# Patient Record
Sex: MALE | Race: WHITE | NOT HISPANIC OR LATINO | Employment: OTHER | URBAN - METROPOLITAN AREA
[De-identification: names, ages, dates, MRNs, and addresses within clinical notes are randomized per-mention and may not be internally consistent; named-entity substitution may affect disease eponyms.]

---

## 2017-01-26 ENCOUNTER — GENERIC CONVERSION - ENCOUNTER (OUTPATIENT)
Dept: OTHER | Facility: OTHER | Age: 72
End: 2017-01-26

## 2017-01-26 LAB
LEFT EYE DIABETIC RETINOPATHY: NORMAL
RIGHT EYE DIABETIC RETINOPATHY: NORMAL

## 2017-02-13 ENCOUNTER — APPOINTMENT (OUTPATIENT)
Dept: LAB | Facility: CLINIC | Age: 72
End: 2017-02-13
Payer: COMMERCIAL

## 2017-02-13 ENCOUNTER — TRANSCRIBE ORDERS (OUTPATIENT)
Dept: LAB | Facility: CLINIC | Age: 72
End: 2017-02-13

## 2017-02-13 DIAGNOSIS — R31.0 GROSS HEMATURIA: ICD-10-CM

## 2017-02-13 DIAGNOSIS — R31.0 GROSS HEMATURIA: Primary | ICD-10-CM

## 2017-02-13 LAB — PSA SERPL-MCNC: <0.1 NG/ML (ref 0–4)

## 2017-02-13 PROCEDURE — 36415 COLL VENOUS BLD VENIPUNCTURE: CPT

## 2017-02-13 PROCEDURE — G0103 PSA SCREENING: HCPCS

## 2017-03-06 ENCOUNTER — TRANSCRIBE ORDERS (OUTPATIENT)
Dept: LAB | Facility: CLINIC | Age: 72
End: 2017-03-06

## 2017-03-06 ENCOUNTER — APPOINTMENT (OUTPATIENT)
Dept: LAB | Facility: CLINIC | Age: 72
End: 2017-03-06
Payer: COMMERCIAL

## 2017-03-06 DIAGNOSIS — R16.0 HEPATOMEGALY, NOT ELSEWHERE CLASSIFIED: ICD-10-CM

## 2017-03-06 DIAGNOSIS — I10 ESSENTIAL (PRIMARY) HYPERTENSION: ICD-10-CM

## 2017-03-06 DIAGNOSIS — K75.81 NONALCOHOLIC STEATOHEPATITIS (NASH): ICD-10-CM

## 2017-03-06 DIAGNOSIS — E78.5 HYPERLIPIDEMIA: ICD-10-CM

## 2017-03-06 DIAGNOSIS — E11.65 TYPE 2 DIABETES MELLITUS WITH HYPERGLYCEMIA (HCC): ICD-10-CM

## 2017-03-06 LAB
ALBUMIN SERPL BCP-MCNC: 3.6 G/DL (ref 3.5–5)
ALP SERPL-CCNC: 68 U/L (ref 46–116)
ALT SERPL W P-5'-P-CCNC: 126 U/L (ref 12–78)
ANION GAP SERPL CALCULATED.3IONS-SCNC: 6 MMOL/L (ref 4–13)
AST SERPL W P-5'-P-CCNC: 69 U/L (ref 5–45)
BILIRUB SERPL-MCNC: 0.5 MG/DL (ref 0.2–1)
BUN SERPL-MCNC: 19 MG/DL (ref 5–25)
CALCIUM SERPL-MCNC: 9.8 MG/DL (ref 8.3–10.1)
CHLORIDE SERPL-SCNC: 102 MMOL/L (ref 100–108)
CHOLEST SERPL-MCNC: 211 MG/DL (ref 50–200)
CO2 SERPL-SCNC: 31 MMOL/L (ref 21–32)
CREAT SERPL-MCNC: 1.51 MG/DL (ref 0.6–1.3)
CREAT UR-MCNC: 132 MG/DL
EST. AVERAGE GLUCOSE BLD GHB EST-MCNC: 157 MG/DL
GFR SERPL CREATININE-BSD FRML MDRD: 45.9 ML/MIN/1.73SQ M
GLUCOSE SERPL-MCNC: 113 MG/DL (ref 65–140)
HBA1C MFR BLD: 7.1 % (ref 4.2–6.3)
HDLC SERPL-MCNC: 49 MG/DL (ref 40–60)
LDLC SERPL CALC-MCNC: 139 MG/DL (ref 0–100)
MICROALBUMIN UR-MCNC: 5.7 MG/L (ref 0–20)
MICROALBUMIN/CREAT 24H UR: 4 MG/G CREATININE (ref 0–30)
POTASSIUM SERPL-SCNC: 4.5 MMOL/L (ref 3.5–5.3)
PROT SERPL-MCNC: 8.5 G/DL (ref 6.4–8.2)
SODIUM SERPL-SCNC: 139 MMOL/L (ref 136–145)
TRIGL SERPL-MCNC: 115 MG/DL

## 2017-03-06 PROCEDURE — 80061 LIPID PANEL: CPT

## 2017-03-06 PROCEDURE — 36415 COLL VENOUS BLD VENIPUNCTURE: CPT

## 2017-03-06 PROCEDURE — 82043 UR ALBUMIN QUANTITATIVE: CPT

## 2017-03-06 PROCEDURE — 83036 HEMOGLOBIN GLYCOSYLATED A1C: CPT

## 2017-03-06 PROCEDURE — 80053 COMPREHEN METABOLIC PANEL: CPT

## 2017-03-06 PROCEDURE — 82570 ASSAY OF URINE CREATININE: CPT

## 2017-03-16 ENCOUNTER — ALLSCRIPTS OFFICE VISIT (OUTPATIENT)
Dept: OTHER | Facility: OTHER | Age: 72
End: 2017-03-16

## 2017-03-22 ENCOUNTER — APPOINTMENT (OUTPATIENT)
Dept: LAB | Facility: CLINIC | Age: 72
End: 2017-03-22
Payer: COMMERCIAL

## 2017-03-22 ENCOUNTER — TRANSCRIBE ORDERS (OUTPATIENT)
Dept: LAB | Facility: CLINIC | Age: 72
End: 2017-03-22

## 2017-03-22 DIAGNOSIS — R31.0 GROSS HEMATURIA: ICD-10-CM

## 2017-03-22 DIAGNOSIS — R31.0 GROSS HEMATURIA: Primary | ICD-10-CM

## 2017-03-22 LAB — PSA SERPL-MCNC: <0.1 NG/ML (ref 0–4)

## 2017-03-22 PROCEDURE — 84153 ASSAY OF PSA TOTAL: CPT

## 2017-03-28 ENCOUNTER — GENERIC CONVERSION - ENCOUNTER (OUTPATIENT)
Dept: OTHER | Facility: OTHER | Age: 72
End: 2017-03-28

## 2017-05-04 ENCOUNTER — GENERIC CONVERSION - ENCOUNTER (OUTPATIENT)
Dept: OTHER | Facility: OTHER | Age: 72
End: 2017-05-04

## 2017-05-09 DIAGNOSIS — E11.65 TYPE 2 DIABETES MELLITUS WITH HYPERGLYCEMIA (HCC): ICD-10-CM

## 2017-05-09 DIAGNOSIS — K75.81 NONALCOHOLIC STEATOHEPATITIS (NASH): ICD-10-CM

## 2017-05-09 DIAGNOSIS — I10 ESSENTIAL (PRIMARY) HYPERTENSION: ICD-10-CM

## 2017-05-09 DIAGNOSIS — E78.5 HYPERLIPIDEMIA: ICD-10-CM

## 2017-05-09 DIAGNOSIS — C61 MALIGNANT NEOPLASM OF PROSTATE (HCC): ICD-10-CM

## 2017-06-03 DIAGNOSIS — I35.0 NONRHEUMATIC AORTIC VALVE STENOSIS: ICD-10-CM

## 2017-06-03 DIAGNOSIS — K75.81 NONALCOHOLIC STEATOHEPATITIS (NASH): ICD-10-CM

## 2017-06-03 DIAGNOSIS — R74.8 ABNORMAL LEVELS OF OTHER SERUM ENZYMES: ICD-10-CM

## 2017-06-03 DIAGNOSIS — E78.5 HYPERLIPIDEMIA: ICD-10-CM

## 2017-06-03 DIAGNOSIS — I10 ESSENTIAL (PRIMARY) HYPERTENSION: ICD-10-CM

## 2017-06-03 DIAGNOSIS — E11.65 TYPE 2 DIABETES MELLITUS WITH HYPERGLYCEMIA (HCC): ICD-10-CM

## 2017-06-08 ENCOUNTER — TRANSCRIBE ORDERS (OUTPATIENT)
Dept: LAB | Facility: CLINIC | Age: 72
End: 2017-06-08

## 2017-06-08 ENCOUNTER — APPOINTMENT (OUTPATIENT)
Dept: LAB | Facility: CLINIC | Age: 72
End: 2017-06-08
Payer: COMMERCIAL

## 2017-06-08 DIAGNOSIS — E11.65 TYPE 2 DIABETES MELLITUS WITH HYPERGLYCEMIA (HCC): ICD-10-CM

## 2017-06-08 DIAGNOSIS — R31.21 ASYMPTOMATIC MICROSCOPIC HEMATURIA: Primary | ICD-10-CM

## 2017-06-08 DIAGNOSIS — E78.5 HYPERLIPIDEMIA: ICD-10-CM

## 2017-06-08 DIAGNOSIS — C61 MALIGNANT NEOPLASM OF PROSTATE (HCC): ICD-10-CM

## 2017-06-08 DIAGNOSIS — R31.21 ASYMPTOMATIC MICROSCOPIC HEMATURIA: ICD-10-CM

## 2017-06-08 DIAGNOSIS — K75.81 NONALCOHOLIC STEATOHEPATITIS (NASH): ICD-10-CM

## 2017-06-08 DIAGNOSIS — I35.0 NONRHEUMATIC AORTIC VALVE STENOSIS: ICD-10-CM

## 2017-06-08 DIAGNOSIS — R74.8 ABNORMAL LEVELS OF OTHER SERUM ENZYMES: ICD-10-CM

## 2017-06-08 DIAGNOSIS — I10 ESSENTIAL (PRIMARY) HYPERTENSION: ICD-10-CM

## 2017-06-08 LAB
ALBUMIN SERPL BCP-MCNC: 3.6 G/DL (ref 3.5–5)
ALP SERPL-CCNC: 49 U/L (ref 46–116)
ALT SERPL W P-5'-P-CCNC: 136 U/L (ref 12–78)
ANION GAP SERPL CALCULATED.3IONS-SCNC: 10 MMOL/L (ref 4–13)
AST SERPL W P-5'-P-CCNC: 68 U/L (ref 5–45)
BILIRUB SERPL-MCNC: 0.5 MG/DL (ref 0.2–1)
BUN SERPL-MCNC: 28 MG/DL (ref 5–25)
CALCIUM SERPL-MCNC: 9.5 MG/DL (ref 8.3–10.1)
CHLORIDE SERPL-SCNC: 102 MMOL/L (ref 100–108)
CHOLEST SERPL-MCNC: 209 MG/DL (ref 50–200)
CO2 SERPL-SCNC: 28 MMOL/L (ref 21–32)
CREAT SERPL-MCNC: 1.25 MG/DL (ref 0.6–1.3)
EST. AVERAGE GLUCOSE BLD GHB EST-MCNC: 146 MG/DL
GFR SERPL CREATININE-BSD FRML MDRD: 57.1 ML/MIN/1.73SQ M
GLUCOSE P FAST SERPL-MCNC: 117 MG/DL (ref 65–99)
HBA1C MFR BLD: 6.7 % (ref 4.2–6.3)
HDLC SERPL-MCNC: 49 MG/DL (ref 40–60)
LDLC SERPL CALC-MCNC: 134 MG/DL (ref 0–100)
POTASSIUM SERPL-SCNC: 4.2 MMOL/L (ref 3.5–5.3)
PROT SERPL-MCNC: 8.2 G/DL (ref 6.4–8.2)
PSA SERPL-MCNC: <0.1 NG/ML (ref 0–4)
SODIUM SERPL-SCNC: 140 MMOL/L (ref 136–145)
TRIGL SERPL-MCNC: 130 MG/DL

## 2017-06-08 PROCEDURE — 80053 COMPREHEN METABOLIC PANEL: CPT

## 2017-06-08 PROCEDURE — 36415 COLL VENOUS BLD VENIPUNCTURE: CPT

## 2017-06-08 PROCEDURE — 80061 LIPID PANEL: CPT

## 2017-06-08 PROCEDURE — 83036 HEMOGLOBIN GLYCOSYLATED A1C: CPT

## 2017-06-08 PROCEDURE — 84403 ASSAY OF TOTAL TESTOSTERONE: CPT

## 2017-06-08 PROCEDURE — 84153 ASSAY OF PSA TOTAL: CPT

## 2017-06-08 PROCEDURE — 84402 ASSAY OF FREE TESTOSTERONE: CPT

## 2017-06-08 PROCEDURE — 84305 ASSAY OF SOMATOMEDIN: CPT

## 2017-06-09 LAB
IGF-I SERPL-MCNC: 110 NG/ML (ref 47–192)
TESTOST FREE SERPL-MCNC: 0.7 PG/ML (ref 6.6–18.1)
TESTOST SERPL-MCNC: <3 NG/DL (ref 348–1197)
TESTOSTERONE COMMENT: ABNORMAL

## 2017-06-20 ENCOUNTER — ALLSCRIPTS OFFICE VISIT (OUTPATIENT)
Dept: OTHER | Facility: OTHER | Age: 72
End: 2017-06-20

## 2017-06-21 ENCOUNTER — GENERIC CONVERSION - ENCOUNTER (OUTPATIENT)
Dept: OTHER | Facility: OTHER | Age: 72
End: 2017-06-21

## 2017-08-02 ENCOUNTER — GENERIC CONVERSION - ENCOUNTER (OUTPATIENT)
Dept: OTHER | Facility: OTHER | Age: 72
End: 2017-08-02

## 2017-08-04 ENCOUNTER — GENERIC CONVERSION - ENCOUNTER (OUTPATIENT)
Dept: OTHER | Facility: OTHER | Age: 72
End: 2017-08-04

## 2017-08-07 ENCOUNTER — GENERIC CONVERSION - ENCOUNTER (OUTPATIENT)
Dept: OTHER | Facility: OTHER | Age: 72
End: 2017-08-07

## 2017-08-14 ENCOUNTER — GENERIC CONVERSION - ENCOUNTER (OUTPATIENT)
Dept: OTHER | Facility: OTHER | Age: 72
End: 2017-08-14

## 2017-08-30 ENCOUNTER — ALLSCRIPTS OFFICE VISIT (OUTPATIENT)
Dept: OTHER | Facility: OTHER | Age: 72
End: 2017-08-30

## 2017-08-30 DIAGNOSIS — E11.9 TYPE 2 DIABETES MELLITUS WITHOUT COMPLICATIONS (HCC): ICD-10-CM

## 2017-09-12 ENCOUNTER — GENERIC CONVERSION - ENCOUNTER (OUTPATIENT)
Dept: OTHER | Facility: OTHER | Age: 72
End: 2017-09-12

## 2017-09-16 DIAGNOSIS — K75.81 NONALCOHOLIC STEATOHEPATITIS (NASH): ICD-10-CM

## 2017-09-16 DIAGNOSIS — I10 ESSENTIAL (PRIMARY) HYPERTENSION: ICD-10-CM

## 2017-09-16 DIAGNOSIS — I35.0 NONRHEUMATIC AORTIC VALVE STENOSIS: ICD-10-CM

## 2017-09-16 DIAGNOSIS — E11.9 TYPE 2 DIABETES MELLITUS WITHOUT COMPLICATIONS (HCC): ICD-10-CM

## 2017-09-16 DIAGNOSIS — E78.5 HYPERLIPIDEMIA: ICD-10-CM

## 2017-11-25 ENCOUNTER — APPOINTMENT (OUTPATIENT)
Dept: LAB | Facility: CLINIC | Age: 72
End: 2017-11-25
Payer: COMMERCIAL

## 2017-11-25 DIAGNOSIS — E11.9 TYPE 2 DIABETES MELLITUS WITHOUT COMPLICATIONS (HCC): ICD-10-CM

## 2017-11-25 LAB
ALBUMIN SERPL BCP-MCNC: 3.3 G/DL (ref 3.5–5)
ALP SERPL-CCNC: 68 U/L (ref 46–116)
ALT SERPL W P-5'-P-CCNC: 117 U/L (ref 12–78)
ANION GAP SERPL CALCULATED.3IONS-SCNC: 7 MMOL/L (ref 4–13)
AST SERPL W P-5'-P-CCNC: 84 U/L (ref 5–45)
BILIRUB SERPL-MCNC: 0.5 MG/DL (ref 0.2–1)
BUN SERPL-MCNC: 15 MG/DL (ref 5–25)
CALCIUM SERPL-MCNC: 9.4 MG/DL (ref 8.3–10.1)
CHLORIDE SERPL-SCNC: 102 MMOL/L (ref 100–108)
CO2 SERPL-SCNC: 28 MMOL/L (ref 21–32)
CREAT SERPL-MCNC: 1.16 MG/DL (ref 0.6–1.3)
EST. AVERAGE GLUCOSE BLD GHB EST-MCNC: 200 MG/DL
GFR SERPL CREATININE-BSD FRML MDRD: 63 ML/MIN/1.73SQ M
GLUCOSE P FAST SERPL-MCNC: 208 MG/DL (ref 65–99)
HBA1C MFR BLD: 8.6 % (ref 4.2–6.3)
POTASSIUM SERPL-SCNC: 4.4 MMOL/L (ref 3.5–5.3)
PROT SERPL-MCNC: 8 G/DL (ref 6.4–8.2)
SODIUM SERPL-SCNC: 137 MMOL/L (ref 136–145)

## 2017-11-25 PROCEDURE — 36415 COLL VENOUS BLD VENIPUNCTURE: CPT

## 2017-11-25 PROCEDURE — 83036 HEMOGLOBIN GLYCOSYLATED A1C: CPT

## 2017-11-25 PROCEDURE — 80053 COMPREHEN METABOLIC PANEL: CPT

## 2017-11-30 ENCOUNTER — ALLSCRIPTS OFFICE VISIT (OUTPATIENT)
Dept: OTHER | Facility: OTHER | Age: 72
End: 2017-11-30

## 2017-11-30 ENCOUNTER — GENERIC CONVERSION - ENCOUNTER (OUTPATIENT)
Dept: OTHER | Facility: OTHER | Age: 72
End: 2017-11-30

## 2018-01-11 NOTE — MISCELLANEOUS
Message   Recorded as Task   Date: 11/30/2017 11:54 AM, Created By: Dottie Garcia   Task Name: Miscellaneous   Assigned To: Essie Severs   Regarding Patient: Aneta Vivas, Status: Active   Comment:    Dottie Garcia - 30 Nov 2017 11:54 AM     TASK CREATED  Caller: Self; Other; [phone] (Home); [phone] (Work)  PATIENT IS TAKING ATORVASTATIN 40MG  LISINOPRIL 2 5MG  GLIPIZIDE 5MG  CARVEDILOL 3 135 MG  JANUVIA 100MG  UBIQUINONE Q10  MULTI VITAMIN  VIT D3  OMEGA CRILL 5X  OSTEOBIFLEX TRIPLE STRENGHT  GLIMEPIRIDE 1MG   Emma Mclean - 30 Nov 2017 3:02 PM     TASK EDITED  Patient called inquiring about meds  Claims you would review and get back to him  I did advise him you were still with patients  spoke with pt  he is taking glipizide 5 mg one per day abd glimipirde 1mg once per day  pt to stop glimipiride and take glipizide 5mg bid        Signatures   Electronically signed by : Obed Gregg; Nov 30 2017  3:22PM EST                       (Author)

## 2018-01-12 VITALS
DIASTOLIC BLOOD PRESSURE: 74 MMHG | WEIGHT: 239.8 LBS | RESPIRATION RATE: 16 BRPM | SYSTOLIC BLOOD PRESSURE: 120 MMHG | BODY MASS INDEX: 36.34 KG/M2 | OXYGEN SATURATION: 98 % | HEART RATE: 82 BPM | TEMPERATURE: 97.2 F | HEIGHT: 68 IN

## 2018-01-12 VITALS
SYSTOLIC BLOOD PRESSURE: 110 MMHG | TEMPERATURE: 98.1 F | RESPIRATION RATE: 16 BRPM | HEIGHT: 68 IN | WEIGHT: 241.38 LBS | OXYGEN SATURATION: 96 % | DIASTOLIC BLOOD PRESSURE: 72 MMHG | BODY MASS INDEX: 36.58 KG/M2 | HEART RATE: 86 BPM

## 2018-01-13 VITALS
SYSTOLIC BLOOD PRESSURE: 118 MMHG | DIASTOLIC BLOOD PRESSURE: 78 MMHG | RESPIRATION RATE: 16 BRPM | HEIGHT: 68 IN | HEART RATE: 90 BPM | WEIGHT: 250.2 LBS | BODY MASS INDEX: 37.92 KG/M2 | OXYGEN SATURATION: 97 % | TEMPERATURE: 97.3 F

## 2018-01-14 VITALS
DIASTOLIC BLOOD PRESSURE: 62 MMHG | WEIGHT: 247.4 LBS | HEIGHT: 68 IN | RESPIRATION RATE: 16 BRPM | TEMPERATURE: 97.2 F | SYSTOLIC BLOOD PRESSURE: 102 MMHG | HEART RATE: 77 BPM | BODY MASS INDEX: 37.5 KG/M2 | OXYGEN SATURATION: 97 %

## 2018-02-10 DIAGNOSIS — E78.5 HYPERLIPIDEMIA: ICD-10-CM

## 2018-02-10 DIAGNOSIS — I10 ESSENTIAL (PRIMARY) HYPERTENSION: ICD-10-CM

## 2018-02-10 DIAGNOSIS — K75.81 NONALCOHOLIC STEATOHEPATITIS (NASH): ICD-10-CM

## 2018-02-10 DIAGNOSIS — E11.9 TYPE 2 DIABETES MELLITUS WITHOUT COMPLICATIONS (HCC): ICD-10-CM

## 2018-02-10 DIAGNOSIS — I25.10 ATHEROSCLEROTIC HEART DISEASE OF NATIVE CORONARY ARTERY WITHOUT ANGINA PECTORIS: ICD-10-CM

## 2018-02-21 DIAGNOSIS — I10 HYPERTENSION, UNSPECIFIED TYPE: Primary | ICD-10-CM

## 2018-02-21 DIAGNOSIS — E11.9 TYPE 2 DIABETES MELLITUS WITHOUT COMPLICATION, WITHOUT LONG-TERM CURRENT USE OF INSULIN (HCC): ICD-10-CM

## 2018-02-21 RX ORDER — CARVEDILOL 3.12 MG/1
3.12 TABLET ORAL 2 TIMES DAILY WITH MEALS
Qty: 180 TABLET | Refills: 1 | Status: SHIPPED | OUTPATIENT
Start: 2018-02-21 | End: 2018-09-20 | Stop reason: SDUPTHER

## 2018-02-21 RX ORDER — GLIPIZIDE 5 MG/1
5 TABLET ORAL 2 TIMES DAILY
Qty: 180 TABLET | Refills: 1 | Status: SHIPPED | OUTPATIENT
Start: 2018-02-21 | End: 2018-02-28 | Stop reason: ALTCHOICE

## 2018-02-23 ENCOUNTER — APPOINTMENT (OUTPATIENT)
Dept: LAB | Facility: CLINIC | Age: 73
End: 2018-02-23
Payer: COMMERCIAL

## 2018-02-23 DIAGNOSIS — E11.9 TYPE 2 DIABETES MELLITUS WITHOUT COMPLICATIONS (HCC): ICD-10-CM

## 2018-02-23 DIAGNOSIS — K75.81 NONALCOHOLIC STEATOHEPATITIS (NASH): ICD-10-CM

## 2018-02-23 DIAGNOSIS — E78.5 HYPERLIPIDEMIA: ICD-10-CM

## 2018-02-23 DIAGNOSIS — I25.10 ATHEROSCLEROTIC HEART DISEASE OF NATIVE CORONARY ARTERY WITHOUT ANGINA PECTORIS: ICD-10-CM

## 2018-02-23 DIAGNOSIS — I10 ESSENTIAL (PRIMARY) HYPERTENSION: ICD-10-CM

## 2018-02-23 LAB
ALBUMIN SERPL BCP-MCNC: 3.4 G/DL (ref 3.5–5)
ALP SERPL-CCNC: 79 U/L (ref 46–116)
ALT SERPL W P-5'-P-CCNC: 160 U/L (ref 12–78)
ANION GAP SERPL CALCULATED.3IONS-SCNC: 8 MMOL/L (ref 4–13)
AST SERPL W P-5'-P-CCNC: 112 U/L (ref 5–45)
BILIRUB SERPL-MCNC: 0.4 MG/DL (ref 0.2–1)
BUN SERPL-MCNC: 23 MG/DL (ref 5–25)
CALCIUM SERPL-MCNC: 9.9 MG/DL (ref 8.3–10.1)
CHLORIDE SERPL-SCNC: 101 MMOL/L (ref 100–108)
CHOLEST SERPL-MCNC: 133 MG/DL (ref 50–200)
CO2 SERPL-SCNC: 28 MMOL/L (ref 21–32)
CREAT SERPL-MCNC: 1.29 MG/DL (ref 0.6–1.3)
CREAT UR-MCNC: 172 MG/DL
EST. AVERAGE GLUCOSE BLD GHB EST-MCNC: 240 MG/DL
GFR SERPL CREATININE-BSD FRML MDRD: 55 ML/MIN/1.73SQ M
GLUCOSE P FAST SERPL-MCNC: 246 MG/DL (ref 65–99)
HBA1C MFR BLD: 10 % (ref 4.2–6.3)
HDLC SERPL-MCNC: 43 MG/DL (ref 40–60)
LDLC SERPL CALC-MCNC: 74 MG/DL (ref 0–100)
MICROALBUMIN UR-MCNC: 19 MG/L (ref 0–20)
MICROALBUMIN/CREAT 24H UR: 11 MG/G CREATININE (ref 0–30)
POTASSIUM SERPL-SCNC: 4.6 MMOL/L (ref 3.5–5.3)
PROT SERPL-MCNC: 8.4 G/DL (ref 6.4–8.2)
SODIUM SERPL-SCNC: 137 MMOL/L (ref 136–145)
TRIGL SERPL-MCNC: 78 MG/DL

## 2018-02-23 PROCEDURE — 3061F NEG MICROALBUMINURIA REV: CPT | Performed by: PHYSICIAN ASSISTANT

## 2018-02-23 PROCEDURE — 82570 ASSAY OF URINE CREATININE: CPT

## 2018-02-23 PROCEDURE — 80053 COMPREHEN METABOLIC PANEL: CPT

## 2018-02-23 PROCEDURE — 80061 LIPID PANEL: CPT

## 2018-02-23 PROCEDURE — 36415 COLL VENOUS BLD VENIPUNCTURE: CPT

## 2018-02-23 PROCEDURE — 82043 UR ALBUMIN QUANTITATIVE: CPT

## 2018-02-23 PROCEDURE — 83036 HEMOGLOBIN GLYCOSYLATED A1C: CPT

## 2018-02-27 PROBLEM — Z95.2 HISTORY OF AORTIC VALVE REPLACEMENT: Status: ACTIVE | Noted: 2017-11-30

## 2018-02-27 PROBLEM — I25.10 ARTERIOSCLEROTIC CORONARY ARTERY DISEASE: Status: ACTIVE | Noted: 2017-11-30

## 2018-02-27 PROBLEM — E11.9: Status: ACTIVE | Noted: 2017-06-20

## 2018-02-27 PROBLEM — M81.0 OSTEOPOROSIS: Status: ACTIVE | Noted: 2017-11-30

## 2018-02-27 PROBLEM — E66.9 OBESITY (BMI 30-39.9): Status: ACTIVE | Noted: 2017-11-30

## 2018-02-27 RX ORDER — ACETAMINOPHEN 160 MG
2000 TABLET,DISINTEGRATING ORAL DAILY
Refills: 11 | COMMUNITY
Start: 2018-02-02

## 2018-02-27 RX ORDER — LANCETS 28 GAUGE
EACH MISCELLANEOUS
COMMUNITY
Start: 2011-09-28 | End: 2018-05-14 | Stop reason: SDUPTHER

## 2018-02-27 RX ORDER — ATORVASTATIN CALCIUM 40 MG/1
40 TABLET, FILM COATED ORAL DAILY
Refills: 1 | COMMUNITY
Start: 2017-12-18 | End: 2018-03-14 | Stop reason: SDUPTHER

## 2018-02-27 RX ORDER — LISINOPRIL 2.5 MG/1
1 TABLET ORAL DAILY
COMMUNITY
Start: 2017-12-05 | End: 2018-02-28

## 2018-02-28 ENCOUNTER — OFFICE VISIT (OUTPATIENT)
Dept: FAMILY MEDICINE CLINIC | Facility: CLINIC | Age: 73
End: 2018-02-28
Payer: COMMERCIAL

## 2018-02-28 VITALS
DIASTOLIC BLOOD PRESSURE: 76 MMHG | HEART RATE: 82 BPM | OXYGEN SATURATION: 96 % | WEIGHT: 257.6 LBS | SYSTOLIC BLOOD PRESSURE: 124 MMHG | TEMPERATURE: 97.5 F | BODY MASS INDEX: 39.04 KG/M2 | HEIGHT: 68 IN

## 2018-02-28 DIAGNOSIS — I25.10 ARTERIOSCLEROTIC CORONARY ARTERY DISEASE: ICD-10-CM

## 2018-02-28 DIAGNOSIS — E66.9 OBESITY (BMI 30-39.9): ICD-10-CM

## 2018-02-28 DIAGNOSIS — C61 PROSTATE CANCER (HCC): ICD-10-CM

## 2018-02-28 DIAGNOSIS — E11.65 CONTROLLED TYPE 2 DIABETES MELLITUS WITH HYPERGLYCEMIA (HCC): Primary | ICD-10-CM

## 2018-02-28 DIAGNOSIS — K75.81 NASH (NONALCOHOLIC STEATOHEPATITIS): ICD-10-CM

## 2018-02-28 DIAGNOSIS — M81.0 OSTEOPOROSIS: ICD-10-CM

## 2018-02-28 DIAGNOSIS — I10 BENIGN ESSENTIAL HTN: ICD-10-CM

## 2018-02-28 DIAGNOSIS — Z95.2 HISTORY OF AORTIC VALVE REPLACEMENT: ICD-10-CM

## 2018-02-28 DIAGNOSIS — E78.5 HYPERLIPIDEMIA: ICD-10-CM

## 2018-02-28 PROCEDURE — 99214 OFFICE O/P EST MOD 30 MIN: CPT | Performed by: PHYSICIAN ASSISTANT

## 2018-02-28 PROCEDURE — 1101F PT FALLS ASSESS-DOCD LE1/YR: CPT | Performed by: PHYSICIAN ASSISTANT

## 2018-02-28 RX ORDER — GLIPIZIDE 10 MG/1
10 TABLET ORAL
Qty: 180 TABLET | Refills: 0 | Status: SHIPPED | OUTPATIENT
Start: 2018-02-28 | End: 2018-05-27 | Stop reason: SDUPTHER

## 2018-02-28 NOTE — PROGRESS NOTES
Assessment/Plan:         Diagnoses and all orders for this visit:    Controlled type 2 diabetes mellitus with hyperglycemia ([facility] [address] )  Comments:   diabetes uncontrolled will increase glipizide to 10 mg twice a day  Continue Januvia 100 mg daily check blood sugar daily exercise low carb diet and weight lo  Orders:  -     HEMOGLOBIN A1C W/ EAG ESTIMATION; Future  -     glipiZIDE (GLUCOTROL) 10 mg tablet; Take 1 tablet (10 mg total) by mouth 2 (two) times a day before meals    Arteriosclerotic coronary artery disease  Comments:   coronary artery disease stable no angina  Benign essential HTN  Comments:    Hypertension at goal   Orders:  -     Comprehensive metabolic panel; Future    Osteoporosis  Comments:    Prolia  Calcium and vitamin-D and exercise    Prostate cancer (HCC)    YATES (nonalcoholic steatohepatitis)  Comments:   elevated liver enzymes  Patient to exercise diet and weight loss    History of aortic valve replacement    Hyperlipidemia  Comments:   lipids at goal on statin therapy  Obesity (BMI 30-39  9)  Comments:    Patient to resume the gym  Hopefully to resume bowling very soon  Other orders  -     aspirin 81 MG tablet; Take by mouth  -     atorvastatin (LIPITOR) 40 mg tablet; Take 40 mg by mouth daily  -     Cholecalciferol (VITAMIN D3) 2000 units capsule; Take 2,000 Units by mouth daily  -     Lancets (FREESTYLE) lancets; by Does not apply route  -     Glucose Blood (FREESTYLE LITE TEST VI); by In Vitro route  -     Discontinue: lisinopril (ZESTRIL) 2 5 mg tablet; Take 1 tablet by mouth daily  -     leuprolide (LUPRON DEPOT, 1-MONTH,) 3 75 mg injection; Inject into the shoulder, thigh, or buttocks  -     Multiple Vitamin-Folic Acid TABS; Take by mouth  -     denosumab (PROLIA) 60 mg/mL; Inject under the skin          Subjective:      Patient ID: Carlos Barker is a 71 y o  male  Patient has known coronary artery disease and history of aortic valve replacement    He is followed by Dr Giselle Mejias screen is Cardiology at [facility]   Patient has hypertension  He is on Coreg and Benicar 20 mg  Patient has hyperlipidemia on atorvastatin 20 mg  Patient has YATES or non alcoholic fatty liver disease  Patient has non-insulin diabetes mellitus type 2  He is on glipizide 5 mg twice a day and Januvia 100 mg daily  Patient is intolerant to metformin  Patient has prostate cancer on Lupron injection followed by Urology  Patient also sees Oncology Dr Timmons  He has osteoporosis which he is getting Prolia injections  Patient has been off his diet not exercising and has gained over 12 lb  His fasting blood sugars 246 current A1c is uncontrolled at 10 urine microalbumin is negative renal functions are negative  AST is 112     Lipid panel is normal        The following portions of the patient's history were reviewed and updated as appropriate:   He   Patient Active Problem List    Diagnosis Date Noted    History of aortic valve replacement 11/30/2017    Arteriosclerotic coronary artery disease 11/30/2017    Obesity (BMI 30-39 9) 11/30/2017    Osteoporosis 11/30/2017    Controlled type 2 diabetes mellitus with hyperglycemia ([facility] [address] ) 06/20/2017    YATES (nonalcoholic steatohepatitis) 09/14/2015    Abnormal liver enzymes 05/08/2014    Prostate cancer ([facility] [address] ) 06/18/2013    Hyperlipidemia 07/17/2012    Osteoarthritis 07/17/2012    Benign essential HTN 04/30/2012     Current Outpatient Prescriptions   Medication Sig Dispense Refill    denosumab (PROLIA) 60 mg/mL Inject under the skin      Glucose Blood (FREESTYLE LITE TEST VI) by In Vitro route      Lancets (FREESTYLE) lancets by Does not apply route      leuprolide (LUPRON DEPOT, 1-MONTH,) 3 75 mg injection Inject into the shoulder, thigh, or buttocks      aspirin 81 MG tablet Take by mouth      atorvastatin (LIPITOR) 40 mg tablet Take 40 mg by mouth daily  1    carvedilol (COREG) 3 125 mg tablet Take 1 tablet (3 125 mg total) by mouth 2 (two) times a day with meals 180 tablet 1    Cholecalciferol (VITAMIN D3) 2000 units capsule Take 2,000 Units by mouth daily  11    Coenzyme Q10 (COQ-10) 100 MG CAPS Take 100 mg by mouth   glipiZIDE (GLUCOTROL) 10 mg tablet Take 1 tablet (10 mg total) by mouth 2 (two) times a day before meals 180 tablet 0    Multiple Vitamin-Folic Acid TABS Take by mouth      olmesartan (BENICAR) 20 mg tablet Take 20 mg by mouth daily   Omega-3 Fatty Acids (FISH OIL) 1,000 mg Take 1,000 mg by mouth daily   sitaGLIPtin (JANUVIA) 100 mg tablet Take 100 mg by mouth daily  No current facility-administered medications for this visit  Current Outpatient Prescriptions on File Prior to Visit   Medication Sig    carvedilol (COREG) 3 125 mg tablet Take 1 tablet (3 125 mg total) by mouth 2 (two) times a day with meals    Coenzyme Q10 (COQ-10) 100 MG CAPS Take 100 mg by mouth   olmesartan (BENICAR) 20 mg tablet Take 20 mg by mouth daily   Omega-3 Fatty Acids (FISH OIL) 1,000 mg Take 1,000 mg by mouth daily   sitaGLIPtin (JANUVIA) 100 mg tablet Take 100 mg by mouth daily   [DISCONTINUED] glipiZIDE (GLUCOTROL) 5 mg tablet Take 1 tablet (5 mg total) by mouth 2 (two) times a day    [DISCONTINUED] multivitamin (THERAGRAN) TABS Take 1 tablet by mouth daily  No current facility-administered medications on file prior to visit  He has No Known Allergies       Review of Systems   Constitutional: Positive for appetite change  HENT: Negative for ear pain  Respiratory: Negative for cough and shortness of breath  Cardiovascular: Negative for chest pain and leg swelling  Gastrointestinal: Negative for abdominal pain, constipation, diarrhea, nausea and vomiting  Genitourinary: Negative for difficulty urinating and dysuria  Nocturia   X 2   Musculoskeletal: Negative for arthralgias and myalgias  Skin: Negative for rash  Neurological: Negative for dizziness, light-headedness and headaches  Psychiatric/Behavioral: Negative for sleep disturbance  Objective:      /76 (BP Location: Left arm, Patient Position: Sitting, Cuff Size: Standard)   Pulse 82   Temp 97 5 °F (36 4 °C)   Ht 5' 7 5" (1 715 m)   Wt 117 kg (257 lb 9 6 oz)   SpO2 96%   BMI 39 75 kg/m²          Physical Exam   Constitutional: He is oriented to person, place, and time  He appears well-developed and well-nourished  Morbidly  obese   HENT:   Right Ear: External ear normal    Left Ear: External ear normal    Mouth/Throat: Oropharynx is clear and moist    Eyes: Conjunctivae are normal  Pupils are equal, round, and reactive to light  Neck: Neck supple  No thyromegaly present  Cardiovascular: Normal rate and regular rhythm  Pulses are no weak pulses  Murmur heard  Systolic murmur is present with a grade of 1/6   Pulses:       Carotid pulses are 1+ on the right side, and 1+ on the left side  Dorsalis pedis pulses are 2+ on the right side, and 2+ on the left side  Pulmonary/Chest: Effort normal and breath sounds normal    Musculoskeletal: He exhibits no edema  Feet:   Right Foot:   Skin Integrity: Positive for dry skin  Left Foot:   Skin Integrity: Positive for dry skin  Lymphadenopathy:     He has no cervical adenopathy  Neurological: He is alert and oriented to person, place, and time  Skin: Skin is dry  Psychiatric: He has a normal mood and affect  His behavior is normal  Thought content normal      Patient's shoes and socks removed  Right Foot/Ankle   Right Foot Inspection  Skin Exam: dry skin                          Toe Exam: ROM and strength within normal limits  Sensory   Vibration: intact    Monofilament testing: intact  Vascular    The right DP pulse is 2+  Left Foot/Ankle  Left Foot Inspection  Skin Exam: dry skin                                         Sensory   Vibration: intact    Monofilament: intact  Vascular    The left DP pulse is 2+     Assign Risk Category:  No deformity present; No loss of protective sensation;  No weak pulses       Risk: 0

## 2018-03-01 DIAGNOSIS — I10 HYPERTENSION, UNSPECIFIED TYPE: Primary | ICD-10-CM

## 2018-03-01 RX ORDER — LISINOPRIL 2.5 MG/1
2.5 TABLET ORAL DAILY
Qty: 90 TABLET | Refills: 0 | Status: SHIPPED | OUTPATIENT
Start: 2018-03-01 | End: 2018-05-27 | Stop reason: SDUPTHER

## 2018-03-14 DIAGNOSIS — E78.5 DYSLIPIDEMIA: Primary | ICD-10-CM

## 2018-03-14 RX ORDER — ATORVASTATIN CALCIUM 40 MG/1
40 TABLET, FILM COATED ORAL DAILY
Qty: 90 TABLET | Refills: 1 | Status: SHIPPED | OUTPATIENT
Start: 2018-03-14 | End: 2018-09-04 | Stop reason: SDUPTHER

## 2018-04-16 DIAGNOSIS — E11.9 TYPE 2 DIABETES MELLITUS WITHOUT COMPLICATION, WITHOUT LONG-TERM CURRENT USE OF INSULIN (HCC): Primary | ICD-10-CM

## 2018-04-16 RX ORDER — SITAGLIPTIN 100 MG/1
TABLET, FILM COATED ORAL
Qty: 90 TABLET | Refills: 0 | Status: SHIPPED | OUTPATIENT
Start: 2018-04-16 | End: 2018-07-14 | Stop reason: SDUPTHER

## 2018-05-14 DIAGNOSIS — E11.9 TYPE 2 DIABETES MELLITUS WITHOUT COMPLICATION, WITHOUT LONG-TERM CURRENT USE OF INSULIN (HCC): Primary | ICD-10-CM

## 2018-05-14 RX ORDER — LANCETS 28 GAUGE
EACH MISCELLANEOUS
Qty: 100 EACH | Refills: 0 | Status: SHIPPED | OUTPATIENT
Start: 2018-05-14 | End: 2019-04-10

## 2018-05-19 LAB
LEFT EYE DIABETIC RETINOPATHY: NORMAL
RIGHT EYE DIABETIC RETINOPATHY: NORMAL

## 2018-05-21 ENCOUNTER — OFFICE VISIT (OUTPATIENT)
Dept: FAMILY MEDICINE CLINIC | Facility: CLINIC | Age: 73
End: 2018-05-21
Payer: COMMERCIAL

## 2018-05-21 VITALS
DIASTOLIC BLOOD PRESSURE: 72 MMHG | BODY MASS INDEX: 39.13 KG/M2 | OXYGEN SATURATION: 96 % | TEMPERATURE: 97.4 F | HEIGHT: 68 IN | SYSTOLIC BLOOD PRESSURE: 116 MMHG | HEART RATE: 83 BPM | WEIGHT: 258.2 LBS

## 2018-05-21 DIAGNOSIS — S39.012A STRAIN OF LUMBAR REGION, INITIAL ENCOUNTER: Primary | ICD-10-CM

## 2018-05-21 PROCEDURE — 99213 OFFICE O/P EST LOW 20 MIN: CPT | Performed by: PHYSICIAN ASSISTANT

## 2018-05-21 NOTE — PROGRESS NOTES
Assessment/Plan:         Diagnoses and all orders for this visit:    Strain of lumbar region, initial encounter  Comments:    Mild lumbar strain  Patient to apply warm moist heat  Patient may take over-the-counter acetaminophen  Follow up if persists or worsens  Subjective:      Patient ID: Maurice Bloom is a 71 y o  male  Patient presents with right lower back pain which started Tuesday night  Patient denies any fall or any injury  Patient states he was out in the rain and got wet  Patient has no radiation of pain no rash at site of area  Patient states he has pain after sitting for periods of time stiffness when he gets up  Patient applied heat  No over-the-counter meds taken  The following portions of the patient's history were reviewed and updated as appropriate:   He  has a past medical history of Arteriosclerotic coronary artery disease (11/30/2017); Benign prostate hyperplasia; COPD (chronic obstructive pulmonary disease) ([facility] [address] ); Deep venous embolism and thrombosis ([facility] ); Diabetes mellitus ([facility] [address] ); Hypertension; Impacted cerumen; Osteoarthritis (7/17/2012); Prostate cancer ([facility] ); and Pulmonary embolism ([facility] )    He   Patient Active Problem List    Diagnosis Date Noted    History of aortic valve replacement 11/30/2017    Arteriosclerotic coronary artery disease 11/30/2017    Obesity (BMI 30-39 9) 11/30/2017    Osteoporosis 11/30/2017    Controlled type 2 diabetes mellitus with hyperglycemia ([facility] [address] ) 06/20/2017    YATES (nonalcoholic steatohepatitis) 09/14/2015    Abnormal liver enzymes 05/08/2014    [facility] ([facility] ) 06/18/2013    Hyperlipidemia 07/17/2012    Osteoarthritis 07/17/2012    Benign essential HTN 04/30/2012     Current Outpatient Prescriptions   Medication Sig Dispense Refill    aspirin 81 MG tablet Take by mouth      atorvastatin (LIPITOR) 40 mg tablet Take 1 tablet (40 mg total) by mouth daily 90 tablet 1    carvedilol (COREG) 3 125 mg tablet Take 1 tablet (3 125 mg total) by mouth 2 (two) times a day with meals 180 tablet 1    Cholecalciferol (VITAMIN D3) 2000 units capsule Take 2,000 Units by mouth daily  11    Coenzyme Q10 (COQ-10) 100 MG CAPS Take 100 mg by mouth   denosumab (PROLIA) 60 mg/mL Inject under the skin      glipiZIDE (GLUCOTROL) 10 mg tablet Take 1 tablet (10 mg total) by mouth 2 (two) times a day before meals 180 tablet 0    Glucose Blood (FREESTYLE LITE TEST VI) by In Vitro route      JANUVIA 100 MG tablet TAKE 1 TABLET ONCE DAILY  90 tablet 0    Lancets (FREESTYLE) lancets USE 1 LANCET ONCE A DAY AS DIRECTED 100 each 0    leuprolide (LUPRON DEPOT, 1-MONTH,) 3 75 mg injection Inject into the shoulder, thigh, or buttocks      lisinopril (ZESTRIL) 2 5 mg tablet Take 1 tablet (2 5 mg total) by mouth daily 90 tablet 0    Multiple Vitamin-Folic Acid TABS Take by mouth      Omega-3 Fatty Acids (FISH OIL) 1,000 mg Take 1,000 mg by mouth daily  No current facility-administered medications for this visit  Current Outpatient Prescriptions on File Prior to Visit   Medication Sig    aspirin 81 MG tablet Take by mouth    atorvastatin (LIPITOR) 40 mg tablet Take 1 tablet (40 mg total) by mouth daily    carvedilol (COREG) 3 125 mg tablet Take 1 tablet (3 125 mg total) by mouth 2 (two) times a day with meals    Cholecalciferol (VITAMIN D3) 2000 units capsule Take 2,000 Units by mouth daily    Coenzyme Q10 (COQ-10) 100 MG CAPS Take 100 mg by mouth   denosumab (PROLIA) 60 mg/mL Inject under the skin    glipiZIDE (GLUCOTROL) 10 mg tablet Take 1 tablet (10 mg total) by mouth 2 (two) times a day before meals    Glucose Blood (FREESTYLE LITE TEST VI) by In Vitro route    JANUVIA 100 MG tablet TAKE 1 TABLET ONCE DAILY      Lancets (FREESTYLE) lancets USE 1 LANCET ONCE A DAY AS DIRECTED    leuprolide (LUPRON DEPOT, 1-MONTH,) 3 75 mg injection Inject into the shoulder, thigh, or buttocks    lisinopril (ZESTRIL) 2 5 mg tablet Take 1 tablet (2 5 mg total) by mouth daily    Multiple Vitamin-Folic Acid TABS Take by mouth    Omega-3 Fatty Acids (FISH OIL) 1,000 mg Take 1,000 mg by mouth daily   [DISCONTINUED] olmesartan (BENICAR) 20 mg tablet Take 20 mg by mouth daily  No current facility-administered medications on file prior to visit  He has No Known Allergies       Review of Systems   Respiratory: Negative for cough  Cardiovascular: Negative for chest pain  Gastrointestinal: Negative for abdominal pain, constipation and diarrhea  Genitourinary: Negative for difficulty urinating  Musculoskeletal: Positive for back pain  Skin: Negative for rash  Objective:      /72 (BP Location: Left arm, Patient Position: Sitting, Cuff Size: Standard)   Pulse 83   Temp (!) 97 4 °F (36 3 °C)   Ht 5' 7 5" (1 715 m)   Wt 117 kg (258 lb 3 2 oz)   SpO2 96%   BMI 39 84 kg/m²          Physical Exam   Constitutional: He is oriented to person, place, and time  He appears well-developed and well-nourished  No distress  Obese  White  male   HENT:   Right Ear: External ear normal    Left Ear: External ear normal    Eyes: Pupils are equal, round, and reactive to light  Cardiovascular: Normal rate and regular rhythm  aoritc  Valve  replacement   Pulmonary/Chest: Effort normal and breath sounds normal    Abdominal: Soft  Bowel sounds are normal  There is no tenderness  Obese  abd  Musculoskeletal: He exhibits edema  He exhibits no tenderness or deformity  Patient points to right lower lumbar sacral area area is nontender not swollen no rash  No buttocks tenderness  No radiculopathy sitting or lying  Some pain with  With flexion  Gait is normal   Deep tendon reflexes are intact  Strength to path of resistant lower right and left extremities are intact as well  Neurological: He is alert and oriented to person, place, and time  Skin: Skin is dry  Nursing note and vitals reviewed

## 2018-05-27 DIAGNOSIS — I10 HYPERTENSION, UNSPECIFIED TYPE: ICD-10-CM

## 2018-05-27 DIAGNOSIS — E11.65 CONTROLLED TYPE 2 DIABETES MELLITUS WITH HYPERGLYCEMIA (HCC): ICD-10-CM

## 2018-05-28 RX ORDER — LISINOPRIL 2.5 MG/1
2.5 TABLET ORAL DAILY
Qty: 90 TABLET | Refills: 0 | Status: SHIPPED | OUTPATIENT
Start: 2018-05-28 | End: 2018-08-29 | Stop reason: SDUPTHER

## 2018-05-30 RX ORDER — GLIPIZIDE 10 MG/1
10 TABLET ORAL
Qty: 180 TABLET | Refills: 0 | Status: SHIPPED | OUTPATIENT
Start: 2018-05-30 | End: 2018-09-14 | Stop reason: SDUPTHER

## 2018-06-15 ENCOUNTER — TRANSCRIBE ORDERS (OUTPATIENT)
Dept: LAB | Facility: CLINIC | Age: 73
End: 2018-06-15

## 2018-06-15 ENCOUNTER — APPOINTMENT (OUTPATIENT)
Dept: LAB | Facility: CLINIC | Age: 73
End: 2018-06-15
Payer: COMMERCIAL

## 2018-06-15 DIAGNOSIS — I10 BENIGN ESSENTIAL HTN: ICD-10-CM

## 2018-06-15 DIAGNOSIS — E11.65 CONTROLLED TYPE 2 DIABETES MELLITUS WITH HYPERGLYCEMIA (HCC): ICD-10-CM

## 2018-06-15 LAB
ALBUMIN SERPL BCP-MCNC: 3.4 G/DL (ref 3.5–5)
ALP SERPL-CCNC: 63 U/L (ref 46–116)
ALT SERPL W P-5'-P-CCNC: 156 U/L (ref 12–78)
ANION GAP SERPL CALCULATED.3IONS-SCNC: 10 MMOL/L (ref 4–13)
AST SERPL W P-5'-P-CCNC: 109 U/L (ref 5–45)
BILIRUB SERPL-MCNC: 0.7 MG/DL (ref 0.2–1)
BUN SERPL-MCNC: 18 MG/DL (ref 5–25)
CALCIUM SERPL-MCNC: 9.6 MG/DL (ref 8.3–10.1)
CHLORIDE SERPL-SCNC: 101 MMOL/L (ref 100–108)
CO2 SERPL-SCNC: 26 MMOL/L (ref 21–32)
CREAT SERPL-MCNC: 1.25 MG/DL (ref 0.6–1.3)
EST. AVERAGE GLUCOSE BLD GHB EST-MCNC: 243 MG/DL
GFR SERPL CREATININE-BSD FRML MDRD: 58 ML/MIN/1.73SQ M
GLUCOSE P FAST SERPL-MCNC: 224 MG/DL (ref 65–99)
HBA1C MFR BLD: 10.1 % (ref 4.2–6.3)
POTASSIUM SERPL-SCNC: 4.2 MMOL/L (ref 3.5–5.3)
PROT SERPL-MCNC: 7.8 G/DL (ref 6.4–8.2)
SODIUM SERPL-SCNC: 137 MMOL/L (ref 136–145)

## 2018-06-15 PROCEDURE — 83036 HEMOGLOBIN GLYCOSYLATED A1C: CPT

## 2018-06-15 PROCEDURE — 80053 COMPREHEN METABOLIC PANEL: CPT

## 2018-06-15 PROCEDURE — 36415 COLL VENOUS BLD VENIPUNCTURE: CPT

## 2018-06-19 ENCOUNTER — OFFICE VISIT (OUTPATIENT)
Dept: FAMILY MEDICINE CLINIC | Facility: CLINIC | Age: 73
End: 2018-06-19
Payer: COMMERCIAL

## 2018-06-19 VITALS
BODY MASS INDEX: 39.31 KG/M2 | TEMPERATURE: 97.9 F | WEIGHT: 259.4 LBS | HEART RATE: 79 BPM | OXYGEN SATURATION: 96 % | DIASTOLIC BLOOD PRESSURE: 72 MMHG | HEIGHT: 68 IN | SYSTOLIC BLOOD PRESSURE: 114 MMHG

## 2018-06-19 DIAGNOSIS — M81.0 OSTEOPOROSIS WITHOUT CURRENT PATHOLOGICAL FRACTURE, UNSPECIFIED OSTEOPOROSIS TYPE: ICD-10-CM

## 2018-06-19 DIAGNOSIS — Z95.2 HISTORY OF AORTIC VALVE REPLACEMENT: ICD-10-CM

## 2018-06-19 DIAGNOSIS — IMO0001 UNCONTROLLED TYPE 2 DIABETES MELLITUS WITHOUT COMPLICATION, WITHOUT LONG-TERM CURRENT USE OF INSULIN: ICD-10-CM

## 2018-06-19 DIAGNOSIS — E66.9 OBESITY (BMI 30-39.9): ICD-10-CM

## 2018-06-19 DIAGNOSIS — C61 PROSTATE CANCER (HCC): ICD-10-CM

## 2018-06-19 DIAGNOSIS — E78.5 HYPERLIPIDEMIA, UNSPECIFIED HYPERLIPIDEMIA TYPE: ICD-10-CM

## 2018-06-19 DIAGNOSIS — I25.10 ARTERIOSCLEROTIC CORONARY ARTERY DISEASE: ICD-10-CM

## 2018-06-19 DIAGNOSIS — I10 BENIGN ESSENTIAL HTN: ICD-10-CM

## 2018-06-19 DIAGNOSIS — K75.81 NASH (NONALCOHOLIC STEATOHEPATITIS): ICD-10-CM

## 2018-06-19 PROBLEM — H61.20 IMPACTED CERUMEN: Status: RESOLVED | Noted: 2018-06-19 | Resolved: 2018-06-19

## 2018-06-19 PROBLEM — I26.99 PULMONARY EMBOLISM (HCC): Status: RESOLVED | Noted: 2018-06-19 | Resolved: 2018-06-19

## 2018-06-19 PROBLEM — I82.409 DEEP VENOUS EMBOLISM AND THROMBOSIS (HCC): Status: RESOLVED | Noted: 2018-06-19 | Resolved: 2018-06-19

## 2018-06-19 PROCEDURE — 99214 OFFICE O/P EST MOD 30 MIN: CPT | Performed by: PHYSICIAN ASSISTANT

## 2018-06-19 RX ORDER — ACARBOSE 50 MG/1
50 TABLET ORAL
Qty: 90 TABLET | Refills: 5 | Status: SHIPPED | OUTPATIENT
Start: 2018-06-19 | End: 2018-11-06 | Stop reason: ALTCHOICE

## 2018-06-19 RX ORDER — BLOOD-GLUCOSE METER
EACH MISCELLANEOUS DAILY
Qty: 1 EACH | Refills: 0 | Status: SHIPPED | OUTPATIENT
Start: 2018-06-19

## 2018-06-19 RX ORDER — LANCETS
EACH MISCELLANEOUS
Qty: 100 EACH | Refills: 2 | Status: SHIPPED | OUTPATIENT
Start: 2018-06-19 | End: 2019-04-10

## 2018-06-19 NOTE — PROGRESS NOTES
Assessment/Plan:         Diagnoses and all orders for this visit:    Uncontrolled type 2 diabetes mellitus without complication, without long-term current use of insulin ([facility] [address] )  Comments:   uncontrolled diabetes  Patient to continue exercise regularly check blood sugar daily  Referred to endocrinology  Will add  acarbose with meals  Orders:  -     Hemoglobin A1C; Future  -     Ambulatory referral to Endocrinology; Future  -     acarbose (PRECOSE) 50 mg tablet; Take 1 tablet (50 mg total) by mouth 3 (three) times a day with meals    Benign essential HTN  Comments:   blood pressure is at goal continue current regimen  Orders:  -     Comprehensive metabolic panel; Future    Arteriosclerotic coronary artery disease  Comments:    CAD stable no chest pain  Follow up with Cardiology  Orders:  -     Lipid panel; Future  -     Comprehensive metabolic panel; Future    Osteoporosis without current pathological fracture, unspecified osteoporosis type  Comments:    Continue calcium D Prolia injections and weight-bearing exercises  Prostate cancer [facility])  Comments:    Patient needs a new urologist   Patient will be seeing   [facility] ph  anterio  Continue follow-up with Dr Weyman Ganser Oncology  Obesity (BMI 30-39  9)  Comments:    Continue exercise and low carb diet to promote weight loss  Hyperlipidemia, unspecified hyperlipidemia type  Comments:    Continue statin therapy and low-fat diet  Orders:  -     Lipid panel; Future    History of aortic valve replacement    YATES (nonalcoholic steatohepatitis)          Subjective:      Patient ID: Iza Banks is a 71 y o  male  Patient presents for follow up chronic conditions  Patient has non-insulin diabetes mellitus type 2 which is uncontrolled  His last A1c was 10 his current A1c is 10 1  Patient admits to being noncompliant with his diet    Patient does check his blood sugar several times a week he states that his blood sugars have been in the normal range patient's meter is very old he will need a new meter  Patient current diabetic regimen consists of glipizide 10 mg twice a day Januvia 100 mg once a day  Glipizide was increased at last visit  Patient is intolerant to metformin  Discussed and adding once a day long-term insulin  Patient is adverse to this idea  Discussed adding acarbose  With  Meals  Also discussed sending patient to endocrinologist   Patient has coronary artery disease and history of aortic valve replacement he is on Coreg twice a day  He is Co manage with Cardiology  Patient is also taking lisinopril 2 5 mg for hypertension and renal protection  Patient also taking generic Lipitor 40 mg for lipid control  Patient has a history of prostate cancer he is on Lupron injections  Patient's urologist retired so his oncologist has referred him to a new urologist   Patient also has OA and osteoporosis he is on calcium vitamin-D and Prolia injections  This is being managed by his oncologist   Other labs reviewed show a fasting blood sugar of 224 AST and ALT remained elevated  The patient has known fatty liver  The following portions of the patient's history were reviewed and updated as appropriate:   He  has a past medical history of Arteriosclerotic coronary artery disease (11/30/2017); Benign prostate hyperplasia; COPD (chronic obstructive pulmonary disease) ([address] ); Deep venous embolism and thrombosis ([facility] [address] ); Diabetes mellitus ([address] ); Hypertension; Impacted cerumen; Osteoarthritis (7/17/2012); Prostate cancer ([address] ); and Pulmonary embolism ([facility] [address] )    He   Patient Active Problem List    Diagnosis Date Noted    History of aortic valve replacement 11/30/2017    Arteriosclerotic coronary artery disease 11/30/2017    Obesity (BMI 30-39 9) 11/30/2017    Osteoporosis 11/30/2017    Uncontrolled type 2 diabetes mellitus without complication, without long-term current use of insulin ([address] ) 06/20/2017    YATES (nonalcoholic steatohepatitis) 09/14/2015    Abnormal liver enzymes 05/08/2014    Prostate cancer ([facility] [address] ) 06/18/2013    Hyperlipidemia 07/17/2012    Osteoarthritis 07/17/2012    Benign essential HTN 04/30/2012     He  has a past surgical history that includes Fracture surgery; Lung surgery; Coronary artery bypass graft; Cardiac valve replacement; Knee arthroscopy; Knee surgery; Tonsillectomy; Umbilical hernia repair; and Lung surgery  Current Outpatient Prescriptions   Medication Sig Dispense Refill    acarbose (PRECOSE) 50 mg tablet Take 1 tablet (50 mg total) by mouth 3 (three) times a day with meals 90 tablet 5    aspirin 81 MG tablet Take by mouth      atorvastatin (LIPITOR) 40 mg tablet Take 1 tablet (40 mg total) by mouth daily 90 tablet 1    carvedilol (COREG) 3 125 mg tablet Take 1 tablet (3 125 mg total) by mouth 2 (two) times a day with meals 180 tablet 1    Cholecalciferol (VITAMIN D3) 2000 units capsule Take 2,000 Units by mouth daily  11    Coenzyme Q10 (COQ-10) 100 MG CAPS Take 100 mg by mouth   denosumab (PROLIA) 60 mg/mL Inject under the skin      glipiZIDE (GLUCOTROL) 10 mg tablet TAKE 1 TABLET (10 MG TOTAL) BY MOUTH 2 (TWO) TIMES A DAY BEFORE MEALS 180 tablet 0    Glucose Blood (FREESTYLE LITE TEST VI) by In Vitro route      JANUVIA 100 MG tablet TAKE 1 TABLET ONCE DAILY  90 tablet 0    Lancets (FREESTYLE) lancets USE 1 LANCET ONCE A DAY AS DIRECTED 100 each 0    leuprolide (LUPRON DEPOT, 1-MONTH,) 3 75 mg injection Inject into the shoulder, thigh, or buttocks      lisinopril (ZESTRIL) 2 5 mg tablet Take 1 tablet (2 5 mg total) by mouth daily 90 tablet 0    Multiple Vitamin-Folic Acid TABS Take by mouth      Omega-3 Fatty Acids (FISH OIL) 1,000 mg Take 1,000 mg by mouth daily  No current facility-administered medications for this visit        Current Outpatient Prescriptions on File Prior to Visit   Medication Sig    aspirin 81 MG tablet Take by mouth    atorvastatin (LIPITOR) 40 mg tablet Take 1 tablet (40 mg total) by mouth daily    carvedilol (COREG) 3 125 mg tablet Take 1 tablet (3 125 mg total) by mouth 2 (two) times a day with meals    Cholecalciferol (VITAMIN D3) 2000 units capsule Take 2,000 Units by mouth daily    Coenzyme Q10 (COQ-10) 100 MG CAPS Take 100 mg by mouth   denosumab (PROLIA) 60 mg/mL Inject under the skin    glipiZIDE (GLUCOTROL) 10 mg tablet TAKE 1 TABLET (10 MG TOTAL) BY MOUTH 2 (TWO) TIMES A DAY BEFORE MEALS    Glucose Blood (FREESTYLE LITE TEST VI) by In Vitro route    JANUVIA 100 MG tablet TAKE 1 TABLET ONCE DAILY   Lancets (FREESTYLE) lancets USE 1 LANCET ONCE A DAY AS DIRECTED    leuprolide (LUPRON DEPOT, 1-MONTH,) 3 75 mg injection Inject into the shoulder, thigh, or buttocks    lisinopril (ZESTRIL) 2 5 mg tablet Take 1 tablet (2 5 mg total) by mouth daily    Multiple Vitamin-Folic Acid TABS Take by mouth    Omega-3 Fatty Acids (FISH OIL) 1,000 mg Take 1,000 mg by mouth daily  No current facility-administered medications on file prior to visit  He has No Known Allergies       Review of Systems   Constitutional: Negative for unexpected weight change  HENT: Negative for ear pain and sore throat  Eyes: Negative for visual disturbance  Respiratory: Negative for cough and shortness of breath  Cardiovascular: Negative for chest pain, palpitations and leg swelling  Gastrointestinal: Negative for constipation and diarrhea  Genitourinary:        Nocturia  X 2   Musculoskeletal: Negative for back pain and myalgias  Skin: Negative for rash  Neurological: Negative for dizziness and headaches  Objective:      /72 (BP Location: Left arm, Patient Position: Sitting, Cuff Size: Standard)   Pulse 79   Temp 97 9 °F (36 6 °C)   Ht 5' 7 5" (1 715 m)   Wt 118 kg (259 lb 6 4 oz)   SpO2 96%   BMI 40 03 kg/m²          Physical Exam   Constitutional: He is oriented to person, place, and time  He appears well-developed and well-nourished  Obese  White  male   HENT:   Right Ear: Tympanic membrane, external ear and ear canal normal    Left Ear: Tympanic membrane, external ear and ear canal normal    Mouth/Throat: Oropharynx is clear and moist    Eyes: Conjunctivae are normal  Pupils are equal, round, and reactive to light  Cardiovascular: Normal rate and regular rhythm  No murmur heard  Aortic  Valve  replacement   Pulmonary/Chest: Effort normal and breath sounds normal    Musculoskeletal: He exhibits no edema  Lymphadenopathy:     He has no cervical adenopathy  Neurological: He is alert and oriented to person, place, and time  Skin: Skin is warm and dry  Psychiatric: He has a normal mood and affect  Judgment and thought content normal    Nursing note and vitals reviewed

## 2018-07-14 DIAGNOSIS — E11.9 TYPE 2 DIABETES MELLITUS WITHOUT COMPLICATION, WITHOUT LONG-TERM CURRENT USE OF INSULIN (HCC): ICD-10-CM

## 2018-07-16 RX ORDER — SITAGLIPTIN 100 MG/1
TABLET, FILM COATED ORAL
Qty: 90 TABLET | Refills: 0 | Status: SHIPPED | OUTPATIENT
Start: 2018-07-16 | End: 2018-11-06 | Stop reason: ALTCHOICE

## 2018-08-29 DIAGNOSIS — I10 HYPERTENSION, UNSPECIFIED TYPE: ICD-10-CM

## 2018-08-29 PROCEDURE — 4010F ACE/ARB THERAPY RXD/TAKEN: CPT | Performed by: PHYSICIAN ASSISTANT

## 2018-08-29 RX ORDER — LISINOPRIL 2.5 MG/1
2.5 TABLET ORAL DAILY
Qty: 90 TABLET | Refills: 0 | Status: SHIPPED | OUTPATIENT
Start: 2018-08-29 | End: 2018-11-28 | Stop reason: SDUPTHER

## 2018-09-04 DIAGNOSIS — E78.5 DYSLIPIDEMIA: ICD-10-CM

## 2018-09-04 RX ORDER — ATORVASTATIN CALCIUM 40 MG/1
40 TABLET, FILM COATED ORAL DAILY
Qty: 90 TABLET | Refills: 0 | Status: SHIPPED | OUTPATIENT
Start: 2018-09-04 | End: 2019-01-10 | Stop reason: SDUPTHER

## 2018-09-11 ENCOUNTER — TELEPHONE (OUTPATIENT)
Dept: FAMILY MEDICINE CLINIC | Facility: CLINIC | Age: 73
End: 2018-09-11

## 2018-09-11 NOTE — TELEPHONE ENCOUNTER
Spoke  With  Pt  His  Cardiologist  Wants  To  addd  [facility]  Labs  Reviewed a nd ok  To  Take  Med  List  Reviewed and  Ok  To  Take    Pt t o  Continue  Glipizide, januvia and  arcarbose

## 2018-09-11 NOTE — TELEPHONE ENCOUNTER
PATIENT WENT TO CARDIOLOGIST AND THEY WANTED TO CHANGE HIS DIABETIC MEDICATION PATIENT WAS NOT COMFORTABLE AND WOULD LIKE TO SPEAK WITH YOU

## 2018-09-14 DIAGNOSIS — E11.9 TYPE 2 DIABETES MELLITUS WITHOUT COMPLICATION, WITHOUT LONG-TERM CURRENT USE OF INSULIN (HCC): Primary | ICD-10-CM

## 2018-09-14 RX ORDER — GLIPIZIDE 10 MG/1
10 TABLET ORAL
Qty: 180 TABLET | Refills: 0 | Status: SHIPPED | OUTPATIENT
Start: 2018-09-14 | End: 2018-12-18 | Stop reason: SDUPTHER

## 2018-09-19 ENCOUNTER — TRANSCRIBE ORDERS (OUTPATIENT)
Dept: LAB | Facility: CLINIC | Age: 73
End: 2018-09-19

## 2018-09-19 ENCOUNTER — APPOINTMENT (OUTPATIENT)
Dept: LAB | Facility: CLINIC | Age: 73
End: 2018-09-19
Payer: COMMERCIAL

## 2018-09-19 DIAGNOSIS — I25.10 ATHEROSCLEROSIS OF NATIVE CORONARY ARTERY, ANGINA PRESENCE UNSPECIFIED, UNSPECIFIED WHETHER NATIVE OR TRANSPLANTED HEART: Primary | ICD-10-CM

## 2018-09-19 DIAGNOSIS — I25.10 ATHEROSCLEROSIS OF NATIVE CORONARY ARTERY, ANGINA PRESENCE UNSPECIFIED, UNSPECIFIED WHETHER NATIVE OR TRANSPLANTED HEART: ICD-10-CM

## 2018-09-19 LAB
ANION GAP SERPL CALCULATED.3IONS-SCNC: 12 MMOL/L (ref 4–13)
BUN SERPL-MCNC: 17 MG/DL (ref 5–25)
CALCIUM SERPL-MCNC: 9.4 MG/DL (ref 8.3–10.1)
CHLORIDE SERPL-SCNC: 100 MMOL/L (ref 100–108)
CO2 SERPL-SCNC: 24 MMOL/L (ref 21–32)
CREAT SERPL-MCNC: 1.42 MG/DL (ref 0.6–1.3)
GFR SERPL CREATININE-BSD FRML MDRD: 49 ML/MIN/1.73SQ M
GLUCOSE SERPL-MCNC: 294 MG/DL (ref 65–140)
POTASSIUM SERPL-SCNC: 4.2 MMOL/L (ref 3.5–5.3)
SODIUM SERPL-SCNC: 136 MMOL/L (ref 136–145)

## 2018-09-19 PROCEDURE — 36415 COLL VENOUS BLD VENIPUNCTURE: CPT

## 2018-09-19 PROCEDURE — 80048 BASIC METABOLIC PNL TOTAL CA: CPT

## 2018-09-20 DIAGNOSIS — I10 HYPERTENSION, UNSPECIFIED TYPE: ICD-10-CM

## 2018-09-20 RX ORDER — CARVEDILOL 3.12 MG/1
3.12 TABLET ORAL 2 TIMES DAILY WITH MEALS
Qty: 180 TABLET | Refills: 0 | Status: SHIPPED | OUTPATIENT
Start: 2018-09-20 | End: 2018-12-17 | Stop reason: SDUPTHER

## 2018-10-01 ENCOUNTER — TELEPHONE (OUTPATIENT)
Dept: FAMILY MEDICINE CLINIC | Facility: CLINIC | Age: 73
End: 2018-10-01

## 2018-10-01 NOTE — TELEPHONE ENCOUNTER
----- Message from Tao Connors PA-C sent at 9/19/2018  1:02 PM EDT -----  Call  Pt t o remind  Him  To  Do his  Labs  For me and  Make  F/u  appt  diabetes

## 2018-11-06 ENCOUNTER — OFFICE VISIT (OUTPATIENT)
Dept: ENDOCRINOLOGY | Facility: CLINIC | Age: 73
End: 2018-11-06
Payer: COMMERCIAL

## 2018-11-06 VITALS
SYSTOLIC BLOOD PRESSURE: 134 MMHG | HEIGHT: 68 IN | WEIGHT: 251.8 LBS | DIASTOLIC BLOOD PRESSURE: 86 MMHG | HEART RATE: 92 BPM | BODY MASS INDEX: 38.16 KG/M2

## 2018-11-06 DIAGNOSIS — I10 ESSENTIAL HYPERTENSION: ICD-10-CM

## 2018-11-06 DIAGNOSIS — M81.0 OSTEOPOROSIS WITHOUT CURRENT PATHOLOGICAL FRACTURE, UNSPECIFIED OSTEOPOROSIS TYPE: ICD-10-CM

## 2018-11-06 DIAGNOSIS — E78.5 HYPERLIPIDEMIA, UNSPECIFIED HYPERLIPIDEMIA TYPE: ICD-10-CM

## 2018-11-06 DIAGNOSIS — E11.65 TYPE 2 DIABETES MELLITUS WITH HYPERGLYCEMIA, WITHOUT LONG-TERM CURRENT USE OF INSULIN (HCC): Primary | ICD-10-CM

## 2018-11-06 DIAGNOSIS — E55.9 VITAMIN D DEFICIENCY: ICD-10-CM

## 2018-11-06 PROCEDURE — 99204 OFFICE O/P NEW MOD 45 MIN: CPT | Performed by: INTERNAL MEDICINE

## 2018-11-06 PROCEDURE — 95250 CONT GLUC MNTR PHYS/QHP EQP: CPT | Performed by: INTERNAL MEDICINE

## 2018-11-06 RX ORDER — BLOOD-GLUCOSE CONTROL, NORMAL
EACH MISCELLANEOUS AS NEEDED
Qty: 1 EACH | Refills: 3 | Status: SHIPPED | OUTPATIENT
Start: 2018-11-06

## 2018-11-06 RX ORDER — EMPAGLIFLOZIN 10 MG/1
1 TABLET, FILM COATED ORAL DAILY
Refills: 4 | COMMUNITY
Start: 2018-09-20 | End: 2018-11-06 | Stop reason: DRUGHIGH

## 2018-11-06 NOTE — LETTER
November 6, 2018     Karlie Gracia PA-C  [address]    Patient: Tres Sellers   YOB: 1945   Date of Visit: 11/6/2018       Dear Dr Sunitha Bush Recipients: Thank you for referring Abner Harrington to me for evaluation  Below are my notes for this consultation  If you have questions, please do not hesitate to call me  I look forward to following your patient along with you  Sincerely,        Pepe Dennison MD        CC: No Recipients  Pepe Dennison MD  11/6/2018  3:26 PM  Signed      New Patient Progress Note         Referring Provider  Karlie Gracia Pa-c  [address], [address]     History of Present Illness:   Tres Sellers is a 68 y o  male with a history of type 2 diabetes without long term use of insulin since 11 yrs   Diabetes course has been un- stable  Reports complications of diabetes such as CKD and CAD  Denies recent illness or hospitalizations  Denies recent severe hypoglycemic or severe hyperglycemic episodes  Denies any issues with his current regimen  home glucose monitoring: are performed regularly, once daily     Fasting - 140-170 mg/dl   Lab Results   Component Value Date    HGBA1C 10 1 (H) 06/15/2018    His regimen was modified after June 2018 by primary care physician, since then his blood sugar control has improved as per patient      Current regimen:  Glipizide 10 mg twice a day, Precose 50 mg 3 times a day, Januvia 100 mg daily,Jardiance 10 mg po daily  HIS currently not taking metformin    compliant most of the timedenies any side effects from medications    denies Hypoglycemic episodes:  Denies H/o of hypoglycemia causing hospitalization or Intervention such as glucagon injection  or ambulance call   Hypoglycemia symptoms: does not feel symptoms as blood sugars usually elevated   Treatment of hypoglycemia: None      Medic alert tag: recommended: Yes    Diet: 3  meals per day, 1-2  snacks per day  Timing of meals is predictable  Yes  diabetic diet compliance:  compliant most of the time   Activity: Daily activity is predictable Yes The patient engages in little, if any physical activity  Opthamology: sees regularly, , yearly ,  no retinopathy, no macular edema  Podiatry: sees Podiatry   Loni vaccine: he does not take flu vaccine     Has hypertension: followed by PCP; on ACE inhibitor/ARB, compliant most of the time  Has hyperlipidemia: followed by PCP; on statin, lipitor 40 mg at bedtime,  - tolerating well, no myalgias  compliant most of the time  denies any side effects from medications  He has vitamin D deficiency- currently taking vitamin D , 2000 IU daily   He has Osteoporosis, was treated with Prolia  injection every 6 months , he got the Prolia injection in march 2018   Denies H/o falls or Fracture bones   Will get the record of DEXA scan from Dr Marcella Moreira, as pt does not want to follow up with them       Lab Results   Component Value Date    HGBA1C 10 1 (H) 06/15/2018       Lab Results   Component Value Date    CREATININE 1 42 (H) 09/19/2018     Lab Results   Component Value Date     (H) 06/15/2018     (H) 06/15/2018    ALKPHOS 63 06/15/2018    BILITOT 0 34 11/23/2015         Patient Active Problem List   Diagnosis    Abnormal liver enzymes    History of aortic valve replacement    Arteriosclerotic coronary artery disease    Benign essential HTN    Uncontrolled type 2 diabetes mellitus without complication, without long-term current use of insulin (HCC)    Hyperlipidemia    YATES (nonalcoholic steatohepatitis)    Obesity (BMI 30-39  9)    Osteoarthritis    Osteoporosis    Prostate cancer [facility])      Past Medical History:   Diagnosis Date    Arteriosclerotic coronary artery disease 11/30/2017    Benign prostate hyperplasia     L A  7/17/12   R   8/30/17      COPD (chronic obstructive pulmonary disease) (HCC)     Deep venous embolism and thrombosis ([facility] )     Diabetes mellitus ([facility] )     Hypertension     Impacted cerumen     unspecified laterity / L A  6/20/12       Osteoarthritis 7/17/2012    Prostate cancer ([facility] )     Pulmonary embolism (HCC)       Past Surgical History:   Procedure Laterality Date    CARDIAC VALVE REPLACEMENT      CORONARY ARTERY BYPASS GRAFT      FRACTURE SURGERY      left knee surgery    KNEE ARTHROSCOPY      Therapeutic    KNEE SURGERY      replacement    LUNG SURGERY      LUNG SURGERY      wedge resection     TONSILLECTOMY      UMBILICAL HERNIA REPAIR        Family History   Problem Relation Age of Onset    COPD Mother     Heart attack Mother     Heart attack Father      Social History   Substance Use Topics    Smoking status: Former Smoker     Quit date: 3/23/2000    Smokeless tobacco: Never Used    Alcohol use No      Comment: social drinker per [employer]script     No Known Allergies      Current Outpatient Prescriptions:     aspirin 81 MG tablet, Take by mouth, Disp: , Rfl:     atorvastatin (LIPITOR) 40 mg tablet, Take 1 tablet (40 mg total) by mouth daily, Disp: 90 tablet, Rfl: 0    Blood Glucose Monitoring Suppl (ONE TOUCH ULTRA 2) w/Device KIT, by Does not apply route daily DX E11 65  Pt to   Check  fbs  daily, Disp: 1 each, Rfl: 0    carvedilol (COREG) 3 125 mg tablet, Take 1 tablet (3 125 mg total) by mouth 2 (two) times a day with meals, Disp: 180 tablet, Rfl: 0    Cholecalciferol (VITAMIN D3) 2000 units capsule, Take 2,000 Units by mouth daily, Disp: , Rfl: 11    Coenzyme Q10 (COQ-10) 100 MG CAPS, Take 100 mg by mouth , Disp: , Rfl:     denosumab (PROLIA) 60 mg/mL, Inject under the skin, Disp: , Rfl:     glipiZIDE (GLUCOTROL) 10 mg tablet, Take 1 tablet (10 mg total) by mouth 2 (two) times a day before meals, Disp: 180 tablet, Rfl: 0    glucose blood (ONE TOUCH ULTRA TEST) test strip, Check  fbs  Daily   DX E11 65, Disp: 100 each, Rfl: 3    Lancets (ONETOUCH ULTRASOFT) lancets, Check  fbs Daily DX E11 65, Disp: 100 each, Rfl: 2    leuprolide (LUPRON DEPOT, 1-MONTH,) 3 75 mg injection, Inject into a muscle Once yearly , Disp: , Rfl:     lisinopril (ZESTRIL) 2 5 mg tablet, Take 1 tablet (2 5 mg total) by mouth daily, Disp: 90 tablet, Rfl: 0    Multiple Vitamin-Folic Acid TABS, Take by mouth, Disp: , Rfl:     Omega-3 Fatty Acids (FISH OIL) 1,000 mg, Take 1,000 mg by mouth daily  , Disp: , Rfl:     Blood Glucose Calibration (OT ULTRA/FASTTK CNTRL SOLN) SOLN, by In Vitro route as needed (to check meter calibration), Disp: 1 each, Rfl: 3    Dulaglutide (TRULICITY) 1 27 LY/4 5LY SOPN, Inject 0 5 mL (0 75 mg total) under the skin once a week, Disp: 4 pen, Rfl: 3    Glucose Blood (FREESTYLE LITE TEST VI), by In Vitro route, Disp: , Rfl:     Lancets (FREESTYLE) lancets, USE 1 LANCET ONCE A DAY AS DIRECTED (Patient not taking: Reported on 11/6/2018), Disp: 100 each, Rfl: 0  Review of Systems   Constitutional: Negative for activity change, diaphoresis, fatigue and fever  HENT: Negative  Eyes: Negative for visual disturbance  Respiratory: Negative for cough, chest tightness and shortness of breath  Cardiovascular: Negative for chest pain, palpitations and leg swelling  Gastrointestinal: Negative for abdominal pain, blood in stool, constipation, diarrhea, nausea and vomiting  Endocrine: Negative for cold intolerance, heat intolerance, polydipsia, polyphagia and polyuria  Genitourinary: Negative for dysuria, enuresis, frequency and urgency  Musculoskeletal: Negative for arthralgias and myalgias  Skin: Negative for pallor, rash and wound  Allergic/Immunologic: Negative  Neurological: Negative for dizziness, tremors, weakness and numbness  Hematological: Negative  Psychiatric/Behavioral: Negative  Physical Exam:  Body mass index is 38 86 kg/m²    /86   Pulse 92   Ht 5' 7 5" (1 715 m)   Wt 114 kg (251 lb 12 8 oz)   BMI 38 86 kg/m²     Wt Readings from Last 3 Encounters:   11/06/18 114 kg (251 lb 12 8 oz)   06/19/18 118 kg (259 lb 6 4 oz)   05/21/18 117 kg (258 lb 3 2 oz)       Physical Exam   Constitutional: He is oriented to person, place, and time  He appears well-developed and well-nourished  No distress  HENT:   Head: Normocephalic and atraumatic  Eyes: Pupils are equal, round, and reactive to light  Conjunctivae and EOM are normal  Right eye exhibits no discharge  Left eye exhibits no discharge  Neck: Normal range of motion  Neck supple  No tracheal deviation present  No thyromegaly present  Cardiovascular: Normal rate, regular rhythm, normal heart sounds and intact distal pulses  Exam reveals no friction rub  No murmur heard  Pulmonary/Chest: Effort normal and breath sounds normal  No respiratory distress  He has no wheezes  He has no rales  He exhibits no tenderness  Abdominal: Soft  Bowel sounds are normal  He exhibits no distension  There is no tenderness  Musculoskeletal: Normal range of motion  He exhibits no edema, tenderness or deformity  Lymphadenopathy:     He has no cervical adenopathy  Neurological: He is alert and oriented to person, place, and time  He has normal reflexes  Skin: Skin is warm and dry  He is not diaphoretic  No erythema  Psychiatric: He has a normal mood and affect  His behavior is normal    Vitals reviewed      Diabetic Foot Exam    Labs:   No components found for: HA1C  No components found for: GLU    Lab Results   Component Value Date    CREATININE 1 42 (H) 09/19/2018    CREATININE 1 25 06/15/2018    CREATININE 1 29 02/23/2018    BUN 17 09/19/2018     09/14/2015    K 4 2 09/19/2018     09/19/2018    CO2 24 09/19/2018     eGFR   Date Value Ref Range Status   09/19/2018 49 ml/min/1 73sq m Final     No components found for: [facility]    Lab Results   Component Value Date    CHOL 208 09/08/2015    HDL 43 02/23/2018    TRIG 78 02/23/2018       Lab Results   Component Value Date     (H) 06/15/2018  (H) 06/15/2018    ALKPHOS 63 06/15/2018    BILITOT 0 34 11/23/2015       No results found for: TSH, FREET4, TSI    Impression:  1  Type 2 diabetes mellitus with hyperglycemia, without long-term current use of insulin ([facility] )    2  Essential hypertension    3  Vitamin D deficiency    4  Hyperlipidemia, unspecified hyperlipidemia type    5  Osteoporosis without current pathological fracture, unspecified osteoporosis type           Plan:    Diagnoses and all orders for this visit:    Type 2 diabetes mellitus with hyperglycemia, without long-term current use of insulin ([facility] )    Lab Results   Component Value Date    HGBA1C 10 1 (H) 06/15/2018    A1c 10 1% in June 2018  No blood work since then  I have ordered basic metabolic profile, C-peptide, A1c to be done now  I have discontinued Jardiance as GFR was less than 45 on most recent blood work  Discussed to start trulicity injection once a week, 0 75 mg  Discussed side effects and benefits  Educated to stop Januvia once he start Trulicity  Discussed to check blood sugars twice daily and send log in 2 weeks  Also discussed about, putting diagnostic continuous glucose monitor sensor in the office, he will call his insurance company and let us know    -     Blood Glucose Calibration (OT ULTRA/FASTTK CNTRL SOLN) SOLN; by In Vitro route as needed (to check meter calibration)  -     Hemoglobin A1C; Future  -     Basic metabolic panel; Future  -     C-peptide; Future  -     Microalbumin / creatinine urine ratio; Future  -     TSH, 3rd generation; Future  -     T4, free; Future  -     Ambulatory referral to medical nutrition therapy for diabetes; Future  -     Ambulatory referral to Diabetic Education;  Future  -     Discontinue: Dulaglutide (TRULICITY) 7 24 WK/3 2AA SOPN; Inject 0 5 mL (0 75 mg total) under the skin once a week  -     Dulaglutide (TRULICITY) 4 04 MU/2 7YY SOPN; Inject 0 5 mL (0 75 mg total) under the skin once a week    Essential hypertension  BP Readings from Last 3 Encounters:   11/06/18 134/86   06/19/18 114/72   05/21/18 116/72    Continue current management    Vitamin D deficiency  Will order vitamin-D level  Start vitamin-D 3 supplementation 2000 International Units daily  -     Vitamin D 25 hydroxy; Future    Hyperlipidemia, unspecified hyperlipidemia type    Lab Results   Component Value Date    LDLCALC 74 02/23/2018    Continue statins, Lipitor 40 mg daily  -     Lipid panel Lab Collect Lab Collect; Future    Osteoporosis without current pathological fracture, unspecified osteoporosis type  No records of DEXA scan, will get the records, patient was getting Prolia injection every 6 months  I have also ordered vitamin-D level  Will order Prolia injection    Discussed with the patient and all questioned fully answered  He will call me if any problems arise      Counseled patient on diagnostic results, prognosis, risk and benefit of treatment options, instruction for management, importance of treatment compliance, Risk  factor reduction and impressions      Masha Angulo MD

## 2018-11-06 NOTE — PROGRESS NOTES
Curvin Benders was placed on patient's left arm by Magdaleno Gray MD    New Patient Progress Note         Referring Provider  [place] , [address] [facility] [address] [facility],[address], [address]     History of Present Illness:   Kobe Guzman is a 68 y o  male with a history of type 2 diabetes without long term use of insulin since 11 yrs   Diabetes course has been un- stable  Reports complications of diabetes such as CKD and CAD  Denies recent illness or hospitalizations  Denies recent severe hypoglycemic or severe hyperglycemic episodes  Denies any issues with his current regimen  home glucose monitoring: are performed regularly, once daily     Fasting - 140-170 mg/dl   Lab Results   Component Value Date    HGBA1C 10 1 (H) 06/15/2018    His regimen was modified after June 2018 by primary care physician, since then his blood sugar control has improved as per patient      Current regimen:  Glipizide 10 mg twice a day, Precose 50 mg 3 times a day, Januvia 100 mg daily,Jardiance 10 mg po daily  HIS currently not taking metformin    compliant most of the timedenies any side effects from medications  denies Hypoglycemic episodes:  Denies H/o of hypoglycemia causing hospitalization or Intervention such as glucagon injection  or ambulance call   Hypoglycemia symptoms: does not feel symptoms as blood sugars usually elevated   Treatment of hypoglycemia: None      Medic alert tag: recommended: Yes    Diet: 3  meals per day, 1-2  snacks per day  Timing of meals is predictable  Yes  diabetic diet compliance:  compliant most of the time   Activity: Daily activity is predictable Yes The patient engages in little, if any physical activity        Opthamology: sees regularly, , yearly ,  no retinopathy, no macular edema  Podiatry: sees Podiatry   Loni vaccine: he does not take flu vaccine     Has hypertension: followed by PCP; on ACE inhibitor/ARB, compliant most of the time  Has hyperlipidemia: followed by PCP; on statin, lipitor 40 mg at bedtime,  - tolerating well, no myalgias  compliant most of the time  denies any side effects from medications  He has vitamin D deficiency- currently taking vitamin D , 2000 IU daily   He has Osteoporosis, was treated with Prolia  injection every 6 months , he got the Prolia injection in march 2018   Denies H/o falls or Fracture bones   Will get the record of DEXA scan from Dr Polly Burns, as pt does not want to follow up with them       Lab Results   Component Value Date    HGBA1C 10 1 (H) 06/15/2018       Lab Results   Component Value Date    CREATININE 1 42 (H) 09/19/2018     Lab Results   Component Value Date     (H) 06/15/2018     (H) 06/15/2018    ALKPHOS 63 06/15/2018    BILITOT 0 34 11/23/2015         Patient Active Problem List   Diagnosis    Abnormal liver enzymes    History of aortic valve replacement    Arteriosclerotic coronary artery disease    Benign essential HTN    Uncontrolled type 2 diabetes mellitus without complication, without long-term current use of insulin (HCC)    Hyperlipidemia    YATES (nonalcoholic steatohepatitis)    Obesity (BMI 30-39  9)    Osteoarthritis    Osteoporosis    Prostate cancer [facility])      Past Medical History:   Diagnosis Date    Arteriosclerotic coronary artery disease 11/30/2017    Benign prostate hyperplasia     L A  7/17/12   R   8/30/17      COPD (chronic obstructive pulmonary disease) (HCC)     Deep venous embolism and thrombosis (HCC)     Diabetes mellitus ([facility] [address] )     Hypertension     Impacted cerumen     unspecified laterity / L A  6/20/12       Osteoarthritis 7/17/2012    Prostate cancer ([facility] [address] )     Pulmonary embolism (HCC)       Past Surgical History:   Procedure Laterality Date    CARDIAC VALVE REPLACEMENT      CORONARY ARTERY BYPASS GRAFT      FRACTURE SURGERY      left knee surgery    KNEE ARTHROSCOPY      Therapeutic    KNEE SURGERY      replacement    LUNG SURGERY      LUNG SURGERY wedge resection     TONSILLECTOMY      UMBILICAL HERNIA REPAIR        Family History   Problem Relation Age of Onset    COPD Mother     Heart attack Mother     Heart attack Father      Social History   Substance Use Topics    Smoking status: Former Smoker     Quit date: 3/23/2000    Smokeless tobacco: Never Used    Alcohol use No      Comment: social drinker per [employer]script     No Known Allergies      Current Outpatient Prescriptions:     aspirin 81 MG tablet, Take by mouth, Disp: , Rfl:     atorvastatin (LIPITOR) 40 mg tablet, Take 1 tablet (40 mg total) by mouth daily, Disp: 90 tablet, Rfl: 0    Blood Glucose Monitoring Suppl (ONE TOUCH ULTRA 2) w/Device KIT, by Does not apply route daily DX E11 65  Pt to   Check  fbs  daily, Disp: 1 each, Rfl: 0    carvedilol (COREG) 3 125 mg tablet, Take 1 tablet (3 125 mg total) by mouth 2 (two) times a day with meals, Disp: 180 tablet, Rfl: 0    Cholecalciferol (VITAMIN D3) 2000 units capsule, Take 2,000 Units by mouth daily, Disp: , Rfl: 11    Coenzyme Q10 (COQ-10) 100 MG CAPS, Take 100 mg by mouth , Disp: , Rfl:     denosumab (PROLIA) 60 mg/mL, Inject under the skin, Disp: , Rfl:     glipiZIDE (GLUCOTROL) 10 mg tablet, Take 1 tablet (10 mg total) by mouth 2 (two) times a day before meals, Disp: 180 tablet, Rfl: 0    glucose blood (ONE TOUCH ULTRA TEST) test strip, Check  fbs  Daily  DX E11 65, Disp: 100 each, Rfl: 3    Lancets (ONETOUCH ULTRASOFT) lancets, Check  fbs  Daily DX E11 65, Disp: 100 each, Rfl: 2    leuprolide (LUPRON DEPOT, 1-MONTH,) 3 75 mg injection, Inject into a muscle Once yearly , Disp: , Rfl:     lisinopril (ZESTRIL) 2 5 mg tablet, Take 1 tablet (2 5 mg total) by mouth daily, Disp: 90 tablet, Rfl: 0    Multiple Vitamin-Folic Acid TABS, Take by mouth, Disp: , Rfl:     Omega-3 Fatty Acids (FISH OIL) 1,000 mg, Take 1,000 mg by mouth daily  , Disp: , Rfl:     Blood Glucose Calibration (OT ULTRA/FASTTK CNTRL SOLN) SOLN, by In Vitro route as needed (to check meter calibration), Disp: 1 each, Rfl: 3    Dulaglutide (TRULICITY) 7 32 FP/2 4NL SOPN, Inject 0 5 mL (0 75 mg total) under the skin once a week, Disp: 4 pen, Rfl: 3    Glucose Blood (FREESTYLE LITE TEST VI), by In Vitro route, Disp: , Rfl:     Lancets (FREESTYLE) lancets, USE 1 LANCET ONCE A DAY AS DIRECTED (Patient not taking: Reported on 11/6/2018), Disp: 100 each, Rfl: 0  Review of Systems   Constitutional: Negative for activity change, diaphoresis, fatigue and fever  HENT: Negative  Eyes: Negative for visual disturbance  Respiratory: Negative for cough, chest tightness and shortness of breath  Cardiovascular: Negative for chest pain, palpitations and leg swelling  Gastrointestinal: Negative for abdominal pain, blood in stool, constipation, diarrhea, nausea and vomiting  Endocrine: Negative for cold intolerance, heat intolerance, polydipsia, polyphagia and polyuria  Genitourinary: Negative for dysuria, enuresis, frequency and urgency  Musculoskeletal: Negative for arthralgias and myalgias  Skin: Negative for pallor, rash and wound  Allergic/Immunologic: Negative  Neurological: Negative for dizziness, tremors, weakness and numbness  Hematological: Negative  Psychiatric/Behavioral: Negative  Physical Exam:  Body mass index is 38 86 kg/m²  /86   Pulse 92   Ht 5' 7 5" (1 715 m)   Wt 114 kg (251 lb 12 8 oz)   BMI 38 86 kg/m²    Wt Readings from Last 3 Encounters:   11/06/18 114 kg (251 lb 12 8 oz)   06/19/18 118 kg (259 lb 6 4 oz)   05/21/18 117 kg (258 lb 3 2 oz)       Physical Exam   Constitutional: He is oriented to person, place, and time  He appears well-developed and well-nourished  No distress  HENT:   Head: Normocephalic and atraumatic  Eyes: Pupils are equal, round, and reactive to light  Conjunctivae and EOM are normal  Right eye exhibits no discharge  Left eye exhibits no discharge  Neck: Normal range of motion  Neck supple   No tracheal deviation present  No thyromegaly present  Cardiovascular: Normal rate, regular rhythm, normal heart sounds and intact distal pulses  Exam reveals no friction rub  No murmur heard  Pulmonary/Chest: Effort normal and breath sounds normal  No respiratory distress  He has no wheezes  He has no rales  He exhibits no tenderness  Abdominal: Soft  Bowel sounds are normal  He exhibits no distension  There is no tenderness  Musculoskeletal: Normal range of motion  He exhibits no edema, tenderness or deformity  Lymphadenopathy:     He has no cervical adenopathy  Neurological: He is alert and oriented to person, place, and time  He has normal reflexes  Skin: Skin is warm and dry  He is not diaphoretic  No erythema  Psychiatric: He has a normal mood and affect  His behavior is normal    Vitals reviewed  Diabetic Foot Exam    Labs:   No components found for: HA1C  No components found for: GLU    Lab Results   Component Value Date    CREATININE 1 42 (H) 09/19/2018    CREATININE 1 25 06/15/2018    CREATININE 1 29 02/23/2018    BUN 17 09/19/2018     09/14/2015    K 4 2 09/19/2018     09/19/2018    CO2 24 09/19/2018     eGFR   Date Value Ref Range Status   09/19/2018 49 ml/min/1 73sq m Final     No components found for: [facility]    Lab Results   Component Value Date    CHOL 208 09/08/2015    HDL 43 02/23/2018    TRIG 78 02/23/2018       Lab Results   Component Value Date     (H) 06/15/2018     (H) 06/15/2018    ALKPHOS 63 06/15/2018    BILITOT 0 34 11/23/2015       No results found for: TSH, FREET4, TSI    Impression:  1  Type 2 diabetes mellitus with hyperglycemia, without long-term current use of insulin ([address] )    2  Essential hypertension    3  Vitamin D deficiency    4  Hyperlipidemia, unspecified hyperlipidemia type    5   Osteoporosis without current pathological fracture, unspecified osteoporosis type           Plan:    Diagnoses and all orders for this visit:    Type 2 diabetes mellitus with hyperglycemia, without long-term current use of insulin ([facility])    Lab Results   Component Value Date    HGBA1C 10 1 (H) 06/15/2018    A1c 10 1% in June 2018  No blood work since then  I have ordered basic metabolic profile, C-peptide, A1c to be done now  I have discontinued Jardiance as GFR was less than 45 on most recent blood work  Discussed to start trulicity injection once a week, 0 75 mg  Discussed side effects and benefits  Educated to stop Januvia once he start Trulicity  Discussed to check blood sugars twice daily and send log in 2 weeks  Also discussed about, putting diagnostic continuous glucose monitor sensor in the office, he will call his insurance company and let us know    -     Blood Glucose Calibration (OT ULTRA/FASTTK CNTRL SOLN) SOLN; by In Vitro route as needed (to check meter calibration)  -     Hemoglobin A1C; Future  -     Basic metabolic panel; Future  -     C-peptide; Future  -     Microalbumin / creatinine urine ratio; Future  -     TSH, 3rd generation; Future  -     T4, free; Future  -     Ambulatory referral to medical nutrition therapy for diabetes; Future  -     Ambulatory referral to Diabetic Education; Future  -     Discontinue: Dulaglutide (TRULICITY) 7 75 XM/3 7LJ SOPN; Inject 0 5 mL (0 75 mg total) under the skin once a week  -     Dulaglutide (TRULICITY) 2 92 LQ/2 6FF SOPN; Inject 0 5 mL (0 75 mg total) under the skin once a week    Essential hypertension  BP Readings from Last 3 Encounters:   11/06/18 134/86   06/19/18 114/72   05/21/18 116/72    Continue current management    Vitamin D deficiency  Will order vitamin-D level  Start vitamin-D 3 supplementation 2000 International Units daily  -     Vitamin D 25 hydroxy; Future    Hyperlipidemia, unspecified hyperlipidemia type    Lab Results   Component Value Date    LDLCALC 74 02/23/2018    Continue statins, Lipitor 40 mg daily  -     Lipid panel Lab Collect Lab Collect;  Future    Osteoporosis without current pathological fracture, unspecified osteoporosis type  No records of DEXA scan, will get the records, patient was getting Prolia injection every 6 months  I have also ordered vitamin-D level  Will order Prolia injection    Discussed with the patient and all questioned fully answered  He will call me if any problems arise      Counseled patient on diagnostic results, prognosis, risk and benefit of treatment options, instruction for management, importance of treatment compliance, Risk  factor reduction and impressions      Sanjana Elizondo MD

## 2018-11-06 NOTE — LETTER
November 6, 2018     Tierra Currie PA-C  [address] [address] [address]    Patient: Aj Knight   YOB: 1945   Date of Visit: 11/6/2018     Dear Dr Adrian Funez      Thank you for referring Meme Altman to me for evaluation  Below are the relevant portions of my assessment and plan of care  If you have questions, please do not hesitate to call me  I look forward to following Ludwin Novoa along with you           Sincerely,        Aristides Campo MD        CC: No Recipients    Progress Notes:

## 2018-11-15 ENCOUNTER — OFFICE VISIT (OUTPATIENT)
Dept: DIABETES SERVICES | Facility: CLINIC | Age: 73
End: 2018-11-15
Payer: COMMERCIAL

## 2018-11-15 DIAGNOSIS — E11.65 TYPE 2 DIABETES MELLITUS WITH HYPERGLYCEMIA, WITHOUT LONG-TERM CURRENT USE OF INSULIN (HCC): Primary | ICD-10-CM

## 2018-11-15 PROCEDURE — G0108 DIAB MANAGE TRN  PER INDIV: HCPCS | Performed by: DIETITIAN, REGISTERED

## 2018-11-15 NOTE — PATIENT INSTRUCTIONS
1  Start testing blood sugar before and two hours after different meals as shown  If blood sugar is above target, write what food you have eaten in the comment section of the blood sugar log   2  Check food labels for serving size and total carbohydrates per serving  If blood sugar is high 2 hours after eating, try eating less of the carb food or switch to a different food to see if that works better    3  Continue exercise program

## 2018-11-15 NOTE — PROGRESS NOTES
Type 2 Diabetes Class Assessment    HPI: Met with Aníbal Andrade for DSME Initial visit  Obed Aggarwal has Type 2 Diabetes, CKD, YATES, CAD, Obesity  He is testing his blood sugar fasting only with readings 155-189  We discussed doing paired testing to find patterns of hyperglycemia to facilitate dietary/lifestyle/treatment changes  Currently wearing professional CGM  Answered questions on getting personal CGM  Patients medication recently changed to Trulicity and glipizide  Other OAD's were discontinued  He is taking it as prescribed  We discussed waning of endogenous insulin production and need to match with carbs eaten  Patient stated that he has started working out at the [employer] and does 1/2-1 mile per day and 3 x week resistance training  He is frustrated that he not lost any weight  We discussed some basic nutrition principles but he will be educated on meal planning/carb counting at his MNT appointment  Diabetes Assessment  Visit Type: Initial visit  Present at Session: patient   Special Learning Needs: No  Barriers to Learning: no barriers    How do you feel about making lifestyle changes at this time? Preparation  How would you rate your current knowledge of diabetes? fair  How confident are you that will be able to take better control of your diabetes?: good    How long have you had diabetes? 10 years  Have you had diabetes education in the past?: No  Do you have any family members with diabetes?: No  Do you monitor your blood sugar? yes  Type of blood sugar monitor: One Touch  How old is your meter?: New  How often do you test your blood sugars? :Fasting  Do you keep a written record of your blood sugars?  Yes   Blood sugar log with patient today and reviewed by educator?: Yes   Blood Sugar ranges:    Fastin-189   Before meals:    2 hours after meals:   Any financial concerns pertaining to your diabetes supplies, medication or care?: No  Have you ever experienced hypoglycemia?:  No  Have you ever been hospitalized or gone to the ER due to your blood sugars?: No  How do you treat low blood sugars?: Juice  How do you treat high blood sugars? Dont know  Do you wear a Diabetes I D ?: no  Where do you dispose of your sharps (needles,lancetes)?: Regular garbage    Ht Readings from Last 1 Encounters:   11/06/18 5' 7 5" (1 715 m)     Wt Readings from Last 3 Encounters:   11/06/18 114 kg (251 lb 12 8 oz)   06/19/18 118 kg (259 lb 6 4 oz)   05/21/18 117 kg (258 lb 3 2 oz)        There is no height or weight on file to calculate BMI       Lab Results   Component Value Date    HGBA1C 10 1 (H) 06/15/2018    HGBA1C 10 0 (H) 02/23/2018    HGBA1C 8 6 (H) 11/25/2017       Lab Results   Component Value Date    CHOL 208 09/08/2015     Lab Results   Component Value Date    HDL 43 02/23/2018    HDL 49 06/08/2017    HDL 49 03/06/2017     Lab Results   Component Value Date    LDLCALC 74 02/23/2018    LDLCALC 134 (H) 06/08/2017    LDLCALC 139 (H) 03/06/2017     Lab Results   Component Value Date    TRIG 78 02/23/2018    TRIG 130 06/08/2017    TRIG 115 03/06/2017     No results found for: CHOLHDL  No results found for: Paulo Stacie    Weight Change: No    Diet Assessment    Do you follow any special diet presently?: No  Who cooks at home?:  patient and spouse  Who does the grocery shopping?: patient and spouse   How frequently do you eat out?: 2-3 x week  Activity Assessment    Exercise: 7 days per week 1/2 -1 mile treadmill/walking, 3 x resistance machines    Lifestyle/Social Assessment    Racial/ethnic group:                                       Primary Language: English  Marital Status:   Education Level: Some College (No Degree)  Work status: Retired  Type of job and hours:   Who lives in your household?: spouse  Who is you primary support person(s): spouse   Describe your quality of life currently?: good  Any concerns for your safety?: No  Any Holiness or cultural practices that may affect your diabetes care: No  Do you have a decrease or loss of hearing?: No  Do you have a decrease or loss of vision?: No  When was the last time you had an ophthalmology exam?: March 2018  When was the last time you had dental exam?: Every few years  Do you check your feet for cracks, sores, debris?: Yes  When was the last time you had podiatry or foot exam?: September 2018  Last flu shot?: No  Pneumonia shot?: Yes      The patient's history was reviewed and updated as appropriate: allergies, current medications  Intervention    Diabetes Overview :   Zen Rodriguezgerald was instructed on basic concepts of diabetes, including identifying role of diabetes self management, basic pathophysiology and types of diabetes, A1c and blood sugar targets  Zen Fuentes has good understanding of material covered  Taking Medications: Instructed patient on action, side effects, efficacy, prescribed dosage and appropriate timing and frequency of administration of his diabetes medication  Zen Fuentes has good understanding of material covered  Monitoring Blood Sugars  Instructions for Meter Teaching- Patient instructed in the following:  Site selection and skin preparation, Loading strips and lancet device, meter activation, obtaining blood sample, test strip and lancet disposal and recording log book entries  Patient has good understanding of material covered and was able to test their own blood sugar in office today  Testing frequency: Encouraged pair testing  Test sugars before a meal and 2hr after the same meal, rotating between breakfast, lunch, and dinner  Test sugars twice a day (3 days a week, 7 days a week)  Goal Blood Sugars:   Premeal , even better <110  2hr after a meal <180, even better <140  A1C <7%, even better <6 5%  Hypoglycemia: Instructed patient on definition/risk of hypoglycemia, treatment, causes/symptoms, when to notify provider of lows, prevention of hypoglycemia and exercise precautions    Comments: Zen Fuentes verbalizes understanding of hypoglycemia concepts      Physical Activity: Discussed benefits of physical activity to optimize blood glucose control, encouraged activity at patient is physically able  Always consult a physician prior to starting an exercise program   Comments: Roya Rodriges understanding of exercise concepts        Diabetes Education Record  Scot Escobar received the following handouts: Know Your Blood Sugar Numbers, BG Log, Hypo/hyperglycemia,  15/15 Rule, Three Day Food Record      Patient response to instruction    Comprehensiongood  Motivationgood  Expected Compliancegood    Thank you for referring your patient to [facility], it was a pleasure working with them today  Please feel free to call with any questions or concerns      [facility] [place] [phone]

## 2018-11-20 ENCOUNTER — CLINICAL SUPPORT (OUTPATIENT)
Dept: ENDOCRINOLOGY | Facility: CLINIC | Age: 73
End: 2018-11-20
Payer: COMMERCIAL

## 2018-11-20 DIAGNOSIS — E11.65 TYPE 2 DIABETES MELLITUS WITH HYPERGLYCEMIA, WITHOUT LONG-TERM CURRENT USE OF INSULIN (HCC): ICD-10-CM

## 2018-11-20 PROCEDURE — 95251 CONT GLUC MNTR ANALYSIS I&R: CPT | Performed by: INTERNAL MEDICINE

## 2018-11-20 NOTE — PROGRESS NOTES
Current medications  Trulicity 4 33 mg weekly  Glipizide 10 mg BID    C GM reviewed  Average glucose 192 mg/dL  56% time blood sugars above 180 mg/dL  44% time blood sugars are in target range  Standard deviation of 47 6    Recommendations-increase trulicity to 1 5 mg once 0 75 is finished   Continue glipizide 10 mg twice a day  He should send log in 2 weeks after increase dose of trulicity     Kristal Ellis MD

## 2018-11-20 NOTE — TELEPHONE ENCOUNTER
C GM reviewed  Average glucose 192 mg/dL  56% time blood sugars above 180 mg/dL  44% time blood sugars are in target range  Standard deviation of 47 6    Recommendations-increase trulicity to 1 5 mg once 0 75 is finished   Continue glipizide 10 mg twice a day  He should send log in 2 weeks after increase dose of trulicity     Please make prescription ready for trulicity 1 5 , thanks     Cy Mojica MD

## 2018-11-20 NOTE — PROGRESS NOTES
Professional [address] report scanned and attached to this report for review  Also, placed in provider bin  Current medications:    Trulicity 8 16 mg weekly  Glipizide 10 mg BID    Please review

## 2018-11-21 ENCOUNTER — LAB (OUTPATIENT)
Dept: LAB | Facility: CLINIC | Age: 73
End: 2018-11-21
Payer: COMMERCIAL

## 2018-11-21 ENCOUNTER — TRANSCRIBE ORDERS (OUTPATIENT)
Dept: LAB | Facility: CLINIC | Age: 73
End: 2018-11-21

## 2018-11-21 ENCOUNTER — TELEPHONE (OUTPATIENT)
Dept: ENDOCRINOLOGY | Facility: CLINIC | Age: 73
End: 2018-11-21

## 2018-11-21 DIAGNOSIS — I10 BENIGN ESSENTIAL HTN: ICD-10-CM

## 2018-11-21 DIAGNOSIS — E11.65 TYPE 2 DIABETES MELLITUS WITH HYPERGLYCEMIA, WITHOUT LONG-TERM CURRENT USE OF INSULIN (HCC): ICD-10-CM

## 2018-11-21 DIAGNOSIS — E55.9 VITAMIN D DEFICIENCY: ICD-10-CM

## 2018-11-21 DIAGNOSIS — IMO0001 UNCONTROLLED TYPE 2 DIABETES MELLITUS WITHOUT COMPLICATION, WITHOUT LONG-TERM CURRENT USE OF INSULIN: ICD-10-CM

## 2018-11-21 DIAGNOSIS — E78.5 HYPERLIPIDEMIA, UNSPECIFIED HYPERLIPIDEMIA TYPE: ICD-10-CM

## 2018-11-21 DIAGNOSIS — I25.10 ARTERIOSCLEROTIC CORONARY ARTERY DISEASE: ICD-10-CM

## 2018-11-21 LAB
25(OH)D3 SERPL-MCNC: 26.2 NG/ML (ref 30–100)
ALBUMIN SERPL BCP-MCNC: 3.5 G/DL (ref 3.5–5)
ALP SERPL-CCNC: 74 U/L (ref 46–116)
ALT SERPL W P-5'-P-CCNC: 115 U/L (ref 12–78)
ANION GAP SERPL CALCULATED.3IONS-SCNC: 8 MMOL/L (ref 4–13)
AST SERPL W P-5'-P-CCNC: 76 U/L (ref 5–45)
BILIRUB SERPL-MCNC: 0.7 MG/DL (ref 0.2–1)
BUN SERPL-MCNC: 16 MG/DL (ref 5–25)
CALCIUM SERPL-MCNC: 9.7 MG/DL (ref 8.3–10.1)
CHLORIDE SERPL-SCNC: 103 MMOL/L (ref 100–108)
CHOLEST SERPL-MCNC: 137 MG/DL (ref 50–200)
CO2 SERPL-SCNC: 29 MMOL/L (ref 21–32)
CREAT SERPL-MCNC: 1.11 MG/DL (ref 0.6–1.3)
CREAT UR-MCNC: 172 MG/DL
EST. AVERAGE GLUCOSE BLD GHB EST-MCNC: 197 MG/DL
GFR SERPL CREATININE-BSD FRML MDRD: 66 ML/MIN/1.73SQ M
GLUCOSE P FAST SERPL-MCNC: 161 MG/DL (ref 65–99)
HBA1C MFR BLD: 8.5 % (ref 4.2–6.3)
HDLC SERPL-MCNC: 50 MG/DL (ref 40–60)
LDLC SERPL CALC-MCNC: 68 MG/DL (ref 0–100)
MICROALBUMIN UR-MCNC: 16.6 MG/L (ref 0–20)
MICROALBUMIN/CREAT 24H UR: 10 MG/G CREATININE (ref 0–30)
NONHDLC SERPL-MCNC: 87 MG/DL
POTASSIUM SERPL-SCNC: 4.4 MMOL/L (ref 3.5–5.3)
PROT SERPL-MCNC: 7.7 G/DL (ref 6.4–8.2)
SODIUM SERPL-SCNC: 140 MMOL/L (ref 136–145)
T4 FREE SERPL-MCNC: 1.28 NG/DL (ref 0.76–1.46)
TRIGL SERPL-MCNC: 95 MG/DL
TSH SERPL DL<=0.05 MIU/L-ACNC: 1.67 UIU/ML (ref 0.36–3.74)

## 2018-11-21 PROCEDURE — 83036 HEMOGLOBIN GLYCOSYLATED A1C: CPT

## 2018-11-21 PROCEDURE — 82043 UR ALBUMIN QUANTITATIVE: CPT

## 2018-11-21 PROCEDURE — 84443 ASSAY THYROID STIM HORMONE: CPT

## 2018-11-21 PROCEDURE — 36415 COLL VENOUS BLD VENIPUNCTURE: CPT

## 2018-11-21 PROCEDURE — 3061F NEG MICROALBUMINURIA REV: CPT | Performed by: PHYSICIAN ASSISTANT

## 2018-11-21 PROCEDURE — 80061 LIPID PANEL: CPT

## 2018-11-21 PROCEDURE — 80053 COMPREHEN METABOLIC PANEL: CPT

## 2018-11-21 PROCEDURE — 82570 ASSAY OF URINE CREATININE: CPT

## 2018-11-21 PROCEDURE — 84681 ASSAY OF C-PEPTIDE: CPT

## 2018-11-21 PROCEDURE — 82306 VITAMIN D 25 HYDROXY: CPT

## 2018-11-21 PROCEDURE — 84439 ASSAY OF FREE THYROXINE: CPT

## 2018-11-21 NOTE — TELEPHONE ENCOUNTER
----- Message from Frances Wolfe MD sent at 11/21/2018  3:35 PM EST -----  Please call the patient regarding his abnormal result  a1c is 8 5% , adjustment made in his regimen yesterday

## 2018-11-21 NOTE — TELEPHONE ENCOUNTER
----- Message from Daniel Prieto MD sent at 11/21/2018  3:38 PM EST -----  Please inform patient that thyroid blood work is normal, Urine protein is normal,

## 2018-11-22 LAB — C PEPTIDE SERPL-MCNC: 5 NG/ML (ref 1.1–4.4)

## 2018-11-26 ENCOUNTER — OFFICE VISIT (OUTPATIENT)
Dept: FAMILY MEDICINE CLINIC | Facility: CLINIC | Age: 73
End: 2018-11-26
Payer: COMMERCIAL

## 2018-11-26 VITALS
HEART RATE: 70 BPM | HEIGHT: 68 IN | OXYGEN SATURATION: 95 % | DIASTOLIC BLOOD PRESSURE: 82 MMHG | WEIGHT: 256.2 LBS | BODY MASS INDEX: 38.83 KG/M2 | TEMPERATURE: 97.7 F | SYSTOLIC BLOOD PRESSURE: 124 MMHG

## 2018-11-26 DIAGNOSIS — E78.5 HYPERLIPIDEMIA, UNSPECIFIED HYPERLIPIDEMIA TYPE: ICD-10-CM

## 2018-11-26 DIAGNOSIS — K75.81 NASH (NONALCOHOLIC STEATOHEPATITIS): ICD-10-CM

## 2018-11-26 DIAGNOSIS — I10 BENIGN ESSENTIAL HTN: ICD-10-CM

## 2018-11-26 DIAGNOSIS — E11.8 TYPE 2 DIABETES MELLITUS WITH COMPLICATION, WITHOUT LONG-TERM CURRENT USE OF INSULIN (HCC): Primary | ICD-10-CM

## 2018-11-26 DIAGNOSIS — I25.10 ARTERIOSCLEROTIC CORONARY ARTERY DISEASE: ICD-10-CM

## 2018-11-26 DIAGNOSIS — E66.9 OBESITY (BMI 30-39.9): ICD-10-CM

## 2018-11-26 PROCEDURE — 3074F SYST BP LT 130 MM HG: CPT | Performed by: PHYSICIAN ASSISTANT

## 2018-11-26 PROCEDURE — 1036F TOBACCO NON-USER: CPT | Performed by: PHYSICIAN ASSISTANT

## 2018-11-26 PROCEDURE — 99214 OFFICE O/P EST MOD 30 MIN: CPT | Performed by: PHYSICIAN ASSISTANT

## 2018-11-26 PROCEDURE — 3008F BODY MASS INDEX DOCD: CPT | Performed by: PHYSICIAN ASSISTANT

## 2018-11-26 PROCEDURE — 3079F DIAST BP 80-89 MM HG: CPT | Performed by: PHYSICIAN ASSISTANT

## 2018-11-26 PROCEDURE — 1160F RVW MEDS BY RX/DR IN RCRD: CPT | Performed by: PHYSICIAN ASSISTANT

## 2018-11-26 NOTE — PROGRESS NOTES
Assessment/Plan:     Diagnoses and all orders for this visit:    Type 2 diabetes mellitus with complication, without long-term current use of insulin ([facility] [address] )  Comments:  Patient to continue low carb diet and current medications  Check blood sugar daily  Orders:  -     Hemoglobin A1C; Future  -     Microalbumin / creatinine urine ratio; Future    YATES (nonalcoholic steatohepatitis)  Comments:  Exercise and weight loss encouraged to continue to decrease liver enzymes  Orders:  -     Hepatic function panel; Future    Arteriosclerotic coronary artery disease  Comments:  Coronary artery disease is stable no angina    Benign essential HTN  Comments:  Blood pressure is at goal continue current regimen    Hyperlipidemia, unspecified hyperlipidemia type  Comments:  Continue low-fat diet and statin therapy  Obesity (BMI 30-39  9)  Comments:  Continue exercise to promote weight loss  Subjective:      Patient ID: Matti Osorio is a 68 y o  male  Patient presents for follow up chronic conditions  Patient has non-insulin diabetes mellitus type 2  He is currently seeing endocrinologist   He is also involved in diabetic education class and dietitian training  He checks his blood sugar daily  Is currently on glipizide 10 mg and [facility] injection once a week  His recent A1c came back 8 5  His endocrinologist call that has increased to [facility] to 1 5 mg a week  Patient has history of coronary artery disease and aortic valve replacement  He is followed by his cardiologist   He is on lisinopril 2 5 mg and Coreg 3 25 mg twice a day  Patient is taking generic Lipitor 40 mg for his hyperlipidemia  Patient is overweight and has known fatty liver  We are working on this patient has prostate cancer he is in the process of switching his oncology provider  He also has osteoporosis he is taking calcium vitamin-D  Patient was also getting Prolia injection every 6 months    Other labs reviewed showed normal thyroid functions  Normal urine microalbumin  Lipid panel is at goal   Renal functions are normal   AST and ALT have decline some  Flu vaccine declined today        The following portions of the patient's history were reviewed and updated as appropriate:   He  has a past medical history of Arteriosclerotic coronary artery disease (11/30/2017); Benign prostate hyperplasia; COPD (chronic obstructive pulmonary disease) ([place] [address] ); Deep venous embolism and thrombosis ([place] [address] ); Diabetes mellitus ([place] [address] ); Hypertension; Impacted cerumen; Osteoarthritis (7/17/2012); Prostate cancer ([place] [address] ); and Pulmonary embolism ([place] [address] )  He   Patient Active Problem List    Diagnosis Date Noted    History of aortic valve replacement 11/30/2017    Arteriosclerotic coronary artery disease 11/30/2017    Obesity (BMI 30-39 9) 11/30/2017    Osteoporosis 11/30/2017    Type 2 diabetes mellitus with complication, without long-term current use of insulin ([place] [address] ) 06/20/2017    YATES (nonalcoholic steatohepatitis) 09/14/2015    Abnormal liver enzymes 05/08/2014    Prostate cancer ([place] [address] ) 06/18/2013    Hyperlipidemia 07/17/2012    Osteoarthritis 07/17/2012    Benign essential HTN 04/30/2012     Current Outpatient Prescriptions   Medication Sig Dispense Refill    aspirin 81 MG tablet Take by mouth      atorvastatin (LIPITOR) 40 mg tablet Take 1 tablet (40 mg total) by mouth daily 90 tablet 0    Blood Glucose Calibration (OT ULTRA/FASTTK CNTRL SOLN) SOLN by In Vitro route as needed (to check meter calibration) 1 each 3    Blood Glucose Monitoring Suppl (ONE TOUCH ULTRA 2) w/Device KIT by Does not apply route daily DX E11 65  Pt to   Check  fbs  daily 1 each 0    carvedilol (COREG) 3 125 mg tablet Take 1 tablet (3 125 mg total) by mouth 2 (two) times a day with meals 180 tablet 0    Cholecalciferol (VITAMIN D3) 2000 units capsule Take 2,000 Units by mouth daily  11    Coenzyme Q10 (COQ-10) 100 MG CAPS Take 100 mg by mouth        denosumab (PROLIA) 60 mg/mL Inject every 6 months, subcutaneously 1 mL 0    Dulaglutide (TRULICITY) 1 5 LT/8 5CT SOPN Inject 0 5 mL (1 5 mg total) under the skin once a week for 30 days 4 pen 2    glipiZIDE (GLUCOTROL) 10 mg tablet Take 1 tablet (10 mg total) by mouth 2 (two) times a day before meals 180 tablet 0    Glucose Blood (FREESTYLE LITE TEST VI) by In Vitro route      glucose blood (ONE TOUCH ULTRA TEST) test strip Check  fbs  Daily  DX E11 65 100 each 3    Lancets (FREESTYLE) lancets USE 1 LANCET ONCE A DAY AS DIRECTED (Patient not taking: Reported on 11/6/2018) 100 each 0    Lancets (ONETOUCH ULTRASOFT) lancets Check  fbs  Daily DX E11 65 100 each 2    leuprolide (LUPRON DEPOT, 1-MONTH,) 3 75 mg injection Inject into a muscle Once yearly       lisinopril (ZESTRIL) 2 5 mg tablet Take 1 tablet (2 5 mg total) by mouth daily 90 tablet 0    Multiple Vitamin-Folic Acid TABS Take by mouth      Omega-3 Fatty Acids (FISH OIL) 1,000 mg Take 1,000 mg by mouth daily  No current facility-administered medications for this visit  Current Outpatient Prescriptions on File Prior to Visit   Medication Sig    aspirin 81 MG tablet Take by mouth    atorvastatin (LIPITOR) 40 mg tablet Take 1 tablet (40 mg total) by mouth daily    Blood Glucose Calibration (OT ULTRA/FASTTK CNTRL SOLN) SOLN by In Vitro route as needed (to check meter calibration)    Blood Glucose Monitoring Suppl (ONE TOUCH ULTRA 2) w/Device KIT by Does not apply route daily DX E11 65  Pt to   Check  fbs  daily    carvedilol (COREG) 3 125 mg tablet Take 1 tablet (3 125 mg total) by mouth 2 (two) times a day with meals    Cholecalciferol (VITAMIN D3) 2000 units capsule Take 2,000 Units by mouth daily    Coenzyme Q10 (COQ-10) 100 MG CAPS Take 100 mg by mouth      denosumab (PROLIA) 60 mg/mL Inject every 6 months, subcutaneously    Dulaglutide (TRULICITY) 1 5 SX/8 4XY SOPN Inject 0 5 mL (1 5 mg total) under the skin once a week for 30 days    glipiZIDE (GLUCOTROL) 10 mg tablet Take 1 tablet (10 mg total) by mouth 2 (two) times a day before meals    Glucose Blood (FREESTYLE LITE TEST VI) by In Vitro route    glucose blood (ONE TOUCH ULTRA TEST) test strip Check  fbs  Daily  DX E11 65    Lancets (FREESTYLE) lancets USE 1 LANCET ONCE A DAY AS DIRECTED (Patient not taking: Reported on 11/6/2018)    Lancets (ONETOUCH ULTRASOFT) lancets Check  fbs  Daily DX E11 65    leuprolide (LUPRON DEPOT, 1-MONTH,) 3 75 mg injection Inject into a muscle Once yearly     lisinopril (ZESTRIL) 2 5 mg tablet Take 1 tablet (2 5 mg total) by mouth daily    Multiple Vitamin-Folic Acid TABS Take by mouth    Omega-3 Fatty Acids (FISH OIL) 1,000 mg Take 1,000 mg by mouth daily   [DISCONTINUED] Dulaglutide (TRULICITY) 5 18 WJ/6 0XN SOPN Inject 0 5 mL (0 75 mg total) under the skin once a week     No current facility-administered medications on file prior to visit  He has No Known Allergies       Review of Systems   Constitutional: Negative for activity change, appetite change, chills, fatigue and fever  HENT: Negative for ear pain and sore throat  Eyes: Negative for visual disturbance  Respiratory: Negative for cough and shortness of breath  Cardiovascular: Negative for chest pain, palpitations and leg swelling  Gastrointestinal: Negative for abdominal pain, blood in stool, constipation, diarrhea and nausea  Genitourinary: Negative for difficulty urinating  Musculoskeletal: Negative for arthralgias, back pain and myalgias  Skin: Negative for rash  Neurological: Negative for dizziness, syncope and headaches  Psychiatric/Behavioral: Negative for sleep disturbance  Objective:        Physical Exam   Constitutional: He is oriented to person, place, and time  He appears well-developed and well-nourished  Obese  White   Male      HENT:   Right Ear: Tympanic membrane, external ear and ear canal normal    Left Ear: Tympanic membrane, external ear and ear canal normal    Mouth/Throat: Oropharynx is clear and moist    Eyes: Pupils are equal, round, and reactive to light  Conjunctivae are normal    Neck: Neck supple  Carotid bruit is not present  No thyromegaly present  Cardiovascular: Normal rate and regular rhythm  Murmur heard  Aortic  Valve  Replaced  Pulmonary/Chest: Effort normal and breath sounds normal  He has no wheezes  Abdominal: Soft  Bowel sounds are normal  He exhibits no mass  There is no tenderness  Obese   Abd  Diastasis  rectus   Musculoskeletal: He exhibits no edema  Lymphadenopathy:     He has no cervical adenopathy  Neurological: He is alert and oriented to person, place, and time  Skin: Skin is warm and dry  Psychiatric: He has a normal mood and affect  His behavior is normal  Judgment and thought content normal    Nursing note and vitals reviewed

## 2018-11-28 DIAGNOSIS — I10 HYPERTENSION, UNSPECIFIED TYPE: ICD-10-CM

## 2018-11-28 PROCEDURE — 4010F ACE/ARB THERAPY RXD/TAKEN: CPT | Performed by: PHYSICIAN ASSISTANT

## 2018-11-28 RX ORDER — LISINOPRIL 2.5 MG/1
2.5 TABLET ORAL DAILY
Qty: 90 TABLET | Refills: 0 | Status: SHIPPED | OUTPATIENT
Start: 2018-11-28 | End: 2019-02-24 | Stop reason: SDUPTHER

## 2018-12-11 ENCOUNTER — TELEPHONE (OUTPATIENT)
Dept: DIABETES SERVICES | Facility: CLINIC | Age: 73
End: 2018-12-11

## 2018-12-12 ENCOUNTER — DOCUMENTATION (OUTPATIENT)
Dept: ENDOCRINOLOGY | Facility: CLINIC | Age: 73
End: 2018-12-12

## 2018-12-12 ENCOUNTER — OFFICE VISIT (OUTPATIENT)
Dept: DIABETES SERVICES | Facility: CLINIC | Age: 73
End: 2018-12-12
Payer: COMMERCIAL

## 2018-12-12 ENCOUNTER — TELEPHONE (OUTPATIENT)
Dept: ENDOCRINOLOGY | Facility: CLINIC | Age: 73
End: 2018-12-12

## 2018-12-12 VITALS — WEIGHT: 257 LBS | BODY MASS INDEX: 38.95 KG/M2 | HEIGHT: 68 IN

## 2018-12-12 DIAGNOSIS — E11.65 UNCONTROLLED TYPE 2 DIABETES MELLITUS WITH HYPERGLYCEMIA (HCC): Primary | ICD-10-CM

## 2018-12-12 DIAGNOSIS — E11.8 TYPE 2 DIABETES MELLITUS WITH COMPLICATION, WITHOUT LONG-TERM CURRENT USE OF INSULIN (HCC): Primary | ICD-10-CM

## 2018-12-12 PROCEDURE — 97802 MEDICAL NUTRITION INDIV IN: CPT | Performed by: DIETITIAN, REGISTERED

## 2018-12-12 RX ORDER — FLASH GLUCOSE SENSOR
KIT MISCELLANEOUS
Qty: 6 EACH | Refills: 1 | Status: SHIPPED | OUTPATIENT
Start: 2018-12-12 | End: 2019-07-05 | Stop reason: SDUPTHER

## 2018-12-12 RX ORDER — FLASH GLUCOSE SCANNING READER
EACH MISCELLANEOUS
Qty: 1 DEVICE | Refills: 0 | Status: SHIPPED | OUTPATIENT
Start: 2018-12-12

## 2018-12-12 NOTE — PROGRESS NOTES
Medical Nutrition Therapy     Assessment    Visit Type: Initial visit  Chief complaint:  T2DM    HPI:  Patient is a 67 y/o male with T2DM with CKD, CAD, HTN,HLD,YATES, Morbid Obesity  He admits to not testing his blood sugar as advised  Barry professional download shows blood sugars well above target in 200-300's  Patients Trulicity was increased to 1 5 by endo  He is taking his glipizide as prescribed  Patient admits to eating what he wants and not worrying about his blood sugar but is now worried with all the complications and would like to get on track to avoid worsening complications  He was educated on meal planning/carb counting and given a 1512 calorie low carb (30%) personal meal plan  He was encouraged to make the best carb choices, reduce processed sweet snacks like cookies and regular soda and replace with more nutritious whole foods  Gave advise on lowering calories by reducing intake of high fat foods like oils, butter, cheese, ice cream  He was also encouraged to increase daily activity  Ht Readings from Last 1 Encounters:   12/12/18 5' 7 5" (1 715 m)     Wt Readings from Last 3 Encounters:   12/12/18 117 kg (257 lb)   11/26/18 116 kg (256 lb 3 2 oz)   11/06/18 114 kg (251 lb 12 8 oz)        Body mass index is 39 66 kg/m²       Lab Results   Component Value Date    HGBA1C 8 5 (H) 11/21/2018    HGBA1C 10 1 (H) 06/15/2018    HGBA1C 10 0 (H) 02/23/2018       Lab Results   Component Value Date    CHOL 208 09/08/2015     Lab Results   Component Value Date    HDL 50 11/21/2018    HDL 43 02/23/2018    HDL 49 06/08/2017     Lab Results   Component Value Date    LDLCALC 68 11/21/2018    LDLCALC 74 02/23/2018    LDLCALC 134 (H) 06/08/2017     Lab Results   Component Value Date    TRIG 95 11/21/2018    TRIG 78 02/23/2018    TRIG 130 06/08/2017     No results found for: CHOLHDL        Weight Change: Yes +6 lbs  Barriers to Learning: no barriers    Do you follow any special diet presently?: No  Who shops: spouse  Who cooks: spouse    Food Log: Completed via the method of food recall    Breakfast:3 eggs, 2 pc toast, coffee with sweet n low, cream  Morning Snack:3 cookies  Lunch:9" [employer] Kavon hoagie, sparkling ice water, 2-3 pc extra cheese  Afternoon Snack:   Dinner:[employer]ick johnnie a: chicken sandwich and fries, regular Dr Itzel Ramos  Evening Snack:Cookies or 1 large bowl ice cream, or pretzels  Eating out/Take out:Often  Exercise None      Nutrition Diagnosis:  Food and nutrition related knowledge deficit  related to Lack of prior exposure to accurate nutrition related information as evidenced by Conditions associated with diagnosis or treatment    Intervention: behavior modification strategies, carbohydrate counting, meal planning, individualized meal plan, monitoring portion control and food diary     Treatment Goals: Patient will count carbohydrates    Monitoring and evaluation:    Term code indicator   1 6 3 Carbohydrate Intake Criteria: 30 grams breakfast and lunch, 45 grams dinner, 15 grams bedtime snack    Education Material Given  Penelope Escalona was provided the following education material: [employer], Portion Book, 1512 calorie low carb 30% personal meal plan     Patients Response to Instruction  Comprehensiongood  Motivationgood  Expected Compliancegood    Thank you for coming to the [facility] for education today  Please feel free to call with any questions or concerns      [address] [place] [phone]

## 2018-12-12 NOTE — PATIENT INSTRUCTIONS
1  Count carbs by identifying "total carbohydrates" on food labels and multiplying by the number of servings you ate  You can also use carb books, internet or phone apps to count carbs  2  Plan each meal using the number of total carbs allowed on your individual meal plan  3  Test your blood sugar before and 2 hours after various meals to find out if the number of carbs or particular food is working to keep your blood sugar at goal  If it is not, change the portion size of the food or try a different food choice  Eating more whole foods/whole grains versus processed food may help to slow the rise in your blood sugar  4  Include aerobic and resistance exercise in your daily plan   If you are not currently exercising start out slow and work up to 150 minutes of aerobic exercise and 20-30 minutes resistance training 2-3 days per week

## 2018-12-13 ENCOUNTER — DOCUMENTATION (OUTPATIENT)
Dept: ENDOCRINOLOGY | Facility: CLINIC | Age: 73
End: 2018-12-13

## 2018-12-17 DIAGNOSIS — I10 HYPERTENSION, UNSPECIFIED TYPE: ICD-10-CM

## 2018-12-18 DIAGNOSIS — E11.9 TYPE 2 DIABETES MELLITUS WITHOUT COMPLICATION, WITHOUT LONG-TERM CURRENT USE OF INSULIN (HCC): ICD-10-CM

## 2018-12-18 RX ORDER — CARVEDILOL 3.12 MG/1
TABLET ORAL
Qty: 180 TABLET | Refills: 0 | Status: SHIPPED | OUTPATIENT
Start: 2018-12-18 | End: 2018-12-21 | Stop reason: SDUPTHER

## 2018-12-18 RX ORDER — GLIPIZIDE 10 MG/1
10 TABLET ORAL
Qty: 180 TABLET | Refills: 0 | Status: SHIPPED | OUTPATIENT
Start: 2018-12-18 | End: 2019-03-30 | Stop reason: SDUPTHER

## 2018-12-19 ENCOUNTER — TELEPHONE (OUTPATIENT)
Dept: ENDOCRINOLOGY | Facility: CLINIC | Age: 73
End: 2018-12-19

## 2018-12-19 ENCOUNTER — DOCUMENTATION (OUTPATIENT)
Dept: ENDOCRINOLOGY | Facility: CLINIC | Age: 73
End: 2018-12-19

## 2018-12-20 ENCOUNTER — TELEPHONE (OUTPATIENT)
Dept: ENDOCRINOLOGY | Facility: CLINIC | Age: 73
End: 2018-12-20

## 2018-12-20 NOTE — TELEPHONE ENCOUNTER
Per Gene, Prolia Rep, we can either appeal denial for Prolia or start patient on bisphosphonate  Please advise

## 2018-12-20 NOTE — TELEPHONE ENCOUNTER
Please inform patient that Prolia is denied, by insurance  To discuss other option,  will have to make appointment sooner    Rachel Keith MD

## 2018-12-21 DIAGNOSIS — I10 HYPERTENSION, UNSPECIFIED TYPE: ICD-10-CM

## 2018-12-24 RX ORDER — CARVEDILOL 3.12 MG/1
TABLET ORAL
Qty: 180 TABLET | Refills: 0 | Status: SHIPPED | OUTPATIENT
Start: 2018-12-24 | End: 2019-05-08 | Stop reason: SDUPTHER

## 2018-12-27 ENCOUNTER — OFFICE VISIT (OUTPATIENT)
Dept: ENDOCRINOLOGY | Facility: CLINIC | Age: 73
End: 2018-12-27
Payer: COMMERCIAL

## 2018-12-27 ENCOUNTER — DOCUMENTATION (OUTPATIENT)
Dept: ENDOCRINOLOGY | Facility: CLINIC | Age: 73
End: 2018-12-27

## 2018-12-27 VITALS
DIASTOLIC BLOOD PRESSURE: 80 MMHG | BODY MASS INDEX: 39.1 KG/M2 | HEIGHT: 68 IN | WEIGHT: 258 LBS | SYSTOLIC BLOOD PRESSURE: 122 MMHG

## 2018-12-27 DIAGNOSIS — E11.8 TYPE 2 DIABETES MELLITUS WITH COMPLICATION, WITHOUT LONG-TERM CURRENT USE OF INSULIN (HCC): Primary | ICD-10-CM

## 2018-12-27 DIAGNOSIS — I10 BENIGN ESSENTIAL HTN: ICD-10-CM

## 2018-12-27 DIAGNOSIS — E78.2 MIXED HYPERLIPIDEMIA: ICD-10-CM

## 2018-12-27 DIAGNOSIS — M81.0 OSTEOPOROSIS WITHOUT CURRENT PATHOLOGICAL FRACTURE, UNSPECIFIED OSTEOPOROSIS TYPE: ICD-10-CM

## 2018-12-27 DIAGNOSIS — C61 PROSTATE CANCER (HCC): ICD-10-CM

## 2018-12-27 DIAGNOSIS — K75.81 NASH (NONALCOHOLIC STEATOHEPATITIS): ICD-10-CM

## 2018-12-27 PROCEDURE — 99214 OFFICE O/P EST MOD 30 MIN: CPT | Performed by: PHYSICIAN ASSISTANT

## 2018-12-27 RX ORDER — METFORMIN HYDROCHLORIDE EXTENDED-RELEASE TABLETS 500 MG/1
500 TABLET, FILM COATED, EXTENDED RELEASE ORAL 2 TIMES DAILY WITH MEALS
Qty: 90 TABLET | Refills: 1 | Status: SHIPPED | OUTPATIENT
Start: 2018-12-27 | End: 2019-04-10

## 2018-12-27 NOTE — PATIENT INSTRUCTIONS
Hypoglycemia instructions   Deion Hoffmann  12/27/2018  [id number]    Low Blood Sugar    Steps to treat low blood sugar  1  Test blood sugar if you have symptoms of low blood sugar:   Low Blood Sugar Symptoms:  o Sweaty  o Dizzy  o Rapid heartbeat  o Shaky  o Bad mood  o Hungry      2  Treat blood sugar less than 70 with 15 grams of fast-acting carbohydrate:   Examples of 15 grams Fast-Acting Carbohydrate:  o 4 oz juice  o 4 oz regular soda  o 3-4 glucose tablets (chew)  o 3-4 hard candies (chew)          3  Wait 15 minutes and test your blood sugar again     4   If blood sugar is less than 100, repeat steps 2-3     5  When your blood sugar is 100 or more, eat a snack if it will be longer than one hour until your next meal  The snack should be 15 grams of carbohydrate and a protein:   Examples of snacks:  o ½ sandwich  o 6 crackers with cheese  o Piece of fruit with cheese or peanut butter  o 6 crackers with peanut butter

## 2018-12-27 NOTE — PROGRESS NOTES
Patient Progress Note      CC: DM2, osteoporosis      Referring Provider  Dashawn Flores, [address] [facility], [address]     History of Present Illness:   Christa Diamond is a 68 y o  male with a history of type 2 diabetes without long term use of insulin  Diabetes course has been stable  Complications of DM:   Denies recent illness or hospitalizations  Denies recent severe hypoglycemic or severe hyperglycemic episodes  Denies any issues with his current regimen  Home glucose monitoring: are performed regularly, once a day since this past week    Home blood glucose readings: did not bring glucometer or log  Before breakfast: 140-160 mg/dl    Current regimen: Trulicity 1 5 mg weekly, glipizide 10 mg BID  Compliant most of the time, denies any side effects from medications  Injects in: abdomen  Rotates sites: Yes  Hypoglycemic episodes: No, never  Hypoglycemia symptoms: never experienced  Treatment of hypoglycemia: orange juice    Medic alert tag: recommended: Yes    Diabetic diet compliance:  noncompliant some of the time, not compliant over the holidays, but is motivated to make some changes at the start of the new year  Activity: Daily activity is predictable: Yes  [facility]  Has hypertension: on lisinopril, carvedilol, compliant most of the time  Has hyperlipidemia: on atorvastatin- tolerating well, no myalgias  compliant most of the time, denies any side effects from medications  Thyroid disorders: No  History of pancreatitis: No    Osteoporosis / vitamin-D deficiency:  Patient has been receiving Prolia injections every 6 months  Prolia was recently denied  Prior authorization is being completed  Patient is taking vitamin D3 2000 International Units daily  Denies recent falls or fractures  Is doing weight-bearing exercises  Last DEXA scan was done 7/8/2016  Impression:   1  Osteopenia  2  Fracture risk is moderate  3   Degenerative changes in the lumbar spine    Patient has prostate cancer; was being treated with Lupron by Dr Daisy Sandoval  He reports he stopped going to their office because it takes too long to get an injection  He was also upset that the Prolia was denied  Patient Active Problem List   Diagnosis    Abnormal liver enzymes    History of aortic valve replacement    Arteriosclerotic coronary artery disease    Benign essential HTN    Type 2 diabetes mellitus with complication, without long-term current use of insulin (HCC)    Hyperlipidemia    YATES (nonalcoholic steatohepatitis)    Obesity (BMI 30-39  9)    Osteoarthritis    Osteoporosis    Prostate cancer [facility])      Past Medical History:   Diagnosis Date    Arteriosclerotic coronary artery disease 11/30/2017    Benign prostate hyperplasia     L A  7/17/12   R   8/30/17      COPD (chronic obstructive pulmonary disease) (HCC)     Deep venous embolism and thrombosis (HCC)     Diabetes [facility] )     Hypertension     Impacted cerumen     unspecified laterity / L A  6/20/12       Osteoarthritis 7/17/2012    Prostate cancer ([facility] )     Pulmonary embolism (HCC)       Past Surgical History:   Procedure Laterality Date    CARDIAC VALVE REPLACEMENT      CORONARY ARTERY BYPASS GRAFT      FRACTURE SURGERY      left knee surgery    KNEE ARTHROSCOPY      Therapeutic    KNEE SURGERY      replacement    LUNG SURGERY      LUNG SURGERY      wedge resection     TONSILLECTOMY      UMBILICAL HERNIA REPAIR        Family History   Problem Relation Age of Onset    COPD Mother     Heart attack Mother     Heart attack Father      Social History   Substance Use Topics    Smoking status: Former Smoker     Quit date: 3/23/2000    Smokeless tobacco: Never Used    Alcohol use No      Comment: social drinker per [employer]     No Known Allergies      Current Outpatient Prescriptions:     aspirin 81 MG tablet, Take by mouth, Disp: , Rfl:     atorvastatin (LIPITOR) 40 mg tablet, Take 1 tablet (40 mg total) by mouth daily, Disp: 90 tablet, Rfl: 0    Blood Glucose Calibration (OT ULTRA/FASTTK CNTRL SOLN) SOLN, by In Vitro route as needed (to check meter calibration), Disp: 1 each, Rfl: 3    Blood Glucose Monitoring Suppl (ONE TOUCH ULTRA 2) w/Device KIT, by Does not apply route daily DX E11 65  Pt to   Check  fbs  daily, Disp: 1 each, Rfl: 0    carvedilol (COREG) 3 125 mg tablet, TAKE 1 TABLET BY MOUTH TWICE A DAY WITH MEALS, Disp: 180 tablet, Rfl: 0    Cholecalciferol (VITAMIN D3) 2000 units capsule, Take 2,000 Units by mouth daily, Disp: , Rfl: 11    Coenzyme Q10 (COQ-10) 100 MG CAPS, Take 100 mg by mouth , Disp: , Rfl:     Continuous Blood Gluc  (FREESTYLE CEDRIC 14 DAY READER) ADELAIDA, 1 CGM DEVICE  USE TO CHECK BG LEVELS 4+ TIMES DAILY AND AS NEEDED, Disp: 1 Device, Rfl: 0    Continuous Blood Gluc Sensor (FREESTYLE CEDRIC 14 DAY SENSOR) MIS, CHANGE SENSOR EVERY 14 DAYS TO CHECK BG LEVELS 4+ TIMES DAILY, Disp: 6 each, Rfl: 1    denosumab (PROLIA) 60 mg/mL, Inject every 6 months, subcutaneously, Disp: 1 mL, Rfl: 0    Dulaglutide (TRULICITY) 1 5 DP/7 7LM SOPN, Inject 0 5 mL (1 5 mg total) under the skin once a week for 30 days, Disp: 4 pen, Rfl: 2    glipiZIDE (GLUCOTROL) 10 mg tablet, Take 1 tablet (10 mg total) by mouth 2 (two) times a day before meals, Disp: 180 tablet, Rfl: 0    glucose blood (ONE TOUCH ULTRA TEST) test strip, Check  fbs  Daily  DX E11 65, Disp: 100 each, Rfl: 3    Lancets (ONETOUCH ULTRASOFT) lancets, Check  fbs  Daily DX E11 65, Disp: 100 each, Rfl: 2    leuprolide (LUPRON DEPOT, 1-MONTH,) 3 75 mg injection, Inject into a muscle Once yearly , Disp: , Rfl:     lisinopril (ZESTRIL) 2 5 mg tablet, Take 1 tablet (2 5 mg total) by mouth daily, Disp: 90 tablet, Rfl: 0    Multiple Vitamin-Folic Acid TABS, Take by mouth, Disp: , Rfl:     Omega-3 Fatty Acids (FISH OIL) 1,000 mg, Take 1,000 mg by mouth daily  , Disp: , Rfl:     Glucose Blood (FREESTYLE LITE TEST VI), by In Vitro route, Disp: , Rfl:     Lancets (FREESTYLE) lancets, USE 1 LANCET ONCE A DAY AS DIRECTED (Patient not taking: Reported on 11/6/2018), Disp: 100 each, Rfl: 0    metFORMIN (FORTAMET) 500 MG (OSM) 24 hr tablet, Take 1 tablet (500 mg total) by mouth 2 (two) times a day with meals for 90 days Take 1 tablet daily with dinner, Disp: 90 tablet, Rfl: 1  Review of Systems   Constitutional: Negative for activity change, appetite change, fatigue and unexpected weight change  HENT: Negative for trouble swallowing  Eyes: Negative for visual disturbance  Respiratory: Negative for shortness of breath  Cardiovascular: Negative for chest pain and palpitations  Gastrointestinal: Negative for constipation and diarrhea  Endocrine: Negative for cold intolerance, heat intolerance, polydipsia and polyuria  Musculoskeletal: Negative  Neurological: Negative for numbness  Psychiatric/Behavioral: Negative  Physical Exam:  Body mass index is 39 81 kg/m²  /80   Ht 5' 7 5" (1 715 m)   Wt 117 kg (258 lb)   BMI 39 81 kg/m²    Wt Readings from Last 3 Encounters:   12/27/18 117 kg (258 lb)   12/12/18 117 kg (257 lb)   11/26/18 116 kg (256 lb 3 2 oz)       Physical Exam   Constitutional: He appears well-developed and well-nourished  HENT:   Head: Normocephalic  Eyes: Pupils are equal, round, and reactive to light  No scleral icterus  Cardiovascular: Normal rate and regular rhythm  No murmur heard  Pulses:       Radial pulses are 2+ on the right side, and 2+ on the left side  Pulmonary/Chest: Effort normal and breath sounds normal  No respiratory distress  He has no wheezes  Neurological: He is alert  He has normal reflexes  Skin: Skin is warm and dry  Psychiatric: He has a normal mood and affect  Nursing note and vitals reviewed  Patient's shoes and socks were not removed        Labs:   Component      Latest Ref Rng & Units 6/15/2018 9/19/2018 11/21/2018   Sodium 136 - 145 mmol/L 137 136 140   Potassium      3 5 - 5 3 mmol/L 4 2 4 2 4 4   Chloride      100 - 108 mmol/L 101 100 103   CO2      21 - 32 mmol/L 26 24 29   Anion Gap      4 - 13 mmol/L 10 12 8   BUN      5 - 25 mg/dL 18 17 16   Creatinine      0 60 - 1 30 mg/dL 1 25 1 42 (H) 1 11   GLUCOSE FASTING      65 - 99 mg/dL 224 (H)  161 (H)   Calcium      8 3 - 10 1 mg/dL 9 6 9 4 9 7   AST      5 - 45 U/L 109 (H)  76 (H)   ALT      12 - 78 U/L 156 (H)  115 (H)   Alkaline Phosphatase      46 - 116 U/L 63  74   Total Protein      6 4 - 8 2 g/dL 7 8  7 7   Albumin      3 5 - 5 0 g/dL 3 4 (L)  3 5   TOTAL BILIRUBIN      0 20 - 1 00 mg/dL 0 70  0 70   eGFR      ml/min/1 73sq m 58 49 66   Glucose, Random      65 - 140 mg/dL  294 (H)    Cholesterol      50 - 200 mg/dL   137   Triglycerides      <=150 mg/dL   95   HDL      40 - 60 mg/dL   50   LDL Direct      0 - 100 mg/dL   68   Non-HDL Cholesterol      mg/dl   87   EXT Creatinine Urine      mg/dL   172 0   MICROALBUM ,U,RANDOM      0 0 - 20 0 mg/L   16 6   MICROALBUMIN/CREATININE RATIO      0 - 30 mg/g creatinine   10   Hemoglobin A1C      4 2 - 6 3 % 10 1 (H)  8 5 (H)   EAG      mg/dl 243  197   C-PEPTIDE      1 1 - 4 4 ng/mL   5 0 (H)   Vit D, 25-Hydroxy      30 0 - 100 0 ng/mL   26 2 (L)   TSH 3RD GENERATON      0 358 - 3 740 uIU/mL   1 667   Free T4      0 76 - 1 46 ng/dL   1 28       Plan:    Diagnoses and all orders for this visit:    Type 2 diabetes mellitus with complication, without long-term current use of insulin (HCC)  HGA1C 8 5%  Improved  Treatment regimen: Start metformin 500 mg with dinner  Monitor LFTs, history of fatty liver  Continue current treatment  Discussed risks/complications associated with uncontrolled diabetes  Advised to adhere to diabetic diet, and recommended staying active/exercising routinely  Keep carbohydrates consistent to limit blood glucose fluctuations  Advised to call if blood sugars less than 70 mg/dl or over 300 mg/dl     Check blood glucose 2-3 times a day  Discussed symptoms and treatment of hypoglycemia  Plans on meeting with Rosalynd Schwab and using Freestyle Denny at the start of the new year  Send log in 2 weeks  Ordered blood work to complete prior to next visit  -     Hemoglobin A1C; Future  -     Comprehensive metabolic panel; Future  -     metFORMIN (FORTAMET) 500 MG (OSM) 24 hr tablet; Take 1 tablet (500 mg total) by mouth 2 (two) times a day with meals for 90 days Take 1 tablet daily with dinner  -     Hepatic function panel; Future    Benign essential HTN  Blood pressure well controlled, continue current treatment  -     Comprehensive metabolic panel; Future    Osteoporosis without current pathological fracture, unspecified osteoporosis type  Most recent DEXA scan done 2016 shows osteopenia  Continue vitamin D3 supplementation and weight-bearing exercises  Was being treated with Prolia however it was recently denied by [employer]  Will do PA  Due for repeat DEXA scan now  -     Comprehensive metabolic panel; Future  -     Vitamin D 25 hydroxy; Future  -     DXA bone density spine hip and pelvis; Future    Mixed hyperlipidemia  LDL 60, at goal  Continue statin therapy    Prostate cancer [facility])  Patient wants to follow with a new oncologist  Was receiving Lupron injections  Referred to oncology  -     Ambulatory referral to Oncology; Future       Discussed with the patient diagnosis and treatment and all questions fully answered  He will call me if any problems arise  Counseled patient on diagnostic results, prognosis, risk and benefit of treatment options, instruction for management, importance of treatment compliance, risk factor reduction and impressions        Deisy Black PA-C

## 2018-12-28 ENCOUNTER — DOCUMENTATION (OUTPATIENT)
Dept: ENDOCRINOLOGY | Facility: CLINIC | Age: 73
End: 2018-12-28

## 2019-01-10 DIAGNOSIS — E78.5 DYSLIPIDEMIA: ICD-10-CM

## 2019-01-10 RX ORDER — ATORVASTATIN CALCIUM 40 MG/1
40 TABLET, FILM COATED ORAL DAILY
Qty: 90 TABLET | Refills: 0 | Status: SHIPPED | OUTPATIENT
Start: 2019-01-10 | End: 2019-04-03 | Stop reason: SDUPTHER

## 2019-01-24 ENCOUNTER — OFFICE VISIT (OUTPATIENT)
Dept: DIABETES SERVICES | Facility: CLINIC | Age: 74
End: 2019-01-24
Payer: COMMERCIAL

## 2019-01-24 DIAGNOSIS — E11.65 TYPE 2 DIABETES MELLITUS WITH HYPERGLYCEMIA, WITHOUT LONG-TERM CURRENT USE OF INSULIN (HCC): Primary | ICD-10-CM

## 2019-01-24 PROCEDURE — G0109 DIAB MANAGE TRN IND/GROUP: HCPCS | Performed by: DIETITIAN, REGISTERED

## 2019-01-25 NOTE — PATIENT INSTRUCTIONS
Please attend Living Well with Diabetes class 1 on February 7, 2019 at [address] [place] [address]

## 2019-01-25 NOTE — PROGRESS NOTES
Living Well with Diabetes Group Class #4    Andra Holder attended the Living Well with Diabetes Group Class #4  During class, Vadim Faulkner was instructed on the following topics: Types of cholesterol, dietary sources of cholesterol, types of fat, types of fiber, reading food labels- in depth, healthy choices when dining out  Vadim Faulkner participated in group activities of reading labels together  Vadim Faulkner will follow up with classes # 1-3, and 5 or additional DSMT/MNT as desired  Will call with any questions or concerns prior to next session  The patient's history was reviewed and updated as appropriate: allergies, current medications  Lab Results   Component Value Date    HGBA1C 8 5 (H) 11/21/2018       Diabetes Education Record  Vadim Faulkner was provided Living Well with Diabetes Class #4 book      Patient response to instruction    Comprehensiongood  Motivationgood  Expected Compliancegood    Begin Time: 6:00 pm  End Time: 8:00 pm  Referring Provider: Marianna Mcelroy MD    Thank you for referring your patient to [facility], it was a pleasure working with them today  Please feel free to call with any questions or concerns      Gay Quinn RD  [facility] [phone]

## 2019-02-01 ENCOUNTER — HOSPITAL ENCOUNTER (OUTPATIENT)
Dept: RADIOLOGY | Age: 74
Discharge: HOME | End: 2019-02-01
Payer: COMMERCIAL

## 2019-02-01 DIAGNOSIS — M81.0 OSTEOPOROSIS WITHOUT CURRENT PATHOLOGICAL FRACTURE, UNSPECIFIED OSTEOPOROSIS TYPE: ICD-10-CM

## 2019-02-01 PROCEDURE — 77080 DXA BONE DENSITY AXIAL: CPT

## 2019-02-07 ENCOUNTER — OFFICE VISIT (OUTPATIENT)
Dept: DIABETES SERVICES | Facility: CLINIC | Age: 74
End: 2019-02-07
Payer: COMMERCIAL

## 2019-02-07 DIAGNOSIS — E11.65 TYPE 2 DIABETES MELLITUS WITH HYPERGLYCEMIA, WITHOUT LONG-TERM CURRENT USE OF INSULIN (HCC): Primary | ICD-10-CM

## 2019-02-07 PROCEDURE — G0109 DIAB MANAGE TRN IND/GROUP: HCPCS | Performed by: DIETITIAN, REGISTERED

## 2019-02-07 NOTE — PATIENT INSTRUCTIONS
Please return for Living Well with Diabetes Class 2 on February 14, 2019 starting at 9:30 am at the [facility]

## 2019-02-07 NOTE — PROGRESS NOTES
Living Well with Diabetes Group Class #1    Alyx Singh attended the Living Well with Diabetes Group Class #1  Topics Covered in class today include: What is diabetes; Types of Diabetes; How Diabetes is diagnosed; Management skills; the role of exercise in blood sugar managements, Home glucose monitoring, and target ranges  The patient's history was reviewed and updated as appropriate: allergies, current medications  Lab Results   Component Value Date    HGBA1C 8 5 (H) 11/21/2018       Diabetes Education Record  Yumiko Mccray was provided Living Well with Diabetes Class #1 book      Patient response to instruction    Comprehensiongood  Motivationgood  Expected Compliancegood    Begin Time: 9:30 am  End Time: 11:30 am  Referring Provider: Daniel Prieto MD    Thank you for referring your patient to [address] [place] [address], it was a pleasure working with them today  Please feel free to call with any questions or concerns      Mikala Castellanos RD  [facility] [phone]

## 2019-02-13 ENCOUNTER — TELEPHONE (OUTPATIENT)
Dept: ENDOCRINOLOGY | Facility: CLINIC | Age: 74
End: 2019-02-13

## 2019-02-13 NOTE — TELEPHONE ENCOUNTER
Spoke with pt who understood results  Prolia was denied, per ins they want him to start on bisphosphonate first

## 2019-02-13 NOTE — TELEPHONE ENCOUNTER
----- Message from Lisa Aguillon PA-C sent at 2/12/2019  3:50 PM EST -----  DEXA scan shows osteopenia  Continue vit D supplementation  Check on status of PA for prolia

## 2019-02-14 ENCOUNTER — OFFICE VISIT (OUTPATIENT)
Dept: DIABETES SERVICES | Facility: CLINIC | Age: 74
End: 2019-02-14
Payer: COMMERCIAL

## 2019-02-14 DIAGNOSIS — E11.65 TYPE 2 DIABETES MELLITUS WITH HYPERGLYCEMIA, WITHOUT LONG-TERM CURRENT USE OF INSULIN (HCC): Primary | ICD-10-CM

## 2019-02-14 PROCEDURE — G0109 DIAB MANAGE TRN IND/GROUP: HCPCS | Performed by: DIETITIAN, REGISTERED

## 2019-02-15 NOTE — PROGRESS NOTES
Living Well with Diabetes Group Class #2    Green Marcelino attended the Living Well with Diabetes Group Class #2  During class, Sharla Phillips was instructed on the following topics: Macronutrients, Carbohydrate sources, What one serving of carbohydrate equals, effects of diet on blood glucose levels, effect of carbohydrates on blood glucose levels, basics of meal planning: balance, portions, meal times, measurements, reading food labels to determine carbohydrates  Sharla Phillips participated in group activities of reading labels together and completing the meal planning activity  Sharla Jacqueline will follow up with class #3  Will call with any questions or concerns prior to next session  The patient's history was reviewed and updated as appropriate: allergies, current medications  Lab Results   Component Value Date    HGBA1C 8 5 (H) 11/21/2018       Diabetes Education Record  Sharla Phillips was provided Living Well with Diabetes Class #2 book      Patient response to instruction    Comprehensiongood  Motivationgood  Expected Compliancegood    Begin Time: 9:30 am  End Time: 11:30 am  Referring Provider: Immanuel Bob MD    Thank you for referring your patient to [facility], it was a pleasure working with them today  Please feel free to call with any questions or concerns      Yaquelin Landers RD  [facility] [phone]

## 2019-02-21 ENCOUNTER — OFFICE VISIT (OUTPATIENT)
Dept: DIABETES SERVICES | Facility: CLINIC | Age: 74
End: 2019-02-21
Payer: COMMERCIAL

## 2019-02-21 DIAGNOSIS — E11.65 TYPE 2 DIABETES MELLITUS WITH HYPERGLYCEMIA, WITHOUT LONG-TERM CURRENT USE OF INSULIN (HCC): Primary | ICD-10-CM

## 2019-02-21 PROCEDURE — G0109 DIAB MANAGE TRN IND/GROUP: HCPCS | Performed by: DIETITIAN, REGISTERED

## 2019-02-21 NOTE — PROGRESS NOTES
Living Well with Diabetes Group Class #3    Jhoan Reyes attended the Living Well with Diabetes Group Class #3  During class, Juany Trejo was instructed on the following topics: Oral and injectable medications, short term complications of diabetes, long term complications of diabetes, prevention of complications, foot care, sick day management, stress management, and traveling with diabetes  Juany Trejo will follow up with class #5 if interested  Will call with any questions or concerns prior to next session  The patient's history was reviewed and updated as appropriate: allergies, current medications  Lab Results   Component Value Date    HGBA1C 8 5 (H) 11/21/2018       Diabetes Education Record  Juany Trejo was provided Living Well with Diabetes Class #3 book      Patient response to instruction    Comprehensiongood  Motivationgood  Expected Compliancegood    Begin Time: 9:30 am  End Time: 11:30 am  Referring Provider: Edu Cleary MD    Thank you for referring your patient to [facility], it was a pleasure working with them today  Please feel free to call with any questions or concerns      Rhonda Godinez RD  [facility] [phone]

## 2019-02-21 NOTE — PATIENT INSTRUCTIONS
Please schedule and attend optional Class 5 on cooking with whole grains on April 17, 2019 at [address] [place] [address]

## 2019-02-24 DIAGNOSIS — I10 HYPERTENSION, UNSPECIFIED TYPE: ICD-10-CM

## 2019-02-25 RX ORDER — LISINOPRIL 2.5 MG/1
TABLET ORAL
Qty: 90 TABLET | Refills: 0 | Status: SHIPPED | OUTPATIENT
Start: 2019-02-25 | End: 2019-05-19 | Stop reason: SDUPTHER

## 2019-02-26 NOTE — TELEPHONE ENCOUNTER
LM for patient  Prolia denied  Needs to start bisphosphonate first    Advised to call back to further discuss  Can also further discuss at upcoming appointment       Deisy Black PA-C

## 2019-03-30 DIAGNOSIS — E11.9 TYPE 2 DIABETES MELLITUS WITHOUT COMPLICATION, WITHOUT LONG-TERM CURRENT USE OF INSULIN (HCC): ICD-10-CM

## 2019-04-01 RX ORDER — GLIPIZIDE 10 MG/1
10 TABLET ORAL
Qty: 180 TABLET | Refills: 0 | Status: SHIPPED | OUTPATIENT
Start: 2019-04-01 | End: 2019-04-03 | Stop reason: SDUPTHER

## 2019-04-03 DIAGNOSIS — E11.9 TYPE 2 DIABETES MELLITUS WITHOUT COMPLICATION, WITHOUT LONG-TERM CURRENT USE OF INSULIN (HCC): ICD-10-CM

## 2019-04-03 DIAGNOSIS — E78.5 DYSLIPIDEMIA: ICD-10-CM

## 2019-04-03 RX ORDER — ATORVASTATIN CALCIUM 40 MG/1
TABLET, FILM COATED ORAL
Qty: 90 TABLET | Refills: 0 | Status: SHIPPED | OUTPATIENT
Start: 2019-04-03 | End: 2019-07-01 | Stop reason: SDUPTHER

## 2019-04-04 RX ORDER — GLIPIZIDE 10 MG/1
10 TABLET ORAL
Qty: 180 TABLET | Refills: 0 | Status: SHIPPED | OUTPATIENT
Start: 2019-04-04 | End: 2019-04-10 | Stop reason: DRUGHIGH

## 2019-04-05 ENCOUNTER — LAB (OUTPATIENT)
Dept: LAB | Facility: CLINIC | Age: 74
End: 2019-04-05
Payer: COMMERCIAL

## 2019-04-05 DIAGNOSIS — K75.81 NASH (NONALCOHOLIC STEATOHEPATITIS): ICD-10-CM

## 2019-04-05 DIAGNOSIS — E11.8 TYPE 2 DIABETES MELLITUS WITH COMPLICATION, WITHOUT LONG-TERM CURRENT USE OF INSULIN (HCC): ICD-10-CM

## 2019-04-05 DIAGNOSIS — M81.0 OSTEOPOROSIS WITHOUT CURRENT PATHOLOGICAL FRACTURE, UNSPECIFIED OSTEOPOROSIS TYPE: ICD-10-CM

## 2019-04-05 DIAGNOSIS — I10 BENIGN ESSENTIAL HTN: ICD-10-CM

## 2019-04-05 LAB
25(OH)D3 SERPL-MCNC: 35.6 NG/ML (ref 30–100)
ALBUMIN SERPL BCP-MCNC: 3.8 G/DL (ref 3.5–5)
ALP SERPL-CCNC: 78 U/L (ref 46–116)
ALT SERPL W P-5'-P-CCNC: 54 U/L (ref 12–78)
ANION GAP SERPL CALCULATED.3IONS-SCNC: 8 MMOL/L (ref 4–13)
AST SERPL W P-5'-P-CCNC: 43 U/L (ref 5–45)
BILIRUB DIRECT SERPL-MCNC: 0.17 MG/DL (ref 0–0.2)
BILIRUB SERPL-MCNC: 0.6 MG/DL (ref 0.2–1)
BUN SERPL-MCNC: 20 MG/DL (ref 5–25)
CALCIUM SERPL-MCNC: 10.1 MG/DL (ref 8.3–10.1)
CHLORIDE SERPL-SCNC: 104 MMOL/L (ref 100–108)
CO2 SERPL-SCNC: 28 MMOL/L (ref 21–32)
CREAT SERPL-MCNC: 1.26 MG/DL (ref 0.6–1.3)
CREAT UR-MCNC: 182 MG/DL
EST. AVERAGE GLUCOSE BLD GHB EST-MCNC: 137 MG/DL
GFR SERPL CREATININE-BSD FRML MDRD: 57 ML/MIN/1.73SQ M
GLUCOSE P FAST SERPL-MCNC: 115 MG/DL (ref 65–99)
HBA1C MFR BLD: 6.4 % (ref 4.2–6.3)
MICROALBUMIN UR-MCNC: 11.3 MG/L (ref 0–20)
MICROALBUMIN/CREAT 24H UR: 6 MG/G CREATININE (ref 0–30)
POTASSIUM SERPL-SCNC: 4.5 MMOL/L (ref 3.5–5.3)
PROT SERPL-MCNC: 7.9 G/DL (ref 6.4–8.2)
SODIUM SERPL-SCNC: 140 MMOL/L (ref 136–145)

## 2019-04-05 PROCEDURE — 36415 COLL VENOUS BLD VENIPUNCTURE: CPT

## 2019-04-05 PROCEDURE — 83036 HEMOGLOBIN GLYCOSYLATED A1C: CPT

## 2019-04-05 PROCEDURE — 80053 COMPREHEN METABOLIC PANEL: CPT

## 2019-04-05 PROCEDURE — 82043 UR ALBUMIN QUANTITATIVE: CPT

## 2019-04-05 PROCEDURE — 82306 VITAMIN D 25 HYDROXY: CPT

## 2019-04-05 PROCEDURE — 82248 BILIRUBIN DIRECT: CPT

## 2019-04-05 PROCEDURE — 82570 ASSAY OF URINE CREATININE: CPT

## 2019-04-10 ENCOUNTER — OFFICE VISIT (OUTPATIENT)
Dept: ENDOCRINOLOGY | Facility: CLINIC | Age: 74
End: 2019-04-10
Payer: COMMERCIAL

## 2019-04-10 ENCOUNTER — TELEPHONE (OUTPATIENT)
Dept: ENDOCRINOLOGY | Facility: CLINIC | Age: 74
End: 2019-04-10

## 2019-04-10 VITALS
BODY MASS INDEX: 36.68 KG/M2 | HEIGHT: 68 IN | SYSTOLIC BLOOD PRESSURE: 120 MMHG | WEIGHT: 242 LBS | DIASTOLIC BLOOD PRESSURE: 66 MMHG | HEART RATE: 67 BPM

## 2019-04-10 DIAGNOSIS — N52.36 ERECTILE DYSFUNCTION FOLLOWING INTERSTITIAL SEED THERAPY: Primary | ICD-10-CM

## 2019-04-10 DIAGNOSIS — M81.0 OSTEOPOROSIS WITHOUT CURRENT PATHOLOGICAL FRACTURE, UNSPECIFIED OSTEOPOROSIS TYPE: ICD-10-CM

## 2019-04-10 DIAGNOSIS — E78.2 MIXED HYPERLIPIDEMIA: ICD-10-CM

## 2019-04-10 DIAGNOSIS — I10 BENIGN ESSENTIAL HTN: ICD-10-CM

## 2019-04-10 DIAGNOSIS — E11.65 TYPE 2 DIABETES MELLITUS WITH HYPERGLYCEMIA, WITHOUT LONG-TERM CURRENT USE OF INSULIN (HCC): ICD-10-CM

## 2019-04-10 DIAGNOSIS — E11.8 TYPE 2 DIABETES MELLITUS WITH COMPLICATION, WITHOUT LONG-TERM CURRENT USE OF INSULIN (HCC): ICD-10-CM

## 2019-04-10 DIAGNOSIS — E55.9 VITAMIN D DEFICIENCY: ICD-10-CM

## 2019-04-10 PROCEDURE — 99215 OFFICE O/P EST HI 40 MIN: CPT | Performed by: INTERNAL MEDICINE

## 2019-04-10 RX ORDER — ALENDRONATE SODIUM 70 MG/1
70 TABLET ORAL
Qty: 4 TABLET | Refills: 3 | Status: SHIPPED | OUTPATIENT
Start: 2019-04-10 | End: 2019-07-16 | Stop reason: SDUPTHER

## 2019-04-10 RX ORDER — GLIPIZIDE 10 MG/1
TABLET, FILM COATED, EXTENDED RELEASE ORAL
Qty: 30 TABLET | Refills: 3 | Status: SHIPPED | OUTPATIENT
Start: 2019-04-10 | End: 2019-07-31 | Stop reason: SDUPTHER

## 2019-04-16 ENCOUNTER — OFFICE VISIT (OUTPATIENT)
Dept: FAMILY MEDICINE CLINIC | Facility: CLINIC | Age: 74
End: 2019-04-16
Payer: COMMERCIAL

## 2019-04-16 VITALS
DIASTOLIC BLOOD PRESSURE: 68 MMHG | WEIGHT: 240 LBS | SYSTOLIC BLOOD PRESSURE: 118 MMHG | TEMPERATURE: 97.7 F | RESPIRATION RATE: 16 BRPM | HEIGHT: 68 IN | HEART RATE: 80 BPM | OXYGEN SATURATION: 95 % | BODY MASS INDEX: 36.37 KG/M2

## 2019-04-16 DIAGNOSIS — I25.10 ARTERIOSCLEROTIC CORONARY ARTERY DISEASE: ICD-10-CM

## 2019-04-16 DIAGNOSIS — E66.9 OBESITY (BMI 30-39.9): ICD-10-CM

## 2019-04-16 DIAGNOSIS — E11.8 TYPE 2 DIABETES MELLITUS WITH COMPLICATION, WITHOUT LONG-TERM CURRENT USE OF INSULIN (HCC): ICD-10-CM

## 2019-04-16 DIAGNOSIS — M81.0 OSTEOPOROSIS WITHOUT CURRENT PATHOLOGICAL FRACTURE, UNSPECIFIED OSTEOPOROSIS TYPE: ICD-10-CM

## 2019-04-16 DIAGNOSIS — I10 BENIGN ESSENTIAL HTN: ICD-10-CM

## 2019-04-16 DIAGNOSIS — C61 PROSTATE CANCER (HCC): ICD-10-CM

## 2019-04-16 DIAGNOSIS — N52.1 ERECTILE DYSFUNCTION DUE TO DISEASES CLASSIFIED ELSEWHERE: ICD-10-CM

## 2019-04-16 DIAGNOSIS — Z00.00 MEDICARE ANNUAL WELLNESS VISIT, SUBSEQUENT: Primary | ICD-10-CM

## 2019-04-16 DIAGNOSIS — E78.2 MIXED HYPERLIPIDEMIA: ICD-10-CM

## 2019-04-16 PROCEDURE — 3074F SYST BP LT 130 MM HG: CPT | Performed by: PHYSICIAN ASSISTANT

## 2019-04-16 PROCEDURE — 3008F BODY MASS INDEX DOCD: CPT | Performed by: PHYSICIAN ASSISTANT

## 2019-04-16 PROCEDURE — 1170F FXNL STATUS ASSESSED: CPT | Performed by: PHYSICIAN ASSISTANT

## 2019-04-16 PROCEDURE — G0439 PPPS, SUBSEQ VISIT: HCPCS | Performed by: PHYSICIAN ASSISTANT

## 2019-04-16 PROCEDURE — 3078F DIAST BP <80 MM HG: CPT | Performed by: PHYSICIAN ASSISTANT

## 2019-04-16 PROCEDURE — 1125F AMNT PAIN NOTED PAIN PRSNT: CPT | Performed by: PHYSICIAN ASSISTANT

## 2019-04-16 PROCEDURE — 1160F RVW MEDS BY RX/DR IN RCRD: CPT | Performed by: PHYSICIAN ASSISTANT

## 2019-04-16 PROCEDURE — 99214 OFFICE O/P EST MOD 30 MIN: CPT | Performed by: PHYSICIAN ASSISTANT

## 2019-04-16 RX ORDER — SILDENAFIL 50 MG/1
50 TABLET, FILM COATED ORAL DAILY PRN
Qty: 5 TABLET | Refills: 5 | Status: SHIPPED | OUTPATIENT
Start: 2019-04-16

## 2019-04-19 DIAGNOSIS — E11.65 TYPE 2 DIABETES MELLITUS WITH HYPERGLYCEMIA, WITHOUT LONG-TERM CURRENT USE OF INSULIN (HCC): ICD-10-CM

## 2019-04-19 RX ORDER — DULAGLUTIDE 1.5 MG/.5ML
INJECTION, SOLUTION SUBCUTANEOUS
Qty: 6 PEN | Refills: 0 | Status: SHIPPED | OUTPATIENT
Start: 2019-04-19 | End: 2019-06-11 | Stop reason: SDUPTHER

## 2019-04-25 ENCOUNTER — APPOINTMENT (OUTPATIENT)
Dept: LAB | Facility: CLINIC | Age: 74
End: 2019-04-25
Payer: COMMERCIAL

## 2019-04-25 DIAGNOSIS — C61 PROSTATE CANCER (HCC): ICD-10-CM

## 2019-04-25 LAB — PSA SERPL-MCNC: <0.1 NG/ML (ref 0–4)

## 2019-04-25 PROCEDURE — 84153 ASSAY OF PSA TOTAL: CPT

## 2019-04-30 ENCOUNTER — CONSULT (OUTPATIENT)
Dept: UROLOGY | Facility: AMBULATORY SURGERY CENTER | Age: 74
End: 2019-04-30
Payer: COMMERCIAL

## 2019-04-30 VITALS
WEIGHT: 232 LBS | HEART RATE: 70 BPM | DIASTOLIC BLOOD PRESSURE: 80 MMHG | SYSTOLIC BLOOD PRESSURE: 132 MMHG | BODY MASS INDEX: 35.16 KG/M2 | HEIGHT: 68 IN

## 2019-04-30 DIAGNOSIS — C61 PROSTATE CANCER (HCC): ICD-10-CM

## 2019-04-30 PROCEDURE — 99204 OFFICE O/P NEW MOD 45 MIN: CPT | Performed by: UROLOGY

## 2019-05-08 DIAGNOSIS — I10 HYPERTENSION, UNSPECIFIED TYPE: ICD-10-CM

## 2019-05-08 RX ORDER — CARVEDILOL 3.12 MG/1
TABLET ORAL
Qty: 180 TABLET | Refills: 0 | Status: SHIPPED | OUTPATIENT
Start: 2019-05-08 | End: 2019-07-16 | Stop reason: ALTCHOICE

## 2019-05-19 DIAGNOSIS — I10 HYPERTENSION, UNSPECIFIED TYPE: ICD-10-CM

## 2019-05-20 RX ORDER — LISINOPRIL 2.5 MG/1
TABLET ORAL
Qty: 90 TABLET | Refills: 0 | Status: SHIPPED | OUTPATIENT
Start: 2019-05-20 | End: 2019-06-02 | Stop reason: SDUPTHER

## 2019-06-02 DIAGNOSIS — I10 HYPERTENSION, UNSPECIFIED TYPE: ICD-10-CM

## 2019-06-03 PROCEDURE — 4010F ACE/ARB THERAPY RXD/TAKEN: CPT | Performed by: PHYSICIAN ASSISTANT

## 2019-06-03 RX ORDER — LISINOPRIL 2.5 MG/1
TABLET ORAL
Qty: 90 TABLET | Refills: 0 | Status: SHIPPED | OUTPATIENT
Start: 2019-06-03 | End: 2019-07-16 | Stop reason: ALTCHOICE

## 2019-06-11 DIAGNOSIS — E11.65 TYPE 2 DIABETES MELLITUS WITH HYPERGLYCEMIA, WITHOUT LONG-TERM CURRENT USE OF INSULIN (HCC): ICD-10-CM

## 2019-07-01 DIAGNOSIS — E78.5 DYSLIPIDEMIA: ICD-10-CM

## 2019-07-01 RX ORDER — ATORVASTATIN CALCIUM 40 MG/1
TABLET, FILM COATED ORAL
Qty: 90 TABLET | Refills: 0 | Status: SHIPPED | OUTPATIENT
Start: 2019-07-01 | End: 2019-10-15 | Stop reason: SDUPTHER

## 2019-07-05 DIAGNOSIS — E11.65 UNCONTROLLED TYPE 2 DIABETES MELLITUS WITH HYPERGLYCEMIA (HCC): ICD-10-CM

## 2019-07-05 RX ORDER — FLASH GLUCOSE SENSOR
KIT MISCELLANEOUS
Qty: 6 EACH | Refills: 1 | Status: SHIPPED | OUTPATIENT
Start: 2019-07-05 | End: 2019-12-26 | Stop reason: SDUPTHER

## 2019-07-08 ENCOUNTER — TELEPHONE (OUTPATIENT)
Dept: ENDOCRINOLOGY | Facility: CLINIC | Age: 74
End: 2019-07-08

## 2019-07-08 NOTE — TELEPHONE ENCOUNTER
Spoke to Cristina Steward @ patients PCP office requesting insurance referral for upcoming appt on 7/18/2019

## 2019-07-09 ENCOUNTER — TRANSCRIBE ORDERS (OUTPATIENT)
Dept: LAB | Facility: CLINIC | Age: 74
End: 2019-07-09

## 2019-07-09 ENCOUNTER — LAB (OUTPATIENT)
Dept: LAB | Facility: CLINIC | Age: 74
End: 2019-07-09
Payer: COMMERCIAL

## 2019-07-09 DIAGNOSIS — E11.8 TYPE 2 DIABETES MELLITUS WITH COMPLICATION, WITHOUT LONG-TERM CURRENT USE OF INSULIN (HCC): ICD-10-CM

## 2019-07-09 DIAGNOSIS — E55.9 VITAMIN D DEFICIENCY: ICD-10-CM

## 2019-07-09 DIAGNOSIS — E78.2 MIXED HYPERLIPIDEMIA: ICD-10-CM

## 2019-07-09 LAB
25(OH)D3 SERPL-MCNC: 33.1 NG/ML (ref 30–100)
ANION GAP SERPL CALCULATED.3IONS-SCNC: 8 MMOL/L (ref 4–13)
BUN SERPL-MCNC: 21 MG/DL (ref 5–25)
CALCIUM SERPL-MCNC: 9.8 MG/DL (ref 8.3–10.1)
CHLORIDE SERPL-SCNC: 103 MMOL/L (ref 100–108)
CHOLEST SERPL-MCNC: 118 MG/DL (ref 50–200)
CO2 SERPL-SCNC: 28 MMOL/L (ref 21–32)
CREAT SERPL-MCNC: 1.23 MG/DL (ref 0.6–1.3)
CREAT UR-MCNC: 121 MG/DL
EST. AVERAGE GLUCOSE BLD GHB EST-MCNC: 137 MG/DL
GFR SERPL CREATININE-BSD FRML MDRD: 58 ML/MIN/1.73SQ M
GLUCOSE P FAST SERPL-MCNC: 84 MG/DL (ref 65–99)
HBA1C MFR BLD: 6.4 % (ref 4.2–6.3)
HDLC SERPL-MCNC: 52 MG/DL (ref 40–60)
LDLC SERPL CALC-MCNC: 53 MG/DL (ref 0–100)
MICROALBUMIN UR-MCNC: 12.1 MG/L (ref 0–20)
MICROALBUMIN/CREAT 24H UR: 10 MG/G CREATININE (ref 0–30)
NONHDLC SERPL-MCNC: 66 MG/DL
POTASSIUM SERPL-SCNC: 3.9 MMOL/L (ref 3.5–5.3)
SODIUM SERPL-SCNC: 139 MMOL/L (ref 136–145)
TRIGL SERPL-MCNC: 65 MG/DL

## 2019-07-09 PROCEDURE — 82306 VITAMIN D 25 HYDROXY: CPT

## 2019-07-09 PROCEDURE — 36415 COLL VENOUS BLD VENIPUNCTURE: CPT

## 2019-07-09 PROCEDURE — 82043 UR ALBUMIN QUANTITATIVE: CPT

## 2019-07-09 PROCEDURE — 82570 ASSAY OF URINE CREATININE: CPT

## 2019-07-09 PROCEDURE — 83036 HEMOGLOBIN GLYCOSYLATED A1C: CPT

## 2019-07-09 PROCEDURE — 3061F NEG MICROALBUMINURIA REV: CPT | Performed by: PHYSICIAN ASSISTANT

## 2019-07-09 PROCEDURE — 80061 LIPID PANEL: CPT

## 2019-07-09 PROCEDURE — 80048 BASIC METABOLIC PNL TOTAL CA: CPT

## 2019-07-15 ENCOUNTER — OFFICE VISIT (OUTPATIENT)
Dept: FAMILY MEDICINE CLINIC | Facility: CLINIC | Age: 74
End: 2019-07-15
Payer: COMMERCIAL

## 2019-07-15 ENCOUNTER — TELEPHONE (OUTPATIENT)
Dept: FAMILY MEDICINE CLINIC | Facility: CLINIC | Age: 74
End: 2019-07-15

## 2019-07-15 VITALS
DIASTOLIC BLOOD PRESSURE: 70 MMHG | RESPIRATION RATE: 16 BRPM | OXYGEN SATURATION: 96 % | HEART RATE: 82 BPM | SYSTOLIC BLOOD PRESSURE: 112 MMHG | BODY MASS INDEX: 36.07 KG/M2 | TEMPERATURE: 97.6 F | WEIGHT: 238 LBS | HEIGHT: 68 IN

## 2019-07-15 DIAGNOSIS — M81.0 OSTEOPOROSIS WITHOUT CURRENT PATHOLOGICAL FRACTURE, UNSPECIFIED OSTEOPOROSIS TYPE: ICD-10-CM

## 2019-07-15 DIAGNOSIS — C61 PROSTATE CANCER (HCC): ICD-10-CM

## 2019-07-15 DIAGNOSIS — E11.8 TYPE 2 DIABETES MELLITUS WITH COMPLICATION, WITHOUT LONG-TERM CURRENT USE OF INSULIN (HCC): Primary | ICD-10-CM

## 2019-07-15 DIAGNOSIS — I25.10 ARTERIOSCLEROTIC CORONARY ARTERY DISEASE: ICD-10-CM

## 2019-07-15 DIAGNOSIS — Z95.2 HISTORY OF AORTIC VALVE REPLACEMENT: ICD-10-CM

## 2019-07-15 DIAGNOSIS — E78.2 MIXED HYPERLIPIDEMIA: ICD-10-CM

## 2019-07-15 DIAGNOSIS — I10 BENIGN ESSENTIAL HTN: ICD-10-CM

## 2019-07-15 DIAGNOSIS — E66.9 OBESITY (BMI 30-39.9): ICD-10-CM

## 2019-07-15 PROCEDURE — 3008F BODY MASS INDEX DOCD: CPT | Performed by: PHYSICIAN ASSISTANT

## 2019-07-15 PROCEDURE — 1160F RVW MEDS BY RX/DR IN RCRD: CPT | Performed by: PHYSICIAN ASSISTANT

## 2019-07-15 PROCEDURE — 99214 OFFICE O/P EST MOD 30 MIN: CPT | Performed by: PHYSICIAN ASSISTANT

## 2019-07-15 PROCEDURE — 3074F SYST BP LT 130 MM HG: CPT | Performed by: PHYSICIAN ASSISTANT

## 2019-07-15 NOTE — PROGRESS NOTES
Assessment/Plan:     Diagnoses and all orders for this visit:    Type 2 diabetes mellitus with complication, without long-term current use of insulin ([facility])  Comments:  Diabetes is at goal continue current regimen  Check blood sugar as directed  Follow-up with endocrinology  Orders:  -     Hemoglobin A1C; Future    Arteriosclerotic coronary artery disease  Comments:  Heart disease is stable no angina no signs of congestive heart failure  Continue current regimen and follow up with Cardiology    Benign essential HTN  Comments:  Blood pressure is at goal continue current regimen    Osteoporosis without current pathological fracture, unspecified osteoporosis type    Prostate cancer [facility])  Comments:  As per Urology and Oncology    History of aortic valve replacement  Comments:  Tissue valve replacement  Mixed hyperlipidemia  Comments:  Patient will continue low-fat diet and statin therapy  Obesity (BMI 30-39  9)  Comments:  Patient will continue his regular exercise  Patient attends the gym at least 3 times a week          Subjective:      Patient ID: Edyta Davis is a 69 y o  male  Patient presents for follow up chronic conditions  Patient has non-insulin diabetes mellitus type 2  He is currently on true [facility] 1 injection 1 5 mg weekly  Patient also on Glucotrol XL 10 1 in the morning  Patient has new glucometer with the sensor  Patient is trying to be compliant with diet and exercise  Patient has a history of coronary artery disease and a aortic valve replacement  He is followed by Cardiology regularly  He is on a baby aspirin, Coreg 3 125 twice a day and generic Lipitor 40 mg daily  Patient is supposed to be on lisinopril 2 5 mg for renal protection  Patient has osteoporosis he is on calcium vitamin-D supplements along with Fosamax once a week  Patient has a history of prostate cancer he is being followed by Oncology he is on Lupron shots as needed    Current labs reviewed with patient reveal a fasting blood sugar of 84  BUN and creatinine are normal   Hepatic function is normal   Hemoglobin A1c is 6 4  Lipid panel is normal      The following portions of the patient's history were reviewed and updated as appropriate:   He   Patient Active Problem List    Diagnosis Date Noted    Erectile dysfunction due to diseases classified elsewhere 04/16/2019    History of aortic valve replacement 11/30/2017    Arteriosclerotic coronary artery disease 11/30/2017    Obesity (BMI 30-39 9) 11/30/2017    Osteoporosis 11/30/2017    Type 2 diabetes mellitus with complication, without long-term current use of insulin ([facility] [address] ) 06/20/2017    YATES (nonalcoholic steatohepatitis) 09/14/2015    Prostate cancer ([facility] [address] ) 06/18/2013    Hyperlipidemia 07/17/2012    Osteoarthritis 07/17/2012    Benign essential HTN 04/30/2012     Current Outpatient Medications   Medication Sig Dispense Refill    alendronate (FOSAMAX) 70 mg tablet Take 1 tablet (70 mg total) by mouth every 7 days Take 1 tablet on empty stomach with full glass of water, stay upright for half an hour after taking medication 4 tablet 3    aspirin 81 MG tablet Take by mouth      atorvastatin (LIPITOR) 40 mg tablet TAKE 1 TABLET BY MOUTH EVERY DAY 90 tablet 0    Blood Glucose Calibration (OT ULTRA/FASTTK CNTRL SOLN) SOLN by In Vitro route as needed (to check meter calibration) 1 each 3    Blood Glucose Monitoring Suppl (ONE TOUCH ULTRA 2) w/Device KIT by Does not apply route daily DX E11 65  Pt to   Check  fbs  daily 1 each 0    carvedilol (COREG) 3 125 mg tablet TAKE 1 TABLET BY MOUTH TWICE A DAY WITH MEALS 180 tablet 0    Cholecalciferol (VITAMIN D3) 2000 units capsule Take 2,000 Units by mouth daily  11    Coenzyme Q10 (COQ-10) 100 MG CAPS Take 100 mg by mouth   Continuous Blood Gluc  (FREESTYLE CEDRIC 14 DAY READER) ADELAIDA 1 CGM DEVICE   USE TO CHECK BG LEVELS 4+ TIMES DAILY AND AS NEEDED 1 Device 0    Continuous Blood Gluc Sensor (FREESTYLE CEDRIC 14 DAY SENSOR) MISC CHANGE SENSOR EVERY 14 DAYS TO CHECK BG LEVELS 4+ TIMES DAILY 6 each 1    Dulaglutide (TRULICITY) 1 5 WC/2 5IU SOPN Inject 0 5 mL (1 5 mg total) under the skin once a week 6 pen 0    glipiZIDE (GLUCOTROL XL) 10 mg 24 hr tablet Take one tablet before breakfast 30 tablet 3    Glucose Blood (FREESTYLE LITE TEST VI) by In Vitro route      glucose blood (ONE TOUCH ULTRA TEST) test strip Check  fbs  Daily  DX E11 65 100 each 3    Multiple Vitamin-Folic Acid TABS Take by mouth      Omega-3 Fatty Acids (FISH OIL) 1,000 mg Take 1,000 mg by mouth daily   sildenafil (VIAGRA) 50 MG tablet Take 1 tablet (50 mg total) by mouth daily as needed for erectile dysfunction 5 tablet 5    leuprolide (LUPRON DEPOT, 1-MONTH,) 3 75 mg injection Inject into a muscle Once yearly       lisinopril (ZESTRIL) 2 5 mg tablet TAKE 1 TABLET BY MOUTH EVERY DAY (Patient not taking: Reported on 7/15/2019) 90 tablet 0     No current facility-administered medications for this visit  Current Outpatient Medications on File Prior to Visit   Medication Sig    alendronate (FOSAMAX) 70 mg tablet Take 1 tablet (70 mg total) by mouth every 7 days Take 1 tablet on empty stomach with full glass of water, stay upright for half an hour after taking medication    aspirin 81 MG tablet Take by mouth    atorvastatin (LIPITOR) 40 mg tablet TAKE 1 TABLET BY MOUTH EVERY DAY    Blood Glucose Calibration (OT ULTRA/FASTTK CNTRL SOLN) SOLN by In Vitro route as needed (to check meter calibration)    Blood Glucose Monitoring Suppl (ONE TOUCH ULTRA 2) w/Device KIT by Does not apply route daily DX E11 65  Pt to   Check  fbs  daily    carvedilol (COREG) 3 125 mg tablet TAKE 1 TABLET BY MOUTH TWICE A DAY WITH MEALS    Cholecalciferol (VITAMIN D3) 2000 units capsule Take 2,000 Units by mouth daily    Coenzyme Q10 (COQ-10) 100 MG CAPS Take 100 mg by mouth      Continuous Blood Gluc  (FREESTYLE CEDRIC 14 DAY READER) ADELAIDA 1 CGM DEVICE  USE TO CHECK BG LEVELS 4+ TIMES DAILY AND AS NEEDED    Continuous Blood Gluc Sensor (FREESTYLE CEDRIC 14 DAY SENSOR) MISC CHANGE SENSOR EVERY 14 DAYS TO CHECK BG LEVELS 4+ TIMES DAILY    Dulaglutide (TRULICITY) 1 5 KX/1 3UZ SOPN Inject 0 5 mL (1 5 mg total) under the skin once a week    glipiZIDE (GLUCOTROL XL) 10 mg 24 hr tablet Take one tablet before breakfast    Glucose Blood (FREESTYLE LITE TEST VI) by In Vitro route    glucose blood (ONE TOUCH ULTRA TEST) test strip Check  fbs  Daily  DX E11 65    Multiple Vitamin-Folic Acid TABS Take by mouth    Omega-3 Fatty Acids (FISH OIL) 1,000 mg Take 1,000 mg by mouth daily   sildenafil (VIAGRA) 50 MG tablet Take 1 tablet (50 mg total) by mouth daily as needed for erectile dysfunction    leuprolide (LUPRON DEPOT, 1-MONTH,) 3 75 mg injection Inject into a muscle Once yearly     lisinopril (ZESTRIL) 2 5 mg tablet TAKE 1 TABLET BY MOUTH EVERY DAY (Patient not taking: Reported on 7/15/2019)     No current facility-administered medications on file prior to visit  He has No Known Allergies       Review of Systems   Constitutional: Negative for activity change, appetite change, chills, fatigue and fever  HENT: Negative for ear pain and sore throat  Eyes: Negative for visual disturbance  Respiratory: Negative for cough and shortness of breath  Cardiovascular: Negative for chest pain, palpitations and leg swelling  Gastrointestinal: Negative for abdominal pain, blood in stool, constipation, diarrhea and nausea  Genitourinary: Negative for difficulty urinating  Nocturia     Musculoskeletal: Negative for arthralgias, back pain and myalgias  Skin: Negative for rash  Neurological: Negative for dizziness, syncope and headaches  Psychiatric/Behavioral: Negative for sleep disturbance  Objective:        Physical Exam   Constitutional: He is oriented to person, place, and time   He appears well-developed and well-nourished  Overweight  white  male   HENT:   Right Ear: Tympanic membrane, external ear and ear canal normal    Left Ear: Tympanic membrane, external ear and ear canal normal    Mouth/Throat: Oropharynx is clear and moist    Eyes: Pupils are equal, round, and reactive to light  Conjunctivae are normal    Neck: Neck supple  Carotid bruit is not present  No thyromegaly present  Cardiovascular: Normal rate, regular rhythm and normal heart sounds  No murmur heard  Pulmonary/Chest: Effort normal and breath sounds normal  He has no wheezes  Abdominal: Soft  Bowel sounds are normal  He exhibits no mass  There is no tenderness  Musculoskeletal: He exhibits no edema  Lymphadenopathy:     He has no cervical adenopathy  Neurological: He is alert and oriented to person, place, and time  Skin: Skin is warm and dry  No erythema  Psychiatric: He has a normal mood and affect  His behavior is normal  Judgment and thought content normal    Nursing note and vitals reviewed

## 2019-07-15 NOTE — TELEPHONE ENCOUNTER
Patient called to let you know that his Cardiologist discontinued his Lisinopril  He is not taking the Carvedilol and is not using any of the diabetic testing supplies other than the [facility] system

## 2019-07-16 DIAGNOSIS — M81.0 OSTEOPOROSIS WITHOUT CURRENT PATHOLOGICAL FRACTURE, UNSPECIFIED OSTEOPOROSIS TYPE: ICD-10-CM

## 2019-07-16 RX ORDER — ALENDRONATE SODIUM 70 MG/1
70 TABLET ORAL
Qty: 4 TABLET | Refills: 3 | Status: SHIPPED | OUTPATIENT
Start: 2019-07-16 | End: 2019-10-05 | Stop reason: SDUPTHER

## 2019-07-16 NOTE — TELEPHONE ENCOUNTER
Spoke with patient  Cardiologist did stop his Coreg and lisinopril  Patient has appointment Thursday with endocrinology  Patient advised to discuss the need for low-dose Ace inhibitor such as lisinopril 2 5 for renal protection

## 2019-07-16 NOTE — TELEPHONE ENCOUNTER
Spoke with patient regarding Alendronate which [facility] was concerned they haven't received any prescription lately    Patient is out so I ordered today

## 2019-07-18 ENCOUNTER — OFFICE VISIT (OUTPATIENT)
Dept: ENDOCRINOLOGY | Facility: CLINIC | Age: 74
End: 2019-07-18
Payer: COMMERCIAL

## 2019-07-18 VITALS
SYSTOLIC BLOOD PRESSURE: 130 MMHG | WEIGHT: 236 LBS | HEIGHT: 68 IN | DIASTOLIC BLOOD PRESSURE: 84 MMHG | BODY MASS INDEX: 35.77 KG/M2

## 2019-07-18 DIAGNOSIS — E55.9 VITAMIN D DEFICIENCY: ICD-10-CM

## 2019-07-18 DIAGNOSIS — M81.0 OSTEOPOROSIS WITHOUT CURRENT PATHOLOGICAL FRACTURE, UNSPECIFIED OSTEOPOROSIS TYPE: ICD-10-CM

## 2019-07-18 DIAGNOSIS — I10 BENIGN ESSENTIAL HTN: ICD-10-CM

## 2019-07-18 DIAGNOSIS — E78.2 MIXED HYPERLIPIDEMIA: ICD-10-CM

## 2019-07-18 DIAGNOSIS — E11.8 TYPE 2 DIABETES MELLITUS WITH COMPLICATION, WITHOUT LONG-TERM CURRENT USE OF INSULIN (HCC): Primary | ICD-10-CM

## 2019-07-18 PROCEDURE — 99214 OFFICE O/P EST MOD 30 MIN: CPT | Performed by: PHYSICIAN ASSISTANT

## 2019-07-18 NOTE — PROGRESS NOTES
Patient Progress Note      CC: DM2, osteoporosis     Referring Provider  Dorota Malcolm, [address] [facility], [address]     History of Present Illness:   Kyle Heck is a 69 y o  male with a history of type 2 diabetes without long term use of insulin  Diabetes course has been stable  Complications of DM: none reported  Denies recent illness or hospitalizations  Denies recent severe hypoglycemic or severe hyperglycemic episodes  Denies any issues with his current regimen  Home glucose monitoring: are performed regularly  Is using [place] [facility]  Patient forgot to bring blood glucose log/meter  Current regimen: Trulicity 1 5 mg weekly, glipizide 10 mg daily   compliant most of the time, denies any side effects from medications  Injects in: abdomen  Rotates sites: Yes  Hypoglycemic episodes: No, never  H/o of hypoglycemia causing hospitalization or Intervention such as glucagon injection  or ambulance call :  No  Hypoglycemia symptoms: never experienced less than 70 mg/dl that is aware of  Treatment of hypoglycemia: discussed treatment     Diet: 3 meals per day, 0-1 snacks per day  Timing of meals is predictable  Diabetic diet compliance:  compliant most of the time  Activity: Daily activity is predictable: Yes  Walks about 5 miles a day   Further diabetic ROS: no polyuria or polydipsia, no chest pain, dyspnea or TIAs, no numbness, tingling or pain in extremities        Ophthamology: sees routinely; is overdue   Podiatry: Foot exam up-to-date    Not on ACE inhibitor/ARB  Has hyperlipidemia: on statin - tolerating well, no myalgias  compliant most of the time, denies any side effects from medications  Thyroid disorders: No  History of pancreatitis: No    Osteoporosis/vitamin-D deficiency:  Patient was started on Fosamax 70 mg weekly at last visit  In the past he was on Prolia however that was denied  Is also taking vitamin D3 2000 International Units daily  Patient denies recent fractures or falls  Patient is doing weight-bearing exercises  Last DEXA scan done February 2019  RESULTS:   LUMBAR SPINE:  L1-L4:  BMD 0 866 gm/cm2  T-score -2 0  Z-score -1 1     LEFT TOTAL HIP:  BMD 1 0 42 gm/cm2  T-score 0 1  Z-score 0 8     LEFT FEMORAL NECK:  BMD 0 736 gm/cm2  T-score -1 4  Z-score -0 2     IMPRESSION:  1  Based on the [facility] classification, the T-score of -2 0 in the lumbar spine is consistent with low bone mineral density (OSTEOPENIA)  Patient Active Problem List   Diagnosis    History of aortic valve replacement    Arteriosclerotic coronary artery disease    Benign essential HTN    Type 2 diabetes mellitus with complication, without long-term current use of insulin (HCC)    Hyperlipidemia    YATES (nonalcoholic steatohepatitis)    Obesity (BMI 30-39  9)    Osteoarthritis    Osteoporosis    Prostate cancer ([facility] [address] )    Erectile dysfunction due to diseases classified elsewhere    Vitamin D deficiency      Past Medical History:   Diagnosis Date    Arteriosclerotic coronary artery disease 11/30/2017    Benign prostate hyperplasia     L A  7/17/12   R   8/30/17      COPD (chronic obstructive pulmonary disease) ([facility])     Deep venous embolism and thrombosis (HCC)     Diabetes mellitus ([facility] [address] )     Hypertension     Impacted cerumen     unspecified laterity / L A  6/20/12       Osteoarthritis 7/17/2012    Prostate cancer ([facility] [address] )     Pulmonary embolism ([facility])       Past Surgical History:   Procedure Laterality Date    CARDIAC VALVE REPLACEMENT      CORONARY ARTERY BYPASS GRAFT      FRACTURE SURGERY      left knee surgery    KNEE ARTHROSCOPY      Therapeutic    KNEE SURGERY      replacement    LUNG SURGERY      LUNG SURGERY      wedge resection     TONSILLECTOMY      UMBILICAL HERNIA REPAIR        Family History   Problem Relation Age of Onset    COPD Mother     Heart attack Mother     Heart attack Father      Social History     Tobacco Use    Smoking status: Former Smoker     Types: Cigarettes     Last attempt to quit: 3/23/2000     Years since quittin 3    Smokeless tobacco: Never Used   Substance Use Topics    Alcohol use: No     Comment: social drinker per [employer]     No Known Allergies      Current Outpatient Medications:     alendronate (FOSAMAX) 70 mg tablet, Take 1 tablet (70 mg total) by mouth every 7 days Take 1 tablet on empty stomach with full glass of water, stay upright for half an hour after taking medication, Disp: 4 tablet, Rfl: 3    aspirin 81 MG tablet, Take by mouth, Disp: , Rfl:     atorvastatin (LIPITOR) 40 mg tablet, TAKE 1 TABLET BY MOUTH EVERY DAY, Disp: 90 tablet, Rfl: 0    Cholecalciferol (VITAMIN D3) 2000 units capsule, Take 2,000 Units by mouth daily, Disp: , Rfl: 11    Coenzyme Q10 (COQ-10) 100 MG CAPS, Take 100 mg by mouth , Disp: , Rfl:     Continuous Blood Gluc  (FREESTYLE CEDRIC 14 DAY READER) ADELAIDA, 1 CGM DEVICE  USE TO CHECK BG LEVELS 4+ TIMES DAILY AND AS NEEDED, Disp: 1 Device, Rfl: 0    Continuous Blood Gluc Sensor (FREESTYLE CEDRIC 14 DAY SENSOR) MISC, CHANGE SENSOR EVERY 14 DAYS TO CHECK BG LEVELS 4+ TIMES DAILY, Disp: 6 each, Rfl: 1    Dulaglutide (TRULICITY) 1 5 IL/1 4XR SOPN, Inject 0 5 mL (1 5 mg total) under the skin once a week, Disp: 6 pen, Rfl: 0    glipiZIDE (GLUCOTROL XL) 10 mg 24 hr tablet, Take one tablet before breakfast, Disp: 30 tablet, Rfl: 3    Glucose Blood (FREESTYLE LITE TEST VI), by In Vitro route, Disp: , Rfl:     Multiple Vitamin-Folic Acid TABS, Take by mouth, Disp: , Rfl:     Omega-3 Fatty Acids (FISH OIL) 1,000 mg, Take 1,000 mg by mouth daily  , Disp: , Rfl:     sildenafil (VIAGRA) 50 MG tablet, Take 1 tablet (50 mg total) by mouth daily as needed for erectile dysfunction, Disp: 5 tablet, Rfl: 5    Blood Glucose Calibration (OT ULTRA/FASTTK CNTRL SOLN) SOLN, by In Vitro route as needed (to check meter calibration) (Patient not taking: Reported on 2019), Disp: 1 each, Rfl: 3    Blood Glucose Monitoring Suppl (ONE TOUCH ULTRA 2) w/Device KIT, by Does not apply route daily DX E11 65  Pt to   Check  fbs  daily (Patient not taking: Reported on 7/18/2019), Disp: 1 each, Rfl: 0    glucose blood (ONE TOUCH ULTRA TEST) test strip, Check  fbs  Daily  DX E11 65 (Patient not taking: Reported on 7/18/2019), Disp: 100 each, Rfl: 3    leuprolide (LUPRON DEPOT, 1-MONTH,) 3 75 mg injection, Inject into a muscle Once yearly , Disp: , Rfl:   Review of Systems   Constitutional: Negative for activity change, appetite change, fatigue and unexpected weight change  HENT: Negative for trouble swallowing  Eyes: Negative for visual disturbance  Respiratory: Negative for shortness of breath  Cardiovascular: Negative for chest pain and palpitations  Gastrointestinal: Negative for constipation and diarrhea  Endocrine: Negative for polydipsia and polyuria  Musculoskeletal: Negative  Skin: Negative for wound  Neurological: Negative for numbness  Psychiatric/Behavioral: Negative  Physical Exam:  Body mass index is 36 42 kg/m²  /84   Ht 5' 7 5" (1 715 m)   Wt 107 kg (236 lb)   BMI 36 42 kg/m²    Wt Readings from Last 3 Encounters:   07/18/19 107 kg (236 lb)   07/15/19 108 kg (238 lb)   04/30/19 105 kg (232 lb)       Physical Exam   Constitutional: He appears well-developed and well-nourished  HENT:   Head: Normocephalic  Eyes: Pupils are equal, round, and reactive to light  EOM are normal  No scleral icterus  Neck: Neck supple  No thyromegaly present  Cardiovascular: Normal rate and regular rhythm  Pulses:       Radial pulses are 2+ on the right side, and 2+ on the left side  Pulmonary/Chest: Effort normal and breath sounds normal  No respiratory distress  He has no wheezes  Neurological: He is alert  Skin: Skin is warm and dry  Psychiatric: He has a normal mood and affect  Nursing note and vitals reviewed      Patient's shoes and socks were not removed  Labs:   Component      Latest Ref Rng & Units 4/5/2019 7/9/2019   Sodium      136 - 145 mmol/L 140 139   Potassium      3 5 - 5 3 mmol/L 4 5 3 9   Chloride      100 - 108 mmol/L 104 103   CO2      21 - 32 mmol/L 28 28   Anion Gap      4 - 13 mmol/L 8 8   BUN      5 - 25 mg/dL 20 21   Creatinine      0 60 - 1 30 mg/dL 1 26 1 23   GLUCOSE FASTING      65 - 99 mg/dL 115 (H) 84   Calcium      8 3 - 10 1 mg/dL 10 1 9 8   AST      5 - 45 U/L 43    ALT      12 - 78 U/L 54    Alkaline Phosphatase      46 - 116 U/L 78    Total Protein      6 4 - 8 2 g/dL 7 9    Albumin      3 5 - 5 0 g/dL 3 8    TOTAL BILIRUBIN      0 20 - 1 00 mg/dL 0 60    eGFR      ml/min/1 73sq m 57 58   Cholesterol      50 - 200 mg/dL  118   Triglycerides      <=150 mg/dL  65   HDL      40 - 60 mg/dL  52   LDL Direct      0 - 100 mg/dL  53   Non-HDL Cholesterol      mg/dl  66   EXT Creatinine Urine      mg/dL 182 0 121 0   MICROALBUM ,U,RANDOM      0 0 - 20 0 mg/L 11 3 12 1   MICROALBUMIN/CREATININE RATIO      0 - 30 mg/g creatinine 6 10   Hemoglobin A1C      4 2 - 6 3 % 6 4 (H) 6 4 (H)   EAG      mg/dl 137 137   Vit D, 25-Hydroxy      30 0 - 100 0 ng/mL 35 6 33 1       Plan:    Diagnoses and all orders for this visit:    Type 2 diabetes mellitus with complication, without long-term current use of insulin (HCC)  HGA1C 6 4%  Remained the Same  Treatment regimen:  Continue current treatment  Discussed risks/complications associated with uncontrolled diabetes  Advised to adhere to diabetic diet, and recommended staying active/exercising routinely  Keep carbohydrates consistent to limit blood glucose fluctuations  Advised to call if blood sugars less than 70 mg/dl or over 300 mg/dl  Check blood glucose 2-3 times a day  Discussed symptoms and treatment of hypoglycemia  Recommended routine follow-up with podiatry and ophthalmology  Patient forgot to bring [facility]    Will drop by later today to download sensor  Ordered blood work to complete prior to next visit  -     Hemoglobin A1C; Future  -     Basic metabolic panel; Future    Benign essential HTN  Blood pressure well controlled  -     Basic metabolic panel; Future    Mixed hyperlipidemia  LDL 53, at goal  Continue statin therapy    Osteoporosis without current pathological fracture, unspecified osteoporosis type  Continue Fosamax 70 mg weekly and vitamin D3 supplementation   Recommended weight-bearing exercises as tolerated  Take precautions to avoid falls  Next DEXA scan due February 2021    Vitamin D deficiency  Continue current supplementation of vitamin D3      Discussed with the patient diagnosis and treatment and all questions fully answered  He will call me if any problems arise  Counseled patient on diagnostic results, prognosis, risk and benefit of treatment options, instruction for management, importance of treatment compliance, risk factor reduction and impressions        Deisy Black PA-C

## 2019-07-18 NOTE — PATIENT INSTRUCTIONS
Hypoglycemia instructions   Priyanka Rodney  7/18/2019  [id number]    Low Blood Sugar    Steps to treat low blood sugar  1  Test blood sugar if you have symptoms of low blood sugar:   Low Blood Sugar Symptoms:  o Sweaty  o Dizzy  o Rapid heartbeat  o Shaky  o Bad mood  o Hungry      2  Treat blood sugar less than 70 with 15 grams of fast-acting carbohydrate:   Examples of 15 grams Fast-Acting Carbohydrate:  o 4 oz juice  o 4 oz regular soda  o 3-4 glucose tablets (chew)  o 3-4 hard candies (chew)          3  Wait 15 minutes and test your blood sugar again     4   If blood sugar is less than 100, repeat steps 2-3     5  When your blood sugar is 100 or more, eat a snack if it will be longer than one hour until your next meal  The snack should be 15 grams of carbohydrate and a protein:   Examples of snacks:  o ½ sandwich  o 6 crackers with cheese  o Piece of fruit with cheese or peanut butter  o 6 crackers with peanut butter

## 2019-07-22 DIAGNOSIS — E11.65 TYPE 2 DIABETES MELLITUS WITH HYPERGLYCEMIA, WITHOUT LONG-TERM CURRENT USE OF INSULIN (HCC): ICD-10-CM

## 2019-07-31 DIAGNOSIS — E11.65 TYPE 2 DIABETES MELLITUS WITH HYPERGLYCEMIA, WITHOUT LONG-TERM CURRENT USE OF INSULIN (HCC): ICD-10-CM

## 2019-07-31 RX ORDER — GLIPIZIDE 10 MG/1
TABLET, FILM COATED, EXTENDED RELEASE ORAL
Qty: 90 TABLET | Refills: 1 | Status: SHIPPED | OUTPATIENT
Start: 2019-07-31 | End: 2019-11-25 | Stop reason: DRUGHIGH

## 2019-08-05 DIAGNOSIS — I10 HYPERTENSION, UNSPECIFIED TYPE: ICD-10-CM

## 2019-08-06 RX ORDER — CARVEDILOL 3.12 MG/1
TABLET ORAL
Qty: 180 TABLET | Refills: 0 | OUTPATIENT
Start: 2019-08-06

## 2019-10-05 DIAGNOSIS — M81.0 OSTEOPOROSIS WITHOUT CURRENT PATHOLOGICAL FRACTURE, UNSPECIFIED OSTEOPOROSIS TYPE: ICD-10-CM

## 2019-10-05 RX ORDER — ALENDRONATE SODIUM 70 MG/1
TABLET ORAL
Qty: 12 TABLET | Refills: 1 | Status: SHIPPED | OUTPATIENT
Start: 2019-10-05 | End: 2020-03-16

## 2019-10-09 PROBLEM — E66.01 CLASS 2 SEVERE OBESITY WITH SERIOUS COMORBIDITY AND BODY MASS INDEX (BMI) OF 36.0 TO 36.9 IN ADULT (HCC): Status: ACTIVE | Noted: 2017-11-30

## 2019-10-09 PROBLEM — E66.812 CLASS 2 SEVERE OBESITY WITH SERIOUS COMORBIDITY AND BODY MASS INDEX (BMI) OF 36.0 TO 36.9 IN ADULT (HCC): Status: ACTIVE | Noted: 2017-11-30

## 2019-10-11 ENCOUNTER — APPOINTMENT (OUTPATIENT)
Dept: LAB | Facility: CLINIC | Age: 74
End: 2019-10-11
Payer: COMMERCIAL

## 2019-10-11 DIAGNOSIS — E11.8 TYPE 2 DIABETES MELLITUS WITH COMPLICATION, WITHOUT LONG-TERM CURRENT USE OF INSULIN (HCC): ICD-10-CM

## 2019-10-11 LAB
EST. AVERAGE GLUCOSE BLD GHB EST-MCNC: 123 MG/DL
HBA1C MFR BLD: 5.9 % (ref 4.2–6.3)

## 2019-10-11 PROCEDURE — 83036 HEMOGLOBIN GLYCOSYLATED A1C: CPT

## 2019-10-11 PROCEDURE — 36415 COLL VENOUS BLD VENIPUNCTURE: CPT

## 2019-10-15 ENCOUNTER — OFFICE VISIT (OUTPATIENT)
Dept: FAMILY MEDICINE CLINIC | Facility: CLINIC | Age: 74
End: 2019-10-15
Payer: COMMERCIAL

## 2019-10-15 VITALS
HEIGHT: 68 IN | HEART RATE: 84 BPM | DIASTOLIC BLOOD PRESSURE: 76 MMHG | SYSTOLIC BLOOD PRESSURE: 120 MMHG | TEMPERATURE: 97.8 F | WEIGHT: 231.6 LBS | BODY MASS INDEX: 35.1 KG/M2 | OXYGEN SATURATION: 97 %

## 2019-10-15 DIAGNOSIS — Z95.2 HISTORY OF AORTIC VALVE REPLACEMENT: ICD-10-CM

## 2019-10-15 DIAGNOSIS — M81.0 OSTEOPOROSIS WITHOUT CURRENT PATHOLOGICAL FRACTURE, UNSPECIFIED OSTEOPOROSIS TYPE: ICD-10-CM

## 2019-10-15 DIAGNOSIS — I25.10 ARTERIOSCLEROTIC CORONARY ARTERY DISEASE: ICD-10-CM

## 2019-10-15 DIAGNOSIS — E78.5 DYSLIPIDEMIA: ICD-10-CM

## 2019-10-15 DIAGNOSIS — I10 BENIGN ESSENTIAL HTN: ICD-10-CM

## 2019-10-15 DIAGNOSIS — C61 PROSTATE CANCER (HCC): ICD-10-CM

## 2019-10-15 DIAGNOSIS — E66.01 CLASS 2 SEVERE OBESITY DUE TO EXCESS CALORIES WITH SERIOUS COMORBIDITY AND BODY MASS INDEX (BMI) OF 36.0 TO 36.9 IN ADULT (HCC): ICD-10-CM

## 2019-10-15 DIAGNOSIS — E78.2 MIXED HYPERLIPIDEMIA: ICD-10-CM

## 2019-10-15 DIAGNOSIS — E11.8 TYPE 2 DIABETES MELLITUS WITH COMPLICATION, WITHOUT LONG-TERM CURRENT USE OF INSULIN (HCC): Primary | ICD-10-CM

## 2019-10-15 PROCEDURE — 3008F BODY MASS INDEX DOCD: CPT | Performed by: PHYSICIAN ASSISTANT

## 2019-10-15 PROCEDURE — 3078F DIAST BP <80 MM HG: CPT | Performed by: PHYSICIAN ASSISTANT

## 2019-10-15 PROCEDURE — 99214 OFFICE O/P EST MOD 30 MIN: CPT | Performed by: PHYSICIAN ASSISTANT

## 2019-10-15 PROCEDURE — 3074F SYST BP LT 130 MM HG: CPT | Performed by: PHYSICIAN ASSISTANT

## 2019-10-15 RX ORDER — ATORVASTATIN CALCIUM 40 MG/1
40 TABLET, FILM COATED ORAL DAILY
Qty: 90 TABLET | Refills: 1 | Status: SHIPPED | OUTPATIENT
Start: 2019-10-15 | End: 2020-04-08

## 2019-10-15 NOTE — PROGRESS NOTES
Assessment/Plan:     Diagnoses and all orders for this visit:    Type 2 diabetes mellitus with complication, without long-term current use of insulin ([facility])  Comments:  Diabetes is at goal continue current regimen  Check blood sugar daily  Orders:  -     Hemoglobin A1C; Future  -     Microalbumin / creatinine urine ratio; Future    Arteriosclerotic coronary artery disease  Comments:  No angina or signs of congestive heart failure  Benign essential HTN  Comments:  Patient is off all blood pressure medicine his blood pressure is good  Orders:  -     Comprehensive metabolic panel; Future    Osteoporosis without current pathological fracture, unspecified osteoporosis type  Comments:  Continue Fosamax, calcium vitamin-D and weight-bearing exercise    Prostate cancer ([facility] [address] )  Comments: This is as per Urology and Oncology    History of aortic valve replacement    Mixed hyperlipidemia  Comments:  Continue statin therapy and low-fat diet  Orders:  -     Lipid panel; Future    Class 2 severe obesity due to excess calories with serious comorbidity and body mass index (BMI) of 36 0 to 36 9 in [facility])  Comments:  Patient will continue his regimen of exercise  He is slowly losing lb  Dyslipidemia  -     atorvastatin (LIPITOR) 40 mg tablet; Take 1 tablet (40 mg total) by mouth daily          Subjective:      Patient ID: Carolyn Soliz is a 69 y o  male  Patient presents for follow up chronic conditions  Patient has non-insulin diabetes mellitus type 2  He is currently on true [facility] injection 1 week  His glipizide XL 10 is down to 1 a day  Patient has been watching his sugars  Checking them daily  Patient has been exercising and eating a low carb diet  He continues to steadily lose weight  His current A1c is 5 9  He also sees endocrinologist   Patient has a history of coronary artery disease and aortic valve replacement  Cardiologist has him off all anti hypertensives    He is on generic Lipitor for lipid control  And a baby aspirin  Patient is under care of Oncology for prostate cancer  He is on Lupron injections as needed  Patient also has osteoporosis he is on calcium vitamin-D and Fosamax once a week  Flu vaccine declined today      The following portions of the patient's history were reviewed and updated as appropriate:   He   Patient Active Problem List    Diagnosis Date Noted    Vitamin D deficiency 07/18/2019    Erectile dysfunction due to diseases classified elsewhere 04/16/2019    History of aortic valve replacement 11/30/2017    Arteriosclerotic coronary artery disease 11/30/2017    Class 2 severe obesity with serious comorbidity and body mass index (BMI) of 36 0 to 36 9 in adult [facility]) 11/30/2017    Osteoporosis 11/30/2017    Type 2 diabetes mellitus with complication, without long-term current use of insulin ([facility] ) 06/20/2017    YATES (nonalcoholic steatohepatitis) 09/14/2015    Prostate cancer ([facility] ) 06/18/2013    Hyperlipidemia 07/17/2012    Osteoarthritis 07/17/2012    Benign essential HTN 04/30/2012     Current Outpatient Medications   Medication Sig Dispense Refill    alendronate (FOSAMAX) 70 mg tablet PLEASE SEE ATTACHED FOR DETAILED DIRECTIONS 12 tablet 1    aspirin 81 MG tablet Take by mouth      atorvastatin (LIPITOR) 40 mg tablet Take 1 tablet (40 mg total) by mouth daily 90 tablet 1    Blood Glucose Calibration (OT ULTRA/FASTTK CNTRL SOLN) SOLN by In Vitro route as needed (to check meter calibration) 1 each 3    Blood Glucose Monitoring Suppl (ONE TOUCH ULTRA 2) w/Device KIT by Does not apply route daily DX E11 65  Pt to   Check  fbs  daily 1 each 0    Cholecalciferol (VITAMIN D3) 2000 units capsule Take 2,000 Units by mouth daily  11    Coenzyme Q10 (COQ-10) 100 MG CAPS Take 100 mg by mouth   Continuous Blood Gluc  (FREESTTimZon CEDRIC 14 DAY READER) ADELAIDA 1 CGM DEVICE   USE TO CHECK BG LEVELS 4+ TIMES DAILY AND AS NEEDED 1 Device 0    Continuous Blood Gluc Sensor (FREESTYLE CEDRIC 14 DAY SENSOR) MISC CHANGE SENSOR EVERY 14 DAYS TO CHECK BG LEVELS 4+ TIMES DAILY 6 each 1    Dulaglutide (TRULICITY) 1 5 UI/7 9PF SOPN Inject 0 5 mL (1 5 mg total) under the skin once a week 12 pen 1    glipiZIDE (GLUCOTROL XL) 10 mg 24 hr tablet TAKE ONE TABLET BEFORE BREAKFAST 90 tablet 1    Glucose Blood (FREESTYLE LITE TEST VI) by In Vitro route      glucose blood (ONE TOUCH ULTRA TEST) test strip Check  fbs  Daily  DX E11 65 100 each 3    Multiple Vitamin-Folic Acid TABS Take by mouth      Omega-3 Fatty Acids (FISH OIL) 1,000 mg Take 1,000 mg by mouth daily   sildenafil (VIAGRA) 50 MG tablet Take 1 tablet (50 mg total) by mouth daily as needed for erectile dysfunction 5 tablet 5    leuprolide (LUPRON DEPOT, 1-MONTH,) 3 75 mg injection Inject into a muscle Once yearly        No current facility-administered medications for this visit  Current Outpatient Medications on File Prior to Visit   Medication Sig    alendronate (FOSAMAX) 70 mg tablet PLEASE SEE ATTACHED FOR DETAILED DIRECTIONS    aspirin 81 MG tablet Take by mouth    Blood Glucose Calibration (OT ULTRA/FASTTK CNTRL SOLN) SOLN by In Vitro route as needed (to check meter calibration)    Blood Glucose Monitoring Suppl (ONE TOUCH ULTRA 2) w/Device KIT by Does not apply route daily DX E11 65  Pt to   Check  fbs  daily    Cholecalciferol (VITAMIN D3) 2000 units capsule Take 2,000 Units by mouth daily    Coenzyme Q10 (COQ-10) 100 MG CAPS Take 100 mg by mouth   Continuous Blood Gluc  (FREESTYLE CEDRIC 14 DAY READER) ADELAIDA 1 CGM DEVICE   USE TO CHECK BG LEVELS 4+ TIMES DAILY AND AS NEEDED    Continuous Blood Gluc Sensor (FREESTYLE CEDRIC 14 DAY SENSOR) MISC CHANGE SENSOR EVERY 14 DAYS TO CHECK BG LEVELS 4+ TIMES DAILY    Dulaglutide (TRULICITY) 1 5 FV/5 6YU SOPN Inject 0 5 mL (1 5 mg total) under the skin once a week    glipiZIDE (GLUCOTROL XL) 10 mg 24 hr tablet TAKE ONE TABLET BEFORE BREAKFAST    Glucose Blood (FREESTYLE LITE TEST VI) by In Vitro route    glucose blood (ONE TOUCH ULTRA TEST) test strip Check  fbs  Daily  DX E11 65    Multiple Vitamin-Folic Acid TABS Take by mouth    Omega-3 Fatty Acids (FISH OIL) 1,000 mg Take 1,000 mg by mouth daily   sildenafil (VIAGRA) 50 MG tablet Take 1 tablet (50 mg total) by mouth daily as needed for erectile dysfunction    [DISCONTINUED] atorvastatin (LIPITOR) 40 mg tablet TAKE 1 TABLET BY MOUTH EVERY DAY    leuprolide (LUPRON DEPOT, 1-MONTH,) 3 75 mg injection Inject into a muscle Once yearly      No current facility-administered medications on file prior to visit  He has No Known Allergies       Review of Systems   Constitutional: Negative for activity change, appetite change, chills, fatigue and fever  HENT: Negative for ear pain and sore throat  Eyes: Negative for visual disturbance  Respiratory: Negative for cough and shortness of breath  Cardiovascular: Negative for chest pain, palpitations and leg swelling  Gastrointestinal: Negative for abdominal pain, blood in stool, constipation, diarrhea and nausea  Genitourinary: Negative for difficulty urinating  Musculoskeletal: Negative for arthralgias, back pain and myalgias  Skin: Negative for rash  Neurological: Negative for dizziness, syncope and headaches  Psychiatric/Behavioral: Negative for sleep disturbance  Objective:        Physical Exam   Constitutional: He is oriented to person, place, and time  He appears well-developed and well-nourished  Obese  white  male   HENT:   Head: Normocephalic and atraumatic  Right Ear: Tympanic membrane, external ear and ear canal normal    Left Ear: Tympanic membrane, external ear and ear canal normal    Mouth/Throat: Oropharynx is clear and moist    Eyes: Pupils are equal, round, and reactive to light  Conjunctivae are normal    Neck: Neck supple  Carotid bruit is not present  No thyromegaly present     Cardiovascular: Normal rate, regular rhythm and normal heart sounds  Pulses are no weak pulses  No murmur heard  Pulses:       Dorsalis pedis pulses are 2+ on the right side, and 2+ on the left side  avr  Tissue   valve   Pulmonary/Chest: Effort normal and breath sounds normal  He has no wheezes  Abdominal: Soft  Bowel sounds are normal  He exhibits no mass  There is no tenderness  Musculoskeletal: He exhibits no edema  Feet:   Right Foot:   Skin Integrity: Positive for dry skin  Left Foot:   Skin Integrity: Positive for dry skin  Lymphadenopathy:     He has no cervical adenopathy  Neurological: He is alert and oriented to person, place, and time  Skin: Skin is warm and dry  No erythema  Psychiatric: He has a normal mood and affect  His behavior is normal  Judgment and thought content normal    Nursing note and vitals reviewed  Patient's shoes and socks removed  Right Foot/Ankle   Right Foot Inspection  Skin Exam: dry skin                          Toe Exam: Right toe deformity: hammer  toes  Sensory   Vibration: intact    Monofilament testing: intact  Vascular    The right DP pulse is 2+  Left Foot/Ankle  Left Foot Inspection  Skin Exam: dry skin                                         Sensory   Vibration: intact    Monofilament: intact  Vascular    The left DP pulse is 2+  Assign Risk Category:  Deformity present; No loss of protective sensation;  No weak pulses       Risk: 1

## 2019-10-24 DIAGNOSIS — I10 HYPERTENSION, UNSPECIFIED TYPE: ICD-10-CM

## 2019-10-24 RX ORDER — LISINOPRIL 2.5 MG/1
TABLET ORAL
Qty: 90 TABLET | Refills: 0 | OUTPATIENT
Start: 2019-10-24

## 2019-11-18 ENCOUNTER — TELEPHONE (OUTPATIENT)
Dept: ENDOCRINOLOGY | Facility: CLINIC | Age: 74
End: 2019-11-18

## 2019-11-20 ENCOUNTER — LAB (OUTPATIENT)
Dept: LAB | Facility: CLINIC | Age: 74
End: 2019-11-20
Payer: COMMERCIAL

## 2019-11-20 DIAGNOSIS — E11.8 TYPE 2 DIABETES MELLITUS WITH COMPLICATION, WITHOUT LONG-TERM CURRENT USE OF INSULIN (HCC): ICD-10-CM

## 2019-11-20 DIAGNOSIS — I10 BENIGN ESSENTIAL HTN: ICD-10-CM

## 2019-11-20 LAB
ANION GAP SERPL CALCULATED.3IONS-SCNC: 9 MMOL/L (ref 4–13)
BUN SERPL-MCNC: 18 MG/DL (ref 5–25)
CALCIUM SERPL-MCNC: 9.4 MG/DL (ref 8.3–10.1)
CHLORIDE SERPL-SCNC: 104 MMOL/L (ref 100–108)
CO2 SERPL-SCNC: 28 MMOL/L (ref 21–32)
CREAT SERPL-MCNC: 1.14 MG/DL (ref 0.6–1.3)
EST. AVERAGE GLUCOSE BLD GHB EST-MCNC: 140 MG/DL
GFR SERPL CREATININE-BSD FRML MDRD: 63 ML/MIN/1.73SQ M
GLUCOSE P FAST SERPL-MCNC: 123 MG/DL (ref 65–99)
HBA1C MFR BLD: 6.5 % (ref 4.2–6.3)
POTASSIUM SERPL-SCNC: 4.3 MMOL/L (ref 3.5–5.3)
SODIUM SERPL-SCNC: 141 MMOL/L (ref 136–145)

## 2019-11-20 PROCEDURE — 80048 BASIC METABOLIC PNL TOTAL CA: CPT

## 2019-11-20 PROCEDURE — 36415 COLL VENOUS BLD VENIPUNCTURE: CPT

## 2019-11-20 PROCEDURE — 83036 HEMOGLOBIN GLYCOSYLATED A1C: CPT

## 2019-11-25 ENCOUNTER — OFFICE VISIT (OUTPATIENT)
Dept: ENDOCRINOLOGY | Facility: CLINIC | Age: 74
End: 2019-11-25
Payer: COMMERCIAL

## 2019-11-25 VITALS
BODY MASS INDEX: 35.98 KG/M2 | DIASTOLIC BLOOD PRESSURE: 70 MMHG | HEIGHT: 68 IN | WEIGHT: 237.4 LBS | SYSTOLIC BLOOD PRESSURE: 124 MMHG | HEART RATE: 52 BPM

## 2019-11-25 DIAGNOSIS — E55.9 VITAMIN D DEFICIENCY: ICD-10-CM

## 2019-11-25 DIAGNOSIS — M81.0 OSTEOPOROSIS WITHOUT CURRENT PATHOLOGICAL FRACTURE, UNSPECIFIED OSTEOPOROSIS TYPE: ICD-10-CM

## 2019-11-25 DIAGNOSIS — K75.81 NASH (NONALCOHOLIC STEATOHEPATITIS): ICD-10-CM

## 2019-11-25 DIAGNOSIS — I10 ESSENTIAL HYPERTENSION: ICD-10-CM

## 2019-11-25 DIAGNOSIS — E11.65 TYPE 2 DIABETES MELLITUS WITH HYPERGLYCEMIA, WITHOUT LONG-TERM CURRENT USE OF INSULIN (HCC): Primary | ICD-10-CM

## 2019-11-25 DIAGNOSIS — E78.2 MIXED HYPERLIPIDEMIA: ICD-10-CM

## 2019-11-25 PROCEDURE — 99214 OFFICE O/P EST MOD 30 MIN: CPT | Performed by: INTERNAL MEDICINE

## 2019-11-25 PROCEDURE — 95251 CONT GLUC MNTR ANALYSIS I&R: CPT | Performed by: INTERNAL MEDICINE

## 2019-11-25 RX ORDER — GLIPIZIDE 5 MG/1
5 TABLET, FILM COATED, EXTENDED RELEASE ORAL DAILY
Qty: 90 TABLET | Refills: 1 | Status: SHIPPED | OUTPATIENT
Start: 2019-11-25 | End: 2020-05-26

## 2019-11-25 RX ORDER — METFORMIN HYDROCHLORIDE 500 MG/1
TABLET, EXTENDED RELEASE ORAL
Qty: 180 TABLET | Refills: 1 | Status: SHIPPED | OUTPATIENT
Start: 2019-11-25 | End: 2020-05-18

## 2019-11-25 NOTE — PROGRESS NOTES
Carolyn Soliz 69 y o  male MRN: [id number]    Encounter: [id number]      Assessment/Plan     Assessment: This is a 69y o -year-old male with diabetes with hyperglycemia  Plan:    Diagnoses and all orders for this visit:    Type 2 diabetes mellitus with hyperglycemia, without long-term current use of insulin ([facility])    Lab Results   Component Value Date    HGBA1C 6 5 (H) 11/20/2019    A1c is well controlled, with no hypoglycemia  Start metformin 500 mg extended release twice a day  Reduce glipizide XL to 5 mg daily  Continue Trulicity 1 5 mg weekly , patient is tolerating well  Discussed to check blood sugar twice daily  Send log in 2-3 weeks  Follow-up in 3 months      -     glipiZIDE (GLUCOTROL XL) 5 mg 24 hr tablet; Take 1 tablet (5 mg total) by mouth daily  -     metFORMIN (GLUCOPHAGE-XR) 500 mg 24 hr tablet; Take one tablet with Breafast and dinner daily  -     Hemoglobin A1C; Future  -     Basic metabolic panel; Future  -     Microalbumin / creatinine urine ratio; Future    Vitamin D deficiency  Patient is currently taking vitamin-D 3 2000 International Units daily  Continue current dose    YATES (nonalcoholic steatohepatitis)  Continue metformin    Essential hypertension  BP Readings from Last 3 Encounters:   11/25/19 124/70   10/15/19 120/76   07/18/19 130/84    Blood pressure at goal  Continue current management    Mixed hyperlipidemia  Continue statins    Osteoporosis without current pathological fracture, unspecified osteoporosis type    Patient was started on Fosamax during the last visit  Tolerating it well  Continue Fosamax once a week  Discussed importance of fall precautions    Follow-up in 3 months    CC: Diabetes    History of Present Illness     HPI:    Carolyn Carverk HIS 21-year-old man with history of type 2 diabetes without long-term insulin use he is here for follow-up    HIS currently taking glipizide XL 10 mg daily, trulicity 1 5 mg weekly  He denies hypoglycemic episodes  His blood sugars are tightly controlled    Reviewed continues glucose monitor summery   Blood sugars in target range 70 to 1 80 mg/dL, 83% times  16% times blood sugars are elevated above 180 mg/dL  1% blood sugars below 70  Standard deviation 42 3    Patient is really feel symptoms of hypoglycemia when blood sugar is less than 70, understands the treatment of hypoglycemia    He also has history of osteoporosis for which he was started on Fosamax 70 mg weekly, he denies side effects  He takes vitamin-D 3, 3000 International Units daily  DEXA scan reviewed from February 2019 which showed lumbar spine T-score -2 0, left total hip T-score 0 1, left femoral neck T-score -1 4  He was previously treated with Prolia injection however it was discontinued as it was not covered by [employer]  He denies any history of fracture bones    Component      Latest Ref Rng & Units 7/9/2019 10/11/2019 11/20/2019   Sodium      136 - 145 mmol/L 139  141   Potassium      3 5 - 5 3 mmol/L 3 9  4 3   Chloride      100 - 108 mmol/L 103  104   CO2      21 - 32 mmol/L 28  28   Anion Gap      4 - 13 mmol/L 8  9   BUN      5 - 25 mg/dL 21  18   Creatinine      0 60 - 1 30 mg/dL 1 23  1 14   GLUCOSE FASTING      65 - 99 mg/dL 84  123 (H)   Calcium      8 3 - 10 1 mg/dL 9 8  9 4   eGFR      ml/min/1 73sq m 58  63   Cholesterol      50 - 200 mg/dL 118     Triglycerides      <=150 mg/dL 65     HDL      40 - 60 mg/dL 52     LDL Direct      0 - 100 mg/dL 53     Non-HDL Cholesterol      mg/dl 66     EXT Creatinine Urine      mg/dL 121 0     MICROALBUM ,U,RANDOM      0 0 - 20 0 mg/L 12 1     MICROALBUMIN/CREATININE RATIO      0 - 30 mg/g creatinine 10     Hemoglobin A1C      4 2 - 6 3 % 6 4 (H) 5 9 6 5 (H)   EAG      mg/dl 137 123 140   Vit D, 25-Hydroxy      30 0 - 100 0 ng/mL 33 1          Review of Systems   Constitutional: Negative for activity change, fatigue, fever and unexpected weight change  HENT: Negative      Eyes: Negative for visual disturbance  Respiratory: Negative for chest tightness and shortness of breath  Cardiovascular: Negative for chest pain, palpitations and leg swelling  Gastrointestinal: Negative for abdominal pain, constipation, diarrhea, nausea and vomiting  Endocrine: Negative for cold intolerance, heat intolerance, polydipsia, polyphagia and polyuria  Genitourinary: Negative for dysuria, enuresis, frequency and urgency  Musculoskeletal: Negative for arthralgias and myalgias  Skin: Negative for pallor, rash and wound  Allergic/Immunologic: Negative  Neurological: Negative for dizziness, tremors, weakness and numbness  Hematological: Negative  Psychiatric/Behavioral: Negative  Historical Information   Past Medical History:   Diagnosis Date    Arteriosclerotic coronary artery disease 2017    Benign prostate hyperplasia     L A  12   R   17      COPD (chronic obstructive pulmonary disease) (HCC)     Deep venous embolism and thrombosis (HCC)     Diabetes mellitus ([facility] )     Hypertension     Impacted cerumen     unspecified laterity / L A  12       Osteoarthritis 2012    Prostate cancer ([facility] )     Pulmonary embolism (HCC)      Past Surgical History:   Procedure Laterality Date    CARDIAC VALVE REPLACEMENT      CORONARY ARTERY BYPASS GRAFT      FRACTURE SURGERY      left knee surgery    KNEE ARTHROSCOPY      Therapeutic    KNEE SURGERY      replacement    LUNG SURGERY      LUNG SURGERY      wedge resection     TONSILLECTOMY      UMBILICAL HERNIA REPAIR       Social History   Social History     Substance and Sexual Activity   Alcohol Use No    Comment: social drinker per [employer]     Social History     Substance and Sexual Activity   Drug Use No     Social History     Tobacco Use   Smoking Status Former Smoker    Types: Cigarettes    Last attempt to quit: 3/23/2000    Years since quittin 6   Smokeless Tobacco Never Used     Family History:   Family History   Problem Relation Age of Onset    COPD Mother     Heart attack Mother     Heart attack Father        Meds/Allergies   Current Outpatient Medications   Medication Sig Dispense Refill    alendronate (FOSAMAX) 70 mg tablet PLEASE SEE ATTACHED FOR DETAILED DIRECTIONS 12 tablet 1    aspirin 81 MG tablet Take by mouth      atorvastatin (LIPITOR) 40 mg tablet Take 1 tablet (40 mg total) by mouth daily 90 tablet 1    Blood Glucose Calibration (OT ULTRA/FASTTK CNTRL SOLN) SOLN by In Vitro route as needed (to check meter calibration) 1 each 3    Blood Glucose Monitoring Suppl (ONE TOUCH ULTRA 2) w/Device KIT by Does not apply route daily DX E11 65  Pt to   Check  fbs  daily 1 each 0    Cholecalciferol (VITAMIN D3) 2000 units capsule Take 2,000 Units by mouth daily  11    Coenzyme Q10 (COQ-10) 100 MG CAPS Take 100 mg by mouth   Continuous Blood Gluc  (FREESTYLE CEDRIC 14 DAY READER) ADELAIDA 1 CGM DEVICE  USE TO CHECK BG LEVELS 4+ TIMES DAILY AND AS NEEDED 1 Device 0    Continuous Blood Gluc Sensor (FREESTYLE CEDRIC 14 DAY SENSOR) MISC CHANGE SENSOR EVERY 14 DAYS TO CHECK BG LEVELS 4+ TIMES DAILY 6 each 1    Dulaglutide (TRULICITY) 1 5 LC/3 1NA SOPN Inject 0 5 mL (1 5 mg total) under the skin once a week 12 pen 1    Glucose Blood (FREESTYLE LITE TEST VI) by In Vitro route      glucose blood (ONE TOUCH ULTRA TEST) test strip Check  fbs  Daily  DX E11 65 100 each 3    leuprolide (LUPRON DEPOT, 1-MONTH,) 3 75 mg injection Inject into a muscle Once yearly       Multiple Vitamin-Folic Acid TABS Take by mouth      Omega-3 Fatty Acids (FISH OIL) 1,000 mg Take 1,000 mg by mouth daily        sildenafil (VIAGRA) 50 MG tablet Take 1 tablet (50 mg total) by mouth daily as needed for erectile dysfunction 5 tablet 5    glipiZIDE (GLUCOTROL XL) 5 mg 24 hr tablet Take 1 tablet (5 mg total) by mouth daily 90 tablet 1    metFORMIN (GLUCOPHAGE-XR) 500 mg 24 hr tablet Take one tablet with Breafast and dinner daily 180 tablet 1     No current facility-administered medications for this visit  No Known Allergies    Objective   Vitals: Blood pressure 124/70, pulse (!) 52, height 5' 7 5" (1 715 m), weight 108 kg (237 lb 6 4 oz)  Physical Exam   Constitutional: He is oriented to person, place, and time  He appears well-developed and well-nourished  No distress  HENT:   Head: Normocephalic and atraumatic  Eyes: Pupils are equal, round, and reactive to light  EOM are normal    Neck: Normal range of motion  Neck supple  No thyromegaly present  Cardiovascular: Normal rate, regular rhythm and normal heart sounds  Pulmonary/Chest: Effort normal and breath sounds normal  No respiratory distress  Musculoskeletal: Normal range of motion  He exhibits no edema or deformity  Neurological: He is alert and oriented to person, place, and time  Skin: Skin is warm and dry  No erythema  Psychiatric: He has a normal mood and affect  Vitals reviewed  The history was obtained from the review of the chart, patient      Lab Results:   Lab Results   Component Value Date/Time    Hemoglobin A1C 6 5 (H) 11/20/2019 08:33 AM    Hemoglobin A1C 5 9 10/11/2019 08:08 AM    Hemoglobin A1C 6 4 (H) 07/09/2019 06:48 AM    BUN 18 11/20/2019 08:33 AM    BUN 21 07/09/2019 06:48 AM    BUN 20 04/05/2019 07:48 AM    Potassium 4 3 11/20/2019 08:33 AM    Potassium 3 9 07/09/2019 06:48 AM    Potassium 4 5 04/05/2019 07:48 AM    Chloride 104 11/20/2019 08:33 AM    Chloride 103 07/09/2019 06:48 AM    Chloride 104 04/05/2019 07:48 AM    CO2 28 11/20/2019 08:33 AM    CO2 28 07/09/2019 06:48 AM    CO2 28 04/05/2019 07:48 AM    Creatinine 1 14 11/20/2019 08:33 AM    Creatinine 1 23 07/09/2019 06:48 AM    Creatinine 1 26 04/05/2019 07:48 AM    AST 43 04/05/2019 07:48 AM    ALT 54 04/05/2019 07:48 AM    Albumin 3 8 04/05/2019 07:48 AM    HDL, Direct 52 07/09/2019 06:48 AM    Triglycerides 65 07/09/2019 06:48 AM           Imaging Studies: I have personally reviewed pertinent reports  Portions of the record may have been created with voice recognition software  Occasional wrong word or "sound a like" substitutions may have occurred due to the inherent limitations of voice recognition software  Read the chart carefully and recognize, using context, where substitutions have occurred

## 2019-12-26 DIAGNOSIS — E11.65 UNCONTROLLED TYPE 2 DIABETES MELLITUS WITH HYPERGLYCEMIA (HCC): ICD-10-CM

## 2019-12-26 RX ORDER — FLASH GLUCOSE SENSOR
KIT MISCELLANEOUS
Qty: 2 EACH | Refills: 12 | Status: SHIPPED | OUTPATIENT
Start: 2019-12-26 | End: 2020-03-23 | Stop reason: SDUPTHER

## 2020-01-03 DIAGNOSIS — E11.65 TYPE 2 DIABETES MELLITUS WITH HYPERGLYCEMIA, WITHOUT LONG-TERM CURRENT USE OF INSULIN (HCC): ICD-10-CM

## 2020-01-24 DIAGNOSIS — E11.65 TYPE 2 DIABETES MELLITUS WITH HYPERGLYCEMIA, WITHOUT LONG-TERM CURRENT USE OF INSULIN (HCC): ICD-10-CM

## 2020-01-27 RX ORDER — GLIPIZIDE 10 MG/1
TABLET, FILM COATED, EXTENDED RELEASE ORAL
Qty: 90 TABLET | Refills: 0 | OUTPATIENT
Start: 2020-01-27

## 2020-03-01 ENCOUNTER — LAB (OUTPATIENT)
Dept: LAB | Facility: CLINIC | Age: 75
End: 2020-03-01
Payer: COMMERCIAL

## 2020-03-01 ENCOUNTER — TRANSCRIBE ORDERS (OUTPATIENT)
Dept: LAB | Facility: CLINIC | Age: 75
End: 2020-03-01

## 2020-03-01 DIAGNOSIS — E11.8 TYPE 2 DIABETES MELLITUS WITH COMPLICATION, WITHOUT LONG-TERM CURRENT USE OF INSULIN (HCC): ICD-10-CM

## 2020-03-01 DIAGNOSIS — E11.65 TYPE 2 DIABETES MELLITUS WITH HYPERGLYCEMIA, WITHOUT LONG-TERM CURRENT USE OF INSULIN (HCC): ICD-10-CM

## 2020-03-01 LAB
ANION GAP SERPL CALCULATED.3IONS-SCNC: 10 MMOL/L (ref 4–13)
BUN SERPL-MCNC: 22 MG/DL (ref 5–25)
CALCIUM SERPL-MCNC: 9.2 MG/DL (ref 8.3–10.1)
CHLORIDE SERPL-SCNC: 104 MMOL/L (ref 100–108)
CO2 SERPL-SCNC: 25 MMOL/L (ref 21–32)
CREAT SERPL-MCNC: 1.1 MG/DL (ref 0.6–1.3)
CREAT UR-MCNC: 123 MG/DL
EST. AVERAGE GLUCOSE BLD GHB EST-MCNC: 140 MG/DL
GFR SERPL CREATININE-BSD FRML MDRD: 66 ML/MIN/1.73SQ M
GLUCOSE P FAST SERPL-MCNC: 138 MG/DL (ref 65–99)
HBA1C MFR BLD: 6.5 %
MICROALBUMIN UR-MCNC: 22.1 MG/L (ref 0–20)
MICROALBUMIN/CREAT 24H UR: 18 MG/G CREATININE (ref 0–30)
POTASSIUM SERPL-SCNC: 4.2 MMOL/L (ref 3.5–5.3)
SODIUM SERPL-SCNC: 139 MMOL/L (ref 136–145)

## 2020-03-01 PROCEDURE — 36415 COLL VENOUS BLD VENIPUNCTURE: CPT

## 2020-03-01 PROCEDURE — 82043 UR ALBUMIN QUANTITATIVE: CPT

## 2020-03-01 PROCEDURE — 82570 ASSAY OF URINE CREATININE: CPT

## 2020-03-01 PROCEDURE — 83036 HEMOGLOBIN GLYCOSYLATED A1C: CPT

## 2020-03-01 PROCEDURE — 80048 BASIC METABOLIC PNL TOTAL CA: CPT

## 2020-03-02 ENCOUNTER — OFFICE VISIT (OUTPATIENT)
Dept: ENDOCRINOLOGY | Facility: CLINIC | Age: 75
End: 2020-03-02
Payer: COMMERCIAL

## 2020-03-02 VITALS
WEIGHT: 241 LBS | HEIGHT: 68 IN | SYSTOLIC BLOOD PRESSURE: 128 MMHG | BODY MASS INDEX: 36.53 KG/M2 | DIASTOLIC BLOOD PRESSURE: 72 MMHG

## 2020-03-02 DIAGNOSIS — I10 ESSENTIAL HYPERTENSION: ICD-10-CM

## 2020-03-02 DIAGNOSIS — E55.9 VITAMIN D DEFICIENCY: ICD-10-CM

## 2020-03-02 DIAGNOSIS — E78.2 MIXED HYPERLIPIDEMIA: ICD-10-CM

## 2020-03-02 DIAGNOSIS — E11.65 TYPE 2 DIABETES MELLITUS WITH HYPERGLYCEMIA, WITHOUT LONG-TERM CURRENT USE OF INSULIN (HCC): Primary | ICD-10-CM

## 2020-03-02 DIAGNOSIS — M81.0 OSTEOPOROSIS WITHOUT CURRENT PATHOLOGICAL FRACTURE, UNSPECIFIED OSTEOPOROSIS TYPE: ICD-10-CM

## 2020-03-02 PROCEDURE — 4040F PNEUMOC VAC/ADMIN/RCVD: CPT | Performed by: PHYSICIAN ASSISTANT

## 2020-03-02 PROCEDURE — 1160F RVW MEDS BY RX/DR IN RCRD: CPT | Performed by: PHYSICIAN ASSISTANT

## 2020-03-02 PROCEDURE — 3060F POS MICROALBUMINURIA REV: CPT | Performed by: PHYSICIAN ASSISTANT

## 2020-03-02 PROCEDURE — 99214 OFFICE O/P EST MOD 30 MIN: CPT | Performed by: PHYSICIAN ASSISTANT

## 2020-03-02 PROCEDURE — 1036F TOBACCO NON-USER: CPT | Performed by: PHYSICIAN ASSISTANT

## 2020-03-02 PROCEDURE — 3044F HG A1C LEVEL LT 7.0%: CPT | Performed by: PHYSICIAN ASSISTANT

## 2020-03-02 PROCEDURE — 3074F SYST BP LT 130 MM HG: CPT | Performed by: PHYSICIAN ASSISTANT

## 2020-03-02 PROCEDURE — 3008F BODY MASS INDEX DOCD: CPT | Performed by: PHYSICIAN ASSISTANT

## 2020-03-02 PROCEDURE — 3078F DIAST BP <80 MM HG: CPT | Performed by: PHYSICIAN ASSISTANT

## 2020-03-02 NOTE — PROGRESS NOTES
Patient Progress Note      CC: DM2, osteoporosis      Referring Provider  Samantha Chris, 45 Desiree Rodriguez [address] [facility], [address]     History of Present Illness:   Dora Ruiz is a 69 y o  male with a history of type 2 diabetes without long term use of insulin  Diabetes course has been stable  Complications of DM: none reported  Denies recent illness or hospitalizations  Denies recent severe hypoglycemic or severe hyperglycemic episodes  Denies any issues with his current regimen  Home glucose monitoring: are performed regularly  Using [employer]  In target range 86%, above range 11%, below range 3%  Average glucose 128 mg/dl with variability of 29 7%  Glucose management indicator 6 4%    Current regimen: Trulicity 1 5 mg weekly, metformin 500 mg BID, glipizide 5 mg daily  compliant most of the time, denies any side effects from medications  Injects in: abdomen  Rotates sites: Yes  Hypoglycemic episodes: No, rare  H/o of hypoglycemia causing hospitalization or Intervention such as glucagon injection  or ambulance call :  No  Hypoglycemia symptoms: dizziness  Treatment of hypoglycemia: orange juice    Diet: 3 meals per day, 1-2 snacks per day  Timing of meals is predictable  Diabetic diet compliance:  noncompliant much of the time  Activity: Daily activity is predictable: Yes  Trying to exercise every day  Further diabetic ROS: no polyuria or polydipsia, no chest pain, dyspnea, no numbness, tingling or pain in extremities        Ophthamology: overdue, he agrees to schedule it  Podiatry: foot exam UTD, October 2019    Not on ACE inhibitor/ARB  Has hyperlipidemia: on statin - tolerating well, no myalgias  compliant most of the time, denies any side effects from medications  Thyroid disorders: No  History of pancreatitis: No    Osteopenia / vitamin D deficiency: previously treated with Prolia injections but it was denied by [facility]   Currently on Fosamax 70 mg weekly and taking it as instructed and tolerating it well  He is also taking vitamin D3 2000 IU daily  No recent fractures  He did have a fall recently when he tripped over his stationary bike  Does do weight bearing exercises  Last DEXA scan in February 2019 showed osteopenia of the lumbar spine  Patient Active Problem List   Diagnosis    History of aortic valve replacement    Arteriosclerotic coronary artery disease    Benign essential HTN    Type 2 diabetes mellitus with complication, without long-term current use of insulin (HCC)    Hyperlipidemia    YATES (nonalcoholic steatohepatitis)    Class 2 severe obesity with serious comorbidity and body mass index (BMI) of 36 0 to 36 9 in [facility])    Osteoarthritis    Osteoporosis    Prostate cancer ([facility] )    Erectile dysfunction due to diseases classified elsewhere    Vitamin D deficiency      Past Medical History:   Diagnosis Date    Arteriosclerotic coronary artery disease 11/30/2017    Benign prostate hyperplasia     L A  7/17/12   R   8/30/17      [facility]     Deep venous embolism and thrombosis ([facility])     Diabetes mellitus ([facility] )     Hypertension     Impacted cerumen     unspecified laterity / L A  6/20/12       Osteoarthritis 7/17/2012    Prostate cancer ([facility] )     Pulmonary embolism ([facility])       Past Surgical History:   Procedure Laterality Date    CARDIAC VALVE REPLACEMENT      CORONARY ARTERY BYPASS GRAFT      FRACTURE SURGERY      left knee surgery    KNEE ARTHROSCOPY      Therapeutic    KNEE SURGERY      replacement    LUNG SURGERY      LUNG SURGERY      wedge resection     TONSILLECTOMY      UMBILICAL HERNIA REPAIR        Family History   Problem Relation Age of Onset    COPD Mother     Heart attack Mother     Heart attack Father      Social History     Tobacco Use    Smoking status: Former Smoker     Types: Cigarettes     Last attempt to quit: 3/23/2000     Years since quitting: 19 9    Smokeless tobacco: Never Used   Substance Use Topics    Alcohol use: No     Comment: social drinker per [employer]script     No Known Allergies      Current Outpatient Medications:     alendronate (FOSAMAX) 70 mg tablet, PLEASE SEE ATTACHED FOR DETAILED DIRECTIONS, Disp: 12 tablet, Rfl: 1    aspirin 81 MG tablet, Take by mouth, Disp: , Rfl:     atorvastatin (LIPITOR) 40 mg tablet, Take 1 tablet (40 mg total) by mouth daily, Disp: 90 tablet, Rfl: 1    Blood Glucose Calibration (OT ULTRA/FASTTK CNTRL SOLN) SOLN, by In Vitro route as needed (to check meter calibration), Disp: 1 each, Rfl: 3    Cholecalciferol (VITAMIN D3) 2000 units capsule, Take 2,000 Units by mouth daily, Disp: , Rfl: 11    Coenzyme Q10 (COQ-10) 100 MG CAPS, Take 100 mg by mouth , Disp: , Rfl:     Continuous Blood Gluc  (FREESTYLE CEDRIC 14 DAY READER) ADELAIDA, 1 CGM DEVICE  USE TO CHECK BG LEVELS 4+ TIMES DAILY AND AS NEEDED, Disp: 1 Device, Rfl: 0    Continuous Blood Gluc Sensor (FREESTYLE CEDRIC 14 DAY SENSOR) MIS, CHANGE SENSOR EVERY 14 DAYS TO CHECK BG LEVELS 4+ TIMES DAILY, Disp: 2 each, Rfl: 12    Dulaglutide (TRULICITY) 1 5 DJ/1 3MY SOPN, Inject 0 5 mL (1 5 mg total) under the skin once a week, Disp: 12 pen, Rfl: 1    glipiZIDE (GLUCOTROL XL) 5 mg 24 hr tablet, Take 1 tablet (5 mg total) by mouth daily, Disp: 90 tablet, Rfl: 1    Glucose Blood (FREESTYLE LITE TEST VI), by In Vitro route, Disp: , Rfl:     metFORMIN (GLUCOPHAGE-XR) 500 mg 24 hr tablet, Take one tablet with Breafast and dinner daily, Disp: 180 tablet, Rfl: 1    Multiple Vitamin-Folic Acid TABS, Take by mouth, Disp: , Rfl:     Omega-3 Fatty Acids (FISH OIL) 1,000 mg, Take 1,000 mg by mouth daily  , Disp: , Rfl:     sildenafil (VIAGRA) 50 MG tablet, Take 1 tablet (50 mg total) by mouth daily as needed for erectile dysfunction, Disp: 5 tablet, Rfl: 5    Blood Glucose Monitoring Suppl (ONE TOUCH ULTRA 2) w/Device KIT, by Does not apply route daily DX E11 65  Pt to   Check  fbs  daily (Patient not taking: Reported on 3/2/2020), Disp: 1 each, Rfl: 0    glucose blood (ONE TOUCH ULTRA TEST) test strip, Check  fbs  Daily  DX E11 65 (Patient not taking: Reported on 3/2/2020), Disp: 100 each, Rfl: 3    leuprolide (LUPRON DEPOT, 1-MONTH,) 3 75 mg injection, Inject into a muscle Once yearly , Disp: , Rfl:   Review of Systems   Constitutional: Negative for activity change, appetite change, fatigue and unexpected weight change  HENT: Negative for trouble swallowing  Eyes: Negative for visual disturbance  Respiratory: Negative for shortness of breath  Cardiovascular: Negative for chest pain and palpitations  Gastrointestinal: Negative for constipation and diarrhea  Endocrine: Negative for polydipsia and polyuria  Musculoskeletal: Negative  Skin: Negative for wound  Neurological: Negative for numbness  Psychiatric/Behavioral: Negative  Physical Exam:  Body mass index is 37 19 kg/m²  /72   Ht 5' 7 5" (1 715 m)   Wt 109 kg (241 lb)   BMI 37 19 kg/m²    Wt Readings from Last 3 Encounters:   03/02/20 109 kg (241 lb)   11/25/19 108 kg (237 lb 6 4 oz)   10/15/19 105 kg (231 lb 9 6 oz)       Physical Exam   Constitutional: He appears well-developed and well-nourished  HENT:   Head: Normocephalic  Eyes: EOM are normal  No scleral icterus  Neck: Neck supple  No thyromegaly present  Cardiovascular: Normal rate and regular rhythm  No murmur heard  Pulses:       Radial pulses are 2+ on the right side, and 2+ on the left side  Pulmonary/Chest: Effort normal and breath sounds normal  No respiratory distress  He has no wheezes  Neurological: He is alert  He has normal reflexes  Skin: Skin is warm and dry  Psychiatric: He has a normal mood and affect  Nursing note and vitals reviewed  Patient's shoes and socks were not removed        Labs:   Component      Latest Ref Rng & Units 7/9/2019 11/20/2019 3/1/2020   Sodium      136 - 145 mmol/L 139 141 139   Potassium      3 5 - 5 3 mmol/L 3 9 4 3 4 2   Chloride      100 - 108 mmol/L 103 104 104   CO2      21 - 32 mmol/L 28 28 25   Anion Gap      4 - 13 mmol/L 8 9 10   BUN      5 - 25 mg/dL 21 18 22   Creatinine      0 60 - 1 30 mg/dL 1 23 1 14 1 10   GLUCOSE FASTING      65 - 99 mg/dL 84 123 (H) 138 (H)   Calcium      8 3 - 10 1 mg/dL 9 8 9 4 9 2   eGFR      ml/min/1 73sq m 58 63 66   Cholesterol      50 - 200 mg/dL 118     Triglycerides      <=150 mg/dL 65     HDL      40 - 60 mg/dL 52     LDL Direct      0 - 100 mg/dL 53     Non-HDL Cholesterol      mg/dl 66     EXT Creatinine Urine      mg/dL 121 0  123 0   MICROALBUM ,U,RANDOM      0 0 - 20 0 mg/L 12 1  22 1 (H)   MICROALBUMIN/CREATININE RATIO      0 - 30 mg/g creatinine 10  18   Hemoglobin A1C      Normal 3 8-5 6%; PreDiabetic 5 7-6 4%; Diabetic >=6 5%; Glycemic control for adults with diabetes <7 0% % 6 4 (H) 6 5 (H) 6 5 (H)   EAG      mg/dl 137 140 140   Vit D, 25-Hydroxy      30 0 - 100 0 ng/mL 33 1         Plan:    Diagnoses and all orders for this visit:    Type 2 diabetes mellitus with hyperglycemia, without long-term current use of insulin (HCC)  HGA1C 6 5%  Remained the Same  Treatment regimen: continue current treatment  Discussed risks/complications associated with uncontrolled diabetes  Advised to adhere to diabetic diet, and recommended staying active/exercising routinely as tolerated  Keep carbohydrates consistent to limit blood glucose fluctuations  Advised to call if blood sugars less than 70 mg/dl or over 300 mg/dl  Check blood glucose 2+ times a day  Discussed symptoms and treatment of hypoglycemia  Recommended routine follow-up with podiatry and ophthalmology  Ordered blood work to complete prior to next visit  -     Hemoglobin A1C; Future  -     Basic metabolic panel; Future    Essential hypertension  Blood pressure adequately controlled  -     Basic metabolic panel;  Future    Mixed hyperlipidemia  LDL previously 53, at goal  Continue statin therapy  Managed by PCP    Vitamin D deficiency  Vitamin-D previously normal at 33 1  Continue current supplementation of vitamin D3  -     Vitamin D 25 hydroxy; Future    Osteoporosis without current pathological fracture  Most recent DEXA scan was consistent with osteopenia of the lumbar spine  Continue current treatment with Fosamax  Continue weight-bearing exercises as tolerated  Continue vitamin D supplementation  Take precautions to avoid falls  -     Basic metabolic panel; Future  -     Vitamin D 25 hydroxy; Future       Discussed with the patient diagnosis and treatment and all questions fully answered  He will call me if any problems arise  Counseled patient on diagnostic results, prognosis, risk and benefit of treatment options, instruction for management, importance of treatment compliance, risk factor reduction and impressions        Deisy Black PA-C

## 2020-03-02 NOTE — PATIENT INSTRUCTIONS
Hypoglycemia instructions   [facility]  3/2/2020  [id number]    Low Blood Sugar    Steps to treat low blood sugar  1  Test blood sugar if you have symptoms of low blood sugar:   Low Blood Sugar Symptoms:  o Sweaty  o Dizzy  o Rapid heartbeat  o Shaky  o Bad mood  o Hungry      2  Treat blood sugar less than 70 with 15 grams of fast-acting carbohydrate:   Examples of 15 grams Fast-Acting Carbohydrate:  o 4 oz juice  o 4 oz regular soda  o 3-4 glucose tablets (chew)  o 3-4 hard candies (chew)          3  Wait 15 minutes and test your blood sugar again     4   If blood sugar is less than 100, repeat steps 2-3     5  When your blood sugar is 100 or more, eat a snack if it will be longer than one hour until your next meal  The snack should be 15 grams of carbohydrate and a protein:   Examples of snacks:  o ½ sandwich  o 6 crackers with cheese  o Piece of fruit with cheese or peanut butter  o 6 crackers with peanut butter

## 2020-03-16 DIAGNOSIS — M81.0 OSTEOPOROSIS WITHOUT CURRENT PATHOLOGICAL FRACTURE, UNSPECIFIED OSTEOPOROSIS TYPE: ICD-10-CM

## 2020-03-16 RX ORDER — ALENDRONATE SODIUM 70 MG/1
TABLET ORAL
Qty: 12 TABLET | Refills: 1 | Status: SHIPPED | OUTPATIENT
Start: 2020-03-16 | End: 2020-09-09

## 2020-03-23 DIAGNOSIS — E11.65 UNCONTROLLED TYPE 2 DIABETES MELLITUS WITH HYPERGLYCEMIA (HCC): ICD-10-CM

## 2020-03-23 RX ORDER — FLASH GLUCOSE SENSOR
KIT MISCELLANEOUS
Qty: 6 EACH | Refills: 2 | Status: SHIPPED | OUTPATIENT
Start: 2020-03-23 | End: 2020-08-24 | Stop reason: SDUPTHER

## 2020-04-08 DIAGNOSIS — E78.5 DYSLIPIDEMIA: ICD-10-CM

## 2020-04-08 RX ORDER — ATORVASTATIN CALCIUM 40 MG/1
TABLET, FILM COATED ORAL
Qty: 90 TABLET | Refills: 1 | Status: SHIPPED | OUTPATIENT
Start: 2020-04-08 | End: 2020-10-01

## 2020-04-18 DIAGNOSIS — L21.9 SEBORRHEA: Primary | ICD-10-CM

## 2020-04-19 RX ORDER — FLUOROURACIL 50 MG/G
CREAM TOPICAL
Qty: 40 G | Refills: 0 | Status: SHIPPED | OUTPATIENT
Start: 2020-04-19

## 2020-04-22 ENCOUNTER — TELEPHONE (OUTPATIENT)
Dept: UROLOGY | Facility: AMBULATORY SURGERY CENTER | Age: 75
End: 2020-04-22

## 2020-04-27 ENCOUNTER — TELEPHONE (OUTPATIENT)
Dept: UROLOGY | Facility: MEDICAL CENTER | Age: 75
End: 2020-04-27

## 2020-04-30 ENCOUNTER — TELEPHONE (OUTPATIENT)
Dept: UROLOGY | Facility: AMBULATORY SURGERY CENTER | Age: 75
End: 2020-04-30

## 2020-05-07 ENCOUNTER — TELEPHONE (OUTPATIENT)
Dept: UROLOGY | Facility: AMBULATORY SURGERY CENTER | Age: 75
End: 2020-05-07

## 2020-05-07 DIAGNOSIS — C61 PROSTATE CANCER (HCC): Primary | ICD-10-CM

## 2020-05-14 ENCOUNTER — APPOINTMENT (OUTPATIENT)
Dept: LAB | Facility: CLINIC | Age: 75
End: 2020-05-14
Payer: COMMERCIAL

## 2020-05-14 DIAGNOSIS — E11.8 TYPE 2 DIABETES MELLITUS WITH COMPLICATION, WITHOUT LONG-TERM CURRENT USE OF INSULIN (HCC): ICD-10-CM

## 2020-05-14 DIAGNOSIS — I10 BENIGN ESSENTIAL HTN: ICD-10-CM

## 2020-05-14 DIAGNOSIS — E78.2 MIXED HYPERLIPIDEMIA: ICD-10-CM

## 2020-05-14 DIAGNOSIS — C61 PROSTATE CANCER (HCC): ICD-10-CM

## 2020-05-14 LAB
ALBUMIN SERPL BCP-MCNC: 3.7 G/DL (ref 3.5–5)
ALP SERPL-CCNC: 65 U/L (ref 46–116)
ALT SERPL W P-5'-P-CCNC: 84 U/L (ref 12–78)
ANION GAP SERPL CALCULATED.3IONS-SCNC: 10 MMOL/L (ref 4–13)
AST SERPL W P-5'-P-CCNC: 48 U/L (ref 5–45)
BILIRUB SERPL-MCNC: 0.58 MG/DL (ref 0.2–1)
BUN SERPL-MCNC: 21 MG/DL (ref 5–25)
CALCIUM SERPL-MCNC: 9.5 MG/DL (ref 8.3–10.1)
CHLORIDE SERPL-SCNC: 101 MMOL/L (ref 100–108)
CHOLEST SERPL-MCNC: 136 MG/DL (ref 50–200)
CO2 SERPL-SCNC: 27 MMOL/L (ref 21–32)
CREAT SERPL-MCNC: 1.18 MG/DL (ref 0.6–1.3)
CREAT UR-MCNC: 129 MG/DL
GFR SERPL CREATININE-BSD FRML MDRD: 61 ML/MIN/1.73SQ M
GLUCOSE P FAST SERPL-MCNC: 155 MG/DL (ref 65–99)
HDLC SERPL-MCNC: 53 MG/DL
LDLC SERPL CALC-MCNC: 61 MG/DL (ref 0–100)
MICROALBUMIN UR-MCNC: 13.9 MG/L (ref 0–20)
MICROALBUMIN/CREAT 24H UR: 11 MG/G CREATININE (ref 0–30)
NONHDLC SERPL-MCNC: 83 MG/DL
POTASSIUM SERPL-SCNC: 4.2 MMOL/L (ref 3.5–5.3)
PROT SERPL-MCNC: 8 G/DL (ref 6.4–8.2)
PSA SERPL-MCNC: <0.1 NG/ML (ref 0–4)
SODIUM SERPL-SCNC: 138 MMOL/L (ref 136–145)
TRIGL SERPL-MCNC: 110 MG/DL

## 2020-05-14 PROCEDURE — 80053 COMPREHEN METABOLIC PANEL: CPT

## 2020-05-14 PROCEDURE — 84153 ASSAY OF PSA TOTAL: CPT

## 2020-05-14 PROCEDURE — 36415 COLL VENOUS BLD VENIPUNCTURE: CPT

## 2020-05-14 PROCEDURE — 82570 ASSAY OF URINE CREATININE: CPT

## 2020-05-14 PROCEDURE — 80061 LIPID PANEL: CPT

## 2020-05-14 PROCEDURE — 3061F NEG MICROALBUMINURIA REV: CPT | Performed by: PHYSICIAN ASSISTANT

## 2020-05-14 PROCEDURE — 82043 UR ALBUMIN QUANTITATIVE: CPT

## 2020-05-18 ENCOUNTER — OFFICE VISIT (OUTPATIENT)
Dept: FAMILY MEDICINE CLINIC | Facility: CLINIC | Age: 75
End: 2020-05-18
Payer: COMMERCIAL

## 2020-05-18 ENCOUNTER — TELEPHONE (OUTPATIENT)
Dept: FAMILY MEDICINE CLINIC | Facility: CLINIC | Age: 75
End: 2020-05-18

## 2020-05-18 VITALS
OXYGEN SATURATION: 95 % | TEMPERATURE: 97.3 F | DIASTOLIC BLOOD PRESSURE: 82 MMHG | HEART RATE: 87 BPM | RESPIRATION RATE: 16 BRPM | WEIGHT: 246 LBS | BODY MASS INDEX: 37.28 KG/M2 | HEIGHT: 68 IN | SYSTOLIC BLOOD PRESSURE: 140 MMHG

## 2020-05-18 DIAGNOSIS — Z95.2 HISTORY OF AORTIC VALVE REPLACEMENT: ICD-10-CM

## 2020-05-18 DIAGNOSIS — I10 BENIGN ESSENTIAL HTN: ICD-10-CM

## 2020-05-18 DIAGNOSIS — M81.0 OSTEOPOROSIS WITHOUT CURRENT PATHOLOGICAL FRACTURE, UNSPECIFIED OSTEOPOROSIS TYPE: ICD-10-CM

## 2020-05-18 DIAGNOSIS — Z00.00 MEDICARE ANNUAL WELLNESS VISIT, SUBSEQUENT: Primary | ICD-10-CM

## 2020-05-18 DIAGNOSIS — I25.10 ARTERIOSCLEROTIC CORONARY ARTERY DISEASE: ICD-10-CM

## 2020-05-18 DIAGNOSIS — E78.2 MIXED HYPERLIPIDEMIA: ICD-10-CM

## 2020-05-18 DIAGNOSIS — E66.01 CLASS 2 SEVERE OBESITY DUE TO EXCESS CALORIES WITH SERIOUS COMORBIDITY AND BODY MASS INDEX (BMI) OF 36.0 TO 36.9 IN ADULT (HCC): ICD-10-CM

## 2020-05-18 DIAGNOSIS — E11.65 TYPE 2 DIABETES MELLITUS WITH HYPERGLYCEMIA, WITHOUT LONG-TERM CURRENT USE OF INSULIN (HCC): ICD-10-CM

## 2020-05-18 DIAGNOSIS — E11.8 TYPE 2 DIABETES MELLITUS WITH COMPLICATION, WITHOUT LONG-TERM CURRENT USE OF INSULIN (HCC): ICD-10-CM

## 2020-05-18 PROCEDURE — G0439 PPPS, SUBSEQ VISIT: HCPCS | Performed by: PHYSICIAN ASSISTANT

## 2020-05-18 PROCEDURE — 1036F TOBACCO NON-USER: CPT | Performed by: PHYSICIAN ASSISTANT

## 2020-05-18 PROCEDURE — 3079F DIAST BP 80-89 MM HG: CPT | Performed by: PHYSICIAN ASSISTANT

## 2020-05-18 PROCEDURE — 3077F SYST BP >= 140 MM HG: CPT | Performed by: PHYSICIAN ASSISTANT

## 2020-05-18 PROCEDURE — 3044F HG A1C LEVEL LT 7.0%: CPT | Performed by: PHYSICIAN ASSISTANT

## 2020-05-18 PROCEDURE — 4040F PNEUMOC VAC/ADMIN/RCVD: CPT | Performed by: PHYSICIAN ASSISTANT

## 2020-05-18 PROCEDURE — 3008F BODY MASS INDEX DOCD: CPT | Performed by: PHYSICIAN ASSISTANT

## 2020-05-18 PROCEDURE — 1170F FXNL STATUS ASSESSED: CPT | Performed by: PHYSICIAN ASSISTANT

## 2020-05-18 PROCEDURE — 1160F RVW MEDS BY RX/DR IN RCRD: CPT | Performed by: PHYSICIAN ASSISTANT

## 2020-05-18 PROCEDURE — 99214 OFFICE O/P EST MOD 30 MIN: CPT | Performed by: PHYSICIAN ASSISTANT

## 2020-05-18 PROCEDURE — 1125F AMNT PAIN NOTED PAIN PRSNT: CPT | Performed by: PHYSICIAN ASSISTANT

## 2020-05-18 RX ORDER — METFORMIN HYDROCHLORIDE 500 MG/1
TABLET, EXTENDED RELEASE ORAL
Qty: 180 TABLET | Refills: 1 | Status: SHIPPED | OUTPATIENT
Start: 2020-05-18 | End: 2020-11-13

## 2020-05-23 DIAGNOSIS — E11.65 TYPE 2 DIABETES MELLITUS WITH HYPERGLYCEMIA, WITHOUT LONG-TERM CURRENT USE OF INSULIN (HCC): ICD-10-CM

## 2020-05-26 RX ORDER — GLIPIZIDE 5 MG/1
TABLET, FILM COATED, EXTENDED RELEASE ORAL
Qty: 90 TABLET | Refills: 1 | Status: SHIPPED | OUTPATIENT
Start: 2020-05-26 | End: 2020-12-10

## 2020-05-27 ENCOUNTER — TELEMEDICINE (OUTPATIENT)
Dept: UROLOGY | Facility: AMBULATORY SURGERY CENTER | Age: 75
End: 2020-05-27
Payer: COMMERCIAL

## 2020-05-27 DIAGNOSIS — C61 PROSTATE CANCER (HCC): Primary | ICD-10-CM

## 2020-05-27 PROCEDURE — 1160F RVW MEDS BY RX/DR IN RCRD: CPT | Performed by: NURSE PRACTITIONER

## 2020-05-27 PROCEDURE — 99214 OFFICE O/P EST MOD 30 MIN: CPT | Performed by: NURSE PRACTITIONER

## 2020-07-08 ENCOUNTER — LAB (OUTPATIENT)
Dept: LAB | Facility: CLINIC | Age: 75
End: 2020-07-08
Payer: COMMERCIAL

## 2020-07-08 ENCOUNTER — TELEPHONE (OUTPATIENT)
Dept: ENDOCRINOLOGY | Facility: CLINIC | Age: 75
End: 2020-07-08

## 2020-07-08 DIAGNOSIS — M81.0 OSTEOPOROSIS WITHOUT CURRENT PATHOLOGICAL FRACTURE, UNSPECIFIED OSTEOPOROSIS TYPE: ICD-10-CM

## 2020-07-08 DIAGNOSIS — E55.9 VITAMIN D DEFICIENCY: ICD-10-CM

## 2020-07-08 DIAGNOSIS — E11.65 TYPE 2 DIABETES MELLITUS WITH HYPERGLYCEMIA, WITHOUT LONG-TERM CURRENT USE OF INSULIN (HCC): ICD-10-CM

## 2020-07-08 DIAGNOSIS — E11.8 TYPE 2 DIABETES MELLITUS WITH COMPLICATION, WITHOUT LONG-TERM CURRENT USE OF INSULIN (HCC): ICD-10-CM

## 2020-07-08 DIAGNOSIS — I10 ESSENTIAL HYPERTENSION: ICD-10-CM

## 2020-07-08 LAB
25(OH)D3 SERPL-MCNC: 51.2 NG/ML (ref 30–100)
ANION GAP SERPL CALCULATED.3IONS-SCNC: 10 MMOL/L (ref 4–13)
BUN SERPL-MCNC: 18 MG/DL (ref 5–25)
CALCIUM SERPL-MCNC: 9.3 MG/DL (ref 8.3–10.1)
CHLORIDE SERPL-SCNC: 102 MMOL/L (ref 100–108)
CO2 SERPL-SCNC: 26 MMOL/L (ref 21–32)
CREAT SERPL-MCNC: 1.19 MG/DL (ref 0.6–1.3)
EST. AVERAGE GLUCOSE BLD GHB EST-MCNC: 148 MG/DL
GFR SERPL CREATININE-BSD FRML MDRD: 60 ML/MIN/1.73SQ M
GLUCOSE P FAST SERPL-MCNC: 155 MG/DL (ref 65–99)
HBA1C MFR BLD: 6.8 %
POTASSIUM SERPL-SCNC: 4.3 MMOL/L (ref 3.5–5.3)
SODIUM SERPL-SCNC: 138 MMOL/L (ref 136–145)

## 2020-07-08 PROCEDURE — 83036 HEMOGLOBIN GLYCOSYLATED A1C: CPT

## 2020-07-08 PROCEDURE — 82306 VITAMIN D 25 HYDROXY: CPT

## 2020-07-08 PROCEDURE — 80048 BASIC METABOLIC PNL TOTAL CA: CPT

## 2020-07-08 PROCEDURE — 36415 COLL VENOUS BLD VENIPUNCTURE: CPT

## 2020-07-08 PROCEDURE — 3044F HG A1C LEVEL LT 7.0%: CPT | Performed by: PHYSICIAN ASSISTANT

## 2020-07-09 ENCOUNTER — TELEMEDICINE (OUTPATIENT)
Dept: ENDOCRINOLOGY | Facility: CLINIC | Age: 75
End: 2020-07-09
Payer: COMMERCIAL

## 2020-07-09 DIAGNOSIS — I10 BENIGN ESSENTIAL HTN: ICD-10-CM

## 2020-07-09 DIAGNOSIS — E11.8 TYPE 2 DIABETES MELLITUS WITH COMPLICATION, WITHOUT LONG-TERM CURRENT USE OF INSULIN (HCC): Primary | ICD-10-CM

## 2020-07-09 DIAGNOSIS — E78.2 MIXED HYPERLIPIDEMIA: ICD-10-CM

## 2020-07-09 DIAGNOSIS — E55.9 VITAMIN D DEFICIENCY: ICD-10-CM

## 2020-07-09 DIAGNOSIS — M81.0 OSTEOPOROSIS WITHOUT CURRENT PATHOLOGICAL FRACTURE, UNSPECIFIED OSTEOPOROSIS TYPE: ICD-10-CM

## 2020-07-09 PROCEDURE — 3077F SYST BP >= 140 MM HG: CPT | Performed by: PHYSICIAN ASSISTANT

## 2020-07-09 PROCEDURE — 3079F DIAST BP 80-89 MM HG: CPT | Performed by: PHYSICIAN ASSISTANT

## 2020-07-09 PROCEDURE — 95251 CONT GLUC MNTR ANALYSIS I&R: CPT | Performed by: PHYSICIAN ASSISTANT

## 2020-07-09 PROCEDURE — 1160F RVW MEDS BY RX/DR IN RCRD: CPT | Performed by: PHYSICIAN ASSISTANT

## 2020-07-09 PROCEDURE — 3044F HG A1C LEVEL LT 7.0%: CPT | Performed by: PHYSICIAN ASSISTANT

## 2020-07-09 PROCEDURE — 99214 OFFICE O/P EST MOD 30 MIN: CPT | Performed by: PHYSICIAN ASSISTANT

## 2020-07-09 PROCEDURE — 4040F PNEUMOC VAC/ADMIN/RCVD: CPT | Performed by: PHYSICIAN ASSISTANT

## 2020-07-09 PROCEDURE — 1036F TOBACCO NON-USER: CPT | Performed by: PHYSICIAN ASSISTANT

## 2020-07-09 NOTE — PROGRESS NOTES
Virtual Regular Visit      Assessment/Plan:    Problem List Items Addressed This Visit     None               Reason for visit is DM  Chief Complaint   Patient presents with    Virtual Regular Visit        Encounter provider Bhavya Blas PA-C    Provider located at [address] [address]      Recent Visits  Date Type Provider Dept   07/08/20 Telephone Deisy Black PA-C [facility]   Showing recent visits within past 7 days and meeting all other requirements     Future Appointments  No visits were found meeting these conditions  Showing future appointments within next 150 days and meeting all other requirements        The patient was identified by name and date of birth  Aj Knight was informed that this is a telemedicine visit and that the visit is being conducted through [employer] and patient was informed that this is not a secure, HIPAA-complaint platform  He agrees to proceed     My office door was closed  No one else was in the room  He acknowledged consent and understanding of privacy and security of the video platform  The patient has agreed to participate and understands they can discontinue the visit at any time  Patient is aware this is a billable service  Subjective  Aj Knight is a 69 y o  male with a history of type 2 diabetes without long term use of insulin  Diabetes course has been stable  Complications of DM: none reported  Denies recent illness or hospitalizations  Denies any issues with his current regimen  Home glucose monitoring: are performed regularly  Using [place] [employer]       In target range 85%, above range 11%, below range 4%  Average glucose 131 mg/dl with variability of 31 6%  Glucose management indicator 6 4%     Current regimen: Trulicity 1 5 mg weekly, metformin 500 mg BID, glipizide 5 mg daily  compliant most of the time, denies any side effects from medications  Hypoglycemic episodes: Yes, rare  Occurred because he was exercising without eating anything that morning  H/o of hypoglycemia causing hospitalization or Intervention such as glucagon injection or ambulance call : No  Hypoglycemia symptoms: dizziness  Treatment of hypoglycemia: orange juice     Diet: 3 meals per day, 2 snacks per day  Timing of meals is predictable  Diabetic diet compliance: noncompliant much of the time  Activity: Daily activity is predictable: Yes  Trying to exercise every day, almost 5 miles a day  Ophthamology: overdue, he agrees to schedule it  It is delayed due to Covid  Podiatry: foot exam UTD, October 2019     Not on ACE inhibitor/ARB  Has hyperlipidemia: on statin - tolerating well, no myalgias  compliant most of the time, denies any side effects from medications  Thyroid disorders: No  History of pancreatitis: No     Osteopenia / vitamin D deficiency: previously treated with Prolia injections but it was denied by [employer]  Currently on Fosamax 70 mg weekly and taking it as instructed and tolerating it well  He is also taking vitamin D3 2000 IU daily  No recent fractures or falls  Does do weight bearing exercises  Last DEXA scan in February 2019 showed osteopenia of the lumbar spine       Component      Latest Ref Rng & Units 11/20/2019 3/1/2020 5/14/2020   Sodium      136 - 145 mmol/L 141 139 138   Potassium      3 5 - 5 3 mmol/L 4 3 4 2 4 2   Chloride      100 - 108 mmol/L 104 104 101   CO2      21 - 32 mmol/L 28 25 27   Anion Gap      4 - 13 mmol/L 9 10 10   BUN      5 - 25 mg/dL 18 22 21   Creatinine      0 60 - 1 30 mg/dL 1 14 1 10 1 18   GLUCOSE FASTING      65 - 99 mg/dL 123 (H) 138 (H) 155 (H)   Calcium      8 3 - 10 1 mg/dL 9 4 9 2 9 5   AST      5 - 45 U/L   48 (H)   ALT      12 - 78 U/L   84 (H)   Alkaline Phosphatase      46 - 116 U/L   65   Total Protein      6 4 - 8 2 g/dL   8 0   Albumin      3 5 - 5 0 g/dL   3 7 TOTAL BILIRUBIN      0 20 - 1 00 mg/dL   0 58   eGFR      ml/min/1 73sq m 63 66 61   Cholesterol      50 - 200 mg/dL   136   Triglycerides      <=150 mg/dL   110   HDL      >=40 mg/dL   53   LDL Calculated      0 - 100 mg/dL   61   Non-HDL Cholesterol      mg/dl   83   EXT Creatinine Urine      mg/dL  123 0 129 0   MICROALBUM ,U,RANDOM      0 0 - 20 0 mg/L  22 1 (H) 13 9   MICROALBUMIN/CREATININE RATIO      0 - 30 mg/g creatinine  18 11   Hemoglobin A1C      Normal 3 8-5 6%; PreDiabetic 5 7-6 4%; Diabetic >=6 5%; Glycemic control for adults with diabetes <7 0% % 6 5 (H) 6 5 (H)    eAG, EST AVG Glucose      mg/dl 140 140    Vit D, 25-Hydroxy      30 0 - 100 0 ng/mL        Component      Latest Ref Rng & Units 7/8/2020   Sodium      136 - 145 mmol/L 138   Potassium      3 5 - 5 3 mmol/L 4 3   Chloride      100 - 108 mmol/L 102   CO2      21 - 32 mmol/L 26   Anion Gap      4 - 13 mmol/L 10   BUN      5 - 25 mg/dL 18   Creatinine      0 60 - 1 30 mg/dL 1 19   GLUCOSE FASTING      65 - 99 mg/dL 155 (H)   Calcium      8 3 - 10 1 mg/dL 9 3   AST      5 - 45 U/L    ALT      12 - 78 U/L    Alkaline Phosphatase      46 - 116 U/L    Total Protein      6 4 - 8 2 g/dL    Albumin      3 5 - 5 0 g/dL    TOTAL BILIRUBIN      0 20 - 1 00 mg/dL    eGFR      ml/min/1 73sq m 60   Cholesterol      50 - 200 mg/dL    Triglycerides      <=150 mg/dL    HDL      >=40 mg/dL    LDL Calculated      0 - 100 mg/dL    Non-HDL Cholesterol      mg/dl    EXT Creatinine Urine      mg/dL    MICROALBUM ,U,RANDOM      0 0 - 20 0 mg/L    MICROALBUMIN/CREATININE RATIO      0 - 30 mg/g creatinine    Hemoglobin A1C      Normal 3 8-5 6%; PreDiabetic 5 7-6 4%;  Diabetic >=6 5%; Glycemic control for adults with diabetes <7 0% % 6 8 (H)   eAG, EST AVG Glucose      mg/dl 148   Vit D, 25-Hydroxy      30 0 - 100 0 ng/mL 51 2       Past Medical History:   Diagnosis Date    Arteriosclerotic coronary artery disease 11/30/2017    Benign prostate hyperplasia L  A  Monia Le Monia Le Monia Le 7/17/12   R   8/30/17      COPD (chronic obstructive pulmonary disease) (HCC)     Deep venous embolism and thrombosis (HCC)     [facility] )     Hypertension     Impacted cerumen     unspecified laterity / L A  6/20/12       Osteoarthritis 7/17/2012    Prostate cancer ([facility] )     Pulmonary embolism (HCC)        Past Surgical History:   Procedure Laterality Date    CARDIAC VALVE REPLACEMENT      CORONARY ARTERY BYPASS GRAFT      FRACTURE SURGERY      left knee surgery    KNEE ARTHROSCOPY      Therapeutic    KNEE SURGERY      replacement    LUNG SURGERY      LUNG SURGERY      wedge resection     TONSILLECTOMY      UMBILICAL HERNIA REPAIR         Current Outpatient Medications   Medication Sig Dispense Refill    alendronate (FOSAMAX) 70 mg tablet TAKE 1 TAB BY MOUTH EVERY 7 DAYS ON EMPTY STOMACH WITH FULL GLASS OF WATER, DO NOT LIE DOWN FOR 30 MINS AFTER TAKING  12 tablet 1    aspirin 81 MG tablet Take by mouth      atorvastatin (LIPITOR) 40 mg tablet TAKE 1 TABLET BY MOUTH EVERY DAY 90 tablet 1    Blood Glucose Calibration (OT ULTRA/FASTTK CNTRL SOLN) SOLN by In Vitro route as needed (to check meter calibration) 1 each 3    Blood Glucose Monitoring Suppl (ONE TOUCH ULTRA 2) w/Device KIT by Does not apply route daily DX E11 65  Pt to   Check  fbs  daily (Patient not taking: Reported on 3/2/2020) 1 each 0    Cholecalciferol (VITAMIN D3) 2000 units capsule Take 2,000 Units by mouth daily  11    Coenzyme Q10 (COQ-10) 100 MG CAPS Take 100 mg by mouth   Continuous Blood Gluc  (FREESTYLE CEDRIC 14 DAY READER) ADELAIDA 1 CGM DEVICE   USE TO CHECK BG LEVELS 4+ TIMES DAILY AND AS NEEDED 1 Device 0    Continuous Blood Gluc Sensor (FREESTYLE CEDRIC 14 DAY SENSOR) MISC CHANGE SENSOR EVERY 14 DAYS TO CHECK BG LEVELS 4+ TIMES DAILY 6 each 2    Dulaglutide (TRULICITY) 1 5 DJ/8 9MA SOPN Inject 0 5 mL (1 5 mg total) under the skin once a week 12 pen 1    fluorouracil (EFUDEX) 5 % cream APPLY TWICE A DAY TO SCALP FOR 1 MONTH 40 g 0    glipiZIDE (GLUCOTROL XL) 5 mg 24 hr tablet TAKE 1 TABLET BY MOUTH EVERY DAY 90 tablet 1    Glucose Blood (FREESTYLE LITE TEST VI) by In Vitro route      glucose blood (ONE TOUCH ULTRA TEST) test strip Check  fbs  Daily  DX E11 65 (Patient not taking: Reported on 3/2/2020) 100 each 3    leuprolide (LUPRON DEPOT, 1-MONTH,) 3 75 mg injection Inject into a muscle Once yearly       metFORMIN (GLUCOPHAGE-XR) 500 mg 24 hr tablet TAKE ONE TABLET WITH BREAFAST AND DINNER DAILY 180 tablet 1    Multiple Vitamin-Folic Acid TABS Take by mouth      Omega-3 Fatty Acids (FISH OIL) 1,000 mg Take 1,000 mg by mouth daily   sildenafil (VIAGRA) 50 MG tablet Take 1 tablet (50 mg total) by mouth daily as needed for erectile dysfunction 5 tablet 5     No current facility-administered medications for this visit  No Known Allergies    Review of Systems   Constitutional: Negative for activity change, appetite change, fatigue and unexpected weight change  HENT: Negative for trouble swallowing  Eyes: Negative for visual disturbance  Respiratory: Negative for shortness of breath  Cardiovascular: Negative for chest pain and palpitations  Gastrointestinal: Negative for constipation and diarrhea  Endocrine: Negative for polydipsia and polyuria  Musculoskeletal: Negative  Skin: Negative for wound  Neurological: Negative for numbness  Psychiatric/Behavioral: Negative  Video Exam    There were no vitals filed for this visit  Physical Exam     Physical Exam   Constitutional: He is oriented to person, place, and time  He appears well-developed and well-nourished  No distress  HENT:   Head: Normocephalic and atraumatic  Neck: Normal range of motion  Pulmonary/Chest: Effort normal    Musculoskeletal: Normal range of motion  Neurological: He is alert and oriented to person, place, and time  Skin: He is not diaphoretic     Psychiatric: He has a normal mood and affect  His behavior is normal      PLAN  Type 2 diabetes mellitus with hyperglycemia, without long-term current use of insulin ([facility])  HGA1C 6 8%  Worsened  Treatment regimen: continue current treatment  Discussed risks/complications associated with uncontrolled diabetes  Advised to adhere to diabetic diet, and recommended staying active/exercising routinely as tolerated  Keep carbohydrates consistent to limit blood glucose fluctuations  Advised to call if blood sugars less than 70 mg/dl or over 300 mg/dl  Check blood glucose 2+ times a day  Discussed symptoms and treatment of hypoglycemia  Recommended routine follow-up with podiatry and ophthalmology  Ordered blood work to complete prior to next visit  Mixed hyperlipidemia  LDL previously 61, at goal  Continue statin therapy  Managed by PCP  Vitamin D deficiency  Vitamin-D previously normal at 51 2  Continue current supplementation of vitamin D3  Osteoporosis without current pathological fracture  Most recent DEXA scan was consistent with osteopenia of the lumbar spine  Continue current treatment with Fosamax  Continue weight-bearing exercises as tolerated  Continue vitamin D supplementation  Take precautions to avoid falls        I spent 25 minutes directly with the patient during this visit     Joesph Collado PA-C        VIRTUAL VISIT [id number] acknowledges that he has consented to an online visit or consultation  He understands that the online visit is based solely on information provided by him, and that, in the absence of a face-to-face physical evaluation by the physician, the diagnosis he receives is both limited and provisional in terms of accuracy and completeness  This is not intended to replace a full medical face-to-face evaluation by the physician  Jhoan Reyes understands and accepts these terms

## 2020-07-09 NOTE — PATIENT INSTRUCTIONS
Hypoglycemia instructions   Carlos Barker  7/9/2020  [id number]    Low Blood Sugar    Steps to treat low blood sugar  1  Test blood sugar if you have symptoms of low blood sugar:   Low Blood Sugar Symptoms:  o Sweaty  o Dizzy  o Rapid heartbeat  o Shaky  o Bad mood  o Hungry      2  Treat blood sugar less than 70 with 15 grams of fast-acting carbohydrate:   Examples of 15 grams Fast-Acting Carbohydrate:  o 4 oz juice  o 4 oz regular soda  o 3-4 glucose tablets (chew)  o 3-4 hard candies (chew)          3  Wait 15 minutes and test your blood sugar again     4   If blood sugar is less than 100, repeat steps 2-3     5  When your blood sugar is 100 or more, eat a snack if it will be longer than one hour until your next meal  The snack should be 15 grams of carbohydrate and a protein:   Examples of snacks:  o ½ sandwich  o 6 crackers with cheese  o Piece of fruit with cheese or peanut butter  o 6 crackers with peanut butter

## 2020-08-06 ENCOUNTER — TRANSCRIBE ORDERS (OUTPATIENT)
Dept: LAB | Facility: CLINIC | Age: 75
End: 2020-08-06

## 2020-08-18 ENCOUNTER — OFFICE VISIT (OUTPATIENT)
Dept: FAMILY MEDICINE CLINIC | Facility: CLINIC | Age: 75
End: 2020-08-18
Payer: COMMERCIAL

## 2020-08-18 VITALS
HEART RATE: 82 BPM | OXYGEN SATURATION: 96 % | HEIGHT: 68 IN | WEIGHT: 251.8 LBS | TEMPERATURE: 98.1 F | BODY MASS INDEX: 38.16 KG/M2 | DIASTOLIC BLOOD PRESSURE: 80 MMHG | SYSTOLIC BLOOD PRESSURE: 132 MMHG

## 2020-08-18 DIAGNOSIS — E11.8 TYPE 2 DIABETES MELLITUS WITH COMPLICATION, WITHOUT LONG-TERM CURRENT USE OF INSULIN (HCC): Primary | ICD-10-CM

## 2020-08-18 DIAGNOSIS — Z95.2 HISTORY OF AORTIC VALVE REPLACEMENT: ICD-10-CM

## 2020-08-18 DIAGNOSIS — C61 PROSTATE CANCER (HCC): ICD-10-CM

## 2020-08-18 DIAGNOSIS — E78.2 MIXED HYPERLIPIDEMIA: ICD-10-CM

## 2020-08-18 DIAGNOSIS — I25.10 ARTERIOSCLEROTIC CORONARY ARTERY DISEASE: ICD-10-CM

## 2020-08-18 DIAGNOSIS — M81.0 OSTEOPOROSIS WITHOUT CURRENT PATHOLOGICAL FRACTURE, UNSPECIFIED OSTEOPOROSIS TYPE: ICD-10-CM

## 2020-08-18 DIAGNOSIS — E66.01 CLASS 2 SEVERE OBESITY DUE TO EXCESS CALORIES WITH SERIOUS COMORBIDITY AND BODY MASS INDEX (BMI) OF 36.0 TO 36.9 IN ADULT (HCC): ICD-10-CM

## 2020-08-18 DIAGNOSIS — I10 BENIGN ESSENTIAL HTN: ICD-10-CM

## 2020-08-18 PROCEDURE — 4040F PNEUMOC VAC/ADMIN/RCVD: CPT | Performed by: PHYSICIAN ASSISTANT

## 2020-08-18 PROCEDURE — 3075F SYST BP GE 130 - 139MM HG: CPT | Performed by: PHYSICIAN ASSISTANT

## 2020-08-18 PROCEDURE — 1036F TOBACCO NON-USER: CPT | Performed by: PHYSICIAN ASSISTANT

## 2020-08-18 PROCEDURE — 3008F BODY MASS INDEX DOCD: CPT | Performed by: PHYSICIAN ASSISTANT

## 2020-08-18 PROCEDURE — 99214 OFFICE O/P EST MOD 30 MIN: CPT | Performed by: PHYSICIAN ASSISTANT

## 2020-08-18 PROCEDURE — 1160F RVW MEDS BY RX/DR IN RCRD: CPT | Performed by: PHYSICIAN ASSISTANT

## 2020-08-18 PROCEDURE — 3044F HG A1C LEVEL LT 7.0%: CPT | Performed by: PHYSICIAN ASSISTANT

## 2020-08-18 PROCEDURE — 3079F DIAST BP 80-89 MM HG: CPT | Performed by: PHYSICIAN ASSISTANT

## 2020-08-18 NOTE — PROGRESS NOTES
Assessment/Plan:     Diagnoses and all orders for this visit:    Type 2 diabetes mellitus with complication, without long-term current use of insulin ([facility])  Comments:  Diabetes is at goal continue current regimen  Check blood sugar daily  Orders:  -     Comprehensive metabolic panel; Future  -     Hemoglobin A1C; Future    Arteriosclerotic coronary artery disease  Comments:  No angina and no signs of congestive heart failure  follow-up with Cardiology as directed  Orders:  -     Lipid panel; Future    Benign essential HTN  Comments:  Blood pressure is at goal without meds  Orders:  -     Comprehensive metabolic panel; Future    Mixed hyperlipidemia  -     Lipid panel; Future    Class 2 severe obesity due to excess calories with serious comorbidity and body mass index (BMI) of 36 0 to 36 9 in adult [facility])  Comments:  Exercise and weight loss recommended    Prostate cancer [facility])  Comments:  Patient under care of Urology    Osteoporosis without current pathological fracture, unspecified osteoporosis type  Comments:  Continue calcium vitamin-D supplements  Fosamax once a week  Do not forget the weight-bearing exercise    History of aortic valve replacement          Subjective:      Patient ID: Andra Holder is a 77 y o  male  Presents in the office for follow up chronic conditions  Patient has non-insulin diabetes mellitus type 2 without any current complications  He is on Trulicity weekly  He is on glipizide ER 5 mg 1 in the morning and metformin  twice a day  He follows with endocrinology as well  Patient is checking his blood sugar daily  Blood sugars mainly in the 120s  Labs reviewed show a hemoglobin A1c at 6 8  Patient has a history of coronary artery disease and aortic valve replacement  A is under care of Cardiology  He is on a baby aspirin daily  Lipid control he is on atorvastatin 40 mg  He also has history of osteoporosis he is on Fosamax and calcium vitamin-D supplements    History of prostate cancer currently in remission  The following portions of the patient's history were reviewed and updated as appropriate: Diabetic Foot Exam    Patient's shoes and socks removed  Right Foot/Ankle   Right Foot Inspection  Skin Exam: skin intact no dry skin, no callus and no callus                          Toe Exam:  no right toe deformity  Sensory   Vibration: intact    Monofilament testing: intact  Vascular    The right DP pulse is 1+  Left Foot/Ankle  Left Foot Inspection  Skin Exam: skin intactno callus                         Toe Exam: no left toe deformity                   Sensory   Vibration: intact    Monofilament: intact  Vascular    The left DP pulse is 1+  Assign Risk Category:  No deformity present; No loss of protective sensation; Weak pulses       Risk: 1    He   Patient Active Problem List    Diagnosis Date Noted    Vitamin D deficiency 07/18/2019    Erectile dysfunction due to diseases classified elsewhere 04/16/2019    History of aortic valve replacement 11/30/2017    Arteriosclerotic coronary artery disease 11/30/2017    Class 2 severe obesity with serious comorbidity and body mass index (BMI) of 36 0 to 36 9 in [facility]) 11/30/2017    Osteoporosis 11/30/2017    Type 2 diabetes mellitus with complication, without long-term current use of insulin ([facility] [address] ) 06/20/2017    YATES (nonalcoholic steatohepatitis) 09/14/2015    Prostate cancer ([facility] [address] ) 06/18/2013    Hyperlipidemia 07/17/2012    Osteoarthritis 07/17/2012    Benign essential HTN 04/30/2012     Current Outpatient Medications   Medication Sig Dispense Refill    alendronate (FOSAMAX) 70 mg tablet TAKE 1 TAB BY MOUTH EVERY 7 DAYS ON EMPTY STOMACH WITH FULL GLASS OF WATER, DO NOT LIE DOWN FOR 30 MINS AFTER TAKING   12 tablet 1    aspirin 81 MG tablet Take by mouth      atorvastatin (LIPITOR) 40 mg tablet TAKE 1 TABLET BY MOUTH EVERY DAY 90 tablet 1    Blood Glucose Calibration (OT ULTRA/FASTTK CNTRL SOLN) SOLN by In Vitro route as needed (to check meter calibration) 1 each 3    Blood Glucose Monitoring Suppl (ONE TOUCH ULTRA 2) w/Device KIT by Does not apply route daily DX E11 65  Pt to   Check  fbs  daily 1 each 0    Cholecalciferol (VITAMIN D3) 2000 units capsule Take 2,000 Units by mouth daily  11    Coenzyme Q10 (COQ-10) 100 MG CAPS Take 100 mg by mouth   Continuous Blood Gluc  (FREESTYLE CEDRIC 14 DAY READER) ADELAIDA 1 CGM DEVICE  USE TO CHECK BG LEVELS 4+ TIMES DAILY AND AS NEEDED 1 Device 0    Continuous Blood Gluc Sensor (FREESTYLE CEDRIC 14 DAY SENSOR) MISC CHANGE SENSOR EVERY 14 DAYS TO CHECK BG LEVELS 4+ TIMES DAILY 6 each 2    Dulaglutide (TRULICITY) 1 5 EX/5 7SY SOPN Inject 0 5 mL (1 5 mg total) under the skin once a week 12 pen 1    fluorouracil (EFUDEX) 5 % cream APPLY TWICE A DAY TO SCALP FOR 1 MONTH 40 g 0    glipiZIDE (GLUCOTROL XL) 5 mg 24 hr tablet TAKE 1 TABLET BY MOUTH EVERY DAY 90 tablet 1    Glucose Blood (FREESTYLE LITE TEST VI) by In Vitro route      glucose blood (ONE TOUCH ULTRA TEST) test strip Check  fbs  Daily  DX E11 65 100 each 3    leuprolide (LUPRON DEPOT, 1-MONTH,) 3 75 mg injection Inject into a muscle Once yearly       metFORMIN (GLUCOPHAGE-XR) 500 mg 24 hr tablet TAKE ONE TABLET WITH BREAFAST AND DINNER DAILY 180 tablet 1    Multiple Vitamin-Folic Acid TABS Take by mouth      Omega-3 Fatty Acids (FISH OIL) 1,000 mg Take 1,000 mg by mouth daily   sildenafil (VIAGRA) 50 MG tablet Take 1 tablet (50 mg total) by mouth daily as needed for erectile dysfunction 5 tablet 5     No current facility-administered medications for this visit  Current Outpatient Medications on File Prior to Visit   Medication Sig    alendronate (FOSAMAX) 70 mg tablet TAKE 1 TAB BY MOUTH EVERY 7 DAYS ON EMPTY STOMACH WITH FULL GLASS OF WATER, DO NOT LIE DOWN FOR 30 MINS AFTER TAKING      aspirin 81 MG tablet Take by mouth    atorvastatin (LIPITOR) 40 mg tablet TAKE 1 TABLET BY MOUTH EVERY DAY    Blood Glucose Calibration (OT ULTRA/FASTTK CNTRL SOLN) SOLN by In Vitro route as needed (to check meter calibration)    Blood Glucose Monitoring Suppl (ONE TOUCH ULTRA 2) w/Device KIT by Does not apply route daily DX E11 65  Pt to   Check  fbs  daily    Cholecalciferol (VITAMIN D3) 2000 units capsule Take 2,000 Units by mouth daily    Coenzyme Q10 (COQ-10) 100 MG CAPS Take 100 mg by mouth   Continuous Blood Gluc  (FREESTYLE CEDRIC 14 DAY READER) ADELAIDA 1 CGM DEVICE  USE TO CHECK BG LEVELS 4+ TIMES DAILY AND AS NEEDED    Continuous Blood Gluc Sensor (FREESTYLE CEDRIC 14 DAY SENSOR) MISC CHANGE SENSOR EVERY 14 DAYS TO CHECK BG LEVELS 4+ TIMES DAILY    Dulaglutide (TRULICITY) 1 5 OM/6 3JU SOPN Inject 0 5 mL (1 5 mg total) under the skin once a week    fluorouracil (EFUDEX) 5 % cream APPLY TWICE A DAY TO SCALP FOR 1 MONTH    glipiZIDE (GLUCOTROL XL) 5 mg 24 hr tablet TAKE 1 TABLET BY MOUTH EVERY DAY    Glucose Blood (FREESTYLE LITE TEST VI) by In Vitro route    glucose blood (ONE TOUCH ULTRA TEST) test strip Check  fbs  Daily  DX E11 65    leuprolide (LUPRON DEPOT, 1-MONTH,) 3 75 mg injection Inject into a muscle Once yearly     metFORMIN (GLUCOPHAGE-XR) 500 mg 24 hr tablet TAKE ONE TABLET WITH BREAFAST AND DINNER DAILY    Multiple Vitamin-Folic Acid TABS Take by mouth    Omega-3 Fatty Acids (FISH OIL) 1,000 mg Take 1,000 mg by mouth daily   sildenafil (VIAGRA) 50 MG tablet Take 1 tablet (50 mg total) by mouth daily as needed for erectile dysfunction     No current facility-administered medications on file prior to visit  He has No Known Allergies       Review of Systems   Constitutional: Negative for activity change, appetite change, chills, fatigue and fever  HENT: Negative for ear pain and sore throat  Eyes: Negative for visual disturbance  Respiratory: Negative for cough and shortness of breath  Cardiovascular: Negative for chest pain, palpitations and leg swelling  Gastrointestinal: Negative for abdominal pain, blood in stool, constipation, diarrhea and nausea  Genitourinary: Negative for difficulty urinating  Musculoskeletal: Positive for arthralgias (knees)  Negative for back pain and myalgias  Skin: Negative for rash  Neurological: Negative for dizziness, syncope and headaches  Psychiatric/Behavioral: Negative for sleep disturbance  Objective:        Physical Exam  Vitals signs and nursing note reviewed  Constitutional:       General: He is not in acute distress  Appearance: He is well-developed  He is obese  He is not ill-appearing  HENT:      Head: Normocephalic and atraumatic  Right Ear: Tympanic membrane, ear canal and external ear normal  There is impacted cerumen  Left Ear: Tympanic membrane, ear canal and external ear normal    Eyes:      Conjunctiva/sclera: Conjunctivae normal       Pupils: Pupils are equal, round, and reactive to light  Neck:      Musculoskeletal: Neck supple  Thyroid: No thyromegaly  Vascular: No carotid bruit  Cardiovascular:      Rate and Rhythm: Normal rate and regular rhythm  Pulses: Pulses are weak  Dorsalis pedis pulses are 1+ on the right side and 1+ on the left side  Heart sounds: Normal heart sounds  No murmur  Pulmonary:      Effort: Pulmonary effort is normal       Breath sounds: Normal breath sounds  No wheezing  Musculoskeletal:      Right lower leg: No edema  Left lower leg: No edema  Feet:      Right foot:      Skin integrity: No callus or dry skin  Left foot:      Skin integrity: No callus  Lymphadenopathy:      Cervical: No cervical adenopathy  Skin:     General: Skin is warm and dry  Neurological:      General: No focal deficit present  Mental Status: He is alert and oriented to person, place, and time  Psychiatric:         Behavior: Behavior normal          Thought Content:  Thought content normal          Judgment: Judgment normal

## 2020-08-19 DIAGNOSIS — E11.8 TYPE 2 DIABETES MELLITUS WITH COMPLICATION, WITHOUT LONG-TERM CURRENT USE OF INSULIN (HCC): Primary | ICD-10-CM

## 2020-08-19 RX ORDER — BLOOD-GLUCOSE METER
KIT MISCELLANEOUS
Qty: 300 EACH | Refills: 1 | Status: SHIPPED | OUTPATIENT
Start: 2020-08-19

## 2020-08-24 DIAGNOSIS — E11.65 UNCONTROLLED TYPE 2 DIABETES MELLITUS WITH HYPERGLYCEMIA (HCC): ICD-10-CM

## 2020-08-24 RX ORDER — FLASH GLUCOSE SENSOR
KIT MISCELLANEOUS
Qty: 6 EACH | Refills: 2 | Status: SHIPPED | OUTPATIENT
Start: 2020-08-24 | End: 2020-11-23 | Stop reason: SDUPTHER

## 2020-09-09 DIAGNOSIS — M81.0 OSTEOPOROSIS WITHOUT CURRENT PATHOLOGICAL FRACTURE, UNSPECIFIED OSTEOPOROSIS TYPE: ICD-10-CM

## 2020-09-09 RX ORDER — ALENDRONATE SODIUM 70 MG/1
TABLET ORAL
Qty: 12 TABLET | Refills: 1 | Status: SHIPPED | OUTPATIENT
Start: 2020-09-09 | End: 2021-03-22 | Stop reason: SDUPTHER

## 2020-09-19 DIAGNOSIS — E11.65 TYPE 2 DIABETES MELLITUS WITH HYPERGLYCEMIA, WITHOUT LONG-TERM CURRENT USE OF INSULIN (HCC): ICD-10-CM

## 2020-09-21 RX ORDER — DULAGLUTIDE 1.5 MG/.5ML
INJECTION, SOLUTION SUBCUTANEOUS
Qty: 12 PEN | Refills: 1 | Status: SHIPPED | OUTPATIENT
Start: 2020-09-21 | End: 2021-03-15 | Stop reason: SDUPTHER

## 2020-10-01 DIAGNOSIS — E78.5 DYSLIPIDEMIA: ICD-10-CM

## 2020-10-01 RX ORDER — ATORVASTATIN CALCIUM 40 MG/1
TABLET, FILM COATED ORAL
Qty: 90 TABLET | Refills: 1 | Status: SHIPPED | OUTPATIENT
Start: 2020-10-01 | End: 2021-05-03 | Stop reason: SDUPTHER

## 2020-11-13 DIAGNOSIS — E11.65 TYPE 2 DIABETES MELLITUS WITH HYPERGLYCEMIA, WITHOUT LONG-TERM CURRENT USE OF INSULIN (HCC): ICD-10-CM

## 2020-11-13 RX ORDER — METFORMIN HYDROCHLORIDE 500 MG/1
TABLET, EXTENDED RELEASE ORAL
Qty: 180 TABLET | Refills: 1 | Status: SHIPPED | OUTPATIENT
Start: 2020-11-13 | End: 2021-03-22 | Stop reason: SDUPTHER

## 2020-11-23 DIAGNOSIS — E11.65 UNCONTROLLED TYPE 2 DIABETES MELLITUS WITH HYPERGLYCEMIA (HCC): ICD-10-CM

## 2020-11-23 RX ORDER — FLASH GLUCOSE SENSOR
KIT MISCELLANEOUS
Qty: 6 EACH | Refills: 2 | Status: SHIPPED | OUTPATIENT
Start: 2020-11-23 | End: 2021-02-17 | Stop reason: SDUPTHER

## 2020-11-24 ENCOUNTER — OFFICE VISIT (OUTPATIENT)
Dept: ENDOCRINOLOGY | Facility: CLINIC | Age: 75
End: 2020-11-24
Payer: COMMERCIAL

## 2020-11-24 VITALS
HEIGHT: 67 IN | SYSTOLIC BLOOD PRESSURE: 138 MMHG | BODY MASS INDEX: 39.55 KG/M2 | WEIGHT: 252 LBS | TEMPERATURE: 97.4 F | HEART RATE: 88 BPM | DIASTOLIC BLOOD PRESSURE: 80 MMHG

## 2020-11-24 DIAGNOSIS — E11.65 TYPE 2 DIABETES MELLITUS WITH HYPERGLYCEMIA, WITHOUT LONG-TERM CURRENT USE OF INSULIN (HCC): Primary | ICD-10-CM

## 2020-11-24 DIAGNOSIS — M81.0 OSTEOPOROSIS WITHOUT CURRENT PATHOLOGICAL FRACTURE, UNSPECIFIED OSTEOPOROSIS TYPE: ICD-10-CM

## 2020-11-24 DIAGNOSIS — E55.9 VITAMIN D DEFICIENCY: ICD-10-CM

## 2020-11-24 DIAGNOSIS — E78.2 MIXED HYPERLIPIDEMIA: ICD-10-CM

## 2020-11-24 DIAGNOSIS — I10 BENIGN ESSENTIAL HTN: ICD-10-CM

## 2020-11-24 PROCEDURE — 1036F TOBACCO NON-USER: CPT | Performed by: PHYSICIAN ASSISTANT

## 2020-11-24 PROCEDURE — 3075F SYST BP GE 130 - 139MM HG: CPT | Performed by: PHYSICIAN ASSISTANT

## 2020-11-24 PROCEDURE — 99214 OFFICE O/P EST MOD 30 MIN: CPT | Performed by: PHYSICIAN ASSISTANT

## 2020-11-24 PROCEDURE — 3079F DIAST BP 80-89 MM HG: CPT | Performed by: PHYSICIAN ASSISTANT

## 2020-11-24 PROCEDURE — 1160F RVW MEDS BY RX/DR IN RCRD: CPT | Performed by: PHYSICIAN ASSISTANT

## 2020-11-24 PROCEDURE — 3008F BODY MASS INDEX DOCD: CPT | Performed by: PHYSICIAN ASSISTANT

## 2020-11-30 ENCOUNTER — LAB (OUTPATIENT)
Dept: LAB | Facility: CLINIC | Age: 75
End: 2020-11-30
Payer: COMMERCIAL

## 2020-11-30 ENCOUNTER — TRANSCRIBE ORDERS (OUTPATIENT)
Dept: LAB | Facility: CLINIC | Age: 75
End: 2020-11-30

## 2020-11-30 DIAGNOSIS — I25.10 ARTERIOSCLEROTIC CORONARY ARTERY DISEASE: ICD-10-CM

## 2020-11-30 DIAGNOSIS — E78.2 MIXED HYPERLIPIDEMIA: ICD-10-CM

## 2020-11-30 DIAGNOSIS — I10 BENIGN ESSENTIAL HTN: ICD-10-CM

## 2020-11-30 DIAGNOSIS — E11.8 TYPE 2 DIABETES MELLITUS WITH COMPLICATION, WITHOUT LONG-TERM CURRENT USE OF INSULIN (HCC): ICD-10-CM

## 2020-11-30 LAB
ALBUMIN SERPL BCP-MCNC: 3.4 G/DL (ref 3.5–5)
ALP SERPL-CCNC: 67 U/L (ref 46–116)
ALT SERPL W P-5'-P-CCNC: 93 U/L (ref 12–78)
ANION GAP SERPL CALCULATED.3IONS-SCNC: 13 MMOL/L (ref 4–13)
AST SERPL W P-5'-P-CCNC: 70 U/L (ref 5–45)
BILIRUB SERPL-MCNC: 0.71 MG/DL (ref 0.2–1)
BUN SERPL-MCNC: 9 MG/DL (ref 5–25)
CALCIUM ALBUM COR SERPL-MCNC: 10 MG/DL (ref 8.3–10.1)
CALCIUM SERPL-MCNC: 9.5 MG/DL (ref 8.3–10.1)
CHLORIDE SERPL-SCNC: 102 MMOL/L (ref 100–108)
CHOLEST SERPL-MCNC: 103 MG/DL (ref 50–200)
CO2 SERPL-SCNC: 24 MMOL/L (ref 21–32)
CREAT SERPL-MCNC: 1.2 MG/DL (ref 0.6–1.3)
EST. AVERAGE GLUCOSE BLD GHB EST-MCNC: 163 MG/DL
GFR SERPL CREATININE-BSD FRML MDRD: 59 ML/MIN/1.73SQ M
GLUCOSE P FAST SERPL-MCNC: 146 MG/DL (ref 65–99)
HBA1C MFR BLD: 7.3 %
HDLC SERPL-MCNC: 50 MG/DL
LDLC SERPL CALC-MCNC: 30 MG/DL (ref 0–100)
NONHDLC SERPL-MCNC: 53 MG/DL
POTASSIUM SERPL-SCNC: 4.5 MMOL/L (ref 3.5–5.3)
PROT SERPL-MCNC: 7.6 G/DL (ref 6.4–8.2)
SODIUM SERPL-SCNC: 139 MMOL/L (ref 136–145)
TRIGL SERPL-MCNC: 116 MG/DL

## 2020-11-30 PROCEDURE — 80061 LIPID PANEL: CPT

## 2020-11-30 PROCEDURE — 3051F HG A1C>EQUAL 7.0%<8.0%: CPT | Performed by: PHYSICIAN ASSISTANT

## 2020-11-30 PROCEDURE — 83036 HEMOGLOBIN GLYCOSYLATED A1C: CPT

## 2020-11-30 PROCEDURE — 36415 COLL VENOUS BLD VENIPUNCTURE: CPT

## 2020-11-30 PROCEDURE — 80053 COMPREHEN METABOLIC PANEL: CPT

## 2020-12-03 ENCOUNTER — OFFICE VISIT (OUTPATIENT)
Dept: FAMILY MEDICINE CLINIC | Facility: CLINIC | Age: 75
End: 2020-12-03
Payer: COMMERCIAL

## 2020-12-03 VITALS
HEART RATE: 90 BPM | TEMPERATURE: 96.8 F | HEIGHT: 67 IN | BODY MASS INDEX: 39.39 KG/M2 | OXYGEN SATURATION: 94 % | WEIGHT: 251 LBS | SYSTOLIC BLOOD PRESSURE: 130 MMHG | RESPIRATION RATE: 18 BRPM | DIASTOLIC BLOOD PRESSURE: 78 MMHG

## 2020-12-03 DIAGNOSIS — Z95.2 HISTORY OF AORTIC VALVE REPLACEMENT: ICD-10-CM

## 2020-12-03 DIAGNOSIS — I10 BENIGN ESSENTIAL HTN: ICD-10-CM

## 2020-12-03 DIAGNOSIS — M81.0 OSTEOPOROSIS WITHOUT CURRENT PATHOLOGICAL FRACTURE, UNSPECIFIED OSTEOPOROSIS TYPE: ICD-10-CM

## 2020-12-03 DIAGNOSIS — E11.65 TYPE 2 DIABETES MELLITUS WITH HYPERGLYCEMIA, WITHOUT LONG-TERM CURRENT USE OF INSULIN (HCC): Primary | ICD-10-CM

## 2020-12-03 DIAGNOSIS — E78.2 MIXED HYPERLIPIDEMIA: ICD-10-CM

## 2020-12-03 DIAGNOSIS — C61 PROSTATE CANCER (HCC): ICD-10-CM

## 2020-12-03 DIAGNOSIS — I25.10 ARTERIOSCLEROTIC CORONARY ARTERY DISEASE: ICD-10-CM

## 2020-12-03 DIAGNOSIS — E66.01 CLASS 2 SEVERE OBESITY DUE TO EXCESS CALORIES WITH SERIOUS COMORBIDITY AND BODY MASS INDEX (BMI) OF 36.0 TO 36.9 IN ADULT (HCC): ICD-10-CM

## 2020-12-03 PROCEDURE — 3078F DIAST BP <80 MM HG: CPT | Performed by: PHYSICIAN ASSISTANT

## 2020-12-03 PROCEDURE — 3075F SYST BP GE 130 - 139MM HG: CPT | Performed by: PHYSICIAN ASSISTANT

## 2020-12-03 PROCEDURE — 3008F BODY MASS INDEX DOCD: CPT | Performed by: PHYSICIAN ASSISTANT

## 2020-12-03 PROCEDURE — 1036F TOBACCO NON-USER: CPT | Performed by: PHYSICIAN ASSISTANT

## 2020-12-03 PROCEDURE — 3725F SCREEN DEPRESSION PERFORMED: CPT | Performed by: PHYSICIAN ASSISTANT

## 2020-12-03 PROCEDURE — 99214 OFFICE O/P EST MOD 30 MIN: CPT | Performed by: PHYSICIAN ASSISTANT

## 2020-12-03 PROCEDURE — 1160F RVW MEDS BY RX/DR IN RCRD: CPT | Performed by: PHYSICIAN ASSISTANT

## 2020-12-03 PROCEDURE — T1015 CLINIC SERVICE: HCPCS | Performed by: PHYSICIAN ASSISTANT

## 2020-12-10 DIAGNOSIS — E11.65 TYPE 2 DIABETES MELLITUS WITH HYPERGLYCEMIA, WITHOUT LONG-TERM CURRENT USE OF INSULIN (HCC): ICD-10-CM

## 2020-12-10 RX ORDER — GLIPIZIDE 5 MG/1
TABLET, FILM COATED, EXTENDED RELEASE ORAL
Qty: 90 TABLET | Refills: 0 | Status: SHIPPED | OUTPATIENT
Start: 2020-12-10 | End: 2021-03-08 | Stop reason: SDUPTHER

## 2020-12-14 ENCOUNTER — VBI (OUTPATIENT)
Dept: ADMINISTRATIVE | Facility: OTHER | Age: 75
End: 2020-12-14

## 2020-12-15 ENCOUNTER — TELEPHONE (OUTPATIENT)
Dept: FAMILY MEDICINE CLINIC | Facility: CLINIC | Age: 75
End: 2020-12-15

## 2021-01-22 ENCOUNTER — TELEMEDICINE (OUTPATIENT)
Dept: FAMILY MEDICINE CLINIC | Facility: CLINIC | Age: 76
End: 2021-01-22
Payer: COMMERCIAL

## 2021-01-22 DIAGNOSIS — J06.9 UPPER RESPIRATORY TRACT INFECTION, UNSPECIFIED TYPE: Primary | ICD-10-CM

## 2021-01-22 PROCEDURE — 99213 OFFICE O/P EST LOW 20 MIN: CPT | Performed by: PHYSICIAN ASSISTANT

## 2021-01-22 RX ORDER — AZITHROMYCIN 250 MG/1
TABLET, FILM COATED ORAL
Qty: 6 TABLET | Refills: 0 | Status: SHIPPED | OUTPATIENT
Start: 2021-01-22 | End: 2021-01-27

## 2021-01-22 NOTE — PROGRESS NOTES
Virtual Regular Visit      Assessment/Plan:    Problem List Items Addressed This Visit     None      Visit Diagnoses     Upper respiratory tract infection, unspecified type    -  Primary    Relevant Medications    azithromycin (ZITHROMAX) 250 mg tablet               Reason for visit is   Chief Complaint   Patient presents with    Virtual Regular Visit        Encounter provider Mata Jon PA-C    Provider located at [address] [place] [id number] [phone]      Recent Visits  No visits were found meeting these conditions  Showing recent visits within past 7 days and meeting all other requirements     Today's Visits  Date Type Provider Dept   01/22/21 Telemedicine JANIE [facility]   Showing today's visits and meeting all other requirements     Future Appointments  No visits were found meeting these conditions  Showing future appointments within next 150 days and meeting all other requirements        The patient was identified by name and date of birth  Farhat Marquez was informed that this is a telemedicine visit and that the visit is being conducted through [facility] [place] and patient was informed that this is not a secure, HIPAA-compliant platform  He agrees to proceed     My office door was closed  No one else was in the room  He acknowledged consent and understanding of privacy and security of the video platform  The patient has agreed to participate and understands they can discontinue the visit at any time  Patient is aware this is a billable service  Subjective  Farhat Marquez is a 77 y o  male   Patient being seen for video visit for upper respiratory symptoms which he had since last week period patient states he had a lot of postnasal drip and nasal congestion last week now it has turned into cough  Patient states his cough is kind rattling    He has no fever no fatigue no body aches no nausea vomiting diarrhea no loss of taste or smell patient states he does have some nasal congested in the morning patient states he went to an independent lab this morning and had a COVID test results are pending explained to patient that he must isolate and his wife must quarantine I suspect that patient has some bronchitis and not COVID we will call him in some azithromycin       Past Medical History:   Diagnosis Date    Arteriosclerotic coronary artery disease 11/30/2017    Benign prostate hyperplasia     L A  7/17/12   R   8/30/17      COPD [facility]ch[facility] obstructive pulmonary [facility]) [facility])     Deep venous embolism and thrombosis (HCC)     Diabetes mellitus ([facility] [address] )     Hypertension     Impacted cerumen     unspecified laterity / L A  6/20/12       Osteoarthritis 7/17/2012    [facility]     Pulmonary embolism ([facility])        Past Surgical History:   Procedure Laterality Date    CARDIAC VALVE REPLACEMENT      CORONARY ARTERY BYPASS GRAFT      FRACTURE SURGERY      left knee surgery    KNEE ARTHROSCOPY      Therapeutic    KNEE SURGERY      replacement    LUNG SURGERY      LUNG SURGERY      wedge resection     TONSILLECTOMY      UMBILICAL HERNIA REPAIR         Current Outpatient Medications   Medication Sig Dispense Refill    alendronate (FOSAMAX) 70 mg tablet TAKE 1 TAB BY MOUTH EVERY 7 DAYS ON EMPTY STOMACH WITH FULL GLASS OF WATER, DO NOT LIE DOWN FOR 30 MINUTES AFTER TAKING   12 tablet 1    aspirin 81 MG tablet Take by mouth      atorvastatin (LIPITOR) 40 mg tablet TAKE 1 TABLET BY MOUTH EVERY DAY 90 tablet 1    azithromycin (ZITHROMAX) 250 mg tablet Take 2 tablets today then 1 tablet daily x 4 days 6 tablet 0    Blood Glucose Calibration (OT ULTRA/FASTTK CNTRL SOLN) SOLN by In Vitro route as needed (to check meter calibration) 1 each 3    Blood Glucose Monitoring Suppl (ONE TOUCH ULTRA 2) w/Device KIT by Does not apply route daily DX E11 65  Pt to   Check  fbs  daily 1 each 0    Cholecalciferol (VITAMIN D3) 2000 units capsule Take 2,000 Units by mouth daily  11    Coenzyme Q10 (COQ-10) 100 MG CAPS Take 100 mg by mouth   Continuous Blood Gluc  (FREESTYLE CEDRIC 14 DAY READER) ADELAIDA 1 CGM DEVICE  USE TO CHECK BG LEVELS 4+ TIMES DAILY AND AS NEEDED 1 Device 0    Continuous Blood Gluc Sensor (FreeStyle Cedric 14 Day Sensor) MISC CHANGE SENSOR EVERY 14 DAYS TO CHECK BG LEVELS 4+ TIMES DAILY 6 each 2    fluorouracil (EFUDEX) 5 % cream APPLY TWICE A DAY TO SCALP FOR 1 MONTH 40 g 0    glipiZIDE (GLUCOTROL XL) 5 mg 24 hr tablet TAKE 1 TABLET BY MOUTH EVERY DAY 90 tablet 0    glucose blood (FREESTYLE LITE) test strip Test 3 times a day  300 each 1    glucose blood (ONE TOUCH ULTRA TEST) test strip Check  fbs  Daily  DX E11 65 100 each 3    leuprolide (LUPRON DEPOT, 1-MONTH,) 3 75 mg injection Inject into a muscle Once yearly       metFORMIN (GLUCOPHAGE-XR) 500 mg 24 hr tablet TAKE ONE TABLET WITH BREAFAST AND DINNER DAILY 180 tablet 1    Multiple Vitamin-Folic Acid TABS Take by mouth      Omega-3 Fatty Acids (FISH OIL) 1,000 mg Take 1,000 mg by mouth daily   sildenafil (VIAGRA) 50 MG tablet Take 1 tablet (50 mg total) by mouth daily as needed for erectile dysfunction 5 tablet 5    Trulicity 1 5 SS/4 9UD SOPN INJECT 0 5 ML (1 5 MG TOTAL) UNDER THE SKIN ONCE A WEEK 12 pen 1     No current facility-administered medications for this visit  No Known Allergies    Review of Systems   Constitutional: Negative for activity change, appetite change, fatigue and fever  HENT: Positive for congestion and postnasal drip  Respiratory: Positive for cough  Negative for shortness of breath  Video Exam    There were no vitals filed for this visit  Physical Exam  Constitutional:       General: He is not in acute distress  Appearance: Normal appearance  He is obese  He is not ill-appearing  HENT:      Head: Normocephalic and atraumatic        Right Ear: External ear normal       Left Ear: External ear normal    Eyes:      Conjunctiva/sclera: Conjunctivae normal    Pulmonary:      Effort: Pulmonary effort is normal    Skin:     Coloration: Skin is not pale  Neurological:      Mental Status: He is alert  I spent 7 minutes directly with the patient during this visit      VIRTUAL VISIT 2259 Brenda Goyal acknowledges that he has consented to an online visit or consultation  He understands that the online visit is based solely on information provided by him, and that, in the absence of a face-to-face physical evaluation by the physician, the diagnosis he receives is both limited and provisional in terms of accuracy and completeness  This is not intended to replace a full medical face-to-face evaluation by the physician  Stephanie Burkett understands and accepts these terms

## 2021-02-17 DIAGNOSIS — E11.65 UNCONTROLLED TYPE 2 DIABETES MELLITUS WITH HYPERGLYCEMIA (HCC): ICD-10-CM

## 2021-02-17 RX ORDER — FLASH GLUCOSE SENSOR
KIT MISCELLANEOUS
Qty: 6 EACH | Refills: 3 | Status: SHIPPED | OUTPATIENT
Start: 2021-02-17 | End: 2021-05-13 | Stop reason: SDUPTHER

## 2021-02-25 LAB
LEFT EYE DIABETIC RETINOPATHY: NORMAL
RIGHT EYE DIABETIC RETINOPATHY: NORMAL

## 2021-03-08 DIAGNOSIS — E11.65 TYPE 2 DIABETES MELLITUS WITH HYPERGLYCEMIA, WITHOUT LONG-TERM CURRENT USE OF INSULIN (HCC): ICD-10-CM

## 2021-03-08 RX ORDER — GLIPIZIDE 5 MG/1
5 TABLET, FILM COATED, EXTENDED RELEASE ORAL DAILY
Qty: 90 TABLET | Refills: 0 | Status: SHIPPED | OUTPATIENT
Start: 2021-03-08 | End: 2021-04-26

## 2021-03-15 DIAGNOSIS — E11.65 TYPE 2 DIABETES MELLITUS WITH HYPERGLYCEMIA, WITHOUT LONG-TERM CURRENT USE OF INSULIN (HCC): ICD-10-CM

## 2021-03-15 RX ORDER — DULAGLUTIDE 1.5 MG/.5ML
INJECTION, SOLUTION SUBCUTANEOUS
Qty: 12 PEN | Refills: 1 | Status: SHIPPED | OUTPATIENT
Start: 2021-03-15 | End: 2021-04-13 | Stop reason: DRUGHIGH

## 2021-03-22 DIAGNOSIS — M81.0 OSTEOPOROSIS WITHOUT CURRENT PATHOLOGICAL FRACTURE, UNSPECIFIED OSTEOPOROSIS TYPE: ICD-10-CM

## 2021-03-22 DIAGNOSIS — E11.65 TYPE 2 DIABETES MELLITUS WITH HYPERGLYCEMIA, WITHOUT LONG-TERM CURRENT USE OF INSULIN (HCC): ICD-10-CM

## 2021-03-22 RX ORDER — METFORMIN HYDROCHLORIDE 500 MG/1
TABLET, EXTENDED RELEASE ORAL
Qty: 180 TABLET | Refills: 1 | Status: SHIPPED | OUTPATIENT
Start: 2021-03-22 | End: 2021-09-09

## 2021-03-22 RX ORDER — ALENDRONATE SODIUM 70 MG/1
TABLET ORAL
Qty: 12 TABLET | Refills: 1 | Status: SHIPPED | OUTPATIENT
Start: 2021-03-22 | End: 2021-06-14 | Stop reason: SDUPTHER

## 2021-03-24 ENCOUNTER — TELEPHONE (OUTPATIENT)
Dept: ENDOCRINOLOGY | Facility: CLINIC | Age: 76
End: 2021-03-24

## 2021-03-29 ENCOUNTER — LAB (OUTPATIENT)
Dept: LAB | Facility: CLINIC | Age: 76
End: 2021-03-29
Payer: COMMERCIAL

## 2021-03-29 ENCOUNTER — TRANSCRIBE ORDERS (OUTPATIENT)
Dept: LAB | Facility: CLINIC | Age: 76
End: 2021-03-29

## 2021-03-29 DIAGNOSIS — E11.65 TYPE 2 DIABETES MELLITUS WITH HYPERGLYCEMIA, WITHOUT LONG-TERM CURRENT USE OF INSULIN (HCC): ICD-10-CM

## 2021-03-29 DIAGNOSIS — I10 BENIGN ESSENTIAL HTN: ICD-10-CM

## 2021-03-29 DIAGNOSIS — M81.0 OSTEOPOROSIS WITHOUT CURRENT PATHOLOGICAL FRACTURE, UNSPECIFIED OSTEOPOROSIS TYPE: ICD-10-CM

## 2021-03-29 LAB
ANION GAP SERPL CALCULATED.3IONS-SCNC: 9 MMOL/L (ref 4–13)
BUN SERPL-MCNC: 15 MG/DL (ref 5–25)
CALCIUM SERPL-MCNC: 9.9 MG/DL (ref 8.3–10.1)
CHLORIDE SERPL-SCNC: 105 MMOL/L (ref 100–108)
CO2 SERPL-SCNC: 28 MMOL/L (ref 21–32)
CREAT SERPL-MCNC: 1.08 MG/DL (ref 0.6–1.3)
EST. AVERAGE GLUCOSE BLD GHB EST-MCNC: 163 MG/DL
GFR SERPL CREATININE-BSD FRML MDRD: 67 ML/MIN/1.73SQ M
GLUCOSE P FAST SERPL-MCNC: 135 MG/DL (ref 65–99)
HBA1C MFR BLD: 7.3 %
POTASSIUM SERPL-SCNC: 4.5 MMOL/L (ref 3.5–5.3)
SODIUM SERPL-SCNC: 142 MMOL/L (ref 136–145)

## 2021-03-29 PROCEDURE — 3051F HG A1C>EQUAL 7.0%<8.0%: CPT | Performed by: PHYSICIAN ASSISTANT

## 2021-03-29 PROCEDURE — 83036 HEMOGLOBIN GLYCOSYLATED A1C: CPT

## 2021-03-29 PROCEDURE — 36415 COLL VENOUS BLD VENIPUNCTURE: CPT

## 2021-03-29 PROCEDURE — 80048 BASIC METABOLIC PNL TOTAL CA: CPT

## 2021-04-06 ENCOUNTER — VBI (OUTPATIENT)
Dept: ADMINISTRATIVE | Facility: OTHER | Age: 76
End: 2021-04-06

## 2021-04-13 ENCOUNTER — OFFICE VISIT (OUTPATIENT)
Dept: ENDOCRINOLOGY | Facility: CLINIC | Age: 76
End: 2021-04-13
Payer: COMMERCIAL

## 2021-04-13 VITALS
DIASTOLIC BLOOD PRESSURE: 62 MMHG | BODY MASS INDEX: 38.78 KG/M2 | WEIGHT: 247.6 LBS | TEMPERATURE: 97.7 F | HEART RATE: 94 BPM | SYSTOLIC BLOOD PRESSURE: 126 MMHG

## 2021-04-13 DIAGNOSIS — E55.9 VITAMIN D DEFICIENCY: ICD-10-CM

## 2021-04-13 DIAGNOSIS — K75.81 NASH (NONALCOHOLIC STEATOHEPATITIS): ICD-10-CM

## 2021-04-13 DIAGNOSIS — E11.65 TYPE 2 DIABETES MELLITUS WITH HYPERGLYCEMIA, WITHOUT LONG-TERM CURRENT USE OF INSULIN (HCC): Primary | ICD-10-CM

## 2021-04-13 DIAGNOSIS — E66.9 OBESITY (BMI 35.0-39.9 WITHOUT COMORBIDITY): ICD-10-CM

## 2021-04-13 DIAGNOSIS — I10 BENIGN ESSENTIAL HTN: ICD-10-CM

## 2021-04-13 DIAGNOSIS — E78.2 MIXED HYPERLIPIDEMIA: ICD-10-CM

## 2021-04-13 DIAGNOSIS — M81.0 OSTEOPOROSIS WITHOUT CURRENT PATHOLOGICAL FRACTURE, UNSPECIFIED OSTEOPOROSIS TYPE: ICD-10-CM

## 2021-04-13 PROCEDURE — 99214 OFFICE O/P EST MOD 30 MIN: CPT | Performed by: INTERNAL MEDICINE

## 2021-04-13 PROCEDURE — 3078F DIAST BP <80 MM HG: CPT | Performed by: INTERNAL MEDICINE

## 2021-04-13 PROCEDURE — 3074F SYST BP LT 130 MM HG: CPT | Performed by: INTERNAL MEDICINE

## 2021-04-13 PROCEDURE — 1160F RVW MEDS BY RX/DR IN RCRD: CPT | Performed by: INTERNAL MEDICINE

## 2021-04-13 PROCEDURE — 95251 CONT GLUC MNTR ANALYSIS I&R: CPT | Performed by: INTERNAL MEDICINE

## 2021-04-13 PROCEDURE — 1036F TOBACCO NON-USER: CPT | Performed by: INTERNAL MEDICINE

## 2021-04-13 RX ORDER — DULAGLUTIDE 3 MG/.5ML
3 INJECTION, SOLUTION SUBCUTANEOUS WEEKLY
Qty: 4 PEN | Refills: 3 | Status: SHIPPED | OUTPATIENT
Start: 2021-04-13 | End: 2021-08-02 | Stop reason: SDUPTHER

## 2021-04-13 NOTE — PROGRESS NOTES
Christa Diamond 77 y o  male MRN: [id number]    Encounter: [id number]      Assessment/Plan     Assessment: This is a 77y o -year-old male with diabetes with hyperglycemia  Plan:    Diagnoses and all orders for this visit:    Type 2 diabetes mellitus with hyperglycemia, without long-term current use of insulin ([facility] )    Lab Results   Component Value Date    HGBA1C 7 3 (H) 03/29/2021    A1c 7 3%   blood sugars are in desirable range most of the time   as patient is not able to lose weight will increase Trulicity to 3 mg once a week, which will also help to improve glycemic control and postprandial blood sugar control  Discussed to call if he notices any nausea vomiting or abdominal pain  will follow-up in 4 months  discussed the goal for blood sugar is 80 to 160 mg/dL and call if he  Has issues controlling blood sugars  -     Dulaglutide (Trulicity) 3 EZ/8 3BT SOPN; Inject 0 5 mL (3 mg total) under the skin once a week  -     Microalbumin / creatinine urine ratio; Future  -     Hemoglobin A1C; Future  -     Comprehensive metabolic panel Lab Collect; Future    Benign essential HTN  BP Readings from Last 3 Encounters:   04/13/21 126/62   12/03/20 130/78   11/24/20 138/80    blood pressure well controlled  continue current management  Mixed hyperlipidemia  Lab Results   Component Value Date    LDLCALC 30 11/30/2020    continue statins  Vitamin D deficiency   continue current dose of vitamin-D supplementation  YATES (nonalcoholic steatohepatitis)   discussed importance of lifestyle modification and importance of weight loss  Osteoporosis without current pathological fracture, unspecified osteoporosis type    Continue calcium and vitamin-D supplementation  Discussed importance of fall precautions  Continue Fosamax 70 mg once a week    educated again how to take medication appropriately, take medication on empty stomach with full glass of water and stay upright for half an hour to not lie down        Obesity (BMI 35 0-39 9 without comorbidity)   Increase the dose of Trulicity to 3 mg once a week  -     Dulaglutide (Trulicity) 3 OJ/3 3NA SOPN; Inject 0 5 mL (3 mg total) under the skin once a week        CC: Diabetes    History of Present Illness     HPI:    Enrique Kelly  Is 25-year-old man with history of type 2 diabetes with no known complications he is here for follow-up  He denies any recent illnesses or hospitalizations  His currently using Trulicity 1 5 mg weekly, metformin 500 mg twice a day, glipizide 5 mg daily  he denies hypoglycemia or hyperglycemia  He is  using continues glucose monitor sensor by [employer]  reviewed [employer]  March 2038 to April 12, 2021, % time C GM is active 89%, average glucose 159 mg/dL, GM I- 7 1%, glucose variability 28 5%   71% blood sugars are in target range,  mg/dL   24% blood sugars are high, 180-250 mg/dL   5% blood sugars are very high more than 250 mg/dL   0% blood sugars are low 54-69 mg/dL   patient has a pattern of elevated blood sugar after breakfast and after dinner  ophthalmology appointment- up-to-date, February 2021, no retinopathy     he also has history of osteoporosis for which he takes Fosamax 70 mg once a week, is tolerating it well  for hyperlipidemia he takes Lipitor 40 mg daily  currently is taking vitamin-D 3 supplementation 2000 International Units daily  Dexa scan 2019   RESULTS:   LUMBAR SPINE:  L1-L4:  BMD 0 866 gm/cm2  T-score -2 0  Z-score -1 1     LEFT TOTAL HIP:  BMD 1 0 42 gm/cm2  T-score 0 1  Z-score 0 8     LEFT FEMORAL NECK:  BMD 0 736 gm/cm2  T-score -1 4  Z-score -0 2           IMPRESSION:  1  Based on the [facility] classification, the T-score of -2 0 in the lumbar spine is consistent with low bone mineral density (OSTEOPENIA)  2   Any secondary causes of low bone mineral density should be excluded prior to treatment, if clinically indicated    3   A daily intake of at least 1200 mg calcium and 800 - 1000 IU of Vitamin D, as well as weight bearing and muscle strengthening exercise, fall prevention and avoidance of tobacco and excessive alcohol intake as basic preventive measures are suggested  Lab Results   Component Value Date    HGBA1C 7 3 (H) 03/29/2021   Results for Griselda Riddles (MRN [id number]) as of 4/13/2021 15:08   Ref  Range 7/9/2019 06:48 3/1/2020 08:05 5/14/2020 08:11   MICROALBUMIN/CREATININE RATIO Latest Ref Range: 0 - 30 mg/g creatinine 10 18 11     Results for Griselda Riddles (MRN [id number]) as of 4/13/2021 15:08   Ref  Range 11/30/2020 07:29   Cholesterol Latest Ref Range: 50 - 200 mg/dL 103   Triglycerides Latest Ref Range: <=150 mg/dL 116   HDL Latest Ref Range: >=40 mg/dL 50   Non-HDL Cholesterol Latest Units: mg/dl 53   LDL Calculated Latest Ref Range: 0 - 100 mg/dL 30         Component      Latest Ref Rng & Units 3/29/2021   Sodium      136 - 145 mmol/L 142   Potassium      3 5 - 5 3 mmol/L 4 5   Chloride      100 - 108 mmol/L 105   CO2      21 - 32 mmol/L 28   Anion Gap      4 - 13 mmol/L 9   BUN      5 - 25 mg/dL 15   Creatinine      0 60 - 1 30 mg/dL 1 08   GLUCOSE FASTING      65 - 99 mg/dL 135 (H)   Calcium      8 3 - 10 1 mg/dL 9 9   eGFR      ml/min/1 73sq m 67   Hemoglobin A1C      Normal 3 8-5 6%; PreDiabetic 5 7-6 4%; Diabetic >=6 5%; Glycemic control for adults with diabetes <7 0% % 7 3 (H)   eAG, EST AVG Glucose      mg/dl 163        Review of Systems   Constitutional: Negative for activity change, diaphoresis, fatigue, fever and unexpected weight change  HENT: Negative  Eyes: Negative for visual disturbance  Respiratory: Negative for cough, chest tightness and shortness of breath  Cardiovascular: Negative for chest pain, palpitations and leg swelling  Gastrointestinal: Negative for abdominal pain, blood in stool, constipation, diarrhea, nausea and vomiting  Endocrine: Negative for cold intolerance, heat intolerance, polydipsia, polyphagia and polyuria  Genitourinary: Negative for dysuria, enuresis, frequency and urgency  Musculoskeletal: Positive for arthralgias and myalgias  Skin: Negative for pallor, rash and wound  Allergic/Immunologic: Negative  Neurological: Negative for dizziness, tremors, weakness and numbness  Hematological: Negative  Psychiatric/Behavioral: Negative  Historical Information   Past Medical History:   Diagnosis Date    Arteriosclerotic coronary artery disease 2017    Benign prostate hyperplasia     L A  12   R   17      COPD (chronic obstructive pulmonary disease) (HCC)     Deep venous embolism and thrombosis (HCC)     Diabetes mellitus ([facility] )     Hypertension     Impacted cerumen     unspecified laterity / L A  12       Osteoarthritis 2012    Prostate cancer ([facility] )     [facility]ism ([facility]      Past Surgical History:   Procedure Laterality Date    CARDIAC VALVE REPLACEMENT      CORONARY ARTERY BYPASS GRAFT      FRACTURE SURGERY      left knee surgery    KNEE ARTHROSCOPY      Therapeutic    KNEE SURGERY      replacement    LUNG SURGERY      LUNG SURGERY      wedge resection     TONSILLECTOMY      UMBILICAL HERNIA REPAIR       Social History   Social History     Substance and Sexual Activity   Alcohol Use No    Comment: social drinker per [employer]     Social History     Substance and Sexual Activity   Drug Use No     Social History     Tobacco Use   Smoking Status Former Smoker    Types: Cigarettes    Quit date: 3/23/2000    Years since quittin 0   Smokeless Tobacco Never Used     Family History:   Family History   Problem Relation Age of Onset    COPD Mother     Heart attack Mother     Heart attack Father        Meds/Allergies   Current Outpatient Medications   Medication Sig Dispense Refill    alendronate (FOSAMAX) 70 mg tablet Take 1 tab by mouth every 7 days on empty stomach with full glass of water, do not lie down for 30 minutes after taking 12 tablet 1    aspirin 81 MG tablet Take by mouth      atorvastatin (LIPITOR) 40 mg tablet TAKE 1 TABLET BY MOUTH EVERY DAY 90 tablet 1    Blood Glucose Calibration (OT ULTRA/FASTTK CNTRL SOLN) SOLN by In Vitro route as needed (to check meter calibration) 1 each 3    Blood Glucose Monitoring Suppl (ONE TOUCH ULTRA 2) w/Device KIT by Does not apply route daily DX E11 65  Pt to   Check  fbs  daily 1 each 0    Cholecalciferol (VITAMIN D3) 2000 units capsule Take 2,000 Units by mouth daily  11    Coenzyme Q10 (COQ-10) 100 MG CAPS Take 100 mg by mouth   Continuous Blood Gluc  (FREESTYLE CEDRIC 14 DAY READER) ADELAIDA 1 CGM DEVICE  USE TO CHECK BG LEVELS 4+ TIMES DAILY AND AS NEEDED 1 Device 0    Continuous Blood Gluc Sensor (FreeStyle Cedric 14 Day Sensor) MISC CHANGE SENSOR EVERY 14 DAYS TO CHECK BG LEVELS 4+ TIMES DAILY 6 each 3    fluorouracil (EFUDEX) 5 % cream APPLY TWICE A DAY TO SCALP FOR 1 MONTH 40 g 0    glipiZIDE (GLUCOTROL XL) 5 mg 24 hr tablet Take 1 tablet (5 mg total) by mouth daily 90 tablet 0    glucose blood (FREESTYLE LITE) test strip Test 3 times a day  300 each 1    glucose blood (ONE TOUCH ULTRA TEST) test strip Check  fbs  Daily  DX E11 65 100 each 3    metFORMIN (GLUCOPHAGE-XR) 500 mg 24 hr tablet Take 1 tablet with breakfast and dinner daily 180 tablet 1    Multiple Vitamin-Folic Acid TABS Take by mouth      Omega-3 Fatty Acids (FISH OIL) 1,000 mg Take 1,000 mg by mouth daily   sildenafil (VIAGRA) 50 MG tablet Take 1 tablet (50 mg total) by mouth daily as needed for erectile dysfunction 5 tablet 5    Dulaglutide (Trulicity) 3 RA/8 9JJ SOPN Inject 0 5 mL (3 mg total) under the skin once a week 4 pen 3    leuprolide (LUPRON DEPOT, 1-MONTH,) 3 75 mg injection Inject into a muscle Once yearly        No current facility-administered medications for this visit        No Known Allergies    Objective   Vitals: Blood pressure 126/62, pulse 94, temperature 97 7 °F (36 5 °C), weight 112 kg (247 lb 9 6 oz)  Physical Exam  Vitals signs reviewed  Constitutional:       General: He is not in acute distress  Appearance: He is well-developed  HENT:      Head: Normocephalic and atraumatic  Eyes:      Pupils: Pupils are equal, round, and reactive to light  Neck:      Musculoskeletal: Normal range of motion and neck supple  Thyroid: No thyromegaly  Cardiovascular:      Rate and Rhythm: Normal rate and regular rhythm  Heart sounds: Normal heart sounds  Pulmonary:      Effort: Pulmonary effort is normal  No respiratory distress  Breath sounds: Normal breath sounds  Musculoskeletal: Normal range of motion  General: No deformity  Skin:     General: Skin is warm and dry  Findings: No erythema  Neurological:      General: No focal deficit present  Mental Status: He is alert and oriented to person, place, and time  Psychiatric:         Mood and Affect: Mood normal          Behavior: Behavior normal          The history was obtained from the review of the chart, patient      Lab Results:   Lab Results   Component Value Date/Time    Hemoglobin A1C 7 3 (H) 03/29/2021 07:14 AM    Hemoglobin A1C 7 3 (H) 11/30/2020 07:29 AM    Hemoglobin A1C 6 8 (H) 07/08/2020 07:18 AM    BUN 15 03/29/2021 07:14 AM    BUN 9 11/30/2020 07:29 AM    BUN 18 07/08/2020 07:18 AM    Potassium 4 5 03/29/2021 07:14 AM    Potassium 4 5 11/30/2020 07:29 AM    Potassium 4 3 07/08/2020 07:18 AM    Chloride 105 03/29/2021 07:14 AM    Chloride 102 11/30/2020 07:29 AM    Chloride 102 07/08/2020 07:18 AM    CO2 28 03/29/2021 07:14 AM    CO2 24 11/30/2020 07:29 AM    CO2 26 07/08/2020 07:18 AM    Creatinine 1 08 03/29/2021 07:14 AM    Creatinine 1 20 11/30/2020 07:29 AM    Creatinine 1 19 07/08/2020 07:18 AM    AST 70 (H) 11/30/2020 07:29 AM    AST 48 (H) 05/14/2020 08:11 AM    ALT 93 (H) 11/30/2020 07:29 AM    ALT 84 (H) 05/14/2020 08:11 AM    Albumin 3 4 (L) 11/30/2020 07:29 AM    Albumin 3 7 05/14/2020 08:11 AM    HDL, Direct 50 11/30/2020 07:29 AM    HDL, Direct 53 05/14/2020 08:11 AM    Triglycerides 116 11/30/2020 07:29 AM    Triglycerides 110 05/14/2020 08:11 AM           Imaging Studies: I have personally reviewed pertinent reports  Portions of the record may have been created with voice recognition software  Occasional wrong word or "sound a like" substitutions may have occurred due to the inherent limitations of voice recognition software  Read the chart carefully and recognize, using context, where substitutions have occurred

## 2021-04-13 NOTE — PATIENT INSTRUCTIONS
Hypoglycemia instructions   Alyx Rosannequirino  4/13/2021  [id number]    Low Blood Sugar    Steps to treat low blood sugar  1  Test blood sugar if you have symptoms of low blood sugar:   Low Blood Sugar Symptoms:  o Sweaty  o Dizzy  o Rapid heartbeat  o Shaky    o Bad mood  o Hungry      2  Treat blood sugar less than 70 with 15 grams of fast-acting carbohydrate:   Examples of 15 grams Fast-Acting Carbohydrate:  o 4 oz juice  o 4 oz regular soda  o 3-4 glucose tablets (chew)  o 3-4 hard candies (chew)              3    Wait 15 minutes and test your blood sugar again           4   If blood sugar is less than 100, repeat steps 2-3       5  When your blood sugar is 100 or more, eat a snack if it will be longer than one hour until your next meal  The snack should be 15 grams of carbohydrate and a protein:   Examples of snacks:  o ½ sandwich  o 6 crackers with cheese  o Piece of fruit with cheese or peanut butter  o 6 crackers with peanut butter

## 2021-04-26 DIAGNOSIS — E11.65 TYPE 2 DIABETES MELLITUS WITH HYPERGLYCEMIA, WITHOUT LONG-TERM CURRENT USE OF INSULIN (HCC): ICD-10-CM

## 2021-04-26 RX ORDER — GLIPIZIDE 5 MG/1
TABLET, FILM COATED, EXTENDED RELEASE ORAL
Qty: 90 TABLET | Refills: 0 | Status: SHIPPED | OUTPATIENT
Start: 2021-04-26 | End: 2021-08-06

## 2021-05-03 DIAGNOSIS — E78.5 DYSLIPIDEMIA: ICD-10-CM

## 2021-05-04 RX ORDER — ATORVASTATIN CALCIUM 40 MG/1
40 TABLET, FILM COATED ORAL DAILY
Qty: 90 TABLET | Refills: 1 | Status: SHIPPED | OUTPATIENT
Start: 2021-05-04 | End: 2021-10-25

## 2021-05-13 ENCOUNTER — RA CDI HCC (OUTPATIENT)
Dept: OTHER | Facility: HOSPITAL | Age: 76
End: 2021-05-13

## 2021-05-13 DIAGNOSIS — E11.65 UNCONTROLLED TYPE 2 DIABETES MELLITUS WITH HYPERGLYCEMIA (HCC): ICD-10-CM

## 2021-05-13 RX ORDER — FLASH GLUCOSE SENSOR
KIT MISCELLANEOUS
Qty: 6 EACH | Refills: 3 | Status: SHIPPED | OUTPATIENT
Start: 2021-05-13 | End: 2021-10-26 | Stop reason: SDUPTHER

## 2021-05-13 NOTE — PROGRESS NOTES
[place] [id number]  coding opportunities             Chart reviewed, (number of) suggestions sent to provider: 1           Patients insurance company: [employer] (Medicare Advantage and Commercial)     Visit status: Patient canceled the appointment     Provider never responded to [facility]  coding request     [facility]  coding opportunities             Chart reviewed, (number of) suggestions sent to provider: 1           DX:  E66 01-Morbid (severe) obesity due to excess [id number] with dm, htn, hyperlipidemia  Patients insurance company: [employer] ([employer])

## 2021-05-26 ENCOUNTER — RA CDI HCC (OUTPATIENT)
Dept: OTHER | Facility: HOSPITAL | Age: 76
End: 2021-05-26

## 2021-05-26 NOTE — PROGRESS NOTES
[place] [id number]  coding opportunities             Chart reviewed, (number of) suggestions sent to provider: 1           Patients insurance company: [employer] (Medicare Advantage and Commercial)     Visit status: Patient canceled the appointment     Provider never responded to [facility]  coding request     [facility]  coding opportunities             Chart reviewed, (number of) suggestions sent to provider: 1      DX:  E66 01-Morbid (severe) obesity due to excess [id number] with dm, htn, hyperlipidemia     Patients insurance company: [employer] ([employer])

## 2021-05-28 ENCOUNTER — TRANSCRIBE ORDERS (OUTPATIENT)
Dept: LAB | Facility: CLINIC | Age: 76
End: 2021-05-28

## 2021-05-28 ENCOUNTER — APPOINTMENT (OUTPATIENT)
Dept: LAB | Facility: CLINIC | Age: 76
End: 2021-05-28
Payer: COMMERCIAL

## 2021-05-28 DIAGNOSIS — I10 BENIGN ESSENTIAL HTN: ICD-10-CM

## 2021-05-28 DIAGNOSIS — E78.2 MIXED HYPERLIPIDEMIA: ICD-10-CM

## 2021-05-28 DIAGNOSIS — E11.65 TYPE 2 DIABETES MELLITUS WITH HYPERGLYCEMIA, WITHOUT LONG-TERM CURRENT USE OF INSULIN (HCC): ICD-10-CM

## 2021-05-28 DIAGNOSIS — I25.10 ARTERIOSCLEROTIC CORONARY ARTERY DISEASE: ICD-10-CM

## 2021-05-28 LAB
ALBUMIN SERPL BCP-MCNC: 3.7 G/DL (ref 3.5–5)
ALP SERPL-CCNC: 63 U/L (ref 46–116)
ALT SERPL W P-5'-P-CCNC: 90 U/L (ref 12–78)
ANION GAP SERPL CALCULATED.3IONS-SCNC: 9 MMOL/L (ref 4–13)
AST SERPL W P-5'-P-CCNC: 65 U/L (ref 5–45)
BILIRUB SERPL-MCNC: 0.47 MG/DL (ref 0.2–1)
BUN SERPL-MCNC: 16 MG/DL (ref 5–25)
CALCIUM SERPL-MCNC: 9.6 MG/DL (ref 8.3–10.1)
CHLORIDE SERPL-SCNC: 106 MMOL/L (ref 100–108)
CHOLEST SERPL-MCNC: 130 MG/DL (ref 50–200)
CO2 SERPL-SCNC: 28 MMOL/L (ref 21–32)
CREAT SERPL-MCNC: 1.17 MG/DL (ref 0.6–1.3)
CREAT UR-MCNC: 132 MG/DL
EST. AVERAGE GLUCOSE BLD GHB EST-MCNC: 146 MG/DL
GFR SERPL CREATININE-BSD FRML MDRD: 61 ML/MIN/1.73SQ M
GLUCOSE P FAST SERPL-MCNC: 141 MG/DL (ref 65–99)
HBA1C MFR BLD: 6.7 %
HDLC SERPL-MCNC: 48 MG/DL
LDLC SERPL CALC-MCNC: 63 MG/DL (ref 0–100)
MICROALBUMIN UR-MCNC: 22.3 MG/L (ref 0–20)
MICROALBUMIN/CREAT 24H UR: 17 MG/G CREATININE (ref 0–30)
NONHDLC SERPL-MCNC: 82 MG/DL
POTASSIUM SERPL-SCNC: 4.4 MMOL/L (ref 3.5–5.3)
PROT SERPL-MCNC: 7.9 G/DL (ref 6.4–8.2)
SODIUM SERPL-SCNC: 143 MMOL/L (ref 136–145)
TRIGL SERPL-MCNC: 95 MG/DL

## 2021-05-28 PROCEDURE — 80061 LIPID PANEL: CPT

## 2021-05-28 PROCEDURE — 36415 COLL VENOUS BLD VENIPUNCTURE: CPT

## 2021-05-28 PROCEDURE — 3044F HG A1C LEVEL LT 7.0%: CPT | Performed by: INTERNAL MEDICINE

## 2021-05-28 PROCEDURE — 83036 HEMOGLOBIN GLYCOSYLATED A1C: CPT

## 2021-05-28 PROCEDURE — 82570 ASSAY OF URINE CREATININE: CPT

## 2021-05-28 PROCEDURE — 80053 COMPREHEN METABOLIC PANEL: CPT

## 2021-05-28 PROCEDURE — 3061F NEG MICROALBUMINURIA REV: CPT | Performed by: INTERNAL MEDICINE

## 2021-05-28 PROCEDURE — 82043 UR ALBUMIN QUANTITATIVE: CPT

## 2021-06-07 ENCOUNTER — RA CDI HCC (OUTPATIENT)
Dept: OTHER | Facility: HOSPITAL | Age: 76
End: 2021-06-07

## 2021-06-07 NOTE — PROGRESS NOTES
Assessment & Plan:     E89 53 YATES (nonalcoholic steatohepatitis)  I have evaluated the patient and found the condition to be: Stable  I have evaluated the patient and: Recommended continue with same treatment plan    E11 65  Type 2 diabetes mellitus with hyperglycemia, without long-term current use of insulin ([facility] )  I have evaluated the patient and found the condition to be: Stable  I have evaluated the patient and: Recommended continue with same treatment plan    i25 10 Arteriosclerotic coronary artery disease  I have evaluated the patient and found the condition to be: Stable  I have evaluated the patient and: Recommended continue with same treatment plan  The patient should continue treatment and follow-up with: f/u  with  cardiology    i10 Benign essential HTN  I have evaluated the patient and found the condition to be: Stable  I have evaluated the patient and: Recommended continue with same treatment plan    M19 90 Osteoarthritis  I have evaluated the patient and found the condition to be: Stable  I have evaluated the patient and: Adjusted medications as indicated  The patient should continue treatment and follow-up with: reorder fosamax    c61 Prostate cancer [facility])  I have evaluated the patient and found the condition to be: Stable  The patient should continue treatment and follow-up with: f/u  with  urology    E78 2 Mixed hyperlipidemia  I have evaluated the patient and found the condition to be: Stable  I have evaluated the patient and: Recommended continue with same treatment plan    e66 01 Class 2 severe obesity with serious comorbidity and body mass index (BMI) of 36 0 to 36 9 in adult [facility])  I have evaluated the patient and found the condition to be: Stable  I have evaluated the patient and: Recommended continue with same treatment plan    E55 9 Vitamin D deficiency  I have evaluated the patient and found the condition to be:  Stable  I have evaluated the patient and: Recommended continue with same treatment plan           Subjective:     Patient ID: Deion Hoffmann is a 77 y o  male     Chief Complaint   Patient presents with    Medicare Wellness Visit      See  Note  From  Same  day      Review of Systems   Constitutional: Negative for activity change, appetite change, chills, fatigue and fever  HENT: Negative for ear pain and sore throat  Eyes: Negative for visual disturbance  Respiratory: Negative for cough and shortness of breath  Cardiovascular: Negative for chest pain, palpitations and leg swelling  Gastrointestinal: Negative for abdominal pain, blood in stool, constipation, diarrhea and nausea  Genitourinary: Negative for difficulty urinating  Musculoskeletal: Negative for arthralgias, back pain and myalgias  Skin: Negative for rash  Neurological: Negative for dizziness, syncope and headaches  Psychiatric/Behavioral: Negative for sleep disturbance  Objective:      /70   Pulse 79   Temp 97 5 °F (36 4 °C)   Resp 18   Ht 5' 7" (1 702 m)   Wt 112 kg (247 lb)   SpO2 96%   BMI 38 69 kg/m²     Problem List Items Addressed This Visit        Digestive    YATES (nonalcoholic steatohepatitis)       Endocrine    Type 2 diabetes mellitus with hyperglycemia, without long-term current use of insulin (HCC)       Cardiovascular and Mediastinum    Arteriosclerotic coronary artery disease    Benign essential HTN       Musculoskeletal and Integument    Osteoporosis    Relevant Medications    alendronate (FOSAMAX) 70 mg tablet       Genitourinary    Prostate cancer ([facility] )       Other    History of aortic valve replacement    Hyperlipidemia    Vitamin D deficiency    Class 2 severe obesity with serious comorbidity and body mass index (BMI) of 36 0 to 36 9 in adult [facility])      Other Visit Diagnoses     Medicare annual wellness visit, subsequent    -  Primary          Physical Exam  Vitals and nursing note reviewed  Constitutional:       Appearance: Normal appearance  He is obese   He is not ill-appearing  HENT:      Head: Normocephalic and atraumatic  Right Ear: Tympanic membrane, ear canal and external ear normal       Left Ear: Tympanic membrane, ear canal and external ear normal    Eyes:      Pupils: Pupils are equal, round, and reactive to light  Neck:      Thyroid: No thyromegaly  Vascular: No carotid bruit  Cardiovascular:      Rate and Rhythm: Normal rate and regular rhythm  Pulses: Normal pulses  Heart sounds: Normal heart sounds  No murmur heard  Pulmonary:      Effort: Pulmonary effort is normal       Breath sounds: Normal breath sounds  Abdominal:      General: Abdomen is protuberant  Bowel sounds are normal       Palpations: Abdomen is soft  There is no mass  Tenderness: There is no abdominal tenderness  Musculoskeletal:      Right lower leg: No edema  Left lower leg: No edema  Skin:     General: Skin is warm and dry  Coloration: Skin is not pale  Findings: No erythema  Neurological:      General: No focal deficit present  Mental Status: He is alert and oriented to person, place, and time  Psychiatric:         Mood and Affect: Mood normal          Behavior: Behavior normal          Thought Content:  Thought content normal          Judgment: Judgment normal

## 2021-06-07 NOTE — PROGRESS NOTES
[place] [id number]  coding opportunities             Chart reviewed, (number of) suggestions sent to provider: 1     Problem listed updated   Provider Accepted, (number of) suggestions accepted: 1         Number of suggestions actually used: 1      Number of suggestions NOT actually used: 0     Patients insurance company: [employer] (Medicare Advantage and [email or web address])   dx is on the bill  Visit status: Patient arrived for their scheduled appointment        [place] [id number]  coding opportunities             Chart reviewed, (number of) suggestions sent to provider: 1       DX:  E66 01-Morbid (severe) obesity due to excess [id number] with dm, htn, hyperlipidemia     Patients insurance company: [employer] (Medicare Advantage and [email or web address])

## 2021-06-14 ENCOUNTER — OFFICE VISIT (OUTPATIENT)
Dept: FAMILY MEDICINE CLINIC | Facility: CLINIC | Age: 76
End: 2021-06-14
Payer: COMMERCIAL

## 2021-06-14 VITALS
OXYGEN SATURATION: 96 % | BODY MASS INDEX: 38.77 KG/M2 | HEART RATE: 79 BPM | DIASTOLIC BLOOD PRESSURE: 70 MMHG | WEIGHT: 247 LBS | TEMPERATURE: 97.5 F | HEIGHT: 67 IN | SYSTOLIC BLOOD PRESSURE: 140 MMHG | RESPIRATION RATE: 18 BRPM

## 2021-06-14 DIAGNOSIS — M81.0 OSTEOPOROSIS WITHOUT CURRENT PATHOLOGICAL FRACTURE, UNSPECIFIED OSTEOPOROSIS TYPE: ICD-10-CM

## 2021-06-14 DIAGNOSIS — E11.65 TYPE 2 DIABETES MELLITUS WITH HYPERGLYCEMIA, WITHOUT LONG-TERM CURRENT USE OF INSULIN (HCC): ICD-10-CM

## 2021-06-14 DIAGNOSIS — I10 BENIGN ESSENTIAL HTN: ICD-10-CM

## 2021-06-14 DIAGNOSIS — E55.9 VITAMIN D DEFICIENCY: ICD-10-CM

## 2021-06-14 DIAGNOSIS — I25.10 ARTERIOSCLEROTIC CORONARY ARTERY DISEASE: ICD-10-CM

## 2021-06-14 DIAGNOSIS — Z00.00 MEDICARE ANNUAL WELLNESS VISIT, SUBSEQUENT: Primary | ICD-10-CM

## 2021-06-14 DIAGNOSIS — Z95.2 HISTORY OF AORTIC VALVE REPLACEMENT: ICD-10-CM

## 2021-06-14 DIAGNOSIS — E66.01 CLASS 2 SEVERE OBESITY DUE TO EXCESS CALORIES WITH SERIOUS COMORBIDITY AND BODY MASS INDEX (BMI) OF 36.0 TO 36.9 IN ADULT (HCC): ICD-10-CM

## 2021-06-14 DIAGNOSIS — C61 PROSTATE CANCER (HCC): ICD-10-CM

## 2021-06-14 DIAGNOSIS — K75.81 NASH (NONALCOHOLIC STEATOHEPATITIS): ICD-10-CM

## 2021-06-14 DIAGNOSIS — E78.2 MIXED HYPERLIPIDEMIA: ICD-10-CM

## 2021-06-14 PROCEDURE — 99214 OFFICE O/P EST MOD 30 MIN: CPT | Performed by: PHYSICIAN ASSISTANT

## 2021-06-14 PROCEDURE — 3288F FALL RISK ASSESSMENT DOCD: CPT | Performed by: PHYSICIAN ASSISTANT

## 2021-06-14 PROCEDURE — T1015 CLINIC SERVICE: HCPCS | Performed by: PHYSICIAN ASSISTANT

## 2021-06-14 PROCEDURE — G0439 PPPS, SUBSEQ VISIT: HCPCS | Performed by: PHYSICIAN ASSISTANT

## 2021-06-14 PROCEDURE — 3008F BODY MASS INDEX DOCD: CPT | Performed by: INTERNAL MEDICINE

## 2021-06-14 RX ORDER — ALENDRONATE SODIUM 70 MG/1
TABLET ORAL
Qty: 12 TABLET | Refills: 1 | Status: SHIPPED | OUTPATIENT
Start: 2021-06-14 | End: 2021-08-31

## 2021-06-14 NOTE — PROGRESS NOTES
Assessment & Plan:     Enhanced Visit       Subjective:     Patient ID: Samantha Cormier is a 77 y o  male     Chief Complaint   Patient presents with    Medicare Wellness Visit      HPI    Review of Systems    Objective:      /70   Pulse 79   Temp 97 5 °F (36 4 °C)   Resp 18   Ht 5' 7" (1 702 m)   Wt 112 kg (247 lb)   SpO2 96%   BMI 38 69 kg/m²     Problem List Items Addressed This Visit        Digestive    YATES (nonalcoholic steatohepatitis)       Endocrine    Type 2 diabetes mellitus with hyperglycemia, without long-term current use of insulin (HCC)       Cardiovascular and Mediastinum    Arteriosclerotic coronary artery disease    Benign essential HTN       Musculoskeletal and Integument    Osteoporosis    Relevant Medications    alendronate (FOSAMAX) 70 mg tablet       Genitourinary    Prostate cancer ([facility] )       Other    History of aortic valve replacement    Hyperlipidemia    Vitamin D deficiency    Class 2 severe obesity with serious comorbidity and body mass index (BMI) of 36 0 to 36 9 in adult [facility])      Other Visit Diagnoses     Medicare annual wellness visit, subsequent    -  Primary          Physical Exam

## 2021-06-14 NOTE — PROGRESS NOTES
Assessment and Plan:     Problem List Items Addressed This Visit        Digestive    YATES (nonalcoholic steatohepatitis)       Endocrine    Type 2 diabetes mellitus with hyperglycemia, without long-term current use of insulin ([facility])       Cardiovascular and Mediastinum    Arteriosclerotic coronary artery disease    Benign essential HTN       Musculoskeletal and Integument    Osteoporosis       Genitourinary    Prostate cancer ([facility] [address] )       Other    History of aortic valve replacement    Hyperlipidemia    Vitamin D deficiency    Class 2 severe obesity with serious comorbidity and body mass index (BMI) of 36 0 to 36 9 in adult [facility])      Other Visit Diagnoses     Medicare annual wellness visit, subsequent    -  Primary        BMI Counseling: Body mass index is 38 69 kg/m²  The BMI is above normal  Nutrition recommendations include decreasing portion sizes and moderation in carbohydrate intake  Exercise recommendations include exercising 3-5 times per week  Gym   3-4  Times  Per  week        Preventive health issues were discussed with patient, and age appropriate screening tests were ordered as noted in patient's After Visit Summary  Personalized health advice and appropriate referrals for health education or preventive services given if needed, as noted in patient's After Visit Summary       History of Present Illness:     Patient presents for Medicare Annual Wellness visit    Patient Care Team:  Brant Gunn PA-C as PCP - General (Family Medicine)  Zaid Griffin MD as PCP - Endocrinology (Endocrinology)  Lucia Nathan DO as PCP - PCP-[facility]  Tae Topete PA-C as Physician Assistant (Endocrinology)     Problem List:     Patient Active Problem List   Diagnosis    History of aortic valve replacement    Arteriosclerotic coronary artery disease    Benign essential HTN    Type 2 diabetes mellitus with hyperglycemia, without long-term current use of insulin ([facility] [address] )    Hyperlipidemia    YATES (nonalcoholic steatohepatitis)    Class 2 severe obesity with serious comorbidity and body mass index (BMI) of 36 0 to 36 9 in adult [facility])    Osteoarthritis    Osteoporosis    Prostate cancer ([place] [address] )    Erectile dysfunction due to diseases classified elsewhere    Vitamin D deficiency      Past Medical and Surgical History:     Past Medical History:   Diagnosis Date    Arteriosclerotic coronary artery disease 2017    Benign prostate hyperplasia     L A  12   R   17      COPD (chronic obstructive pulmonary disease) (HCC)     Deep venous embolism and thrombosis (HCC)     Diabetes mellitus ([place] [address] )     Hypertension     Impacted cerumen     unspecified laterity / L A  12       Osteoarthritis 2012    Prostate cancer ([place] [address] )     Pulmonary embolism (HCC)      Past Surgical History:   Procedure Laterality Date    CARDIAC VALVE REPLACEMENT      CORONARY ARTERY BYPASS GRAFT      FRACTURE SURGERY      left knee surgery    KNEE ARTHROSCOPY      Therapeutic    KNEE SURGERY      replacement    LUNG SURGERY      LUNG SURGERY      wedge resection     TONSILLECTOMY      UMBILICAL HERNIA REPAIR        Family History:     Family History   Problem Relation Age of Onset    COPD Mother     Heart attack Mother     Heart attack Father       Social History:     Social History     Socioeconomic History    Marital status: /Civil Union     Spouse name: Not on file    Number of children: Not on file    Years of education: Not on file    Highest education level: Not on file   Occupational History    Not on file   Tobacco Use    Smoking status: Former Smoker     Types: Cigarettes     Quit date: 3/23/2000     Years since quittin 2    Smokeless tobacco: Never Used   Substance and Sexual Activity    Alcohol use: No     Comment: social drinker per [employer]script    Drug use: No    Sexual activity: Not on file   Other Topics Concern    Not on file   Social History Narrative    Daily coffee consumption     Uses safety equipment seatbelts     Social Determinants of Health     Financial Resource Strain:     Difficulty of Paying Living Expenses:    Food Insecurity:     Worried About Running Out of Food in the Last Year:     [address] in the Last Year:    Transportation Needs:     Lack of Transportation (Medical):  Lack of Transportation (Non-Medical):    Physical Activity:     Days of Exercise per Week:     Minutes of Exercise per Session:    Stress:     Feeling of Stress :    Social Connections:     Frequency of Communication with Friends and Family:     Frequency of Social Gatherings with Friends and Family:     Attends [employer] Services:     Active Member of Clubs or Organizations:     Attends Club or Organization Meetings:     Marital Status:    Intimate Partner Violence:     Fear of Current or Ex-Partner:     Emotionally Abused:     Physically Abused:     Sexually Abused:       Medications and Allergies:     Current Outpatient Medications   Medication Sig Dispense Refill    alendronate (FOSAMAX) 70 mg tablet Take 1 tab by mouth every 7 days on empty stomach with full glass of water, do not lie down for 30 minutes after taking 12 tablet 1    aspirin 81 MG tablet Take by mouth      atorvastatin (LIPITOR) 40 mg tablet Take 1 tablet (40 mg total) by mouth daily 90 tablet 1    Blood Glucose Calibration (OT ULTRA/FASTTK CNTRL SOLN) SOLN by In Vitro route as needed (to check meter calibration) 1 each 3    Blood Glucose Monitoring Suppl (ONE TOUCH ULTRA 2) w/Device KIT by Does not apply route daily DX E11 65  Pt to   Check  fbs  daily 1 each 0    Cholecalciferol (VITAMIN D3) 2000 units capsule Take 2,000 Units by mouth daily  11    Coenzyme Q10 (COQ-10) 100 MG CAPS Take 100 mg by mouth   Continuous Blood Gluc  (FREESTYLE CEDRIC 14 DAY READER) ADELAIDA 1 CGM DEVICE   USE TO CHECK BG LEVELS 4+ TIMES DAILY AND AS NEEDED 1 Device 0    Continuous Blood Gluc Sensor (FreeStyle Denny 14 Day Sensor) MISC CHANGE SENSOR EVERY 14 DAYS TO CHECK BG LEVELS 4+ TIMES DAILY 6 each 3    Dulaglutide (Trulicity) 3 LB/4 4ML SOPN Inject 0 5 mL (3 mg total) under the skin once a week 4 pen 3    fluorouracil (EFUDEX) 5 % cream APPLY TWICE A DAY TO SCALP FOR 1 MONTH 40 g 0    glipiZIDE (GLUCOTROL XL) 5 mg 24 hr tablet TAKE 1 TABLET BY MOUTH EVERY DAY 90 tablet 0    glucose blood (FREESTYLE LITE) test strip Test 3 times a day  300 each 1    glucose blood (ONE TOUCH ULTRA TEST) test strip Check  fbs  Daily  DX E11 65 100 each 3    leuprolide (LUPRON DEPOT, 1-MONTH,) 3 75 mg injection Inject into a muscle Once yearly       metFORMIN (GLUCOPHAGE-XR) 500 mg 24 hr tablet Take 1 tablet with breakfast and dinner daily 180 tablet 1    Multiple Vitamin-Folic Acid TABS Take by mouth      Omega-3 Fatty Acids (FISH OIL) 1,000 mg Take 1,000 mg by mouth daily   sildenafil (VIAGRA) 50 MG tablet Take 1 tablet (50 mg total) by mouth daily as needed for erectile dysfunction 5 tablet 5     No current facility-administered medications for this visit  No Known Allergies   Immunizations:     Immunization History   Administered Date(s) Administered    Pneumococcal Conjugate 13-Valent 03/02/2016    Pneumococcal Polysaccharide PPV23 01/30/2014      Health Maintenance:         Topic Date Due    Colorectal Cancer Screening  05/04/2020    Hepatitis C Screening  Completed         Topic Date Due    COVID-19 Vaccine (1) Never done    DTaP,Tdap,and Td Vaccines (1 - Tdap) Never done    Influenza Vaccine (Season Ended) 09/01/2021      Medicare Health Risk Assessment:     /70   Pulse 79   Temp 97 5 °F (36 4 °C)   Resp 18   Ht 5' 7" (1 702 m)   Wt 112 kg (247 lb)   SpO2 96%   BMI 38 69 kg/m²      Vadim Faulkner is here for his Subsequent Wellness visit  Last Medicare Wellness visit information reviewed, patient interviewed and updates made to the record today        Health Risk Assessment:   Patient rates overall health as good  Patient feels that their physical health rating is same  Patient is very satisfied with their life  Eyesight was rated as same  Hearing was rated as same  Patient feels that their emotional and mental health rating is same  Patients states they are never, rarely angry  Patient states they are sometimes unusually tired/fatigued  Pain experienced in the last 7 days has been none  Patient states that he has experienced no weight loss or gain in last 6 months  Depression Screening:   PHQ-2 Score: 0      Fall Risk Screening: In the past year, patient has experienced: no history of falling in past year      Home Safety:  Patient does not have trouble with stairs inside or outside of their home  Patient has working smoke alarms and has working carbon monoxide detector  Home safety hazards include: none  Nutrition:   Current diet is Regular  Medications:   Patient is currently taking over-the-counter supplements  OTC medications include: see medication list  Patient is able to manage medications  Activities of Daily Living (ADLs)/Instrumental Activities of Daily Living (IADLs):   Walk and transfer into and out of bed and chair?: Yes  Dress and groom yourself?: Yes    Bathe or shower yourself?: Yes    Feed yourself?  Yes  Do your laundry/housekeeping?: Yes  Manage your money, pay your bills and track your expenses?: Yes  Make your own meals?: Yes    Do your own shopping?: Yes    Previous Hospitalizations:   Any hospitalizations or ED visits within the last 12 months?: No      Advance Care Planning:   Living will: No    Durable POA for healthcare: No    Advanced directive: No    Five wishes given: Yes      PREVENTIVE SCREENINGS      Cardiovascular Screening:    General: History Lipid Disorder and Screening Current      Diabetes Screening:     General: History Diabetes and Screening Current      Colorectal Cancer Screening:     General: Screening Current      Prostate Cancer Screening:    General: History Prostate Cancer      Osteoporosis Screening:    General: History Osteoporosis    Due for: DXA Axial      Abdominal Aortic Aneurysm (AAA) Screening:    Risk factors include: age between 73-69 yo and tobacco use        General: Screening Current      Lung Cancer Screening:     General: Screening Not Indicated      Hepatitis C Screening:    General: Screening Current    Screening, Brief Intervention, and Referral to Treatment (SBIRT)    Screening  Typical number of drinks in a day: 0  Typical number of drinks in a week: 0  Interpretation: Low risk drinking behavior  Single Item Drug Screening:  How often have you used an illegal drug (including marijuana) or a prescription medication for non-medical reasons in the past year? never    Single Item Drug Screen Score: 0  Interpretation: Negative screen for possible drug use disorder    Brief Intervention  Alcohol & drug use screenings were reviewed  No concerns regarding substance use disorder identified  Other Counseling Topics:   Calcium and vitamin D intake and regular weightbearing exercise         Harden Schwab, PA-C

## 2021-06-14 NOTE — PROGRESS NOTES
Diabetic Foot Exam    Patient's shoes and socks removed  Right Foot/Ankle   Right Foot Inspection  Skin Exam: skin intact no ulcer                            Sensory   Vibration: intact    Monofilament testing: intact  Vascular    The right DP pulse is 2+  Left Foot/Ankle  Left Foot Inspection  Skin Exam: skin intactno ulcer                                         Sensory   Vibration: intact    Monofilament: intact  Vascular    The left DP pulse is 2+  Assign Risk Category:  No deformity present; No loss of protective sensation; No weak pulses       Risk: 0  Assessment/Plan:     Diagnoses and all orders for this visit:    Medicare annual wellness visit, subsequent    Type 2 diabetes mellitus with hyperglycemia, without long-term current use of insulin ([facility] [address] )  Comments:  Diabetes is at goal continue current regimen  Benign essential HTN  Comments:  Blood pressure is at goal not on any medication  Arteriosclerotic coronary artery disease  Comments:  No angina or signs of congestive heart failure    Mixed hyperlipidemia  Comments:  Continue statin therapy and low-fat diet    History of aortic valve replacement  Comments:  Currently stable  Follow-up with Cardiology as directed    Class 2 severe obesity due to excess calories with serious comorbidity and body mass index (BMI) of 36 0 to 36 9 in adult [facility])  Comments:  Patient will continue diet and exercise  Weight loss encouraged    Prostate cancer [facility])  Comments:  Patient currently under care of Urology    Vitamin D deficiency  Comments:  Continue over-the-counter supplementation  Osteoporosis without current pathological fracture, unspecified osteoporosis type  Comments:  Reorder Fosamax  Proceed with DEXA scan    YATES (nonalcoholic steatohepatitis)  Comments:  Exercise diet          Subjective:      Patient ID: Norma Benson is a 77 y o  male  Patient presents in the office for follow up chronic conditions    Patient has non-insulin diabetes mellitus type 2  He is currently on Trulicity once a week period he is on metformin  twice a day and glipizide ER 5 mg in the morning  Patient is following a low carb diet he does exercise regularly  Patient checking his blood sugar daily  His current A1c is 6 7  He has a history of coronary artery disease and aortic valve replacement  He is currently on a baby aspirin  For hypertension he is off all medications  He does follow with Cardiology  For lipid control he is on atorvastatin 40 mg  Patient has a history of being overweight and having non alcoholic fatty liver  Liver functions are slightly elevated  Patient continues to watch his diet go to the gym  He has osteoarthritis and osteoporosis  He is on calcium and vitamin-D supplements  Patient states his Fosamax was not refilled  We will refill Fosamax  He is due for DEXA scan          The following portions of the patient's history were reviewed and updated as appropriate:   He   Patient Active Problem List    Diagnosis Date Noted    Vitamin D deficiency 07/18/2019    Erectile dysfunction due to diseases classified elsewhere 04/16/2019    History of aortic valve replacement 11/30/2017    Arteriosclerotic coronary artery disease 11/30/2017    Class 2 severe obesity with serious comorbidity and body mass index (BMI) of 36 0 to 36 9 in adult [facility]) 11/30/2017    Osteoporosis 11/30/2017    Type 2 diabetes mellitus with hyperglycemia, without long-term current use of insulin ([facility] ) 06/20/2017    YATES (nonalcoholic steatohepatitis) 09/14/2015    Prostate cancer ([facility] ) 06/18/2013    Hyperlipidemia 07/17/2012    Osteoarthritis 07/17/2012    Benign essential HTN 04/30/2012     Current Outpatient Medications   Medication Sig Dispense Refill    alendronate (FOSAMAX) 70 mg tablet Take 1 tab by mouth every 7 days on empty stomach with full glass of water, do not lie down for 30 minutes after taking 12 tablet 1    aspirin 81 MG tablet Take by mouth      atorvastatin (LIPITOR) 40 mg tablet Take 1 tablet (40 mg total) by mouth daily 90 tablet 1    Blood Glucose Calibration (OT ULTRA/FASTTK CNTRL SOLN) SOLN by In Vitro route as needed (to check meter calibration) 1 each 3    Blood Glucose Monitoring Suppl (ONE TOUCH ULTRA 2) w/Device KIT by Does not apply route daily DX E11 65  Pt to   Check  fbs  daily 1 each 0    Cholecalciferol (VITAMIN D3) 2000 units capsule Take 2,000 Units by mouth daily  11    Coenzyme Q10 (COQ-10) 100 MG CAPS Take 100 mg by mouth   Continuous Blood Gluc  (FREESTYLE CEDRIC 14 DAY READER) ADELAIDA 1 CGM DEVICE  USE TO CHECK BG LEVELS 4+ TIMES DAILY AND AS NEEDED 1 Device 0    Continuous Blood Gluc Sensor (FreeStyle Cedric 14 Day Sensor) MISC CHANGE SENSOR EVERY 14 DAYS TO CHECK BG LEVELS 4+ TIMES DAILY 6 each 3    Dulaglutide (Trulicity) 3 KH/3 9GS SOPN Inject 0 5 mL (3 mg total) under the skin once a week 4 pen 3    fluorouracil (EFUDEX) 5 % cream APPLY TWICE A DAY TO SCALP FOR 1 MONTH 40 g 0    glipiZIDE (GLUCOTROL XL) 5 mg 24 hr tablet TAKE 1 TABLET BY MOUTH EVERY DAY 90 tablet 0    glucose blood (FREESTYLE LITE) test strip Test 3 times a day  300 each 1    glucose blood (ONE TOUCH ULTRA TEST) test strip Check  fbs  Daily  DX E11 65 100 each 3    leuprolide (LUPRON DEPOT, 1-MONTH,) 3 75 mg injection Inject into a muscle Once yearly       metFORMIN (GLUCOPHAGE-XR) 500 mg 24 hr tablet Take 1 tablet with breakfast and dinner daily 180 tablet 1    Multiple Vitamin-Folic Acid TABS Take by mouth      Omega-3 Fatty Acids (FISH OIL) 1,000 mg Take 1,000 mg by mouth daily   sildenafil (VIAGRA) 50 MG tablet Take 1 tablet (50 mg total) by mouth daily as needed for erectile dysfunction 5 tablet 5     No current facility-administered medications for this visit  He has No Known Allergies       Review of Systems   Constitutional: Negative for activity change, appetite change, chills, fatigue and fever     HENT: Negative for ear pain and sore throat  Eyes: Negative for visual disturbance  Respiratory: Negative for cough and shortness of breath  Cardiovascular: Negative for chest pain, palpitations and leg swelling  Gastrointestinal: Negative for abdominal pain, blood in stool, constipation, diarrhea and nausea  Genitourinary: Negative for difficulty urinating  Musculoskeletal: Positive for arthralgias (knees)  Negative for back pain and myalgias  Skin: Negative for rash  Neurological: Negative for dizziness, syncope and headaches  Psychiatric/Behavioral: Negative for sleep disturbance  Objective:        Physical Exam  Vitals and nursing note reviewed  Constitutional:       General: He is not in acute distress  Appearance: He is well-developed  He is obese  He is not ill-appearing  HENT:      Head: Normocephalic and atraumatic  Right Ear: Tympanic membrane, ear canal and external ear normal       Left Ear: Tympanic membrane, ear canal and external ear normal    Eyes:      Conjunctiva/sclera: Conjunctivae normal       Pupils: Pupils are equal, round, and reactive to light  Neck:      Thyroid: No thyromegaly  Vascular: No carotid bruit  Cardiovascular:      Rate and Rhythm: Normal rate and regular rhythm  Pulses: no weak pulses          Dorsalis pedis pulses are 2+ on the right side and 2+ on the left side  Heart sounds: No murmur heard  Comments: avr  replacement  Pulmonary:      Effort: Pulmonary effort is normal       Breath sounds: Normal breath sounds  No wheezing  Abdominal:      General: Abdomen is protuberant  Bowel sounds are normal       Palpations: Abdomen is soft  There is no mass  Tenderness: There is no abdominal tenderness  Comments: diastasis  Rectus     Musculoskeletal:      Cervical back: Normal range of motion and neck supple  Right lower leg: No edema  Left lower leg: No edema     Feet:      Right foot:      Skin integrity: No ulcer  Left foot:      Skin integrity: No ulcer  Lymphadenopathy:      Cervical: No cervical adenopathy  Skin:     General: Skin is warm and dry  Coloration: Skin is not pale  Findings: No erythema  Neurological:      General: No focal deficit present  Mental Status: He is alert and oriented to person, place, and time  Psychiatric:         Mood and Affect: Mood normal          Behavior: Behavior normal          Thought Content:  Thought content normal          Judgment: Judgment normal

## 2021-06-29 ENCOUNTER — TELEPHONE (OUTPATIENT)
Dept: UROLOGY | Facility: AMBULATORY SURGERY CENTER | Age: 76
End: 2021-06-29

## 2021-06-29 NOTE — TELEPHONE ENCOUNTER
1YR f/u PSA PTV-bumped from [facility] 7/28 LMOM to confirm 6/29 KA  NEW OV scheduled 8/10 @ 1:15 with Arabella Bourne

## 2021-07-13 ENCOUNTER — VBI (OUTPATIENT)
Dept: ADMINISTRATIVE | Facility: OTHER | Age: 76
End: 2021-07-13

## 2021-08-02 DIAGNOSIS — E11.65 TYPE 2 DIABETES MELLITUS WITH HYPERGLYCEMIA, WITHOUT LONG-TERM CURRENT USE OF INSULIN (HCC): ICD-10-CM

## 2021-08-02 DIAGNOSIS — E66.9 OBESITY (BMI 35.0-39.9 WITHOUT COMORBIDITY): ICD-10-CM

## 2021-08-02 RX ORDER — DULAGLUTIDE 3 MG/.5ML
INJECTION, SOLUTION SUBCUTANEOUS
Qty: 0.5 ML | Refills: 0 | Status: SHIPPED | OUTPATIENT
Start: 2021-08-02 | End: 2021-08-02

## 2021-08-02 NOTE — TELEPHONE ENCOUNTER
I would prefer he not miss multiple doses because other we will have to restart him on the lower dose and titrate up again  We can send a pen to the pharmacy near him so that he does not have to miss a dose if possible

## 2021-08-02 NOTE — TELEPHONE ENCOUNTER
Pt called [place] to [place]  [facility] Saturday and forgot Trulicity at home and was due to take Sunday  He will not be home til after this Sunday  Is it ok to miss 2 doses? ? States bs are fine

## 2021-08-06 DIAGNOSIS — E11.65 TYPE 2 DIABETES MELLITUS WITH HYPERGLYCEMIA, WITHOUT LONG-TERM CURRENT USE OF INSULIN (HCC): ICD-10-CM

## 2021-08-06 RX ORDER — GLIPIZIDE 5 MG/1
TABLET, FILM COATED, EXTENDED RELEASE ORAL
Qty: 90 TABLET | Refills: 0 | Status: SHIPPED | OUTPATIENT
Start: 2021-08-06 | End: 2021-09-07 | Stop reason: SDUPTHER

## 2021-08-25 ENCOUNTER — TELEPHONE (OUTPATIENT)
Dept: OBGYN CLINIC | Facility: HOSPITAL | Age: 76
End: 2021-08-25

## 2021-08-25 ENCOUNTER — TELEPHONE (OUTPATIENT)
Dept: FAMILY MEDICINE CLINIC | Facility: CLINIC | Age: 76
End: 2021-08-25

## 2021-08-25 NOTE — TELEPHONE ENCOUNTER
Email also sent to practice admin/SME:    Hello,  Please advise if the following patient can be forced onto the schedule:    Patient: Rosi Meredith  : [birth year]  MRN: [id number]  Call back # [phone] or XF#[phone] (wife Brein Benson)  Reason for appointment: NP, R upper arm fx, xrays out of state but not in possession, [employer]  Requested doctor/location: Prefers Judy Eisenberg, or can do [place]    Please respond & let me know that this has been taken care of  Thank you in advance

## 2021-08-25 NOTE — TELEPHONE ENCOUNTER
Patient was out of state, he fell and broke his arm  Went to hospital out there and did xrays, was told he would need surgery  Patient is driving back now and will call ortho to set up an appointment to talk to them about surgery  He just wanted you to know

## 2021-08-26 ENCOUNTER — APPOINTMENT (EMERGENCY)
Dept: RADIOLOGY | Facility: HOSPITAL | Age: 76
DRG: 563 | End: 2021-08-26
Payer: COMMERCIAL

## 2021-08-26 ENCOUNTER — HOSPITAL ENCOUNTER (INPATIENT)
Facility: HOSPITAL | Age: 76
LOS: 1 days | Discharge: HOME | DRG: 563 | End: 2021-08-26
Attending: EMERGENCY MEDICINE | Admitting: SURGERY
Payer: COMMERCIAL

## 2021-08-26 VITALS
OXYGEN SATURATION: 93 % | HEART RATE: 80 BPM | DIASTOLIC BLOOD PRESSURE: 62 MMHG | SYSTOLIC BLOOD PRESSURE: 124 MMHG | RESPIRATION RATE: 18 BRPM | TEMPERATURE: 98.4 F

## 2021-08-26 DIAGNOSIS — S42.301A CLOSED RIGHT HUMERAL FRACTURE: Primary | ICD-10-CM
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
## 2021-08-26 PROBLEM — S42.309A HUMERUS FRACTURE: Status: ACTIVE | Noted: 2021-08-26

## 2021-08-26 LAB
ALBUMIN SERPL BCP-MCNC: 3.9 G/DL (ref 3.5–5)
ALP SERPL-CCNC: 67 U/L (ref 46–116)
ALT SERPL W P-5'-P-CCNC: 92 U/L (ref 12–78)
ANION GAP SERPL CALCULATED.3IONS-SCNC: 11 MMOL/L (ref 4–13)
ANION GAP SERPL CALCULATED.3IONS-SCNC: 9 MMOL/L (ref 4–13)
AST SERPL W P-5'-P-CCNC: 55 U/L (ref 5–45)
ATRIAL RATE: 87 BPM
BASOPHILS # BLD AUTO: 0.04 THOUSANDS/ΜL (ref 0–0.1)
BASOPHILS NFR BLD AUTO: 0 % (ref 0–1)
BILIRUB SERPL-MCNC: 0.82 MG/DL (ref 0.2–1)
BUN SERPL-MCNC: 22 MG/DL (ref 5–25)
BUN SERPL-MCNC: 24 MG/DL (ref 5–25)
CALCIUM SERPL-MCNC: 9.3 MG/DL (ref 8.3–10.1)
CALCIUM SERPL-MCNC: 9.8 MG/DL (ref 8.3–10.1)
CHLORIDE SERPL-SCNC: 102 MMOL/L (ref 100–108)
CHLORIDE SERPL-SCNC: 102 MMOL/L (ref 100–108)
CO2 SERPL-SCNC: 26 MMOL/L (ref 21–32)
CO2 SERPL-SCNC: 26 MMOL/L (ref 21–32)
CREAT SERPL-MCNC: 1.18 MG/DL (ref 0.6–1.3)
CREAT SERPL-MCNC: 1.41 MG/DL (ref 0.6–1.3)
EOSINOPHIL # BLD AUTO: 0.03 THOUSAND/ΜL (ref 0–0.61)
EOSINOPHIL NFR BLD AUTO: 0 % (ref 0–6)
ERYTHROCYTE [DISTWIDTH] IN BLOOD BY AUTOMATED COUNT: 13.7 % (ref 11.6–15.1)
ERYTHROCYTE [DISTWIDTH] IN BLOOD BY AUTOMATED COUNT: 13.7 % (ref 11.6–15.1)
GFR SERPL CREATININE-BSD FRML MDRD: 48 ML/MIN/1.73SQ M
GFR SERPL CREATININE-BSD FRML MDRD: 60 ML/MIN/1.73SQ M
GLUCOSE SERPL-MCNC: 187 MG/DL (ref 65–140)
GLUCOSE SERPL-MCNC: 192 MG/DL (ref 65–140)
GLUCOSE SERPL-MCNC: 206 MG/DL (ref 65–140)
GLUCOSE SERPL-MCNC: 222 MG/DL (ref 65–140)
HCT VFR BLD AUTO: 35.4 % (ref 36.5–49.3)
HCT VFR BLD AUTO: 39.6 % (ref 36.5–49.3)
HGB BLD-MCNC: 11.7 G/DL (ref 12–17)
HGB BLD-MCNC: 12.9 G/DL (ref 12–17)
IMM GRANULOCYTES # BLD AUTO: 0.04 THOUSAND/UL (ref 0–0.2)
IMM GRANULOCYTES NFR BLD AUTO: 0 % (ref 0–2)
LYMPHOCYTES # BLD AUTO: 3.98 THOUSANDS/ΜL (ref 0.6–4.47)
LYMPHOCYTES NFR BLD AUTO: 29 % (ref 14–44)
MCH RBC QN AUTO: 31.3 PG (ref 26.8–34.3)
MCH RBC QN AUTO: 31.5 PG (ref 26.8–34.3)
MCHC RBC AUTO-ENTMCNC: 32.6 G/DL (ref 31.4–37.4)
MCHC RBC AUTO-ENTMCNC: 33.1 G/DL (ref 31.4–37.4)
MCV RBC AUTO: 95 FL (ref 82–98)
MCV RBC AUTO: 96 FL (ref 82–98)
MONOCYTES # BLD AUTO: 1.15 THOUSAND/ΜL (ref 0.17–1.22)
MONOCYTES NFR BLD AUTO: 9 % (ref 4–12)
NEUTROPHILS # BLD AUTO: 8.34 THOUSANDS/ΜL (ref 1.85–7.62)
NEUTS SEG NFR BLD AUTO: 62 % (ref 43–75)
NRBC BLD AUTO-RTO: 0 /100 WBCS
P AXIS: 42 DEGREES
PLATELET # BLD AUTO: 202 THOUSANDS/UL (ref 149–390)
PLATELET # BLD AUTO: 249 THOUSANDS/UL (ref 149–390)
PMV BLD AUTO: 10.2 FL (ref 8.9–12.7)
PMV BLD AUTO: 10.3 FL (ref 8.9–12.7)
POTASSIUM SERPL-SCNC: 3.9 MMOL/L (ref 3.5–5.3)
POTASSIUM SERPL-SCNC: 4.4 MMOL/L (ref 3.5–5.3)
PR INTERVAL: 148 MS
PROT SERPL-MCNC: 8.4 G/DL (ref 6.4–8.2)
QRS AXIS: 22 DEGREES
QRSD INTERVAL: 98 MS
QT INTERVAL: 334 MS
QTC INTERVAL: 401 MS
RBC # BLD AUTO: 3.72 MILLION/UL (ref 3.88–5.62)
RBC # BLD AUTO: 4.12 MILLION/UL (ref 3.88–5.62)
SODIUM SERPL-SCNC: 137 MMOL/L (ref 136–145)
SODIUM SERPL-SCNC: 139 MMOL/L (ref 136–145)
T WAVE AXIS: 187 DEGREES
VENTRICULAR RATE: 87 BPM
WBC # BLD AUTO: 10.87 THOUSAND/UL (ref 4.31–10.16)
WBC # BLD AUTO: 13.58 THOUSAND/UL (ref 4.31–10.16)

## 2021-08-26 PROCEDURE — 99285 EMERGENCY DEPT VISIT HI MDM: CPT | Performed by: PHYSICIAN ASSISTANT

## 2021-08-26 PROCEDURE — 97163 PT EVAL HIGH COMPLEX 45 MIN: CPT

## 2021-08-26 PROCEDURE — 99284 EMERGENCY DEPT VISIT MOD MDM: CPT

## 2021-08-26 PROCEDURE — 73060 X-RAY EXAM OF HUMERUS: CPT

## 2021-08-26 PROCEDURE — 80048 BASIC METABOLIC PNL TOTAL CA: CPT | Performed by: PHYSICIAN ASSISTANT

## 2021-08-26 PROCEDURE — 97116 GAIT TRAINING THERAPY: CPT

## 2021-08-26 PROCEDURE — 93005 ELECTROCARDIOGRAM TRACING: CPT

## 2021-08-26 PROCEDURE — NC001 PR NO CHARGE: Performed by: PHYSICIAN ASSISTANT

## 2021-08-26 PROCEDURE — 93010 ELECTROCARDIOGRAM REPORT: CPT | Performed by: INTERNAL MEDICINE

## 2021-08-26 PROCEDURE — 99222 1ST HOSP IP/OBS MODERATE 55: CPT | Performed by: PHYSICIAN ASSISTANT

## 2021-08-26 PROCEDURE — 97530 THERAPEUTIC ACTIVITIES: CPT

## 2021-08-26 PROCEDURE — 82948 REAGENT STRIP/BLOOD GLUCOSE: CPT

## 2021-08-26 PROCEDURE — 85025 COMPLETE CBC W/AUTO DIFF WBC: CPT | Performed by: PHYSICIAN ASSISTANT

## 2021-08-26 PROCEDURE — 36415 COLL VENOUS BLD VENIPUNCTURE: CPT | Performed by: PHYSICIAN ASSISTANT

## 2021-08-26 PROCEDURE — 85027 COMPLETE CBC AUTOMATED: CPT | Performed by: PHYSICIAN ASSISTANT

## 2021-08-26 PROCEDURE — 97166 OT EVAL MOD COMPLEX 45 MIN: CPT

## 2021-08-26 PROCEDURE — 2W38X1Z IMMOBILIZATION OF RIGHT UPPER EXTREMITY USING SPLINT: ICD-10-PCS | Performed by: EMERGENCY MEDICINE

## 2021-08-26 PROCEDURE — 80053 COMPREHEN METABOLIC PANEL: CPT | Performed by: PHYSICIAN ASSISTANT

## 2021-08-26 PROCEDURE — 29105 APPLICATION LONG ARM SPLINT: CPT | Performed by: PHYSICIAN ASSISTANT

## 2021-08-26 PROCEDURE — 99222 1ST HOSP IP/OBS MODERATE 55: CPT | Performed by: SURGERY

## 2021-08-26 RX ORDER — AMOXICILLIN 250 MG
1 CAPSULE ORAL 2 TIMES DAILY
Refills: 0
Start: 2021-08-26

## 2021-08-26 RX ORDER — HEPARIN SODIUM 5000 [USP'U]/ML
5000 INJECTION, SOLUTION INTRAVENOUS; SUBCUTANEOUS EVERY 8 HOURS SCHEDULED
Status: DISCONTINUED | OUTPATIENT
Start: 2021-08-26 | End: 2021-08-26 | Stop reason: HOSPADM

## 2021-08-26 RX ORDER — POLYETHYLENE GLYCOL 3350 17 G/17G
17 POWDER, FOR SOLUTION ORAL DAILY PRN
Refills: 0
Start: 2021-08-26

## 2021-08-26 RX ORDER — OXYCODONE HYDROCHLORIDE AND ACETAMINOPHEN 5; 325 MG/1; MG/1
1 TABLET ORAL ONCE
Status: COMPLETED | OUTPATIENT
Start: 2021-08-26 | End: 2021-08-26

## 2021-08-26 RX ORDER — OXYCODONE HYDROCHLORIDE 5 MG/1
5 TABLET ORAL EVERY 4 HOURS PRN
Status: DISCONTINUED | OUTPATIENT
Start: 2021-08-26 | End: 2021-08-26 | Stop reason: HOSPADM

## 2021-08-26 RX ORDER — GINSENG 100 MG
1 CAPSULE ORAL ONCE
Status: COMPLETED | OUTPATIENT
Start: 2021-08-26 | End: 2021-08-26

## 2021-08-26 RX ORDER — SODIUM CHLORIDE, SODIUM GLUCONATE, SODIUM ACETATE, POTASSIUM CHLORIDE, MAGNESIUM CHLORIDE, SODIUM PHOSPHATE, DIBASIC, AND POTASSIUM PHOSPHATE .53; .5; .37; .037; .03; .012; .00082 G/100ML; G/100ML; G/100ML; G/100ML; G/100ML; G/100ML; G/100ML
100 INJECTION, SOLUTION INTRAVENOUS ONCE
Status: COMPLETED | OUTPATIENT
Start: 2021-08-26 | End: 2021-08-26

## 2021-08-26 RX ORDER — HYDROMORPHONE HCL/PF 1 MG/ML
0.2 SYRINGE (ML) INJECTION EVERY 4 HOURS PRN
Status: DISCONTINUED | OUTPATIENT
Start: 2021-08-26 | End: 2021-08-26 | Stop reason: HOSPADM

## 2021-08-26 RX ORDER — OXYCODONE HYDROCHLORIDE 5 MG/1
2.5 TABLET ORAL EVERY 4 HOURS PRN
Status: DISCONTINUED | OUTPATIENT
Start: 2021-08-26 | End: 2021-08-26 | Stop reason: HOSPADM

## 2021-08-26 RX ORDER — INSULIN GLARGINE 100 [IU]/ML
16 INJECTION, SOLUTION SUBCUTANEOUS
Status: DISCONTINUED | OUTPATIENT
Start: 2021-08-26 | End: 2021-08-26 | Stop reason: HOSPADM

## 2021-08-26 RX ORDER — ACETAMINOPHEN 325 MG/1
975 TABLET ORAL EVERY 8 HOURS SCHEDULED
Status: DISCONTINUED | OUTPATIENT
Start: 2021-08-26 | End: 2021-08-26 | Stop reason: HOSPADM

## 2021-08-26 RX ORDER — SODIUM CHLORIDE, SODIUM GLUCONATE, SODIUM ACETATE, POTASSIUM CHLORIDE, MAGNESIUM CHLORIDE, SODIUM PHOSPHATE, DIBASIC, AND POTASSIUM PHOSPHATE .53; .5; .37; .037; .03; .012; .00082 G/100ML; G/100ML; G/100ML; G/100ML; G/100ML; G/100ML; G/100ML
100 INJECTION, SOLUTION INTRAVENOUS CONTINUOUS
Status: DISCONTINUED | OUTPATIENT
Start: 2021-08-26 | End: 2021-08-26 | Stop reason: HOSPADM

## 2021-08-26 RX ORDER — ATORVASTATIN CALCIUM 40 MG/1
40 TABLET, FILM COATED ORAL DAILY
Status: DISCONTINUED | OUTPATIENT
Start: 2021-08-26 | End: 2021-08-26 | Stop reason: HOSPADM

## 2021-08-26 RX ORDER — OXYCODONE HYDROCHLORIDE 5 MG/1
2.5 TABLET ORAL EVERY 4 HOURS PRN
Qty: 20 TABLET | Refills: 0 | Status: CANCELLED | OUTPATIENT
Start: 2021-08-26 | End: 2021-09-05

## 2021-08-26 RX ORDER — AMOXICILLIN 250 MG
1 CAPSULE ORAL 2 TIMES DAILY
Status: DISCONTINUED | OUTPATIENT
Start: 2021-08-26 | End: 2021-08-26 | Stop reason: HOSPADM

## 2021-08-26 RX ORDER — ACETAMINOPHEN 325 MG/1
975 TABLET ORAL EVERY 8 HOURS SCHEDULED
Refills: 0 | Status: CANCELLED
Start: 2021-08-26

## 2021-08-26 RX ORDER — ONDANSETRON 2 MG/ML
4 INJECTION INTRAMUSCULAR; INTRAVENOUS EVERY 4 HOURS PRN
Status: DISCONTINUED | OUTPATIENT
Start: 2021-08-26 | End: 2021-08-26 | Stop reason: HOSPADM

## 2021-08-26 RX ORDER — POLYETHYLENE GLYCOL 3350 17 G/17G
17 POWDER, FOR SOLUTION ORAL DAILY PRN
Status: DISCONTINUED | OUTPATIENT
Start: 2021-08-26 | End: 2021-08-26 | Stop reason: HOSPADM

## 2021-08-26 RX ADMIN — DOCUSATE SODIUM AND SENNOSIDES 1 TABLET: 8.6; 5 TABLET, FILM COATED ORAL at 09:29

## 2021-08-26 RX ADMIN — ATORVASTATIN CALCIUM 40 MG: 40 TABLET, FILM COATED ORAL at 09:29

## 2021-08-26 RX ADMIN — SODIUM CHLORIDE, SODIUM GLUCONATE, SODIUM ACETATE, POTASSIUM CHLORIDE, MAGNESIUM CHLORIDE, SODIUM PHOSPHATE, DIBASIC, AND POTASSIUM PHOSPHATE 100 ML/HR: .53; .5; .37; .037; .03; .012; .00082 INJECTION, SOLUTION INTRAVENOUS at 09:30

## 2021-08-26 RX ADMIN — SODIUM CHLORIDE, SODIUM GLUCONATE, SODIUM ACETATE, POTASSIUM CHLORIDE, MAGNESIUM CHLORIDE, SODIUM PHOSPHATE, DIBASIC, AND POTASSIUM PHOSPHATE 100 ML/HR: .53; .5; .37; .037; .03; .012; .00082 INJECTION, SOLUTION INTRAVENOUS at 03:20

## 2021-08-26 RX ADMIN — OXYCODONE AND ACETAMINOPHEN 1 TABLET: 5; 325 TABLET ORAL at 02:22

## 2021-08-26 RX ADMIN — BACITRACIN ZINC 1 SMALL APPLICATION: 500 OINTMENT TOPICAL at 02:22

## 2021-08-26 RX ADMIN — HEPARIN SODIUM 5000 UNITS: 5000 INJECTION INTRAVENOUS; SUBCUTANEOUS at 05:06

## 2021-08-26 RX ADMIN — ACETAMINOPHEN 975 MG: 325 TABLET ORAL at 05:06

## 2021-08-26 NOTE — ASSESSMENT & PLAN NOTE
- S/p mechanical fall  - History of osteoporosis, on Alendronate   Consider Endocrinology consult  - Pain in right arm  - XR: displaced humerus fracture  - Neurovascularly intact  - Admit to trauma service  - Orthopedic surgery consultation  - nonweightbearing right upper extremity in sling  - Remain NPO until ortho plan defined  - Pain control  - DVT ppx  - PT and OT

## 2021-08-26 NOTE — PHYSICAL THERAPY NOTE
PHYSICAL THERAPY TREATMENT NOTE    Patient Name: Josette Hyde  LLQXN'Y Date: 8/26/2021 08/26/21 0856   PT Last Visit   PT Visit Date 08/26/21   Pain Assessment   Pain Assessment Tool 0-10   Pain Score 7   Pain Location/Orientation Orientation: Right;Location: Shoulder   Pain 2   Pain Score 2 4   Pain Location/Orientation 2 Orientation: Right;Location: Knee   Restrictions/Precautions   RUE Weight Bearing Per Order NWB  (in sling)   Other Precautions Chair Alarm; Bed Alarm;Multiple lines; Fall Risk;Pain  (Masimo)   General   Chart Reviewed Yes   Family/Caregiver Present No   Cognition   Arousal/Participation Alert; Cooperative   Attention Within functional limits   Orientation Level Oriented to person; Other (Comment)  (pt was identified w/ full name, birth date)   Following Commands Follows one step commands without difficulty   Subjective   Subjective pt agreed to participate in PT intervention  Bed Mobility   Additional Comments Comfortable Gait Speed w/ cane: 0 74 m/s   Transfers   Sit to Stand 5  Supervision   Additional items Increased time required   Stand to Sit 5  Supervision   Additional items Increased time required   Additional Comments pt stated feeling uncomfortable w/ sling he had previously received  therapist was unable to adjust sling to comfortable position for pt  therpaist obtained sling from supply closet and provided to pt  pt stated feeling more comfortable  pt needed mod assist w/ donning and doffing sling  pt would benefit follow up w/ OT regarding sling management     Ambulation/Elevation   Gait pattern Foward flexed   Gait Assistance 6  Modified independent   Additional items Verbal cues  (for cane positioning)   Assistive Device SPC   Distance 100 feet  (additional not possible due to pain and fatigue)   Balance   Static Sitting Good   Dynamic Sitting Fair   Static Standing Fair   Ambulatory Fair  (w/ cane)   Activity Tolerance   Activity Tolerance Patient limited by fatigue;Patient limited by pain   Nurse Made Aware spoke to Isabel STONEG, Dannielle CM, Elizabeth Fan AP, ILDEFONSO OT   Assessment   Prognosis Fair   Problem List Decreased strength;Decreased range of motion;Decreased endurance; Impaired balance;Decreased mobility; Decreased safety awareness;Orthopedic restrictions;Pain   Assessment Therapist introduced single point cane use w/ ambulation to improve gait stability  Pt was noted to have improvement w/ use of cane w/ increased gait speed and decreased level of assist to maintain safety  continued inpatient PT tx is indicated to reduce fall risk  Goals   Patient Goals go home   STG Expiration Date 09/05/21   Short Term Goal #1 pt will: Increase bilateral LE strength 1/2 grade to facilitate independent mobility, Perform all bed mobility tasks independently to decrease fall risk factors, Perform all transfers independently to improve independence, Ambulate 250 ft  with least restrictive assistive device independently w/o LOB to expedite return to independent level of function, Navigate 10 stairs independently with unilateral handrail to facilitate return to previous living environment, Increase ambulatory balance 1 grade to decrease risk for falls, Complete exercise program independently to increase strength and endurance, Tolerate 3 hr OOB to faciliate upright tolerance, Improve gait speed to 0 84 m/s to reduce fall risk and increase independence, Improve Barthel Index score to 80 or greater to facilitate independence and Increase 4 Item DGI score to 10 or greater to decrease risk for falls   PT Treatment Day 1   Plan   Treatment/Interventions Functional transfer training;LE strengthening/ROM; Elevations; Therapeutic exercise; Endurance training;Patient/family training;Equipment eval/education; Bed mobility;Gait training   Progress Progressing toward goals   PT Frequency 5x/wk   Recommendation   PT Discharge Recommendation Home with outpatient rehabilitation   Additional Comments recommend cane use w/ mobility   AM-PAC Basic Mobility Inpatient   Turning in Bed Without Bedrails 4   Lying on Back to Sitting on Edge of Flat Bed 3   Moving Bed to Chair 3   Standing Up From Chair 3   Walk in Room 3   Climb 3-5 Stairs 3   Basic Mobility Inpatient Raw Score 19   Basic Mobility Standardized Score 42 48     Comfortable Gait Speed: 0 74 m/s  Gait Speed Interpretation:  Gain of 0 1 m/s is a predictor of well-being in those w/ abnormal walking speed compared to age-patched peers  <0 7m/s is at an increased risk for falls    Household ambulator: <0 4 m/s  Limited community ambulator: 0 4-0 8 m/s  [employer] ambulator: 0 8-1 2 m/s  Able to safely cross streets: >1 2 m/s    Skilled inpatient PT recommended while in hospital to progress pt toward treatment goals      Marielena Hutchison, PT

## 2021-08-26 NOTE — CASE MANAGEMENT
LOS: 0 DAYS  PATIENT IS NOT A BUNDLE  PATIENT IS NOT A READMISSION  UNPLANNED RISK OF READMISSION: 13      CM met with the Patient at the bedside; Patient was alert and oriented, sitting in the recliner  CM introduced self and role  Patient resides with his spouse in a 2-story home  There are 3 MICKEY  Prior to admission, Patient was independent with all ADLs and utilized no DME  Patient does have a RW and SPC from previous knee surgery  Patient has had [facility] in the past, but is unable to recall the agency  Patient has completed STR at [address] in the past  Patient utilizes [facility] in [place] and is able to afford all of his medications  Patient denies any MH/substance use history  Patient identifies his spouse as his health care representative  Patient does not have a POA or AD; Patient declined resources at this time, stating that he has the materials at home  Patient is retired and receives [employer] as his main source of income  Patient is a community  and reports that his spouse will transport upon dc  Patient sees his PCP, Mauricio Treviño, as needed  CM reviewed the recommendation for OPPT services  Patient verbalized his understanding of the recommendation and reports that he has completed OPPT at [facility] [address]  in the past  CM explained that the Patient is able to attend any facility that he chooses  Patient reports that he plans to use [facility]; CM provided the Patient with a choice list of [phone] [email or web address] offices  Patient denies any additional CM needs at this time  CM reviewed discharge planning process including the following: identifying caregivers at home, preference for d/c planning needs,   availability of [facility] program, availability of treatment team to discuss questions or concerns patient and/or family may have regarding diagnosis, plan of care, old or new medications and discharge planning   CM will continue to follow for care coordination and update assessment as appropriate

## 2021-08-26 NOTE — ED PROVIDER NOTES
History  Chief Complaint   Patient presents with    Fall     step off curve this morning in [place]  Hit right arm into side of car  Seen at [facility] and was told to come to home hospital or patient needed to be medvac due to broke right arm and no ortho present  + aspirin 81mg, no other thinners, no LOC  no other injuries   Arm Pain     Patient is a 20-year-old male with history of diabetes mellitus, hypertension, osteoarthritis, cardiac valve replacement, CABG, left knee replacement the presents emergency department with abrupt onset sharp shooting nonradiating right arm pain for 12 hours  Patient denies associated symptomatology  Patient states that he was in [place] and further reports that while attempting to walk over a curb, he tripped and landed on his right arm on the curb feeling immediate right arm pain  Patient denies head strike  Patient denies [address]  Patient is currently on 81 mg of aspirin  Patient states that he is right-handed  Patient also reports to a been treated at a local hospital in [place], proximal to where patient reports the injury having occurred, and was offered to be negative act to an adjacent hospital for orthopedic intervention to which patient had reported declining and came to emergency department at [facility] for treatment and evaluation  Patient currently has a right arm sling in place  Patient denies palliative factors with provocative factors of pressure to right arm  Patient denies not effective treatment  Patient denies fevers, chills, nausea, vomiting, diarrhea, constipation urinary symptoms  Patient denies recent sick contacts or recent travel  Patient affirms recent fall; approximately 3 ft in height on a concrete curb  Patient denies recent trauma  Patient denies chest pain, shortness of breath, and abdominal pain        History provided by:  Patient   used: No    Fall  Associated symptoms: no abdominal pain, no back pain, no chest pain, no headaches, no nausea, no neck pain and no vomiting    Arm Pain  Associated symptoms: no abdominal pain, no chest pain, no congestion, no cough, no diarrhea, no fever, no headaches, no nausea, no rash, no rhinorrhea, no shortness of breath, no sore throat and no vomiting        Prior to Admission Medications   Prescriptions Last Dose Informant Patient Reported? Taking? Blood Glucose Calibration (OT ULTRA/FASTTK CNTRL SOLN) SOLN 2021 at Unknown time Self No Yes   Sig: by In Vitro route as needed (to check meter calibration)   Blood Glucose Monitoring Suppl (ONE TOUCH ULTRA 2) w/Device KIT 2021 at Unknown time Self No Yes   Sig: by Does not apply route daily DX E11 65  Pt to   Check  fbs  daily   Cholecalciferol (VITAMIN D3) 2000 units capsule 2021 at Unknown time Self Yes Yes   Sig: Take 2,000 Units by mouth daily   Coenzyme Q10 (COQ-10) 100 MG CAPS 2021 at Unknown time Self Yes Yes   Sig: Take 100 mg by mouth  Continuous Blood Gluc  (FREESTYLE CEDRIC 14 DAY READER) ADELAIDA 2021 at Unknown time Self No Yes   Si CGM DEVICE  USE TO CHECK BG LEVELS 4+ TIMES DAILY AND AS NEEDED   Continuous Blood Gluc Sensor (FreeStyle Cedric 14 Day Sensor) MISC 2021 at Unknown time  No Yes   Sig: CHANGE SENSOR EVERY 14 DAYS TO CHECK BG LEVELS 4+ TIMES DAILY   Multiple Vitamin-Folic Acid TABS  at Unknown time Self Yes Yes   Sig: Take by mouth   Omega-3 Fatty Acids (FISH OIL) 1,000 mg 2021 at Unknown time Self Yes Yes   Sig: Take 1,000 mg by mouth daily     Trulicity 3 LW/9 4TV SOPN 2021 at Unknown time  No Yes   Sig: INJECT 0 5ML (3 MG TOTAL) ONCE A WEEK   alendronate (FOSAMAX) 70 mg tablet 2021 at Unknown time  No Yes   Sig: Take 1 tab by mouth every 7 days on empty stomach with full glass of water, do not lie down for 30 minutes after taking   aspirin 81 MG tablet 2021 at Unknown time Self Yes Yes   Sig: Take by mouth   atorvastatin (LIPITOR) 40 mg tablet 8/25/2021 at Unknown time  No Yes   Sig: Take 1 tablet (40 mg total) by mouth daily   fluorouracil (EFUDEX) 5 % cream 8/25/2021 at Unknown time  No Yes   Sig: APPLY TWICE A DAY TO SCALP FOR 1 MONTH   glipiZIDE (GLUCOTROL XL) 5 mg 24 hr tablet 8/25/2021 at Unknown time  No Yes   Sig: TAKE 1 TABLET BY MOUTH EVERY DAY   glucose blood (FREESTYLE LITE) test strip 8/25/2021 at Unknown time  No Yes   Sig: Test 3 times a day  glucose blood (ONE TOUCH ULTRA TEST) test strip 8/25/2021 at Unknown time Self No Yes   Sig: Check  fbs  Daily  DX E11 65   leuprolide (LUPRON DEPOT, 1-MONTH,) 3 75 mg injection 8/25/2021 at Unknown time Self Yes Yes   Sig: Inject into a muscle Once yearly    metFORMIN (GLUCOPHAGE-XR) 500 mg 24 hr tablet 8/25/2021 at Unknown time  No Yes   Sig: Take 1 tablet with breakfast and dinner daily   sildenafil (VIAGRA) 50 MG tablet 8/25/2021 at Unknown time Self No Yes   Sig: Take 1 tablet (50 mg total) by mouth daily as needed for erectile dysfunction      Facility-Administered Medications: None       Past Medical History:   Diagnosis Date    Arteriosclerotic coronary artery disease 11/30/2017    Benign prostate hyperplasia     L A  7/17/12   R   8/30/17      COPD (chronic obstructive pulmonary disease) (HCC)     Deep venous embolism and thrombosis (HCC)     [facility] mellitus ([facility] [address] [facility]     Hypertension     Impacted cerumen     unspecified laterity / L A  6/20/12       Osteoarthritis 7/17/2012    Prostate cancer ([facility] [address] )     Pulmonary embolism (HCC)        Past Surgical History:   Procedure Laterality Date    CARDIAC VALVE REPLACEMENT      CORONARY ARTERY BYPASS GRAFT      FRACTURE SURGERY      left knee surgery    KNEE ARTHROSCOPY      Therapeutic    KNEE SURGERY      replacement    LUNG SURGERY      LUNG SURGERY      wedge resection     TONSILLECTOMY      UMBILICAL HERNIA REPAIR         Family History   Problem Relation Age of Onset    COPD Mother     Heart attack Mother     Heart attack Father      I have reviewed and agree with the history as documented  E-Cigarette/Vaping     E-Cigarette/Vaping Substances     Social History     Tobacco Use    Smoking status: Former Smoker     Types: Cigarettes     Quit date: 3/23/2000     Years since quittin 4    Smokeless tobacco: Never Used   Substance Use Topics    Alcohol use: No     Comment: social drinker per allscript    Drug use: No       Review of Systems   Constitutional: Negative for activity change, appetite change, chills and fever  HENT: Negative for congestion, postnasal drip, rhinorrhea, sinus pressure, sinus pain, sore throat and tinnitus  Eyes: Negative for photophobia and visual disturbance  Respiratory: Negative for cough, chest tightness and shortness of breath  Cardiovascular: Negative for chest pain and palpitations  Gastrointestinal: Negative for abdominal pain, constipation, diarrhea, nausea and vomiting  Genitourinary: Negative for difficulty urinating, dysuria, flank pain, frequency and urgency  Musculoskeletal: Positive for arthralgias  Negative for back pain, gait problem, neck pain and neck stiffness  Skin: Negative for pallor and rash  Allergic/Immunologic: Negative for environmental allergies and food allergies  Neurological: Negative for dizziness, weakness, numbness and headaches  Psychiatric/Behavioral: Negative for confusion  All other systems reviewed and are negative  Physical Exam  Physical Exam  Vitals and nursing note reviewed  Constitutional:       General: He is awake  Appearance: Normal appearance  He is well-developed  He is not ill-appearing, toxic-appearing or diaphoretic  Comments: /71 (BP Location: Right arm)   Pulse 95   Temp 98 8 °F (37 1 °C) (Oral)   Resp 18   SpO2 94%      HENT:      Head: Normocephalic and atraumatic        Right Ear: Hearing, tympanic membrane, ear canal and external ear normal  No decreased hearing noted  No drainage, swelling or tenderness  No mastoid tenderness  Left Ear: Hearing, tympanic membrane, ear canal and external ear normal  No decreased hearing noted  No drainage, swelling or tenderness  No mastoid tenderness  Nose: Nose normal       Mouth/Throat:      Lips: Pink  Mouth: Mucous membranes are moist       Pharynx: Oropharynx is clear  Uvula midline  Eyes:      General: Lids are normal  Vision grossly intact  Right eye: No discharge  Left eye: No discharge  Extraocular Movements: Extraocular movements intact  Conjunctiva/sclera: Conjunctivae normal       Pupils: Pupils are equal, round, and reactive to light  Neck:      Vascular: No JVD  Trachea: Trachea and phonation normal  No tracheal tenderness or tracheal deviation  Comments: No tenderness with passive and active cervical ROM with head turning approximately 45 degree angles to the left and right  No cervical tenderness with cranial axial loading    Cardiovascular:      Rate and Rhythm: Normal rate and regular rhythm  Pulses: Normal pulses  Radial pulses are 2+ on the right side and 2+ on the left side  Posterior tibial pulses are 2+ on the right side and 2+ on the left side  Heart sounds: Normal heart sounds  Pulmonary:      Effort: Pulmonary effort is normal       Breath sounds: Normal breath sounds  No stridor  No decreased breath sounds, wheezing, rhonchi or rales  Chest:      Chest wall: No tenderness  Abdominal:      General: Abdomen is flat  Bowel sounds are normal  There is no distension  Palpations: Abdomen is soft  Abdomen is not rigid  Tenderness: There is no abdominal tenderness  There is no guarding or rebound  Musculoskeletal:         General: Normal range of motion  Cervical back: Full passive range of motion without pain, normal range of motion and neck supple  No rigidity  No spinous process tenderness or muscular tenderness  Normal range of motion  Comments: Passive ROM intact  lower extremity 4/5 bilaterally  Left upper extremity, 4/5  Right upper extremity right shoulder 3/5, left elbow 4/5, left wrist 4/5, left hand 4/5  Neurovascularly intact  No grinding or clicking of joints    No snuffbox tenderness bilaterally     Lymphadenopathy:      Head:      Right side of head: No submental, submandibular, tonsillar, preauricular, posterior auricular or occipital adenopathy  Left side of head: No submental, submandibular, tonsillar, preauricular, posterior auricular or occipital adenopathy  Cervical: No cervical adenopathy  Right cervical: No superficial, deep or posterior cervical adenopathy  Left cervical: No superficial, deep or posterior cervical adenopathy  Skin:     General: Skin is warm  Capillary Refill: Capillary refill takes less than 2 seconds  Neurological:      General: No focal deficit present  Mental Status: He is alert and oriented to person, place, and time  GCS: GCS eye subscore is 4  GCS verbal subscore is 5  GCS motor subscore is 6  Sensory: No sensory deficit  Deep Tendon Reflexes: Reflexes are normal and symmetric  Reflex Scores:       Patellar reflexes are 2+ on the right side and 2+ on the left side  Psychiatric:         Mood and Affect: Mood normal          Speech: Speech normal          Behavior: Behavior normal  Behavior is cooperative  Thought Content:  Thought content normal          Judgment: Judgment normal          Vital Signs  ED Triage Vitals   Temperature Pulse Respirations Blood Pressure SpO2   08/26/21 0032 08/26/21 0032 08/26/21 0032 08/26/21 0032 08/26/21 0032   98 8 °F (37 1 °C) 95 18 118/71 94 %      Temp Source Heart Rate Source Patient Position - Orthostatic VS BP Location FiO2 (%)   08/26/21 0032 08/26/21 0032 08/26/21 0032 08/26/21 0032 --   Oral Monitor Lying Right arm       Pain Score       08/26/21 0333       3           Vitals: 08/26/21 0032 08/26/21 0321 08/26/21 0445   BP: 118/71 135/68 148/86   Pulse: 95 80 86   Patient Position - Orthostatic VS: Lying Lying Lying         Visual Acuity  Visual Acuity      Most Recent Value   L Pupil Size (mm)  3   R Pupil Size (mm)  3   L Pupil Shape  Round   R Pupil Shape  Round          ED Medications  Medications   tetanus-diphtheria-acellular pertussis (BOOSTRIX) IM injection 0 5 mL (0 5 mL Intramuscular Not Given 8/26/21 0222)   insulin lispro (HumaLOG) 100 units/mL subcutaneous injection 2-12 Units (2 Units Subcutaneous Refused 8/26/21 0503)   insulin glargine (LANTUS) subcutaneous injection 16 Units 0 16 mL (has no administration in time range)   acetaminophen (TYLENOL) tablet 975 mg (975 mg Oral Given 8/26/21 0506)   oxyCODONE (ROXICODONE) IR tablet 2 5 mg (has no administration in time range)   oxyCODONE (ROXICODONE) IR tablet 5 mg (has no administration in time range)   HYDROmorphone (DILAUDID) injection 0 2 mg (has no administration in time range)   senna-docusate sodium (SENOKOT S) 8 6-50 mg per tablet 1 tablet (has no administration in time range)   polyethylene glycol (MIRALAX) packet 17 g (has no administration in time range)   ondansetron (ZOFRAN) injection 4 mg (has no administration in time range)   heparin (porcine) subcutaneous injection 5,000 Units (5,000 Units Subcutaneous Given 8/26/21 0506)   atorvastatin (LIPITOR) tablet 40 mg (has no administration in time range)   multi-electrolyte (PLASMALYTE-A/ISOLYTE-S PH 7 4) IV solution (100 mL/hr Intravenous Rate/Dose Verify 8/26/21 0457)   oxyCODONE-acetaminophen (PERCOCET) 5-325 mg per tablet 1 tablet (1 tablet Oral Given 8/26/21 0222)   bacitracin topical ointment 1 small application (1 small application Topical Given 8/26/21 0222)   multi-electrolyte (PLASMALYTE-A/ISOLYTE-S PH 7 4) IV solution (100 mL/hr Intravenous New Bag 8/26/21 0320)       Diagnostic Studies  Results Reviewed     Procedure Component Value Units Date/Time Basic metabolic panel [[id number]]     Lab Status: No result Specimen: Blood     Comprehensive metabolic panel [[id number]]  (Abnormal) Collected: 08/26/21 0259    Lab Status: Final result Specimen: Blood Updated: 08/26/21 0309     Sodium 139 mmol/L      Potassium 4 4 mmol/L      Chloride 102 mmol/L      CO2 26 mmol/L      ANION GAP 11 mmol/L      BUN 24 mg/dL      Creatinine 1 41 mg/dL      Glucose 222 mg/dL      Calcium 9 8 mg/dL      AST 55 U/L      ALT 92 U/L      Alkaline Phosphatase 67 U/L      Total Protein 8 4 g/dL      Albumin 3 9 g/dL      Total Bilirubin 0 82 mg/dL      eGFR 48 ml/min/1 73sq m     Narrative:      [place] guidelines for Chronic Kidney Disease (CKD):     Stage 1 with normal or high GFR (GFR > 90 mL/min/1 73 square meters)    Stage 2 Mild CKD (GFR = 60-89 mL/min/1 73 square meters)    Stage 3A Moderate CKD (GFR = 45-59 mL/min/1 73 square meters)    Stage 3B Moderate CKD (GFR = 30-44 mL/min/1 73 square meters)    Stage 4 Severe CKD (GFR = 15-29 mL/min/1 73 square meters)    Stage 5 End Stage CKD (GFR <15 mL/min/1 73 square meters)  Note: GFR calculation is accurate only with a steady state creatinine    CBC and differential [[id number]]  (Abnormal) Resulted: 08/26/21 0256    Lab Status: Final result Specimen: Blood Updated: 08/26/21 0256     WBC 13 58 Thousand/uL      RBC 4 12 Million/uL      Hemoglobin 12 9 g/dL      Hematocrit 39 6 %      MCV 96 fL      MCH 31 3 pg      MCHC 32 6 g/dL      RDW 13 7 %      MPV 10 3 fL      Platelets 355 Thousands/uL      nRBC 0 /100 WBCs      Neutrophils Relative 62 %      Immat GRANS % 0 %      Lymphocytes Relative 29 %      Monocytes Relative 9 %      Eosinophils Relative 0 %      Basophils Relative 0 %      Neutrophils Absolute 8 34 Thousands/µL      Immature Grans Absolute 0 04 Thousand/uL      Lymphocytes Absolute 3 98 Thousands/µL      Monocytes Absolute 1 15 Thousand/µL      Eosinophils Absolute 0 03 Thousand/µL Basophils Absolute 0 04 Thousands/µL                  XR humerus RIGHT   ED Interpretation by Aditya Baron PA-C (08/26 8706)   Humeral neck fracture with mild angulation on initial read                 Procedures  Splint application    Date/Time: 8/26/2021 2:50 AM  Performed by: Aditya Baron PA-C  Authorized by: Aditya Baron PA-C   Universal Protocol:  Procedure performed by:  Consent: Verbal consent obtained  Risks and benefits: risks, benefits and alternatives were discussed  Consent given by: patient  Time out: Immediately prior to procedure a "time out" was called to verify the correct patient, procedure, equipment, support staff and site/side marked as required  Timeout called at: 8/26/2021 2:50 AM   Patient understanding: patient states understanding of the procedure being performed  Patient identity confirmed: verbally with patient and arm band      Pre-procedure details:     Sensation:  Normal    Skin color:  Pink  Procedure details:     Laterality:  Right    Location:  Arm    Arm:  R upper arm    Splint type: U shaped coaptation  Supplies:  Ortho-Glass  Post-procedure details:     Pain:  Improved    Sensation:  Normal    Skin color:  Pink    Patient tolerance of procedure: Tolerated well, no immediate complications             ED Course                             SBIRT 20yo+      Most Recent Value   SBIRT (22 yo +)   In order to provide better care to our patients, we are screening all of our patients for alcohol and drug use  Would it be okay to ask you these screening questions? Yes Filed at: 08/26/2021 0236   Initial Alcohol Screen: US AUDIT-C    1  How often do you have a drink containing alcohol?  0 Filed at: 08/26/2021 0236   2  How many drinks containing alcohol do you have on a typical day you are drinking? 0 Filed at: 08/26/2021 0236   3a  Male UNDER 65: How often do you have five or more drinks on one occasion? 0 Filed at: 08/26/2021 0236   3b  FEMALE Any Age, or MALE 65+:  How often do you have 4 or more drinks on one occassion? 0 Filed at: 08/26/2021 0236   Audit-C Score  0 Filed at: 08/26/2021 0236   SASHA: How many times in the past year have you    Used an illegal drug or used a prescription medication for non-medical reasons? Never Filed at: 08/26/2021 0236                    MDM  Number of Diagnoses or Management Options  Closed right humeral fracture: new and requires workup     Amount and/or Complexity of Data Reviewed  Clinical lab tests: reviewed and ordered  Tests in the radiology section of CPT®: ordered and reviewed  Review and summarize past medical records: yes    Risk of Complications, Morbidity, and/or Mortality  Presenting problems: moderate  Diagnostic procedures: moderate  Management options: moderate    Patient Progress  Patient progress: stable    Patient is a 56-year-old male with history of diabetes mellitus, hypertension, osteoarthritis, cardiac valve replacement, CABG, left knee replacement the presents emergency department with abrupt onset sharp shooting nonradiating right arm pain for 12 hours      Patient hemodynamically stable and afebrile  Right humerus x-ray with mild displaced proximal humerus fracture; closed  u shaped coaptation splint applied by ED technician with right shoulder sling  Right knee abrasion; with patient verbalizing cleaned at [facility] to which she had been evaluated, hospital in [place]; bacitracin, nonstick bandage, Jihan Lamont; patient had no pain proximal to abrasion area; bleeding controlled  Patient was self ambulatory without assistive cane, persons or assistive ambulatory device  Delivered Percocet with patient verbalized decrease in right arm pain symptoms status post medication delivery  Discussed patient case with tameka Rodriguez and both agreed to place patient on inpatient status under the care of Dr Tyson Beavers, Trauma  Patient with verbal understanding all clinical imaging findings, discharge instructions, follow-up verbalized agreement with patient current treatment plan    Disposition  Final diagnoses:   Closed right humeral fracture   Fall, initial encounter     Time reflects when diagnosis was documented in both MDM as applicable and the Disposition within this note     Time User Action Codes Description Comment    8/26/2021  2:50 AM Sammy Kalin Beard [[id number]] Closed right humeral fracture     8/26/2021  3:04 AM Sammy AguileraGrenadaalfred Beard [W19  Othel Board, initial encounter       ED Disposition     ED Disposition Condition Date/Time Comment    Admit Stable Thu Aug 26, 2021  3:04 AM Case was discussed with Kenneth Cantu, Trauma AP and the patient's admission status was agreed to be Admission Status: inpatient status to the service of Dr Anais Daley, Trauma           Follow-up Information    None         Current Discharge Medication List      CONTINUE these medications which have NOT CHANGED    Details   alendronate (FOSAMAX) 70 mg tablet Take 1 tab by mouth every 7 days on empty stomach with full glass of water, do not lie down for 30 minutes after taking  Qty: 12 tablet, Refills: 1    Associated Diagnoses: Osteoporosis without current pathological fracture, unspecified osteoporosis type      aspirin 81 MG tablet Take by mouth      atorvastatin (LIPITOR) 40 mg tablet Take 1 tablet (40 mg total) by mouth daily  Qty: 90 tablet, Refills: 1    Associated Diagnoses: Dyslipidemia      Blood Glucose Calibration (OT ULTRA/FASTTK CNTRL SOLN) SOLN by In Vitro route as needed (to check meter calibration)  Qty: 1 each, Refills: 3    Associated Diagnoses: Type 2 diabetes mellitus with hyperglycemia, without long-term current use of insulin (HCC)      Blood Glucose Monitoring Suppl (ONE TOUCH ULTRA 2) w/Device KIT by Does not apply route daily DX E11 65  Pt to   Check  fbs  daily  Qty: 1 each, Refills: 0    Associated Diagnoses: Uncontrolled type 2 diabetes mellitus without complication, without long-term current use of insulin      Cholecalciferol (VITAMIN D3) 2000 units capsule Take 2,000 Units by mouth daily  Refills: 11      Coenzyme Q10 (COQ-10) 100 MG CAPS Take 100 mg by mouth  Continuous Blood Gluc  (FREESTYLE CEDRIC 14 DAY READER) ADELAIDA 1 CGM DEVICE  USE TO CHECK BG LEVELS 4+ TIMES DAILY AND AS NEEDED  Qty: 1 Device, Refills: 0    Comments: 14 DAY READER  Associated Diagnoses: Uncontrolled type 2 diabetes mellitus with hyperglycemia (HCC)      Continuous Blood Gluc Sensor (FreeStyle Cedric 14 Day Sensor) MISC CHANGE SENSOR EVERY 14 DAYS TO CHECK BG LEVELS 4+ TIMES DAILY  Qty: 6 each, Refills: 3    Comments: 14 DAY SENSOR  Associated Diagnoses: Uncontrolled type 2 diabetes mellitus with hyperglycemia (HCC)      fluorouracil (EFUDEX) 5 % cream APPLY TWICE A DAY TO SCALP FOR 1 MONTH  Qty: 40 g, Refills: 0    Comments: REFILL PLEASE  Associated Diagnoses: Seborrhea      glipiZIDE (GLUCOTROL XL) 5 mg 24 hr tablet TAKE 1 TABLET BY MOUTH EVERY DAY  Qty: 90 tablet, Refills: 0    Associated Diagnoses: Type 2 diabetes mellitus with hyperglycemia, without long-term current use of insulin ([facility])      ! ! glucose blood (FREESTYLE LITE) test strip Test 3 times a day  Qty: 300 each, Refills: 1    Associated Diagnoses: Type 2 diabetes mellitus with complication, without long-term current use of insulin ([address] )      ! ! glucose blood (ONE TOUCH ULTRA TEST) test strip Check  fbs  Daily   DX E11 65  Qty: 100 each, Refills: 3    Associated Diagnoses: Uncontrolled type 2 diabetes mellitus without complication, without long-term current use of insulin      leuprolide (LUPRON DEPOT, 1-MONTH,) 3 75 mg injection Inject into a muscle Once yearly       metFORMIN (GLUCOPHAGE-XR) 500 mg 24 hr tablet Take 1 tablet with breakfast and dinner daily  Qty: 180 tablet, Refills: 1    Associated Diagnoses: Type 2 diabetes mellitus with hyperglycemia, without long-term current use of insulin (HCC)      Multiple Vitamin-Folic Acid TABS Take by mouth      Omega-3 Fatty Acids (FISH OIL) 1,000 mg Take 1,000 mg by mouth daily  sildenafil (VIAGRA) 50 MG tablet Take 1 tablet (50 mg total) by mouth daily as needed for erectile dysfunction  Qty: 5 tablet, Refills: 5    Associated Diagnoses: Erectile dysfunction due to diseases classified elsewhere      Trulicity 3 MH/0 9ZS SOPN INJECT 0 5ML (3 MG TOTAL) ONCE A WEEK  Qty: 0 5 mL, Refills: 0    Associated Diagnoses: Type 2 diabetes mellitus with hyperglycemia, without long-term current use of insulin ([facility] [address] ); Obesity (BMI 35 0-39 9 without comorbidity)       ! ! - Potential duplicate medications found  Please discuss with provider  No discharge procedures on file      PDMP Review     None          ED Provider  Electronically Signed by           Adrian Olmos PA-C  08/26/21 Carmina Quispe PA-C  08/26/21 [id number]

## 2021-08-26 NOTE — ASSESSMENT & PLAN NOTE
- History of YATES  - AST/ ALT elevated  - Follow up with PCP Roomed by Ange River RN  Patient brought back to exam room  Name and Date of Birth verified by patient  Last visit: 12/11/2018    Problem # 1: WARTS-finger  SUBJECTIVE: Irene is in for a wart follow up on which is gradually improving. Previous treatments: aldara. Risk factors: none. Symptoms: unsightly appearance  OBJECTIVE: Wart(s) is present on Lt. (5th) finger(s). Description: multiple and periungual   ASSESSMENT: Wart(s) - Improving  PLAN: --Discussed viral etiology and natural history of warts and --Various treatment methods, side effects and failure rates have been discussed.  continue present treatment   Liquid nitrogen was applied for 10-12 seconds to the lesion(s) and the expected blistering or scabbing reaction explained.  Return if lesion(s) fail to fully resolve.    Bobby Fleming MD  follow up 6 weeks or prn

## 2021-08-26 NOTE — ASSESSMENT & PLAN NOTE
Lab Results   Component Value Date    HGBA1C 6 7 (H) 05/28/2021       No results for input(s): POCGLU in the last 72 hours      Blood Sugar Average: Last 72 hrs:     - Home medications held while inpatient  - Basal and sliding scale insulin while NPO  - Add meal-time insulin once patient can have a diet

## 2021-08-26 NOTE — CONSULTS
Consult Note - Orthopedics   Hayde Mcleod 77 y o  male MRN: [id number]  Unit/Bed#: S -01 Encounter: [id number]      Assessment & Plan     Right proximal humerus fracture after mechanical fall 8/25/21  1  Plan for non operative management at this time  2  Pain control  3  NWB RUE in sling  4  PT/OT  5  Follow-up as outpatient in 1-2 weeks  Chief Complaint     Right shoulder injury    History of the Present Illness     Hayde Mcleod is a 77 y o  male seen in orthopedic consultation at the request of Nicole Cleary DO for evaluation of patient's right shoulder  Patient sustained a mechanical fall yesterday after taking a misstep on a curb  He fell directly onto his right shoulder  After the fall he noted significant pain  This occurred in [place]  After the fall he did report to a hospital there where he was told he had a fracture and was placed in a sling  He reported to [employer] [facility] once he arrived home for further evaluation  Currently his pain is well controlled at rest   Pain is worse with movement  No numbness or tingling  No fevers or chills  Physical Exam     /70   Pulse 78   Temp 97 8 °F (36 6 °C)   Resp 18   SpO2 93%     Right shoulder:  Skin intact  Coaptation splint removed  No gross deformity  Tenderness to palpation proximal humerus  Elbow wrist and digital range of motion intact  Shoulder range of motion and strength testing is deferred  Eyes:  Anicteric sclerae  ENT:  Trachea midline  Lungs:  Clear to auscultation bilaterally  Cardiovascular:  Regular rate and rhythm with audible S1 and S2  Abdomen:  Soft and nontender  Lymphatic:  No palpable lymphadenopathy  Skin:  Intact without erythema  Neurologic:  Sensation grossly intact to light touch  Psychiatric:  Mood and affect are appropriate      Data Review     I have personally reviewed pertinent films in PACS, and my interpretation follows:    X-rays right humerus 8/26/21:  Proximal wrist fracture with minimal angulation and no significant displacement    I have personally reviewed pertinent lab results  Lab Results   Component Value Date     09/14/2015    K 4 4 08/26/2021    K 3 5 09/14/2015     08/26/2021     09/14/2015    CO2 26 08/26/2021    CO2 28 1 09/14/2015    BUN 24 08/26/2021    BUN 18 09/14/2015    CREATININE 1 41 (H) 08/26/2021    CREATININE 1 16 09/14/2015    GLUCOSE 209 (H) 09/14/2015     Lab Results   Component Value Date    WBC 13 58 (H) 08/26/2021    WBC 7 33 09/14/2015    HGB 12 9 08/26/2021    HGB 12 2 09/14/2015     08/26/2021     09/14/2015     No results found for: PT, INR, PTT    Past Medical History:   Diagnosis Date    Arteriosclerotic coronary artery disease 11/30/2017    Benign prostate hyperplasia     L A  7/17/12   R   8/30/17      COPD (chronic obstructive pulmonary disease) (HCC)     Deep venous embolism and thrombosis (HCC)     [facility] mellitus [facility]     Hypertension     Impacted cerumen     unspecified laterity / L A  6/20/12       Osteoarthritis 7/17/2012    Prostate cancer [facility]     Pulmonary embolism (HCC)        Past Surgical History:   Procedure Laterality Date    CARDIAC VALVE REPLACEMENT      CORONARY ARTERY BYPASS GRAFT      FRACTURE SURGERY      left knee surgery    KNEE ARTHROSCOPY      Therapeutic    KNEE SURGERY      replacement    LUNG SURGERY      LUNG SURGERY      wedge resection     TONSILLECTOMY      UMBILICAL HERNIA REPAIR         No Known Allergies    No current facility-administered medications on file prior to encounter       Current Outpatient Medications on File Prior to Encounter   Medication Sig Dispense Refill    alendronate (FOSAMAX) 70 mg tablet Take 1 tab by mouth every 7 days on empty stomach with full glass of water, do not lie down for 30 minutes after taking 12 tablet 1    aspirin 81 MG tablet Take by mouth      atorvastatin (LIPITOR) 40 mg tablet Take 1 tablet (40 mg total) by mouth daily 90 tablet 1    Blood Glucose Calibration (OT ULTRA/FASTTK CNTRL SOLN) SOLN by In Vitro route as needed (to check meter calibration) 1 each 3    Blood Glucose Monitoring Suppl (ONE TOUCH ULTRA 2) w/Device KIT by Does not apply route daily DX E11 65  Pt to   Check  fbs  daily 1 each 0    Cholecalciferol (VITAMIN D3) 2000 units capsule Take 2,000 Units by mouth daily  11    Coenzyme Q10 (COQ-10) 100 MG CAPS Take 100 mg by mouth   Continuous Blood Gluc  (FREESTYLE CEDRIC 14 DAY READER) ADELAIDA 1 CGM DEVICE  USE TO CHECK BG LEVELS 4+ TIMES DAILY AND AS NEEDED 1 Device 0    Continuous Blood Gluc Sensor (FreeStyle Cedric 14 Day Sensor) MISC CHANGE SENSOR EVERY 14 DAYS TO CHECK BG LEVELS 4+ TIMES DAILY 6 each 3    fluorouracil (EFUDEX) 5 % cream APPLY TWICE A DAY TO SCALP FOR 1 MONTH 40 g 0    glipiZIDE (GLUCOTROL XL) 5 mg 24 hr tablet TAKE 1 TABLET BY MOUTH EVERY DAY 90 tablet 0    glucose blood (FREESTYLE LITE) test strip Test 3 times a day  300 each 1    glucose blood (ONE TOUCH ULTRA TEST) test strip Check  fbs  Daily  DX E11 65 100 each 3    leuprolide (LUPRON DEPOT, 1-MONTH,) 3 75 mg injection Inject into a muscle Once yearly       metFORMIN (GLUCOPHAGE-XR) 500 mg 24 hr tablet Take 1 tablet with breakfast and dinner daily 180 tablet 1    Multiple Vitamin-Folic Acid TABS Take by mouth      Omega-3 Fatty Acids (FISH OIL) 1,000 mg Take 1,000 mg by mouth daily        sildenafil (VIAGRA) 50 MG tablet Take 1 tablet (50 mg total) by mouth daily as needed for erectile dysfunction 5 tablet 5    Trulicity 3  9JB SOPN INJECT 0 5ML (3 MG TOTAL) ONCE A WEEK 0 5 mL 0       Social History     Tobacco Use    Smoking status: Former Smoker     Types: Cigarettes     Quit date: 3/23/2000     Years since quittin 4    Smokeless tobacco: Never Used   Substance Use Topics    Alcohol use: No     Comment: social drinker per allscript    Drug use: No       Family History   Problem Relation Age of Onset    COPD Mother     Heart attack Mother     Heart attack Father        Review of Systems     As stated in the HPI  All other systems were reviewed and are negative

## 2021-08-26 NOTE — ASSESSMENT & PLAN NOTE
Lab Results   Component Value Date    HGBA1C 6 7 (H) 05/28/2021       Recent Labs     08/26/21  0503   POCGLU 206*       Blood Sugar Average: Last 72 hrs:  (P) 206   - Home medications held while inpatient  - Basal and sliding scale insulin while NPO  - Add meal-time insulin once patient can have a diet  - resume home medications at discharge

## 2021-08-26 NOTE — OCCUPATIONAL THERAPY NOTE
Occupational Therapy Evaluation     Patient Name: Kushal Stoll  EOHAT'T Date: 8/26/2021  Problem List  Principal Problem:    Humerus fracture  Active Problems:    History of aortic valve replacement    Type 2 diabetes mellitus with hyperglycemia, without long-term current use of insulin (HCC)    YATES (nonalcoholic steatohepatitis)    Fall    Past Medical History  Past Medical History:   Diagnosis Date    Arteriosclerotic coronary artery disease 11/30/2017    Benign prostate hyperplasia     L A  7/17/12   R   8/30/17      [facility]     Deep venous embolism and thrombosis (HCC)     [facility] mellitus [facility]     Hypertension     Impacted cerumen     unspecified laterity / L A  6/20/12       Osteoarthritis 7/17/2012    [facility] cancer ([facility] )     Pulmonary embolism ([facility])      Past Surgical History  Past Surgical History:   Procedure Laterality Date    CARDIAC VALVE REPLACEMENT      CORONARY ARTERY BYPASS GRAFT      FRACTURE SURGERY      left knee surgery    KNEE ARTHROSCOPY      Therapeutic    KNEE SURGERY      replacement    LUNG SURGERY      LUNG SURGERY      wedge resection     TONSILLECTOMY      UMBILICAL HERNIA REPAIR               08/26/21 0932   OT Last Visit   OT Visit Date 08/26/21   Note Type   Note type Evaluation   Restrictions/Precautions   Weight Bearing Precautions Per Order Yes   RUE Weight Bearing Per Order NWB  (in sling)   Other Precautions Chair Alarm; Bed Alarm; Fall Risk;Pain   Pain Assessment   Pain Assessment Tool 0-10   Pain Score 6   Pain Location/Orientation Orientation: Right;Location: Shoulder   Home Living   Type of [address] [place] [address] Two level;Bed/bath upstairs;1/2 bath on main level  (3 MICKEY)   Bathroom Shower/Tub Walk-in shower   Bathroom Toilet Standard   Bathroom Equipment Shower chair   Bathroom Accessibility Accessible   Additional Comments no use of AD at baseline   Prior Function   Level of [place] Independent with ADLs and functional mobility   Lives With Spouse   Receives Help From Family   ADL Assistance Independent   IADLs Independent   Falls in the last 6 months 1 to 4   Vocational Retired   Comments (+)drives   Lifestyle   Autonomy PTA pt living with wife in [facility], pt (I) with all ADLs and IADLs, (+)fall, (+)drives   Reciprocal Relationships supportive wife, children live nearby   Service to Others retired   Intrinsic Gratification enjoys bowling and going to his beach house in [address] [facility] 5  Supervision/Setup   Eating Deficit Setup   Grooming Assistance 5  [address] [facility] 5  [address] [facility] 5  Supervision/Setup   UB Dressing Deficit   (don/doffing sling and edu on shirt)   LB Dressing Assistance 5  Supervision/Setup   LB Dressing Deficit Increased time to complete; Don/doff R sock; Don/doff L sock   Toileting Assistance  5  Supervision/Setup   Additional Comments Edu provided on sling management, dressing techniques and edu provided on showering   Pt reports that he would feel more comfortable if he wore a sling in the shower, pt was provided with 2nd sling earlier in the day educated to use old sling for showering   Bed Mobility   Additional Comments OOB and in chair   Transfers   Sit to Stand 5  Supervision   Stand to Sit 5  Supervision   Functional Mobility   Functional Mobility 5  Supervision   Additional Comments functional household distance   Additional items SPC   Balance   Static Sitting Good   Dynamic Sitting Good   Static Standing Fair +   Dynamic Standing Fair +   Ambulatory Fair +   Activity Tolerance   Activity Tolerance Patient tolerated treatment well   Medical Staff Made Aware LASHANDA Hamm, PETER Spicer   RUKENDALL Assessment   RUE Assessment X  (NWB in sling, good dexterity noted in hand)   LUE Assessment   LUE Assessment WFL   Hand Function   Gross Motor Coordination Functional  (in LUE)   Fine Motor Coordination Functional   Cognition   Overall Cognitive Status WFL   Arousal/Participation Alert; Cooperative   Attention Within functional limits   Orientation Level Oriented X4   Memory Within functional limits   Following Commands Follows one step commands without difficulty   Comments pleasant and cooeprative   Assessment   Limitation Decreased ADL status; Decreased UE strength;Decreased Safe judgement during ADL;Decreased cognition;Decreased endurance;Decreased self-care trans;Decreased high-level ADLs   Prognosis Good   Assessment Patient is a 77 y o  male admitted to [address] [place] [address] on 8/26/2021 due to Humerus fracture  Pt is to be NWB to RUE in sling  Comorbidities affecting pt's physical performance at time of assessment include DM II, fall, obesity, hx aortic valve replacement  Patient has active OT orders and activity orders for Up in chair  PTA pt living with wife in [facility], pt (I) with all ADLs and IADLs, (+)fall, (+)drives  Personal factors affecting pt at time of IE include:steps to enter environment  At the time of evaluation patient currently requires (S) for UB ADLs, (S) for LB ADLs, (S) for functional transfers, and (S) for functional mobility  Pt provided education on one arm techniques with dressing and bathing tasks and demonstrating proper carry-over  No further acute OT needs identified at this time  Recommend continued mobilization with hospital staff and restorative services while in the hospital to increase pts endurance and strength upon D/C  From OT standpoint, recommend D/C to home with family support when medically cleared  D/C pt from OT caseload at this time      Goals   Patient Goals to go home today   Plan   OT Frequency Eval only   Recommendation   OT Discharge Recommendation No rehabilitation needs   AM-PAC Daily Activity Inpatient   Lower Body Dressing 3   Bathing 3   Toileting 3   Upper Body Dressing 3   Grooming 4   Eating 4 Daily Activity Raw Score 20   Daily Activity Standardized Score (Calc for Raw Score >=11) 42 03   AM-[facility] Applied Cognition Inpatient   Following a Speech/Presentation 4   Understanding Ordinary Conversation 4   Taking Medications 4   Remembering Where Things Are Placed or Put Away 4   Remembering List of 4-5 Errands 4   Taking Care of Complicated Tasks 4   Applied Cognition Raw Score 24   Applied Cognition Standardized Score 62 21        The patient's raw score on the AM-PAC Daily Activity inpatient short form is 20, standardized score is 42 03, greater than 39 4  Patients at this level are likely to benefit from discharge to home  Please refer to the recommendation of the Occupational Therapist for safe discharge planning  Pt with no OT needs, d/c OT services at this time     STEPHANIE Weller/ROBER

## 2021-08-26 NOTE — PLAN OF CARE
Problem: PHYSICAL THERAPY ADULT  Goal: Performs mobility at highest level of function for planned discharge setting  See evaluation for individualized goals  Description: Treatment/Interventions: Functional transfer training, LE strengthening/ROM, Elevations, Therapeutic exercise, Endurance training, Patient/family training, Equipment eval/education, Bed mobility, Gait training          See flowsheet documentation for full assessment, interventions and recommendations  Outcome: Progressing  Note: Prognosis: Fair  Problem List: Decreased strength, Decreased range of motion, Decreased endurance, Impaired balance, Decreased mobility, Decreased safety awareness, Orthopedic restrictions, Pain  Assessment: Therapist introduced single point cane use w/ ambulation to improve gait stability  Pt was noted to have improvement w/ use of cane w/ increased gait speed and decreased level of assist to maintain safety  continued inpatient PT tx is indicated to reduce fall risk  PT Discharge Recommendation: Home with outpatient rehabilitation          See flowsheet documentation for full assessment

## 2021-08-26 NOTE — UTILIZATION REVIEW
Initial Clinical Review    Admission: Date/Time/Statement:   Admission Orders (From admission, onward)     Ordered        08/26/21 0258  Inpatient Admission  Once                   Orders Placed This Encounter   Procedures    Inpatient Admission     Standing Status:   Standing     Number of Occurrences:   1     Order Specific Question:   Level of Care     Answer:   Med Surg [16]     Order Specific Question:   Estimated length of stay     Answer:   More than 2 Midnights     Order Specific Question:   Certification     Answer:   I certify that inpatient services are medically necessary for this patient for a duration of greater than two midnights  See H&P and MD Progress Notes for additional information about the patient's course of treatment  ED Arrival Information     Expected Arrival Acuity    - 8/26/2021 00:21 Urgent         Means of arrival Escorted by Service Admission type    Wheelchair Spouse Trauma Urgent         Arrival complaint    FALL KNEE [place] Hora +ASPRIN        Chief Complaint   Patient presents with    Fall     step off curve this morning in [place]  Hit right arm into side of car  Seen at [facility] and was told to come to home hospital or patient needed to be medvac due to broke right arm and no ortho present  + aspirin 81mg, no other thinners, no LOC  no other injuries   Arm Pain       Initial Presentation:  this is a 77year old male from home to ED admitted inpatient due to humerus fracture status post fall/NHUNG  Presented due to arm pain, about 12 noon on 8/25 tripped and fell on right arm, had immediate pain,  [place] and told right arm fracture and placed in sling, advised to go to adjacent hospital for orthopedics, declined and Drove to [place] to have care near home  On exam: lower extremity 4/5 bilaterally  Left upper extremity, 4/5  Right upper extremity right shoulder 3/5, left elbow 4/5, left wrist 4/5, left hand 4/5    Right knee abrasion  Xray showed spiral humeral neck fracture  Bun 24, creatinine 1 41 with baseline of 1 08 - 1 2  In the ED given Percocet  TDP, IVF started  Orthopedics consulted  Plan is continued IVF, pain control, trend BMP  Topical antibiotic to right knee abrasion  Continue home insulin and start accu checks with SSI qid      8/26/2021:  Per Orthopedics:  Patient has right proximal humerus fracture post fall  Plan is pain control, NWB  RUE in sling, PT/OT, no operative intervention at this time         ED Triage Vitals   Temperature Pulse Respirations Blood Pressure SpO2   08/26/21 0032 08/26/21 0032 08/26/21 0032 08/26/21 0032 08/26/21 0032   98 8 °F (37 1 °C) 95 18 118/71 94 %      Temp Source Heart Rate Source Patient Position - Orthostatic VS BP Location FiO2 (%)   08/26/21 0032 08/26/21 0032 08/26/21 0032 08/26/21 0032 --   Oral Monitor Lying Right arm       Pain Score       08/26/21 0333       3          Wt Readings from Last 1 Encounters:   06/14/21 112 kg (247 lb)     Additional Vital Signs:   08/26/21 11:14:59  98 4 °F (36 9 °C)  80  18  124/62  83  93 %  --  --   08/26/21 07:53:15  97 8 °F (36 6 °C)  78  18  128/70  89  93 %  --  --   08/26/21 04:45:24  98 3 °F (36 8 °C)  86  18  148/86  107  94 %  None (Room air)  Lying   08/26/21 0321  --  80  17  135/68  --  96 %  None (Room air)         Pertinent Labs/Diagnostic Test Results:   8/26/2021 Xray right humerus Spiral humeral neck fracture with slight displacement     Results from last 7 days   Lab Units 08/26/21  1119 08/26/21  0256   WBC Thousand/uL 10 87* 13 58*   HEMOGLOBIN g/dL 11 7* 12 9   HEMATOCRIT % 35 4* 39 6   PLATELETS Thousands/uL 202 249   NEUTROS ABS Thousands/µL  --  8 34*     Results from last 7 days   Lab Units 08/26/21  1119 08/26/21  0259   SODIUM mmol/L 137 139   POTASSIUM mmol/L 3 9 4 4   CHLORIDE mmol/L 102 102   CO2 mmol/L 26 26   ANION GAP mmol/L 9 11   BUN mg/dL 22 24   CREATININE mg/dL 1 18 1 41*   EGFR ml/min/1 73sq m 60 48 CALCIUM mg/dL 9 3 9 8     Results from last 7 days   Lab Units 08/26/21  0259   AST U/L 55*   ALT U/L 92*   ALK PHOS U/L 67   TOTAL PROTEIN g/dL 8 4*   ALBUMIN g/dL 3 9   TOTAL BILIRUBIN mg/dL 0 82     Results from last 7 days   Lab Units 08/26/21  1116 08/26/21  0503   POC GLUCOSE mg/dl 187* 206*     Results from last 7 days   Lab Units 08/26/21  1119 08/26/21  0259   GLUCOSE RANDOM mg/dL 192* 222*       ED Treatment:   Medication Administration from 08/26/2021 0021 to 08/26/2021 0447       Date/Time Order Dose Route Action Comments     08/26/2021 0222 oxyCODONE-acetaminophen (PERCOCET) 5-325 mg per tablet 1 tablet 1 tablet Oral Given      08/26/2021 0222 bacitracin topical ointment 1 small application 1 small application Topical Given      08/26/2021 0317 enoxaparin (LOVENOX) subcutaneous injection 30 mg 30 mg Subcutaneous Not Given      08/26/2021 0320 multi-electrolyte (PLASMALYTE-A/ISOLYTE-S PH 7 4) IV solution 100 mL/hr Intravenous New Bag      08/26/2021 0329 multi-electrolyte (PLASMALYTE-A/ISOLYTE-S PH 7 4) IV solution 100 mL/hr Intravenous Not Given         Past Medical History:   Diagnosis Date    Arteriosclerotic coronary artery disease 11/30/2017    Benign prostate hyperplasia     L A  7/17/12   R   8/30/17      COPD (chronic obstructive pulmonary disease) (HCC)     Deep venous embolism and thrombosis (HCC)     [facility]     Hypertension     Impacted cerumen     unspecified laterity / L A  6/20/12       Osteoarthritis 7/17/2012    Prostate cancer ([facility]     Pulmonary embolism (HCC)      Present on Admission:   Humerus fracture   Fall   Type 2 diabetes mellitus with hyperglycemia, without long-term current use of insulin (HCC)   YATES (nonalcoholic steatohepatitis)      Admitting Diagnosis: Knee injury [S89 90XA]  Arm injury [S49 90XA]  Closed right humeral fracture [S42 301A]  Age/Sex: 77 y o  male  Admission Orders: 8/26/2021 0258 inpatient   Scheduled Medications:  acetaminophen, 975 mg, Oral, Q8H MAHAD  atorvastatin, 40 mg, Oral, Daily  heparin (porcine), 5,000 Units, Subcutaneous, Q8H MAHAD  insulin glargine, 16 Units, Subcutaneous, HS  insulin lispro, 2-12 Units, Subcutaneous, Q6H MAHAD  senna-docusate sodium, 1 tablet, Oral, BID  tetanus-diphtheria-acellular pertussis, 0 5 mL, Intramuscular, Once      Continuous IV Infusions:  multi-electrolyte, 100 mL/hr, Intravenous, Continuous      PRN Meds: not used   HYDROmorphone, 0 2 mg, Intravenous, Q4H PRN  ondansetron, 4 mg, Intravenous, Q4H PRN  oxyCODONE, 2 5 mg, Oral, Q4H PRN  oxyCODONE, 5 mg, Oral, Q4H PRN  polyethylene glycol, 17 g, Oral, Daily PRN    Static long arm splint Right - - Sugar-tong up length of humerus; place sling    IP CONSULT TO ORTHOPEDIC SURGERY      Network Utilization Review Department  ATTENTION: Please call with any questions or concerns to [phone] and carefully listen to the prompts so that you are directed to the right person  All voicemails are confidential   Dioni Ashford all requests for admission clinical reviews, approved or denied determinations and any other requests to dedicated fax number below belonging to the campus where the patient is receiving treatment   List of dedicated fax numbers for the Facilities:  [address] DENIALS (Administrative/Medical Necessity) [phone]   [address] (Maternity/NICU/Pediatrics) [phone]   [address] [place] [id number] [place] [address] [address] [phone]   [facility] [place] [address] [phone]

## 2021-08-26 NOTE — H&P
[facility]  H&P- Kushal Stoll 1945, 77 y o  male MRN: [id number]  Unit/Bed#: ED 27 Encounter: [id number]  Primary Care Provider: Meghna Chen PA-C   Date and time admitted to hospital: 8/26/2021  1:32 AM    Fall  Assessment & Plan  - mechanical fall  - injuries as listed    YATES (nonalcoholic steatohepatitis)  Assessment & Plan  - History of YATES  - AST/ ALT elevated  - Follow up with PCP    Type 2 diabetes mellitus with hyperglycemia, without long-term current use of insulin (HCC)  Assessment & Plan  Lab Results   Component Value Date    HGBA1C 6 7 (H) 05/28/2021       No results for input(s): POCGLU in the last 72 hours  Blood Sugar Average: Last 72 hrs:     - Home medications held while inpatient  - Basal and sliding scale insulin while NPO  - Add meal-time insulin once patient can have a diet    History of aortic valve replacement  Assessment & Plan  - Follows with cardiology, takes ASA 81 mg daily    * Humerus fracture  Assessment & Plan  - S/p mechanical fall  - History of osteoporosis, on Alendronate  Consider Endocrinology consult  - Pain in right arm  - XR: displaced humerus fracture  - Neurovascularly intact  - Admit to trauma service  - Orthopedic surgery consultation  - nonweightbearing right upper extremity in sling  - Remain NPO until ortho plan defined  - Pain control  - DVT ppx  - PT and OT      H&P Exam - Trauma   Kushal Stoll 77 y o  male MRN: [id number]  Unit/Bed#: ED 27 Encounter: [id number]    Assessment/Plan   Trauma Alert: Evaluation  Model of Arrival: Self  Trauma Team: Attending Coco Jeronimo and AP [address] [facility]  Consultants: Orthopaedics: Merlyn Landaverde  Time Called 1511      Chief Complaint: Fall    History of Present Illness   HPI:  Kushal Stoll is a 77 y o  male who presents with mechanical fall outside of a [facility] in [place] today around noon  Patient admits that he missed a curb and fell onto his right arm, causing immediate pain    He further reports abrasion to his right lower extremity  He admits that he went to a hospital in [facility] where he was diagnosed with right arm fracture and treated for pain and put in a sling  Patient denied the hospital's plan to transfer him to a hospital in [facility], and instead he and his wife drove up to [facility] because they wanted to have care near their home  At this time, patient reports that his pain is controlled  Mechanism:Fall    Review of Systems   Constitutional: Negative  HENT: Negative  Respiratory: Negative  Negative for chest tightness and shortness of breath  Cardiovascular: Negative  Negative for chest pain  Gastrointestinal: Negative  Negative for abdominal pain  Musculoskeletal: Negative  Negative for back pain and neck pain  Skin: Positive for wound  Neurological: Negative for weakness, numbness and headaches  Psychiatric/Behavioral: Negative for confusion  All other systems reviewed and are negative  12-point, complete review of systems was reviewed and negative except as stated above  Historical Information   History is unobtainable from the patient due to none  Efforts to obtain history included the following sources: family member, obtained from other records    Past Medical History:   Diagnosis Date    Arteriosclerotic coronary artery disease 11/30/2017    Benign prostate hyperplasia     L A  7/17/12   R   8/30/17      COPD [facility]chronic obstructive pulmonary disease) ([facility])     Deep venous embolism and thrombosis (HCC)     [facility] [facility]     Hypertension     Impacted cerumen     unspecified laterity / L A  6/20/12       Osteoarthritis 7/17/2012    Prostate cancer ([facility] [facility]     Pulmonary embolism ([facility])      Past Surgical History:   Procedure Laterality Date    CARDIAC VALVE REPLACEMENT      CORONARY ARTERY BYPASS GRAFT      FRACTURE SURGERY      left knee surgery    KNEE ARTHROSCOPY      Therapeutic    KNEE SURGERY      replacement    LUNG SURGERY      LUNG SURGERY      wedge resection     TONSILLECTOMY      UMBILICAL HERNIA REPAIR       Social History   Social History     Substance and Sexual Activity   Alcohol Use No    Comment: social drinker per [employer]script     Social History     Substance and Sexual Activity   Drug Use No     Social History     Tobacco Use   Smoking Status Former Smoker    Types: Cigarettes    Quit date: 3/23/2000    Years since quittin 4   Smokeless Tobacco Never Used     E-Cigarette/Vaping     E-Cigarette/Vaping Substances     Immunization History   Administered Date(s) Administered    Pneumococcal Conjugate 13-Valent 2016    Pneumococcal Polysaccharide PPV23 2014     Last Tetanus:  Update today  Family History: Non-contributory  Unable to obtain/limited by none      Meds/Allergies   all current active meds have been reviewed    No Known Allergies      PHYSICAL EXAM    PE limited by:  None    Objective   Vitals:   First set: Temperature: 98 8 °F (37 1 °C) (21 003)  Pulse: 95 (21 003)  Respirations: 18 (21 003)  Blood Pressure: 118/71 (21)    Primary Survey:   (A) Airway:  Intact  (B) Breathing:  Breath sounds clear and equal bilaterally  (C) Circulation: Pulses:   pedal  2/4 and radial  2/4  (D) Disabliity:  GCS Total:  15  (E) Expose:  Completed    Secondary Survey: (Click on Physical Exam tab above)  Physical Exam  Vitals reviewed  Constitutional:       General: He is not in acute distress  Appearance: Normal appearance  HENT:      Head: Normocephalic and atraumatic  Right Ear: External ear normal       Left Ear: External ear normal       Nose: Nose normal       Mouth/Throat:      Mouth: Mucous membranes are moist       Pharynx: Oropharynx is clear  Eyes:      Extraocular Movements: Extraocular movements intact  Conjunctiva/sclera: Conjunctivae normal       Pupils: Pupils are equal, round, and reactive to light  Cardiovascular:      Rate and Rhythm: Normal rate and regular rhythm  Pulses: Normal pulses  Heart sounds: Normal heart sounds  No murmur heard  No friction rub  No gallop  Pulmonary:      Effort: Pulmonary effort is normal  No respiratory distress  Breath sounds: Normal breath sounds  No stridor  No wheezing, rhonchi or rales  Chest:      Chest wall: No tenderness  Abdominal:      General: Abdomen is flat  Bowel sounds are normal  There is no distension  Palpations: Abdomen is soft  There is no mass  Tenderness: There is no abdominal tenderness  There is no guarding or rebound  Hernia: No hernia is present  Musculoskeletal:         General: Swelling, tenderness, deformity and signs of injury present  Cervical back: Normal range of motion and neck supple  No rigidity or tenderness  Comments: Right arm swelling and tenderness to triceps region   Skin:     General: Skin is warm and dry  Capillary Refill: Capillary refill takes less than 2 seconds  Coloration: Skin is not pale  Findings: No bruising or lesion  Neurological:      General: No focal deficit present  Mental Status: He is alert and oriented to person, place, and time  Motor: No weakness     Psychiatric:         Mood and Affect: Mood normal          Behavior: Behavior normal          Invasive Devices     Peripheral Intravenous Line            Peripheral IV 08/26/21 Left Antecubital <1 day                Lab Results:   Results: I have personally reviewed pertinent reports   , BMP/CMP:   Lab Results   Component Value Date    SODIUM 139 08/26/2021    K 4 4 08/26/2021     08/26/2021    CO2 26 08/26/2021    BUN 24 08/26/2021    CREATININE 1 41 (H) 08/26/2021    CALCIUM 9 8 08/26/2021    AST 55 (H) 08/26/2021    ALT 92 (H) 08/26/2021    ALKPHOS 67 08/26/2021    EGFR 48 08/26/2021    and CBC:   Lab Results   Component Value Date    WBC 13 58 (H) 08/26/2021    HGB 12 9 08/26/2021 HCT 39 6 08/26/2021    MCV 96 08/26/2021     08/26/2021    MCH 31 3 08/26/2021    MCHC 32 6 08/26/2021    RDW 13 7 08/26/2021    MPV 10 3 08/26/2021    NRBC 0 08/26/2021     Imaging/EKG Studies: Results: I have personally reviewed pertinent reports      Other Studies:   XR humerus RIGHT   ED Interpretation by Adrian Olmos PA-C (08/26 0249)   Humeral neck fracture with mild angulation on initial read            Code Status: Level 1 - Full Code  Advance Directive and Living Will:      Power of :    POLST:

## 2021-08-26 NOTE — PROGRESS NOTES
[facility]  Progress Note - Alicia Grey 1945, 77 y o  male MRN: [id number]  Unit/Bed#: S -01 Encounter: [id number]  Primary Care Provider: Mariam Harper PA-C   Date and time admitted to hospital: 8/26/2021  1:32 AM    Fall  Assessment & Plan  - mechanical fall  - injuries as listed    YATES (nonalcoholic steatohepatitis)  Assessment & Plan  - History of YATES  - AST/ ALT elevated  - Follow up with PCP    Type 2 diabetes mellitus with hyperglycemia, without long-term current use of insulin (HCC)  Assessment & Plan  Lab Results   Component Value Date    HGBA1C 6 7 (H) 05/28/2021       Recent Labs     08/26/21  0503   POCGLU 206*       Blood Sugar Average: Last 72 hrs:  (P) 206   - Home medications held while inpatient  - Basal and sliding scale insulin    - resume home medications at discharge    History of aortic valve replacement  Assessment & Plan  - Follows with cardiology, takes ASA 81 mg daily    * Humerus fracture  Assessment & Plan  - S/p mechanical fall  - History of osteoporosis, on Alendronate  Consider Endocrinology consult  - Pain in right arm  - XR: displaced humerus fracture  - NWB RUE  - follow up Dr Lorenzo Hodgkins in 1-2 weeks  - sling RUE  - Neurovascularly intact  - Admit to trauma service  - Orthopedic surgery consultation  - nonweightbearing right upper extremity in sling  - Pain control  - DVT ppx  - PT and OT- recommend outpatient PT for gait dysfunction      NHUNG  - Cr 1 41 on admission  - Baseline Cr 1 08-1 2  - IVF   - ck labs 11 am    R Knee abrasion  - PT/OT   - LWC topical abx ointment BID   - Pain medication PRN    TERTIARY TRAUMA SURVEY NOTE    Prophylaxis: Sequential compression device (Venodyne)  and Heparin    Disposition: home with family assist    Code status:  Level 1 - Full Code    Consultants: [facility]     Is the patient 72 years or older?: YES:    1   Before the illness or injury that brought you to the Emergency, did you need someone to help you on a regular basis? 0=No   2  Since the illness or injury that brought you to the Emergency, have you needed more help than usual to take care of yourself? 1=Yes   3  Have you been hospitalized for one or more nights during the past 6 months (excluding a stay in the Emergency Department)? 0=No   4  In general, do you see well? 0=Yes   5  In general, do you have serious problems with your memory? 0=No   6  Do you take more than three different medications everyday?  1=Yes   TOTAL   2     Did you order a geriatric consult if the score was 2 or greater?: no      SUBJECTIVE:     Transfer from: N/A  Outside Films Received: not applicable  Tertiary Exam Due on: 8/26/21    Mechanism of Injury:Fall    Details related to Injury: +LOC:  no    Chief Complaint: R arm pain     HPI/Last 24 hour events: no events   Minimal pain  No numbness or tingling    Active medications:           Current Facility-Administered Medications:     acetaminophen (TYLENOL) tablet 975 mg, 975 mg, Oral, Q8H MAHAD, 975 mg at 08/26/21 0506    atorvastatin (LIPITOR) tablet 40 mg, 40 mg, Oral, Daily, 40 mg at 08/26/21 0929    heparin (porcine) subcutaneous injection 5,000 Units, 5,000 Units, Subcutaneous, Q8H [facility], 5,000 Units at 08/26/21 0506    HYDROmorphone (DILAUDID) injection 0 2 mg, 0 2 mg, Intravenous, Q4H PRN    insulin glargine (LANTUS) subcutaneous injection 16 Units 0 16 mL, 16 Units, Subcutaneous, HS    insulin lispro (HumaLOG) 100 units/mL subcutaneous injection 2-12 Units, 2-12 Units, Subcutaneous, Q6H [facility] **AND** Fingerstick Glucose (POCT), , , Q6H    multi-electrolyte (PLASMALYTE-A/ISOLYTE-S PH 7 4) IV solution, 100 mL/hr, Intravenous, Continuous, 100 mL/hr at 08/26/21 0930    ondansetron (ZOFRAN) injection 4 mg, 4 mg, Intravenous, Q4H PRN    oxyCODONE (ROXICODONE) IR tablet 2 5 mg, 2 5 mg, Oral, Q4H PRN    oxyCODONE (ROXICODONE) IR tablet 5 mg, 5 mg, Oral, Q4H PRN    polyethylene glycol (MIRALAX) packet 17 g, 17 g, Oral, Daily PRN    senna-docusate sodium (SENOKOT S) 8 6-50 mg per tablet 1 tablet, 1 tablet, Oral, BID, 1 tablet at 08/26/21 0929    tetanus-diphtheria-acellular pertussis (BOOSTRIX) IM injection 0 5 mL, 0 5 mL, Intramuscular, Once      OBJECTIVE:     Vitals:   Vitals:    08/26/21 0753   BP: 128/70   Pulse: 78   Resp: 18   Temp: 97 8 °F (36 6 °C)   SpO2: 93%       Physical Exam:   GENERAL APPEARANCE: NAD, resting comfortably in chait  NEURO: GCS 15    HEENT:NCAT, no evidence of trauma  C spine nontender  CV: RRR  LUNGS: CTAB, chest wall nontender  GI: soft and nontender  : voiding well   MSK: RUE in sling pink and warm, NVI x4, RUE cap refill <2 seconds  R elbow, hand, LUE and BLE nontender  SKIN: R proximal shin abrasion  No active bleeding sr sign of infection  FAROM R knee without pain    I/O:   I/O     None          Invasive Devices: Invasive Devices     Peripheral Intravenous Line            Peripheral IV 08/26/21 Left Antecubital <1 day                Imaging:   No results found      Labs:   CBC:   Lab Results   Component Value Date    WBC 13 58 (H) 08/26/2021    HGB 12 9 08/26/2021    HCT 39 6 08/26/2021    MCV 96 08/26/2021     08/26/2021    MCH 31 3 08/26/2021    MCHC 32 6 08/26/2021    RDW 13 7 08/26/2021    MPV 10 3 08/26/2021    NRBC 0 08/26/2021     CMP:   Lab Results   Component Value Date     08/26/2021    CO2 26 08/26/2021    BUN 24 08/26/2021    CREATININE 1 41 (H) 08/26/2021    CALCIUM 9 8 08/26/2021    AST 55 (H) 08/26/2021    ALT 92 (H) 08/26/2021    ALKPHOS 67 08/26/2021    EGFR 48 08/26/2021

## 2021-08-26 NOTE — ASSESSMENT & PLAN NOTE
- S/p mechanical fall  - History of osteoporosis, on Alendronate   Consider Endocrinology consult  - Pain in right arm  - XR: displaced humerus fracture  - NWB RUE  - follow up Dr Deepa Wick in 1-2 weeks  - sling RUE  - Neurovascularly intact  - Admit to trauma service  - Orthopedic surgery consultation  - nonweightbearing right upper extremity in sling  - Pain control  - DVT ppx  - PT and OT

## 2021-08-26 NOTE — PHYSICAL THERAPY NOTE
PHYSICAL THERAPY EVALUATION NOTE    Patient Name: Humphrey MONTANEZ Date: 8/26/2021  AGE:   77 y o  Mrn:   [id number]  ADMIT DX:  Knee injury [S89 90XA]  Arm injury [S49 90XA]  Closed right humeral fracture [S42 301A]    Past Medical History:   Diagnosis Date    Arteriosclerotic coronary artery disease 11/30/2017    Benign prostate hyperplasia     L A  7/17/12   R   8/30/17      COPD (chronic obstructive pulmonary disease) (HCC)     Deep venous embolism and thrombosis (HCC)     [facility] mellitus ([facility] [address] [facility]     Hypertension     Impacted cerumen     unspecified laterity / L A  6/20/12       Osteoarthritis 7/17/2012    Prostate cancer ([facility] )     Pulmonary embolism ([facility] )      Length Of Stay: 0  PHYSICAL THERAPY EVALUATION :   08/26/21 1457   PT Last Visit   PT Visit Date 08/26/21   Pain Assessment   Pain Assessment Tool 0-10   Pain Score 7   Pain Location/Orientation Orientation: Right;Location: Shoulder   Pain 2   Pain Score 2 4   Pain Location/Orientation 2 Orientation: Right;Location: Knee   Home Living   Type of [address] [place] [address] Two level;1/2 bath on main level;Bed/bath upstairs; Other (Comment)  (3 MICKEY)   Additional Comments lives w/ spouse  ambulates w/o assistive device  owns walker  independent w/ ADLs and driving  1 fall in last 6 months  Prior Function   Comments pt seen supine in bed  agreed to PT eval  reports having right shoulder and right knee pain  Restrictions/Precautions   RUE Weight Bearing Per Order NWB  (in sling)   Other Precautions Chair Alarm; Bed Alarm;Multiple lines; Fall Risk;Pain  [facility])   General   Additional Pertinent History 8/26/21 at 5:07, glucose was 206  Family/Caregiver Present No   Cognition   Arousal/Participation Alert   Orientation Level Oriented to person; Other (Comment)  (pt was identified w/ full name, birth date)   Following Commands Follows one step commands without difficulty   Comments room air resting pulse ox 98% and 63 BPM, active 94% and 83 BPM    RUE Assessment   RUE Assessment X  (wrist/hand WFL  shoulder and elbow not tested )   LUE Assessment   LUE Assessment WFL  (4-/5)   RLE Assessment   RLE Assessment WFL  (3+/5, knee 3/5)   LLE Assessment   LLE Assessment WFL  (4-/5)   Light Touch   RLE Light Touch Grossly intact   LLE Light Touch Grossly intact   Bed Mobility   Supine to Sit 5  Supervision   Additional items HOB elevated; Bedrails; Increased time required;Verbal cues;LE management  (for trunk/LE positioning, NWB compliance)   Additional Comments Comfortable Gait Speed: 0 57 m/s  4 Item Dynamic Gait Index: 7/12  Transfers   Sit to Stand 5  Supervision   Additional items Increased time required;Verbal cues  (for LE positioning)   Stand to Sit 5  Supervision   Additional items Increased time required;Verbal cues  (for body positioning, NWB compliance)   Ambulation/Elevation   Gait pattern Decreased R stance; Foward flexed; Short stride   Gait Assistance 5  Supervision   Additional items Verbal cues  (for full step length, posture)   Assistive Device None   Distance 80, 120 feet w/ standing rest break  (additional not possible due to fatigue and pain)   Stair Management Assistance 5  Supervision   Additional items Verbal cues; Increased time required  (for foot clearance, sequencing)   Stair Management Technique One rail L;Step to pattern   Number of Stairs 3  (additional not possible due to pain and fatigue)   Balance   Static Sitting Good   Dynamic Sitting Fair   Static Standing Fair -   Ambulatory Fair -   Activity Tolerance   Activity Tolerance Patient limited by fatigue;Patient limited by pain   Nurse Made Aware spoke to Dick Faraz and [place] NSG, Dannielle CM, Fernando Gibson AP, ILDEFONSO OT   Assessment   Prognosis Fair   Problem List Decreased strength;Decreased range of motion;Decreased endurance; Impaired balance;Decreased mobility; Decreased safety awareness;Orthopedic restrictions;Pain   Assessment Pt presents with mechanical fall outside of a [facility] in [place]  Dx: right humeral fx, DM, and s/p fall  order placed for PT eval and tx, w/ activity order of up w/ A and NWB R UE  pt presents w/ comorbidities of AVR, DM, CABG, left TKR, OA, and HTN and personal factors of advanced age, living in 2 story house, mobilizing w/ assistive device, stair(s) to enter home and positive fall history  pt presents w/ pain, weakness, decreased ROM, decreased endurance, impaired balance, gait deviations, decreased safety awareness, orthopedic restrictions and fall risk  these impairments are evident in findings from physical examination (weakness and decreased ROM), mobility assessment (need for standby assist w/ all phases of mobility when usually mobilizing independently, tolerance to only 120 feet of ambulation and need for cueing for mobility technique), and Barthel Index: 55/100, Gait Speed: 0 57 m/s (less than 1 0 m/s indicates need for intervention to address falls risk) and 4 Item DGI: 7/12 (less than 10 indicates risk for falls)  pt needed input for mobility technique  pt is at risk for falls due to physical and safety awareness deficits  pt's clinical presentation is unstable/unpredictable (evident in poor blood sugar control, need for assist w/ all phases of mobility when usually mobilizing independently, tolerance to only 120 feet of ambulation, pain impacting overall mobility status and need for input for mobility technique/safety)  pt needs inpatient PT tx to improve mobility deficits  discharge recommendation is for outpatient PT to address right knee pain/gait progression, reduce fall risk and maximize level of functional independence  Goals   Patient Goals go home  STG Expiration Date 09/05/21   Short Term Goal #1 pt will:  Increase bilateral LE strength 1/2 grade to facilitate independent mobility, Perform all bed mobility tasks independently to decrease fall risk factors, Perform all transfers independently to improve independence, Ambulate 250 ft  with least restrictive assistive device independently w/o LOB to expedite return to independent level of function, Navigate 10 stairs independently with unilateral handrail to facilitate return to previous living environment, Increase ambulatory balance 1 grade to decrease risk for falls, Complete exercise program independently to increase strength and endurance, Tolerate 3 hr OOB to faciliate upright tolerance, Improve gait speed to 0 84 m/s to reduce fall risk and increase independence, Improve Barthel Index score to 80 or greater to facilitate independence and Increase 4 Item DGI score to 10 or greater to decrease risk for falls   Plan   Treatment/Interventions Functional transfer training;LE strengthening/ROM; Elevations; Therapeutic exercise; Endurance training;Patient/family training;Equipment eval/education; Bed mobility;Gait training   PT Frequency 5x/wk   Recommendation   PT Discharge Recommendation Home with outpatient rehabilitation   Additional Comments pt would benefit from OT consult to address ADL adaptation   AM-PAC Basic Mobility Inpatient   Turning in Bed Without Bedrails 4   Lying on Back to Sitting on Edge of Flat Bed 3   Moving Bed to Chair 3   Standing Up From Chair 3   Walk in Room 3   Climb 3-5 Stairs 3   Basic Mobility Inpatient Raw Score 19   Basic Mobility Standardized Score 42 48   Barthel Index   Feeding 5   Bathing 0   Grooming Score 0   Dressing Score 0   Bladder Score 10   Bowels Score 10   Toilet Use Score 5   Transfers (Bed/Chair) Score 10   Mobility (Level Surface) Score 10   Stairs Score 5   Barthel Index Score 55     The patient's AM-[facility] Basic Mobility Inpatient Short Form Raw Score is 19, Standardized Score is 42 48  A standardized score less than 42 9 suggests the patient may benefit from discharge to post-acute rehabilitation services   Please also refer to the recommendation of the Physical Therapist for safe discharge planning  As pt has excellent rehab potential and support available at home, he is recommended for return home w/ outpatient PT     4 Item Dynamic Gait Index  2/3 Gait level surface  1/3 Change in gait speed  2/3 Gait with horizontal head turns  2/3 Gait with vertical head turns  7/12 total score (<10/12 indicates increased risk of fall)    Comfortable Gait Speed: 0 57 m/s  Gait Speed Interpretation:  Gain of 0 1 m/s is a predictor of well-being in those w/ abnormal walking speed compared to age-patched peers  <0 7m/s is at an increased risk for falls    Household ambulator: <0 4 m/s  Limited community ambulator: 0 4-0 8 m/s  [employer] ambulator: 0 8-1 2 m/s  Able to safely cross streets: >1 2 m/s    Skilled PT recommended while in hospital and upon DC to progress pt toward treatment goals       Marielena Hutchison, PT

## 2021-08-27 ENCOUNTER — TELEPHONE (OUTPATIENT)
Dept: FAMILY MEDICINE CLINIC | Facility: CLINIC | Age: 76
End: 2021-08-27

## 2021-08-27 DIAGNOSIS — S42.294D OTHER CLOSED NONDISPLACED FRACTURE OF PROXIMAL END OF RIGHT HUMERUS WITH ROUTINE HEALING, SUBSEQUENT ENCOUNTER: Primary | ICD-10-CM

## 2021-08-27 NOTE — TELEPHONE ENCOUNTER
Patient broke his right arm  He is currently sleeping in his recliner but is unable to operate it due to it being on his right side and he doesn't have enough strength in his left arm to hoist himself up  Patient is looking for an order to have a chair that can raise him out of the chair  Patient did not have a facility in mind but has [facility] [employer] [facility]

## 2021-08-30 ENCOUNTER — TRANSITIONAL CARE MANAGEMENT (OUTPATIENT)
Dept: FAMILY MEDICINE CLINIC | Facility: CLINIC | Age: 76
End: 2021-08-30

## 2021-08-31 ENCOUNTER — OFFICE VISIT (OUTPATIENT)
Dept: OBGYN CLINIC | Facility: CLINIC | Age: 76
End: 2021-08-31
Payer: COMMERCIAL

## 2021-08-31 VITALS
HEART RATE: 101 BPM | HEIGHT: 67 IN | BODY MASS INDEX: 38.77 KG/M2 | SYSTOLIC BLOOD PRESSURE: 146 MMHG | WEIGHT: 247 LBS | DIASTOLIC BLOOD PRESSURE: 63 MMHG

## 2021-08-31 DIAGNOSIS — S42.294D OTHER CLOSED NONDISPLACED FRACTURE OF PROXIMAL END OF RIGHT HUMERUS WITH ROUTINE HEALING, SUBSEQUENT ENCOUNTER: Primary | ICD-10-CM

## 2021-08-31 PROCEDURE — 1036F TOBACCO NON-USER: CPT | Performed by: ORTHOPAEDIC SURGERY

## 2021-08-31 PROCEDURE — 3077F SYST BP >= 140 MM HG: CPT | Performed by: ORTHOPAEDIC SURGERY

## 2021-08-31 PROCEDURE — 3008F BODY MASS INDEX DOCD: CPT | Performed by: ORTHOPAEDIC SURGERY

## 2021-08-31 PROCEDURE — 3078F DIAST BP <80 MM HG: CPT | Performed by: ORTHOPAEDIC SURGERY

## 2021-08-31 PROCEDURE — 1160F RVW MEDS BY RX/DR IN RCRD: CPT | Performed by: ORTHOPAEDIC SURGERY

## 2021-08-31 PROCEDURE — 99212 OFFICE O/P EST SF 10 MIN: CPT | Performed by: ORTHOPAEDIC SURGERY

## 2021-08-31 NOTE — PROGRESS NOTES
Patient Name:  Iza Banks  MRN:  [id number]    Assessment & Plan     Right proximal humerus fracture 8/25/2021  1  Continue sling immobilization, may remove periodically for hygiene and elbow ROM  2  Instructed wrist/finger ROM and  exercises  3  Advised conservative treatment with close follow up for now  Patient aware of chance of interval change in alignment potentially requiring surgical treatment  4  Follow up in 7-8 days with x-rays right shoulder  History of the Present Illness     Patient returns today for his right proximal humerus fracture  He has been wearing the sling at all times  He notes progressive bruising in his shoulder extending distally into his hand with associated swelling  His pain is tolerable in the sling  No numbness or tingling  Physical Exam     /63   Pulse 101   Ht 5' 7" (1 702 m)   Wt 112 kg (247 lb)   BMI 38 69 kg/m²     Right shoulder: No gross deformity  ROM limited by pain  Strength not tested due to fracture timing  Neurovascularly intact distally  1+ edema in the hand

## 2021-09-07 DIAGNOSIS — E11.65 TYPE 2 DIABETES MELLITUS WITH HYPERGLYCEMIA, WITHOUT LONG-TERM CURRENT USE OF INSULIN (HCC): ICD-10-CM

## 2021-09-07 RX ORDER — GLIPIZIDE 5 MG/1
5 TABLET, FILM COATED, EXTENDED RELEASE ORAL DAILY
Qty: 90 TABLET | Refills: 0 | Status: SHIPPED | OUTPATIENT
Start: 2021-09-07 | End: 2022-02-15

## 2021-09-08 ENCOUNTER — OFFICE VISIT (OUTPATIENT)
Dept: OBGYN CLINIC | Facility: CLINIC | Age: 76
End: 2021-09-08
Payer: COMMERCIAL

## 2021-09-08 ENCOUNTER — APPOINTMENT (OUTPATIENT)
Dept: RADIOLOGY | Facility: AMBULARY SURGERY CENTER | Age: 76
End: 2021-09-08
Attending: ORTHOPAEDIC SURGERY
Payer: COMMERCIAL

## 2021-09-08 VITALS
BODY MASS INDEX: 38.77 KG/M2 | WEIGHT: 247 LBS | HEART RATE: 102 BPM | SYSTOLIC BLOOD PRESSURE: 124 MMHG | HEIGHT: 67 IN | DIASTOLIC BLOOD PRESSURE: 77 MMHG

## 2021-09-08 DIAGNOSIS — S42.294D OTHER CLOSED NONDISPLACED FRACTURE OF PROXIMAL END OF RIGHT HUMERUS WITH ROUTINE HEALING, SUBSEQUENT ENCOUNTER: Primary | ICD-10-CM

## 2021-09-08 DIAGNOSIS — S42.294D OTHER CLOSED NONDISPLACED FRACTURE OF PROXIMAL END OF RIGHT HUMERUS WITH ROUTINE HEALING, SUBSEQUENT ENCOUNTER: ICD-10-CM

## 2021-09-08 PROCEDURE — 99213 OFFICE O/P EST LOW 20 MIN: CPT | Performed by: ORTHOPAEDIC SURGERY

## 2021-09-08 PROCEDURE — 73030 X-RAY EXAM OF SHOULDER: CPT

## 2021-09-08 PROCEDURE — 3078F DIAST BP <80 MM HG: CPT | Performed by: ORTHOPAEDIC SURGERY

## 2021-09-08 PROCEDURE — 3074F SYST BP LT 130 MM HG: CPT | Performed by: ORTHOPAEDIC SURGERY

## 2021-09-08 NOTE — PROGRESS NOTES
Patient Name:  Hayde Mcleod  MRN:  [id number]    Assessment & Plan     Right proximal humerus fracture 8/25/2021  1  Continue sling at all times until 9/22/2021, then wean as tolerated  2  Plan to start physical therapy approximately 5-6 weeks post-injury  3  Follow up 10/1/2021 with x-rays right shoulder  He is considering a trip to his second home in [facility] the following week  History of the Present Illness     Patient returns for recheck of his right proximal humerus fracture  The pain is tolerable  He is wearing the sling  Physical Exam     /77   Pulse 102   Ht 5' 7" (1 702 m)   Wt 112 kg (247 lb)   BMI 38 69 kg/m²     Right shoulder:  Deformity: None  Tenderness: Proximal humerus  Range of motion: Deferred due to fracture timing  Strength: Deferred due to fracture timing  CV: 2+ radial pulse    Data Review     I have personally reviewed pertinent films in PACS, and my interpretation follows  XR right shoulder 9/8/2021: Stable alignment of comminuted proximal humerus fracture        Scribe Attestation    I,:  Hui Ortiz am acting as a scribe while in the presence of the attending physician :       I,:  Alex Figueredo MD personally performed the services described in this documentation    as scribed in my presence :

## 2021-09-09 ENCOUNTER — RA CDI HCC (OUTPATIENT)
Dept: OTHER | Facility: HOSPITAL | Age: 76
End: 2021-09-09

## 2021-09-09 DIAGNOSIS — E11.65 TYPE 2 DIABETES MELLITUS WITH HYPERGLYCEMIA, WITHOUT LONG-TERM CURRENT USE OF INSULIN (HCC): ICD-10-CM

## 2021-09-09 RX ORDER — METFORMIN HYDROCHLORIDE 500 MG/1
TABLET, EXTENDED RELEASE ORAL
Qty: 180 TABLET | Refills: 1 | Status: SHIPPED | OUTPATIENT
Start: 2021-09-09 | End: 2022-03-04

## 2021-09-09 NOTE — PROGRESS NOTES
[facility]  coding opportunities       Chart reviewed, no opportunity found: CHART REVIEWED, NO OPPORTUNITY FOUND                        Patients insurance company: [employer] (Medicare Advantage and Commercial)

## 2021-09-13 ENCOUNTER — OFFICE VISIT (OUTPATIENT)
Dept: ENDOCRINOLOGY | Facility: CLINIC | Age: 76
End: 2021-09-13
Payer: COMMERCIAL

## 2021-09-13 VITALS
SYSTOLIC BLOOD PRESSURE: 150 MMHG | DIASTOLIC BLOOD PRESSURE: 88 MMHG | WEIGHT: 242 LBS | HEIGHT: 67 IN | TEMPERATURE: 98.4 F | HEART RATE: 96 BPM | BODY MASS INDEX: 37.98 KG/M2

## 2021-09-13 DIAGNOSIS — M81.0 OSTEOPOROSIS WITHOUT CURRENT PATHOLOGICAL FRACTURE, UNSPECIFIED OSTEOPOROSIS TYPE: ICD-10-CM

## 2021-09-13 DIAGNOSIS — E66.9 OBESITY (BMI 35.0-39.9 WITHOUT COMORBIDITY): ICD-10-CM

## 2021-09-13 DIAGNOSIS — E78.2 MIXED HYPERLIPIDEMIA: ICD-10-CM

## 2021-09-13 DIAGNOSIS — E11.65 TYPE 2 DIABETES MELLITUS WITH HYPERGLYCEMIA, WITHOUT LONG-TERM CURRENT USE OF INSULIN (HCC): Primary | ICD-10-CM

## 2021-09-13 DIAGNOSIS — E55.9 VITAMIN D DEFICIENCY: ICD-10-CM

## 2021-09-13 DIAGNOSIS — E11.65 TYPE 2 DIABETES MELLITUS WITH HYPERGLYCEMIA, WITHOUT LONG-TERM CURRENT USE OF INSULIN (HCC): ICD-10-CM

## 2021-09-13 DIAGNOSIS — I10 BENIGN ESSENTIAL HTN: ICD-10-CM

## 2021-09-13 PROCEDURE — 1111F DSCHRG MED/CURRENT MED MERGE: CPT | Performed by: PHYSICIAN ASSISTANT

## 2021-09-13 PROCEDURE — 99214 OFFICE O/P EST MOD 30 MIN: CPT | Performed by: PHYSICIAN ASSISTANT

## 2021-09-13 RX ORDER — DULAGLUTIDE 3 MG/.5ML
INJECTION, SOLUTION SUBCUTANEOUS
Qty: 6 ML | Refills: 1 | Status: SHIPPED | OUTPATIENT
Start: 2021-09-13 | End: 2022-03-07 | Stop reason: SDUPTHER

## 2021-09-13 NOTE — PROGRESS NOTES
Patient Progress Note      CC: DM      Referring Provider  Bishop Lovett, [address] [facility],  [address]     History of Present Illness:   Harriett Mcnair is a 77 y o  male with a history of type 2 diabetes without long term use of insulin  Diabetes course has been stable  Complications of DM: none reported  Patient was hospitalized in August 2021 for a mechanical fall which resulted in a proximal humerus fracture  Denies any issues with his current regimen  Home glucose monitoring: are performed regularly  Using [employer]  In target range 78%, above range 21%, below range 0%  Average glucose 151 mg/dl with variability of 25 9%  Glucose management indicator 6 9%     Current regimen: Trulicity 3 mg weekly, metformin 500 mg BID, glipizide 5 mg daily  compliant most of the time, denies any side effects from medications  Hypoglycemic episodes: No, rare  H/o of hypoglycemia causing hospitalization or Intervention such as glucagon injection or ambulance call : No  Hypoglycemia symptoms: dizziness  Treatment of hypoglycemia: orange juice     Diet: 3 meals per day, 2-3 snacks per day  Timing of meals is predictable  Diabetic diet compliance: noncompliant much of the time  Activity: Daily activity is predictable: Yes  Exercise is limited due to recent fracture  Ophthamology: Feb 2021  Podiatry: foot exam UTD, June 2021     Not on ACE inhibitor/ARB  Has hyperlipidemia: on statin - tolerating well, no myalgias  compliant most of the time, denies any side effects from medications  Thyroid disorders: No  History of pancreatitis: No     Osteopenia / vitamin D deficiency: previously treated with Prolia injections but it was denied by [employer]  Currently on Fosamax 70 mg weekly and taking it as instructed and tolerating it well  He is also taking vitamin D3 2000 IU daily  He did have a recent fall which resulted in a fracture  Does do weight bearing exercises   Last DEXA scan in 2019 showed osteopenia of the lumbar spine  Repeat DEXA was ordered but not yet completed  Patient Active Problem List   Diagnosis    History of aortic valve replacement    Arteriosclerotic coronary artery disease    Benign essential HTN    Type 2 diabetes mellitus with hyperglycemia, without long-term current use of insulin (HCC)    Hyperlipidemia    YATES (nonalcoholic steatohepatitis)    Class 2 severe obesity with serious comorbidity and body mass index (BMI) of 36 0 to 36 9 in adult [facility])    Osteoarthritis    Osteoporosis    Prostate cancer ([place] [address] )    Erectile dysfunction due to diseases classified elsewhere    Vitamin D deficiency    Humerus fracture    Fall      Past Medical History:   Diagnosis Date    Arteriosclerotic coronary artery disease 2017    Benign prostate hyperplasia     L A  12   R   17      COPD (chronic obstructive pulmonary disease) ([facility])     Deep venous embolism and thrombosis ([facility])     Diabetes mellitus ([facility] )     Hypertension     Impacted cerumen     unspecified laterity / L A  12       Osteoarthritis 2012    Prostate cancer ([place] [address] )     Pulmonary embolism ([facility])       Past Surgical History:   Procedure Laterality Date    CARDIAC VALVE REPLACEMENT      CORONARY ARTERY BYPASS GRAFT      FRACTURE SURGERY      left knee surgery    KNEE ARTHROSCOPY      Therapeutic    KNEE SURGERY      replacement    LUNG SURGERY      LUNG SURGERY      wedge resection     TONSILLECTOMY      UMBILICAL HERNIA REPAIR        Family History   Problem Relation Age of Onset    COPD Mother     Heart attack Mother     Heart attack Father      Social History     Tobacco Use    Smoking status: Former Smoker     Types: Cigarettes     Quit date: 3/23/2000     Years since quittin 4    Smokeless tobacco: Never Used   Substance Use Topics    Alcohol use: No     Comment: social drinker per [employer]script     No Known Allergies      Current Outpatient Medications:     alendronate (FOSAMAX) 70 mg tablet, TAKE 1 TAB BY MOUTH EVERY 7 DAYS ON EMPTY STOMACH WITH FULL GLASS OF WATER, DO NOT LIE DOWN FOR 30 MINUTES AFTER TAKING, Disp: 12 tablet, Rfl: 0    aspirin 81 MG tablet, Take by mouth, Disp: , Rfl:     atorvastatin (LIPITOR) 40 mg tablet, Take 1 tablet (40 mg total) by mouth daily, Disp: 90 tablet, Rfl: 1    Blood Glucose Calibration (OT ULTRA/FASTTK CNTRL SOLN) SOLN, by In Vitro route as needed (to check meter calibration), Disp: 1 each, Rfl: 3    Blood Glucose Monitoring Suppl (ONE TOUCH ULTRA 2) w/Device KIT, by Does not apply route daily DX E11 65  Pt to   Check  fbs  daily, Disp: 1 each, Rfl: 0    Cholecalciferol (VITAMIN D3) 2000 units capsule, Take 2,000 Units by mouth daily, Disp: , Rfl: 11    Coenzyme Q10 (COQ-10) 100 MG CAPS, Take 100 mg by mouth , Disp: , Rfl:     Continuous Blood Gluc  (FREESTYLE CEDRIC 14 DAY READER) ADELAIDA, 1 CGM DEVICE  USE TO CHECK BG LEVELS 4+ TIMES DAILY AND AS NEEDED, Disp: 1 Device, Rfl: 0    Continuous Blood Gluc Sensor (FreeStyle Cedric 14 Day Sensor) MISC, CHANGE SENSOR EVERY 14 DAYS TO CHECK BG LEVELS 4+ TIMES DAILY, Disp: 6 each, Rfl: 3    fluorouracil (EFUDEX) 5 % cream, APPLY TWICE A DAY TO SCALP FOR 1 MONTH, Disp: 40 g, Rfl: 0    glipiZIDE (GLUCOTROL XL) 5 mg 24 hr tablet, Take 1 tablet (5 mg total) by mouth daily, Disp: 90 tablet, Rfl: 0    glucose blood (FREESTYLE LITE) test strip, Test 3 times a day , Disp: 300 each, Rfl: 1    glucose blood (ONE TOUCH ULTRA TEST) test strip, Check  fbs  Daily  DX E11 65, Disp: 100 each, Rfl: 3    metFORMIN (GLUCOPHAGE-XR) 500 mg 24 hr tablet, TAKE 1 TABLET WITH BREAKFAST AND DINNER DAILY, Disp: 180 tablet, Rfl: 1    Multiple Vitamin-Folic Acid TABS, Take by mouth, Disp: , Rfl:     Omega-3 Fatty Acids (FISH OIL) 1,000 mg, Take 1,000 mg by mouth daily  , Disp: , Rfl:     polyethylene glycol (MIRALAX) 17 g packet, Take 17 g by mouth daily as needed (constipation), Disp: , Rfl: 0    senna-docusate sodium (SENOKOT S) 8 6-50 mg per tablet, Take 1 tablet by mouth 2 (two) times a day, Disp: , Rfl: 0    sildenafil (VIAGRA) 50 MG tablet, Take 1 tablet (50 mg total) by mouth daily as needed for erectile dysfunction, Disp: 5 tablet, Rfl: 5    Dulaglutide (Trulicity) 3 AV/3 8LU SOPN, Inject 3 mg total once a week, Disp: 6 mL, Rfl: 1    leuprolide (LUPRON DEPOT, 1-MONTH,) 3 75 mg injection, Inject into a muscle Once yearly  (Patient not taking: Reported on 9/13/2021), Disp: , Rfl:   Review of Systems   Constitutional: Negative for activity change, appetite change, fatigue and unexpected weight change  HENT: Negative for trouble swallowing  Eyes: Negative for visual disturbance  Respiratory: Negative for shortness of breath  Cardiovascular: Negative for chest pain and palpitations  Gastrointestinal: Negative for constipation and diarrhea  Endocrine: Negative for polydipsia and polyuria  Musculoskeletal: Negative  Skin: Negative for wound  Neurological: Negative for numbness  Psychiatric/Behavioral: Negative  Physical Exam:  Body mass index is 37 9 kg/m²  /88   Pulse 96   Temp 98 4 °F (36 9 °C) (Tympanic)   Ht 5' 7" (1 702 m)   Wt 110 kg (242 lb)   BMI 37 90 kg/m²    Wt Readings from Last 3 Encounters:   09/13/21 110 kg (242 lb)   09/08/21 112 kg (247 lb)   08/31/21 112 kg (247 lb)       Physical Exam  Vitals and nursing note reviewed  Constitutional:       Appearance: He is well-developed  HENT:      Head: Normocephalic  Eyes:      General: No scleral icterus  Pupils: Pupils are equal, round, and reactive to light  Neck:      Thyroid: No thyromegaly  Cardiovascular:      Rate and Rhythm: Normal rate and regular rhythm  Heart sounds: No murmur heard  Pulmonary:      Effort: Pulmonary effort is normal  No respiratory distress  Breath sounds: Normal breath sounds  No wheezing     Musculoskeletal: Cervical back: Neck supple  Comments: RUE in a sling   Skin:     General: Skin is warm and dry  Neurological:      Mental Status: He is alert  Patient's shoes and socks were not removed  Labs:   Component      Latest Ref Rng & Units 3/29/2021 5/28/2021 8/26/2021   Sodium      136 - 145 mmol/L 142 143 139   Potassium      3 5 - 5 3 mmol/L 4 5 4 4 4 4   Chloride      100 - 108 mmol/L 105 106 102   CO2      21 - 32 mmol/L 28 28 26   Anion Gap      4 - 13 mmol/L 9 9 11   BUN      5 - 25 mg/dL 15 16 24   Creatinine      0 60 - 1 30 mg/dL 1 08 1 17 1 41 (H)   GLUCOSE FASTING      65 - 99 mg/dL 135 (H) 141 (H)    Calcium      8 3 - 10 1 mg/dL 9 9 9 6 9 8   AST      5 - 45 U/L  65 (H) 55 (H)   ALT      12 - 78 U/L  90 (H) 92 (H)   Alkaline Phosphatase      46 - 116 U/L  63 67   Total Protein      6 4 - 8 2 g/dL  7 9 8 4 (H)   Albumin      3 5 - 5 0 g/dL  3 7 3 9   TOTAL BILIRUBIN      0 20 - 1 00 mg/dL  0 47 0 82   eGFR      ml/min/1 73sq m 67 61 48   Glucose, Random      65 - 140 mg/dL   222 (H)   Cholesterol      50 - 200 mg/dL  130    Triglycerides      <=150 mg/dL  95    HDL      >=40 mg/dL  48    LDL Calculated      0 - 100 mg/dL  63    Non-HDL Cholesterol      mg/dl  82    EXT Creatinine Urine      mg/dL  132 0    MICROALBUM ,U,RANDOM      0 0 - 20 0 mg/L  22 3 (H)    MICROALBUMIN/CREATININE RATIO      0 - 30 mg/g creatinine  17    Hemoglobin A1C      Normal 3 8-5 6%; PreDiabetic 5 7-6 4%; Diabetic >=6 5%; Glycemic control for adults with diabetes <7 0% % 7 3 (H) 6 7 (H)    eAG, EST AVG Glucose      mg/dl 163 146        Plan:    Diagnoses and all orders for this visit:    Type 2 diabetes mellitus with hyperglycemia, without long-term current use of insulin (HCC)  HGA1C 6 5%  Improved  Treatment regimen: continue current treatment  Discussed risks/complications associated with uncontrolled diabetes      Advised to adhere to diabetic diet, and recommended staying active/exercising routinely as tolerated  Keep carbohydrates consistent to limit blood glucose fluctuations  Advised to call if blood sugars less than 70 mg/dl or over 300 mg/dl  Check blood glucose 2+ times a day  Discussed symptoms and treatment of hypoglycemia  Recommended routine follow-up with podiatry and ophthalmology  Ordered blood work to complete prior to next visit  -     Hemoglobin A1C; Future  -     Basic metabolic panel; Future    Benign essential HTN  Blood pressure elevated  Advised to monitor BP at home and follow-up with PCP if remaining elevated  For now continue current treatment   -     Basic metabolic panel; Future    Mixed hyperlipidemia  LDL previously 63  Continue statin therapy     Osteoporosis without current pathological fracture, unspecified osteoporosis type  Continue Fosamax   Continue vitamin D supplementation  Take precautions to avoid falls  Recommended weightbearing exercises as tolerated  Repeat DEXA scan  -     Basic metabolic panel; Future  -     Vitamin D 25 hydroxy; Future  -     DXA bone density spine hip and pelvis; Future    Vitamin D deficiency  Vitamin D previously normal at 51 2  Continue current supplementation  -     Vitamin D 25 hydroxy; Future       Discussed with the patient diagnosis and treatment and all questions fully answered  He will call me if any problems arise  Counseled patient on diagnostic results, prognosis, risk and benefit of treatment options, instruction for management, importance of treatment compliance, risk factor reduction and impressions        Deisy Black PA-C

## 2021-09-13 NOTE — PATIENT INSTRUCTIONS
Hypoglycemia instructions   Phil Grief  9/13/2021  [id number]    Low Blood Sugar    Steps to treat low blood sugar  1  Test blood sugar if you have symptoms of low blood sugar:   Low Blood Sugar Symptoms:  o Sweaty  o Dizzy  o Rapid heartbeat  o Shaky  o Bad mood  o Hungry      2  Treat blood sugar less than 70 with 15 grams of fast-acting carbohydrate:   Examples of 15 grams Fast-Acting Carbohydrate:  o 4 oz juice  o 4 oz regular soda  o 3-4 glucose tablets (chew)  o 3-4 hard candies (chew)          3  Wait 15 minutes and test your blood sugar again     4   If blood sugar is less than 100, repeat steps 2-3     5  When your blood sugar is 100 or more, eat a snack if it will be longer than one hour until your next meal  The snack should be 15 grams of carbohydrate and a protein:   Examples of snacks:  o ½ sandwich  o 6 crackers with cheese  o Piece of fruit with cheese or peanut butter  o 6 crackers with peanut butter

## 2021-09-15 ENCOUNTER — OFFICE VISIT (OUTPATIENT)
Dept: FAMILY MEDICINE CLINIC | Facility: CLINIC | Age: 76
End: 2021-09-15
Payer: COMMERCIAL

## 2021-09-15 VITALS
SYSTOLIC BLOOD PRESSURE: 140 MMHG | BODY MASS INDEX: 37.98 KG/M2 | HEART RATE: 97 BPM | DIASTOLIC BLOOD PRESSURE: 70 MMHG | WEIGHT: 242 LBS | OXYGEN SATURATION: 94 % | RESPIRATION RATE: 18 BRPM | HEIGHT: 67 IN | TEMPERATURE: 97.5 F

## 2021-09-15 DIAGNOSIS — E11.65 TYPE 2 DIABETES MELLITUS WITH HYPERGLYCEMIA, WITHOUT LONG-TERM CURRENT USE OF INSULIN (HCC): Primary | ICD-10-CM

## 2021-09-15 DIAGNOSIS — I10 BENIGN ESSENTIAL HTN: ICD-10-CM

## 2021-09-15 DIAGNOSIS — Z95.2 HISTORY OF AORTIC VALVE REPLACEMENT: ICD-10-CM

## 2021-09-15 DIAGNOSIS — I25.10 ARTERIOSCLEROTIC CORONARY ARTERY DISEASE: ICD-10-CM

## 2021-09-15 DIAGNOSIS — E66.01 CLASS 2 SEVERE OBESITY DUE TO EXCESS CALORIES WITH SERIOUS COMORBIDITY AND BODY MASS INDEX (BMI) OF 36.0 TO 36.9 IN ADULT (HCC): ICD-10-CM

## 2021-09-15 DIAGNOSIS — S42.294D OTHER CLOSED NONDISPLACED FRACTURE OF PROXIMAL END OF RIGHT HUMERUS WITH ROUTINE HEALING, SUBSEQUENT ENCOUNTER: ICD-10-CM

## 2021-09-15 DIAGNOSIS — C61 PROSTATE CANCER (HCC): ICD-10-CM

## 2021-09-15 DIAGNOSIS — E78.2 MIXED HYPERLIPIDEMIA: ICD-10-CM

## 2021-09-15 DIAGNOSIS — M81.0 OSTEOPOROSIS WITHOUT CURRENT PATHOLOGICAL FRACTURE, UNSPECIFIED OSTEOPOROSIS TYPE: ICD-10-CM

## 2021-09-15 PROCEDURE — 1160F RVW MEDS BY RX/DR IN RCRD: CPT | Performed by: PHYSICIAN ASSISTANT

## 2021-09-15 PROCEDURE — 99214 OFFICE O/P EST MOD 30 MIN: CPT | Performed by: PHYSICIAN ASSISTANT

## 2021-09-15 PROCEDURE — 3008F BODY MASS INDEX DOCD: CPT | Performed by: PHYSICIAN ASSISTANT

## 2021-09-15 PROCEDURE — 1111F DSCHRG MED/CURRENT MED MERGE: CPT | Performed by: PHYSICIAN ASSISTANT

## 2021-09-15 PROCEDURE — 1036F TOBACCO NON-USER: CPT | Performed by: PHYSICIAN ASSISTANT

## 2021-09-15 NOTE — PROGRESS NOTES
Diabetic Foot Exam    Patient's shoes and socks removed  Right Foot/Ankle   Right Foot Inspection  Skin Exam: skin intact, dry skin, callus and callus                          Toe Exam:  no right toe deformity  Sensory   Vibration: intact    Monofilament testing: intact  Vascular    The right DP pulse is 1+  Left Foot/Ankle  Left Foot Inspection  Skin Exam: skin intact, dry skin and callus                         Toe Exam: no left toe deformity                   Sensory   Vibration: intact    Monofilament: intact  Vascular    The left DP pulse is 1+  Assign Risk Category:  Deformity present; Loss of protective sensation; Weak pulses       Risk: 2  Assessment/Plan:     Diagnoses and all orders for this visit:    Type 2 diabetes mellitus with hyperglycemia, without long-term current use of insulin (HCC)  Comments:  Diabetes is at goal continue current regimen  Follow-up with Endocrinology as directed  Orders:  -     POCT hemoglobin A1c  -     Hemoglobin A1C; Future    Arteriosclerotic coronary artery disease  Comments:  No angina or signs of congestive heart failure    Benign essential HTN  Comments:  Off all medication blood pressure is normal  Orders:  -     Comprehensive metabolic panel; Future    Other closed nondisplaced fracture of proximal end of right humerus with routine healing, subsequent encounter  Comments: Follow-up with Orthopedic as directed    Osteoporosis without current pathological fracture, unspecified osteoporosis type  Comments:  Continue calcium vitamin-D supplements along with Fosamax weekly  Will forget weight-bearing exercise    Prostate cancer [facility])  Comments:  Under care of Oncology    Patient is currently stable    Class 2 severe obesity due to excess calories with serious comorbidity and body mass index (BMI) of 36 0 to 36 9 in adult [facility])  Comments:  Exercise to promote weight loss    History of aortic valve replacement  Comments:  Heart sounds are normal    Mixed hyperlipidemia  Comments:  Continue statin therapy and low-fat diet  Orders:  -     Lipid panel; Future          Subjective:      Patient ID: Josette Hyde is a 77 y o  male  Presents in the office for follow up chronic conditions  Patient has non-insulin diabetes mellitus type 2  He is currently on [facility] weekly injection  Metformin ER twice a day and glipizide ER 5 milligrams  Patient has a continuous glucometer reader  He just saw his endocrinologist on Monday  A1c done in the office was 6 5  History of CAD he has no angina or signs of congestive heart failure  History of AVR  He does follow up with Cardiology  Hypertension currently on no medication  For lipid control he is on Lipitor 40 milligrams  He has osteoporosis he is on Fosamax once a week along with calcium and vitamin-D supplements  Patient does have a fractured right humerus  He fell several weeks ago  He is seeing orthopedic  X-ray shows routine healing    He also has a history of prostate cancer patient is stable has not had to have a Lupron injection        The following portions of the patient's history were reviewed and updated as appropriate:   He   Patient Active Problem List    Diagnosis Date Noted    Humerus fracture 08/26/2021    Fall 08/26/2021    Vitamin D deficiency 07/18/2019    Erectile dysfunction due to diseases classified elsewhere 04/16/2019    History of aortic valve replacement 11/30/2017    Arteriosclerotic coronary artery disease 11/30/2017    Class 2 severe obesity with serious comorbidity and body mass index (BMI) of 36 0 to 36 9 in [facility]) 11/30/2017    Osteoporosis 11/30/2017    Type 2 diabetes mellitus with hyperglycemia, without long-term current use of insulin ([facility] ) 06/20/2017    YATES (nonalcoholic steatohepatitis) 09/14/2015    Prostate cancer ([facility] ) 06/18/2013    Hyperlipidemia 07/17/2012    Osteoarthritis 07/17/2012    Benign essential HTN 04/30/2012     Current Outpatient Medications   Medication Sig Dispense Refill    alendronate (FOSAMAX) 70 mg tablet TAKE 1 TAB BY MOUTH EVERY 7 DAYS ON EMPTY STOMACH WITH FULL GLASS OF WATER, DO NOT LIE DOWN FOR 30 MINUTES AFTER TAKING 12 tablet 0    aspirin 81 MG tablet Take by mouth      atorvastatin (LIPITOR) 40 mg tablet Take 1 tablet (40 mg total) by mouth daily 90 tablet 1    Blood Glucose Calibration (OT ULTRA/FASTTK CNTRL SOLN) SOLN by In Vitro route as needed (to check meter calibration) 1 each 3    Blood Glucose Monitoring Suppl (ONE TOUCH ULTRA 2) w/Device KIT by Does not apply route daily DX E11 65  Pt to   Check  fbs  daily 1 each 0    Cholecalciferol (VITAMIN D3) 2000 units capsule Take 2,000 Units by mouth daily  11    Coenzyme Q10 (COQ-10) 100 MG CAPS Take 100 mg by mouth   Continuous Blood Gluc  (FREESTYLE CEDRIC 14 DAY READER) ADELAIDA 1 CGM DEVICE  USE TO CHECK BG LEVELS 4+ TIMES DAILY AND AS NEEDED 1 Device 0    Continuous Blood Gluc Sensor (FreeStyle Cedric 14 Day Sensor) MISC CHANGE SENSOR EVERY 14 DAYS TO CHECK BG LEVELS 4+ TIMES DAILY 6 each 3    Dulaglutide (Trulicity) 3 PF/0 2GO SOPN Inject 3 mg total once a week 6 mL 1    fluorouracil (EFUDEX) 5 % cream APPLY TWICE A DAY TO SCALP FOR 1 MONTH 40 g 0    glipiZIDE (GLUCOTROL XL) 5 mg 24 hr tablet Take 1 tablet (5 mg total) by mouth daily 90 tablet 0    glucose blood (FREESTYLE LITE) test strip Test 3 times a day  300 each 1    glucose blood (ONE TOUCH ULTRA TEST) test strip Check  fbs  Daily  DX E11 65 100 each 3    leuprolide (LUPRON DEPOT, 1-MONTH,) 3 75 mg injection Inject into a muscle Once yearly  (Patient not taking: Reported on 9/13/2021)      metFORMIN (GLUCOPHAGE-XR) 500 mg 24 hr tablet TAKE 1 TABLET WITH BREAKFAST AND DINNER DAILY 180 tablet 1    Multiple Vitamin-Folic Acid TABS Take by mouth      Omega-3 Fatty Acids (FISH OIL) 1,000 mg Take 1,000 mg by mouth daily        polyethylene glycol (MIRALAX) 17 g packet Take 17 g by mouth daily as needed (constipation)  0    senna-docusate sodium (SENOKOT S) 8 6-50 mg per tablet Take 1 tablet by mouth 2 (two) times a day  0    sildenafil (VIAGRA) 50 MG tablet Take 1 tablet (50 mg total) by mouth daily as needed for erectile dysfunction 5 tablet 5     No current facility-administered medications for this visit  He has No Known Allergies       Review of Systems   Constitutional: Negative for activity change, appetite change, chills, fatigue and fever  HENT: Negative for ear pain and sore throat  Eyes: Negative for visual disturbance  Respiratory: Negative for cough and shortness of breath  Cardiovascular: Negative for chest pain, palpitations and leg swelling  Gastrointestinal: Positive for constipation  Negative for abdominal pain, blood in stool, diarrhea and nausea  Genitourinary: Negative for difficulty urinating  Musculoskeletal: Positive for arthralgias (right  upper  arm  pain)  Negative for back pain and myalgias  Skin: Negative for rash  Neurological: Negative for dizziness, syncope and headaches  Psychiatric/Behavioral: Positive for sleep disturbance  Objective:        Physical Exam  Vitals and nursing note reviewed  Constitutional:       General: He is not in acute distress  Appearance: He is well-developed  He is obese  He is not ill-appearing  HENT:      Head: Normocephalic and atraumatic  Right Ear: Tympanic membrane, ear canal and external ear normal       Left Ear: Tympanic membrane, ear canal and external ear normal    Eyes:      Conjunctiva/sclera: Conjunctivae normal       Pupils: Pupils are equal, round, and reactive to light  Neck:      Thyroid: No thyromegaly  Vascular: No carotid bruit  Cardiovascular:      Rate and Rhythm: Normal rate and regular rhythm  Pulses: Pulses are weak  Dorsalis pedis pulses are 1+ on the right side and 1+ on the left side  Heart sounds: No murmur heard          Comments: Aortic valve replacement  Pulmonary:      Effort: Pulmonary effort is normal       Breath sounds: Normal breath sounds  No wheezing  Abdominal:      General: Bowel sounds are normal       Palpations: Abdomen is soft  There is no mass  Tenderness: There is no abdominal tenderness  Musculoskeletal:      Right lower leg: No edema  Left lower leg: No edema  Comments: Healing abrasion anterior shin  Right arm in sling   Feet:      Right foot:      Skin integrity: Callus and dry skin present  Left foot:      Skin integrity: Callus and dry skin present  Lymphadenopathy:      Cervical: No cervical adenopathy  Skin:     General: Skin is warm and dry  Neurological:      General: No focal deficit present  Mental Status: He is alert and oriented to person, place, and time  Psychiatric:         Mood and Affect: Mood normal          Behavior: Behavior normal          Thought Content:  Thought content normal          Judgment: Judgment normal

## 2021-09-24 ENCOUNTER — EVALUATION (OUTPATIENT)
Dept: PHYSICAL THERAPY | Facility: CLINIC | Age: 76
End: 2021-09-24
Payer: COMMERCIAL

## 2021-09-24 DIAGNOSIS — S42.294D OTHER CLOSED NONDISPLACED FRACTURE OF PROXIMAL END OF RIGHT HUMERUS WITH ROUTINE HEALING, SUBSEQUENT ENCOUNTER: Primary | ICD-10-CM

## 2021-09-24 PROCEDURE — 97140 MANUAL THERAPY 1/> REGIONS: CPT | Performed by: PHYSICAL THERAPIST

## 2021-09-24 PROCEDURE — 97161 PT EVAL LOW COMPLEX 20 MIN: CPT | Performed by: PHYSICAL THERAPIST

## 2021-09-24 PROCEDURE — 97110 THERAPEUTIC EXERCISES: CPT | Performed by: PHYSICAL THERAPIST

## 2021-09-24 NOTE — PROGRESS NOTES
PT Evaluation     Today's date: 2021  Patient name: Kenny Monge  : [id number]  MRN: [id number]  Referring provider: Christophe Adam MD  Dx:   Encounter Diagnosis     ICD-10-CM    1  Other closed nondisplaced fracture of proximal end of right humerus with routine healing, subsequent encounter  S42 294D Ambulatory referral to Physical Therapy                  Assessment  Assessment details: Pt presents with impairments of R shoulder and elbow ROM, strength, arthrokinematics, and pain  He will benefit from skilled PT services to address his impairments in order to decrease pain, restore function, and return to bowling  Impairments: abnormal muscle firing, abnormal or restricted ROM, abnormal movement, activity intolerance, impaired physical strength, lacks appropriate home exercise program, pain with function, scapular dyskinesis, poor posture  and poor body mechanics  Understanding of Dx/Px/POC: good   Prognosis: good    Goals  STG - 4 wks  1) pt will demonstrate 90 deg active FE  2) pt will demonstrate full elbow ROM    MTG - 8 wks  1) pt will demonstrate 125 deg active FE  2) pt will demonstrate normalized arthrokinematics    LTG - 12 wks  1) pt will demonstrate 4/5 RUE strength  2) pt will resolve functional limitations  3) pt will return to bowling    Plan  Planned modality interventions: cryotherapy  Planned therapy interventions: joint mobilization, manual therapy, body mechanics training, neuromuscular re-education, patient education, postural training, therapeutic activities, therapeutic exercise, stretching, strengthening, home exercise program, functional ROM exercises and flexibility  Frequency: 2x week  Duration in weeks: 12  Treatment plan discussed with: patient        Subjective Evaluation    History of Present Illness  Mechanism of injury: Pt is a 77 y o  male with PMHx significant for prostate CA, osteoporosis, aortic valve replacement, R TKA, HTN, DM II, CAD, falls   He presents to PT s/p R proximal humerus fx 21  He denies pain currently  Pain  Current pain ratin  At best pain ratin  At worst pain rating: 3 (passive elevation)    Social Support    Employment status: not working  Hand dominance: right      Diagnostic Tests  X-ray: abnormal (21: Stable alignment of a healing comminuted oblique fracture of the proximal right humerus)  Treatments  Previous treatment: immobilization  Current treatment: immobilization  Patient Goals  Patient goals for therapy: decreased pain, increased motion, return to sport/leisure activities and independence with ADLs/IADLs  Patient goal: return to semi-professional bowling        Objective     Observations     Additional Observation Details  R: (+) ecchymosis    Active Range of Motion     Right Shoulder   Flexion: 40 degrees   Abduction: 40 degrees     Passive Range of Motion     Right Shoulder   Flexion: 105 degrees with pain  Abduction: 95 degrees with pain  External rotation 45°: 20 degrees   Internal rotation 45°: 50 degrees     Right Elbow   Flexion: 130 degrees   Extension: 15 degrees              Precautions:  PMHx: prostate CA, osteoporosis, aortic valve replacement, R TKA, HTN, DM II, CAD, falls  ROM only  Dx: s/p R humeral fx 21 - non-op    Manuals             R shoulder PROM 7 min            R elbow PROM 3 min                                      Neuro Re-Ed             iso scap retraction 5"x10            Standing FF AAROM             Standing ABD AAROM                                                                 Ther Ex             Pulleys: FF, ABD 5"X5 ea            Pendulums: static hang 1x60"            R UT stretch 10"x3            R elbow AROM 1x15            Supine ER AAROM 5"x10                                                   Ther Activity                                       Gait Training                                       Modalities

## 2021-09-27 ENCOUNTER — OFFICE VISIT (OUTPATIENT)
Dept: PHYSICAL THERAPY | Facility: CLINIC | Age: 76
End: 2021-09-27
Payer: COMMERCIAL

## 2021-09-27 DIAGNOSIS — S42.294D OTHER CLOSED NONDISPLACED FRACTURE OF PROXIMAL END OF RIGHT HUMERUS WITH ROUTINE HEALING, SUBSEQUENT ENCOUNTER: Primary | ICD-10-CM

## 2021-09-27 PROCEDURE — 97110 THERAPEUTIC EXERCISES: CPT | Performed by: PHYSICAL THERAPIST

## 2021-09-27 PROCEDURE — 97112 NEUROMUSCULAR REEDUCATION: CPT | Performed by: PHYSICAL THERAPIST

## 2021-09-27 NOTE — PROGRESS NOTES
Daily Note     Today's date: 2021  Patient name: Maurice Bloom  : [id number]  MRN: [id number]  Referring provider: Silvio Stewart MD  Dx:   Encounter Diagnosis     ICD-10-CM    1  Other closed nondisplaced fracture of proximal end of right humerus with routine healing, subsequent encounter  S42 294D                   Subjective: Pt reports low pain levels and good compliance with HEP and weaning off of sling  Objective: See treatment diary below  PROM: ER 45 deg ABD: 35 deg, FF: 120 deg, ABD: 106 deg    Assessment: Pt demonstrated improved PROM, difficulty with AAROM  Plan: Cont  POC  Add scaption AAROM to HEP nv  Precautions:  PMHx: prostate CA, osteoporosis, aortic valve replacement, R TKA, HTN, DM II, CAD, falls  ROM only  Dx: s/p R humeral fx 21 - non-op    Manuals            R shoulder PROM 7 min 12 min           R elbow PROM 3 min 3 min                                     Neuro Re-Ed             iso scap retraction 5"x10 5"x10           Standing scaption AAROM  1x10                                                                            Ther Ex             Pulleys: FF, ABD 5"X5 ea 5"x10 ea           Pendulums: static hang 1x60" 60"x1           R UT stretch 10"x3 10"X3           R elbow AROM 1x15 1x15           Supine ER AAROM 5"x10 5"x10                                                  Ther Activity                                       Gait Training                                       Modalities

## 2021-10-01 ENCOUNTER — OFFICE VISIT (OUTPATIENT)
Dept: PHYSICAL THERAPY | Facility: CLINIC | Age: 76
End: 2021-10-01
Payer: COMMERCIAL

## 2021-10-01 DIAGNOSIS — S42.294D OTHER CLOSED NONDISPLACED FRACTURE OF PROXIMAL END OF RIGHT HUMERUS WITH ROUTINE HEALING, SUBSEQUENT ENCOUNTER: Primary | ICD-10-CM

## 2021-10-01 PROCEDURE — 97110 THERAPEUTIC EXERCISES: CPT

## 2021-10-04 ENCOUNTER — APPOINTMENT (OUTPATIENT)
Dept: PHYSICAL THERAPY | Facility: CLINIC | Age: 76
End: 2021-10-04
Payer: COMMERCIAL

## 2021-10-04 ENCOUNTER — OFFICE VISIT (OUTPATIENT)
Dept: PHYSICAL THERAPY | Facility: CLINIC | Age: 76
End: 2021-10-04
Payer: COMMERCIAL

## 2021-10-04 DIAGNOSIS — S42.294D OTHER CLOSED NONDISPLACED FRACTURE OF PROXIMAL END OF RIGHT HUMERUS WITH ROUTINE HEALING, SUBSEQUENT ENCOUNTER: Primary | ICD-10-CM

## 2021-10-04 PROCEDURE — 97110 THERAPEUTIC EXERCISES: CPT

## 2021-10-05 ENCOUNTER — APPOINTMENT (OUTPATIENT)
Dept: RADIOLOGY | Facility: AMBULARY SURGERY CENTER | Age: 76
End: 2021-10-05
Attending: ORTHOPAEDIC SURGERY
Payer: COMMERCIAL

## 2021-10-05 ENCOUNTER — OFFICE VISIT (OUTPATIENT)
Dept: OBGYN CLINIC | Facility: CLINIC | Age: 76
End: 2021-10-05
Payer: COMMERCIAL

## 2021-10-05 VITALS
HEART RATE: 89 BPM | HEIGHT: 67 IN | WEIGHT: 242 LBS | SYSTOLIC BLOOD PRESSURE: 123 MMHG | DIASTOLIC BLOOD PRESSURE: 85 MMHG | BODY MASS INDEX: 37.98 KG/M2

## 2021-10-05 DIAGNOSIS — M25.511 ACUTE PAIN OF RIGHT SHOULDER: Primary | ICD-10-CM

## 2021-10-05 DIAGNOSIS — M25.511 ACUTE PAIN OF RIGHT SHOULDER: ICD-10-CM

## 2021-10-05 PROCEDURE — 1160F RVW MEDS BY RX/DR IN RCRD: CPT | Performed by: ORTHOPAEDIC SURGERY

## 2021-10-05 PROCEDURE — 3008F BODY MASS INDEX DOCD: CPT | Performed by: ORTHOPAEDIC SURGERY

## 2021-10-05 PROCEDURE — 73030 X-RAY EXAM OF SHOULDER: CPT

## 2021-10-05 PROCEDURE — 1036F TOBACCO NON-USER: CPT | Performed by: ORTHOPAEDIC SURGERY

## 2021-10-05 PROCEDURE — 3074F SYST BP LT 130 MM HG: CPT | Performed by: ORTHOPAEDIC SURGERY

## 2021-10-05 PROCEDURE — 3079F DIAST BP 80-89 MM HG: CPT | Performed by: ORTHOPAEDIC SURGERY

## 2021-10-05 PROCEDURE — 99213 OFFICE O/P EST LOW 20 MIN: CPT | Performed by: ORTHOPAEDIC SURGERY

## 2021-10-08 ENCOUNTER — OFFICE VISIT (OUTPATIENT)
Dept: PHYSICAL THERAPY | Facility: CLINIC | Age: 76
End: 2021-10-08
Payer: COMMERCIAL

## 2021-10-08 DIAGNOSIS — S42.294D OTHER CLOSED NONDISPLACED FRACTURE OF PROXIMAL END OF RIGHT HUMERUS WITH ROUTINE HEALING, SUBSEQUENT ENCOUNTER: Primary | ICD-10-CM

## 2021-10-08 PROCEDURE — 97140 MANUAL THERAPY 1/> REGIONS: CPT | Performed by: PHYSICAL THERAPIST

## 2021-10-08 PROCEDURE — 97110 THERAPEUTIC EXERCISES: CPT | Performed by: PHYSICAL THERAPIST

## 2021-10-12 ENCOUNTER — OFFICE VISIT (OUTPATIENT)
Dept: PHYSICAL THERAPY | Facility: CLINIC | Age: 76
End: 2021-10-12
Payer: COMMERCIAL

## 2021-10-12 DIAGNOSIS — S42.294D OTHER CLOSED NONDISPLACED FRACTURE OF PROXIMAL END OF RIGHT HUMERUS WITH ROUTINE HEALING, SUBSEQUENT ENCOUNTER: Primary | ICD-10-CM

## 2021-10-12 PROCEDURE — 97110 THERAPEUTIC EXERCISES: CPT

## 2021-10-15 ENCOUNTER — OFFICE VISIT (OUTPATIENT)
Dept: PHYSICAL THERAPY | Facility: CLINIC | Age: 76
End: 2021-10-15
Payer: COMMERCIAL

## 2021-10-15 DIAGNOSIS — S42.294D OTHER CLOSED NONDISPLACED FRACTURE OF PROXIMAL END OF RIGHT HUMERUS WITH ROUTINE HEALING, SUBSEQUENT ENCOUNTER: Primary | ICD-10-CM

## 2021-10-15 PROCEDURE — 97140 MANUAL THERAPY 1/> REGIONS: CPT

## 2021-10-15 PROCEDURE — 97112 NEUROMUSCULAR REEDUCATION: CPT

## 2021-10-15 PROCEDURE — 97110 THERAPEUTIC EXERCISES: CPT

## 2021-10-19 ENCOUNTER — OFFICE VISIT (OUTPATIENT)
Dept: PHYSICAL THERAPY | Facility: CLINIC | Age: 76
End: 2021-10-19
Payer: COMMERCIAL

## 2021-10-19 DIAGNOSIS — S42.294D OTHER CLOSED NONDISPLACED FRACTURE OF PROXIMAL END OF RIGHT HUMERUS WITH ROUTINE HEALING, SUBSEQUENT ENCOUNTER: Primary | ICD-10-CM

## 2021-10-19 PROCEDURE — 97110 THERAPEUTIC EXERCISES: CPT

## 2021-10-19 PROCEDURE — 97112 NEUROMUSCULAR REEDUCATION: CPT

## 2021-10-22 ENCOUNTER — OFFICE VISIT (OUTPATIENT)
Dept: PHYSICAL THERAPY | Facility: CLINIC | Age: 76
End: 2021-10-22
Payer: COMMERCIAL

## 2021-10-22 DIAGNOSIS — S42.294D OTHER CLOSED NONDISPLACED FRACTURE OF PROXIMAL END OF RIGHT HUMERUS WITH ROUTINE HEALING, SUBSEQUENT ENCOUNTER: Primary | ICD-10-CM

## 2021-10-22 PROCEDURE — 97112 NEUROMUSCULAR REEDUCATION: CPT

## 2021-10-22 PROCEDURE — 97110 THERAPEUTIC EXERCISES: CPT

## 2021-10-24 DIAGNOSIS — E78.5 DYSLIPIDEMIA: ICD-10-CM

## 2021-10-25 RX ORDER — ATORVASTATIN CALCIUM 40 MG/1
TABLET, FILM COATED ORAL
Qty: 90 TABLET | Refills: 1 | Status: SHIPPED | OUTPATIENT
Start: 2021-10-25 | End: 2022-04-22

## 2021-10-26 ENCOUNTER — OFFICE VISIT (OUTPATIENT)
Dept: PHYSICAL THERAPY | Facility: CLINIC | Age: 76
End: 2021-10-26
Payer: COMMERCIAL

## 2021-10-26 DIAGNOSIS — S42.294D OTHER CLOSED NONDISPLACED FRACTURE OF PROXIMAL END OF RIGHT HUMERUS WITH ROUTINE HEALING, SUBSEQUENT ENCOUNTER: Primary | ICD-10-CM

## 2021-10-26 DIAGNOSIS — E11.65 UNCONTROLLED TYPE 2 DIABETES MELLITUS WITH HYPERGLYCEMIA (HCC): ICD-10-CM

## 2021-10-26 PROCEDURE — 97112 NEUROMUSCULAR REEDUCATION: CPT

## 2021-10-26 PROCEDURE — 97110 THERAPEUTIC EXERCISES: CPT

## 2021-10-26 RX ORDER — FLASH GLUCOSE SENSOR
KIT MISCELLANEOUS
Qty: 6 EACH | Refills: 3 | Status: SHIPPED | OUTPATIENT
Start: 2021-10-26 | End: 2021-11-03 | Stop reason: SDUPTHER

## 2021-10-29 ENCOUNTER — OFFICE VISIT (OUTPATIENT)
Dept: PHYSICAL THERAPY | Facility: CLINIC | Age: 76
End: 2021-10-29
Payer: COMMERCIAL

## 2021-10-29 DIAGNOSIS — S42.294D OTHER CLOSED NONDISPLACED FRACTURE OF PROXIMAL END OF RIGHT HUMERUS WITH ROUTINE HEALING, SUBSEQUENT ENCOUNTER: Primary | ICD-10-CM

## 2021-10-29 PROCEDURE — 97112 NEUROMUSCULAR REEDUCATION: CPT

## 2021-10-29 PROCEDURE — 97110 THERAPEUTIC EXERCISES: CPT

## 2021-11-02 ENCOUNTER — EVALUATION (OUTPATIENT)
Dept: PHYSICAL THERAPY | Facility: CLINIC | Age: 76
End: 2021-11-02
Payer: COMMERCIAL

## 2021-11-02 DIAGNOSIS — S42.294D OTHER CLOSED NONDISPLACED FRACTURE OF PROXIMAL END OF RIGHT HUMERUS WITH ROUTINE HEALING, SUBSEQUENT ENCOUNTER: Primary | ICD-10-CM

## 2021-11-02 PROCEDURE — 97140 MANUAL THERAPY 1/> REGIONS: CPT | Performed by: PHYSICAL THERAPIST

## 2021-11-02 PROCEDURE — 97110 THERAPEUTIC EXERCISES: CPT | Performed by: PHYSICAL THERAPIST

## 2021-11-02 PROCEDURE — 97112 NEUROMUSCULAR REEDUCATION: CPT | Performed by: PHYSICAL THERAPIST

## 2021-11-03 RX ORDER — FLASH GLUCOSE SENSOR
KIT MISCELLANEOUS
Qty: 6 EACH | Refills: 3 | Status: SHIPPED | OUTPATIENT
Start: 2021-11-03 | End: 2022-04-20 | Stop reason: SDUPTHER

## 2021-11-05 ENCOUNTER — APPOINTMENT (OUTPATIENT)
Dept: RADIOLOGY | Facility: AMBULARY SURGERY CENTER | Age: 76
End: 2021-11-05
Attending: ORTHOPAEDIC SURGERY
Payer: COMMERCIAL

## 2021-11-05 ENCOUNTER — OFFICE VISIT (OUTPATIENT)
Dept: OBGYN CLINIC | Facility: CLINIC | Age: 76
End: 2021-11-05
Payer: COMMERCIAL

## 2021-11-05 ENCOUNTER — OFFICE VISIT (OUTPATIENT)
Dept: PHYSICAL THERAPY | Facility: CLINIC | Age: 76
End: 2021-11-05
Payer: COMMERCIAL

## 2021-11-05 VITALS
DIASTOLIC BLOOD PRESSURE: 84 MMHG | HEART RATE: 88 BPM | SYSTOLIC BLOOD PRESSURE: 149 MMHG | BODY MASS INDEX: 38.14 KG/M2 | HEIGHT: 67 IN | WEIGHT: 243 LBS

## 2021-11-05 DIAGNOSIS — S42.294D OTHER CLOSED NONDISPLACED FRACTURE OF PROXIMAL END OF RIGHT HUMERUS WITH ROUTINE HEALING, SUBSEQUENT ENCOUNTER: Primary | ICD-10-CM

## 2021-11-05 DIAGNOSIS — M79.601 PAIN OF RIGHT UPPER EXTREMITY: ICD-10-CM

## 2021-11-05 PROCEDURE — 99212 OFFICE O/P EST SF 10 MIN: CPT | Performed by: ORTHOPAEDIC SURGERY

## 2021-11-05 PROCEDURE — 97140 MANUAL THERAPY 1/> REGIONS: CPT | Performed by: PHYSICAL THERAPIST

## 2021-11-05 PROCEDURE — 1036F TOBACCO NON-USER: CPT | Performed by: ORTHOPAEDIC SURGERY

## 2021-11-05 PROCEDURE — 1160F RVW MEDS BY RX/DR IN RCRD: CPT | Performed by: ORTHOPAEDIC SURGERY

## 2021-11-05 PROCEDURE — 97110 THERAPEUTIC EXERCISES: CPT | Performed by: PHYSICAL THERAPIST

## 2021-11-05 PROCEDURE — 73030 X-RAY EXAM OF SHOULDER: CPT

## 2021-11-05 PROCEDURE — 97112 NEUROMUSCULAR REEDUCATION: CPT | Performed by: PHYSICAL THERAPIST

## 2021-11-18 DIAGNOSIS — C61 PROSTATE CANCER (HCC): Primary | ICD-10-CM

## 2021-11-22 ENCOUNTER — OFFICE VISIT (OUTPATIENT)
Dept: PHYSICAL THERAPY | Facility: CLINIC | Age: 76
End: 2021-11-22
Payer: COMMERCIAL

## 2021-11-22 DIAGNOSIS — S42.294D OTHER CLOSED NONDISPLACED FRACTURE OF PROXIMAL END OF RIGHT HUMERUS WITH ROUTINE HEALING, SUBSEQUENT ENCOUNTER: Primary | ICD-10-CM

## 2021-11-22 PROCEDURE — 97140 MANUAL THERAPY 1/> REGIONS: CPT | Performed by: PHYSICAL THERAPIST

## 2021-11-22 PROCEDURE — 97110 THERAPEUTIC EXERCISES: CPT | Performed by: PHYSICAL THERAPIST

## 2021-11-22 PROCEDURE — 97112 NEUROMUSCULAR REEDUCATION: CPT | Performed by: PHYSICAL THERAPIST

## 2021-11-23 ENCOUNTER — APPOINTMENT (OUTPATIENT)
Dept: LAB | Facility: CLINIC | Age: 76
End: 2021-11-23
Payer: COMMERCIAL

## 2021-11-23 DIAGNOSIS — C61 PROSTATE CANCER (HCC): ICD-10-CM

## 2021-11-23 LAB — PSA SERPL-MCNC: <0.1 NG/ML (ref 0–4)

## 2021-11-23 PROCEDURE — 84153 ASSAY OF PSA TOTAL: CPT

## 2021-11-26 ENCOUNTER — OFFICE VISIT (OUTPATIENT)
Dept: UROLOGY | Facility: AMBULATORY SURGERY CENTER | Age: 76
End: 2021-11-26
Payer: COMMERCIAL

## 2021-11-26 ENCOUNTER — OFFICE VISIT (OUTPATIENT)
Dept: PHYSICAL THERAPY | Facility: CLINIC | Age: 76
End: 2021-11-26
Payer: COMMERCIAL

## 2021-11-26 VITALS
SYSTOLIC BLOOD PRESSURE: 150 MMHG | HEIGHT: 67 IN | WEIGHT: 238 LBS | HEART RATE: 85 BPM | DIASTOLIC BLOOD PRESSURE: 82 MMHG | BODY MASS INDEX: 37.35 KG/M2

## 2021-11-26 DIAGNOSIS — S42.294D OTHER CLOSED NONDISPLACED FRACTURE OF PROXIMAL END OF RIGHT HUMERUS WITH ROUTINE HEALING, SUBSEQUENT ENCOUNTER: Primary | ICD-10-CM

## 2021-11-26 DIAGNOSIS — C61 PROSTATE CANCER (HCC): Primary | ICD-10-CM

## 2021-11-26 PROCEDURE — 99213 OFFICE O/P EST LOW 20 MIN: CPT | Performed by: NURSE PRACTITIONER

## 2021-11-26 PROCEDURE — 97110 THERAPEUTIC EXERCISES: CPT

## 2021-11-26 PROCEDURE — 3008F BODY MASS INDEX DOCD: CPT | Performed by: ORTHOPAEDIC SURGERY

## 2021-11-26 PROCEDURE — 97112 NEUROMUSCULAR REEDUCATION: CPT

## 2021-11-26 PROCEDURE — 97140 MANUAL THERAPY 1/> REGIONS: CPT

## 2021-11-30 ENCOUNTER — OFFICE VISIT (OUTPATIENT)
Dept: PHYSICAL THERAPY | Facility: CLINIC | Age: 76
End: 2021-11-30
Payer: COMMERCIAL

## 2021-11-30 DIAGNOSIS — S42.294D OTHER CLOSED NONDISPLACED FRACTURE OF PROXIMAL END OF RIGHT HUMERUS WITH ROUTINE HEALING, SUBSEQUENT ENCOUNTER: Primary | ICD-10-CM

## 2021-11-30 PROCEDURE — 97110 THERAPEUTIC EXERCISES: CPT

## 2021-11-30 PROCEDURE — 97112 NEUROMUSCULAR REEDUCATION: CPT

## 2021-12-03 ENCOUNTER — OFFICE VISIT (OUTPATIENT)
Dept: PHYSICAL THERAPY | Facility: CLINIC | Age: 76
End: 2021-12-03
Payer: COMMERCIAL

## 2021-12-03 DIAGNOSIS — S42.294D OTHER CLOSED NONDISPLACED FRACTURE OF PROXIMAL END OF RIGHT HUMERUS WITH ROUTINE HEALING, SUBSEQUENT ENCOUNTER: Primary | ICD-10-CM

## 2021-12-03 PROCEDURE — 97110 THERAPEUTIC EXERCISES: CPT | Performed by: PHYSICAL THERAPIST

## 2021-12-03 PROCEDURE — 97140 MANUAL THERAPY 1/> REGIONS: CPT | Performed by: PHYSICAL THERAPIST

## 2021-12-03 PROCEDURE — 97112 NEUROMUSCULAR REEDUCATION: CPT | Performed by: PHYSICAL THERAPIST

## 2021-12-08 ENCOUNTER — APPOINTMENT (OUTPATIENT)
Dept: LAB | Facility: CLINIC | Age: 76
End: 2021-12-08
Payer: COMMERCIAL

## 2021-12-08 ENCOUNTER — OFFICE VISIT (OUTPATIENT)
Dept: PHYSICAL THERAPY | Facility: CLINIC | Age: 76
End: 2021-12-08
Payer: COMMERCIAL

## 2021-12-08 DIAGNOSIS — E78.2 MIXED HYPERLIPIDEMIA: ICD-10-CM

## 2021-12-08 DIAGNOSIS — I10 BENIGN ESSENTIAL HTN: ICD-10-CM

## 2021-12-08 DIAGNOSIS — S42.294D OTHER CLOSED NONDISPLACED FRACTURE OF PROXIMAL END OF RIGHT HUMERUS WITH ROUTINE HEALING, SUBSEQUENT ENCOUNTER: Primary | ICD-10-CM

## 2021-12-08 DIAGNOSIS — E11.65 TYPE 2 DIABETES MELLITUS WITH HYPERGLYCEMIA, WITHOUT LONG-TERM CURRENT USE OF INSULIN (HCC): ICD-10-CM

## 2021-12-08 LAB
ALBUMIN SERPL BCP-MCNC: 3.5 G/DL (ref 3.5–5)
ALP SERPL-CCNC: 74 U/L (ref 46–116)
ALT SERPL W P-5'-P-CCNC: 65 U/L (ref 12–78)
ANION GAP SERPL CALCULATED.3IONS-SCNC: 8 MMOL/L (ref 4–13)
AST SERPL W P-5'-P-CCNC: 48 U/L (ref 5–45)
BILIRUB SERPL-MCNC: 0.59 MG/DL (ref 0.2–1)
BUN SERPL-MCNC: 15 MG/DL (ref 5–25)
CALCIUM SERPL-MCNC: 9.5 MG/DL (ref 8.3–10.1)
CHLORIDE SERPL-SCNC: 103 MMOL/L (ref 100–108)
CHOLEST SERPL-MCNC: 127 MG/DL
CO2 SERPL-SCNC: 29 MMOL/L (ref 21–32)
CREAT SERPL-MCNC: 1.09 MG/DL (ref 0.6–1.3)
EST. AVERAGE GLUCOSE BLD GHB EST-MCNC: 154 MG/DL
GFR SERPL CREATININE-BSD FRML MDRD: 66 ML/MIN/1.73SQ M
GLUCOSE P FAST SERPL-MCNC: 125 MG/DL (ref 65–99)
HBA1C MFR BLD: 7 %
HDLC SERPL-MCNC: 46 MG/DL
LDLC SERPL CALC-MCNC: 63 MG/DL (ref 0–100)
NONHDLC SERPL-MCNC: 81 MG/DL
POTASSIUM SERPL-SCNC: 4.2 MMOL/L (ref 3.5–5.3)
PROT SERPL-MCNC: 7.9 G/DL (ref 6.4–8.2)
SODIUM SERPL-SCNC: 140 MMOL/L (ref 136–145)
TRIGL SERPL-MCNC: 90 MG/DL

## 2021-12-08 PROCEDURE — 97110 THERAPEUTIC EXERCISES: CPT | Performed by: PHYSICAL THERAPIST

## 2021-12-08 PROCEDURE — 3051F HG A1C>EQUAL 7.0%<8.0%: CPT | Performed by: PHYSICIAN ASSISTANT

## 2021-12-08 PROCEDURE — 36415 COLL VENOUS BLD VENIPUNCTURE: CPT

## 2021-12-08 PROCEDURE — 97112 NEUROMUSCULAR REEDUCATION: CPT | Performed by: PHYSICAL THERAPIST

## 2021-12-08 PROCEDURE — 83036 HEMOGLOBIN GLYCOSYLATED A1C: CPT

## 2021-12-08 PROCEDURE — 80053 COMPREHEN METABOLIC PANEL: CPT

## 2021-12-08 PROCEDURE — 80061 LIPID PANEL: CPT

## 2021-12-08 PROCEDURE — 97140 MANUAL THERAPY 1/> REGIONS: CPT | Performed by: PHYSICAL THERAPIST

## 2021-12-10 ENCOUNTER — OFFICE VISIT (OUTPATIENT)
Dept: PHYSICAL THERAPY | Facility: CLINIC | Age: 76
End: 2021-12-10
Payer: COMMERCIAL

## 2021-12-10 DIAGNOSIS — S42.294D OTHER CLOSED NONDISPLACED FRACTURE OF PROXIMAL END OF RIGHT HUMERUS WITH ROUTINE HEALING, SUBSEQUENT ENCOUNTER: Primary | ICD-10-CM

## 2021-12-10 PROCEDURE — 97110 THERAPEUTIC EXERCISES: CPT

## 2021-12-10 PROCEDURE — 97112 NEUROMUSCULAR REEDUCATION: CPT

## 2021-12-13 PROBLEM — E55.9 VITAMIN D DEFICIENCY: Status: RESOLVED | Noted: 2019-07-18 | Resolved: 2021-12-13

## 2021-12-14 ENCOUNTER — OFFICE VISIT (OUTPATIENT)
Dept: PHYSICAL THERAPY | Facility: CLINIC | Age: 76
End: 2021-12-14
Payer: COMMERCIAL

## 2021-12-14 DIAGNOSIS — S42.294D OTHER CLOSED NONDISPLACED FRACTURE OF PROXIMAL END OF RIGHT HUMERUS WITH ROUTINE HEALING, SUBSEQUENT ENCOUNTER: Primary | ICD-10-CM

## 2021-12-14 PROCEDURE — 97140 MANUAL THERAPY 1/> REGIONS: CPT | Performed by: PHYSICAL THERAPIST

## 2021-12-14 PROCEDURE — 97110 THERAPEUTIC EXERCISES: CPT | Performed by: PHYSICAL THERAPIST

## 2021-12-14 PROCEDURE — 97112 NEUROMUSCULAR REEDUCATION: CPT | Performed by: PHYSICAL THERAPIST

## 2021-12-15 ENCOUNTER — OFFICE VISIT (OUTPATIENT)
Dept: FAMILY MEDICINE CLINIC | Facility: CLINIC | Age: 76
End: 2021-12-15
Payer: COMMERCIAL

## 2021-12-15 VITALS
OXYGEN SATURATION: 98 % | TEMPERATURE: 97.3 F | DIASTOLIC BLOOD PRESSURE: 70 MMHG | BODY MASS INDEX: 38.44 KG/M2 | HEART RATE: 80 BPM | WEIGHT: 244.9 LBS | SYSTOLIC BLOOD PRESSURE: 128 MMHG | HEIGHT: 67 IN

## 2021-12-15 DIAGNOSIS — C61 PROSTATE CANCER (HCC): ICD-10-CM

## 2021-12-15 DIAGNOSIS — E66.01 CLASS 2 SEVERE OBESITY DUE TO EXCESS CALORIES WITH SERIOUS COMORBIDITY AND BODY MASS INDEX (BMI) OF 36.0 TO 36.9 IN ADULT (HCC): ICD-10-CM

## 2021-12-15 DIAGNOSIS — E78.2 MIXED HYPERLIPIDEMIA: ICD-10-CM

## 2021-12-15 DIAGNOSIS — M81.0 OSTEOPOROSIS WITHOUT CURRENT PATHOLOGICAL FRACTURE, UNSPECIFIED OSTEOPOROSIS TYPE: ICD-10-CM

## 2021-12-15 DIAGNOSIS — I25.10 ARTERIOSCLEROTIC CORONARY ARTERY DISEASE: ICD-10-CM

## 2021-12-15 DIAGNOSIS — I10 BENIGN ESSENTIAL HTN: ICD-10-CM

## 2021-12-15 DIAGNOSIS — E11.65 TYPE 2 DIABETES MELLITUS WITH HYPERGLYCEMIA, WITHOUT LONG-TERM CURRENT USE OF INSULIN (HCC): Primary | ICD-10-CM

## 2021-12-15 DIAGNOSIS — Z95.2 HISTORY OF AORTIC VALVE REPLACEMENT: ICD-10-CM

## 2021-12-15 PROCEDURE — 3008F BODY MASS INDEX DOCD: CPT | Performed by: PHYSICIAN ASSISTANT

## 2021-12-15 PROCEDURE — 1036F TOBACCO NON-USER: CPT | Performed by: PHYSICIAN ASSISTANT

## 2021-12-15 PROCEDURE — 3078F DIAST BP <80 MM HG: CPT | Performed by: PHYSICIAN ASSISTANT

## 2021-12-15 PROCEDURE — 3074F SYST BP LT 130 MM HG: CPT | Performed by: PHYSICIAN ASSISTANT

## 2021-12-15 PROCEDURE — 1160F RVW MEDS BY RX/DR IN RCRD: CPT | Performed by: PHYSICIAN ASSISTANT

## 2021-12-15 PROCEDURE — 3725F SCREEN DEPRESSION PERFORMED: CPT | Performed by: PHYSICIAN ASSISTANT

## 2021-12-15 PROCEDURE — 99214 OFFICE O/P EST MOD 30 MIN: CPT | Performed by: PHYSICIAN ASSISTANT

## 2021-12-17 ENCOUNTER — OFFICE VISIT (OUTPATIENT)
Dept: PHYSICAL THERAPY | Facility: CLINIC | Age: 76
End: 2021-12-17
Payer: COMMERCIAL

## 2021-12-17 DIAGNOSIS — S42.294D OTHER CLOSED NONDISPLACED FRACTURE OF PROXIMAL END OF RIGHT HUMERUS WITH ROUTINE HEALING, SUBSEQUENT ENCOUNTER: Primary | ICD-10-CM

## 2021-12-17 PROCEDURE — 97112 NEUROMUSCULAR REEDUCATION: CPT

## 2021-12-17 PROCEDURE — 97110 THERAPEUTIC EXERCISES: CPT

## 2021-12-21 ENCOUNTER — OFFICE VISIT (OUTPATIENT)
Dept: PHYSICAL THERAPY | Facility: CLINIC | Age: 76
End: 2021-12-21
Payer: COMMERCIAL

## 2021-12-21 DIAGNOSIS — S42.294D OTHER CLOSED NONDISPLACED FRACTURE OF PROXIMAL END OF RIGHT HUMERUS WITH ROUTINE HEALING, SUBSEQUENT ENCOUNTER: Primary | ICD-10-CM

## 2021-12-21 PROCEDURE — 97110 THERAPEUTIC EXERCISES: CPT | Performed by: PHYSICAL THERAPIST

## 2021-12-21 PROCEDURE — 97112 NEUROMUSCULAR REEDUCATION: CPT | Performed by: PHYSICAL THERAPIST

## 2021-12-21 PROCEDURE — 97140 MANUAL THERAPY 1/> REGIONS: CPT | Performed by: PHYSICAL THERAPIST

## 2021-12-23 ENCOUNTER — OFFICE VISIT (OUTPATIENT)
Dept: PHYSICAL THERAPY | Facility: CLINIC | Age: 76
End: 2021-12-23
Payer: COMMERCIAL

## 2021-12-23 DIAGNOSIS — S42.294D OTHER CLOSED NONDISPLACED FRACTURE OF PROXIMAL END OF RIGHT HUMERUS WITH ROUTINE HEALING, SUBSEQUENT ENCOUNTER: Primary | ICD-10-CM

## 2021-12-23 PROCEDURE — 97140 MANUAL THERAPY 1/> REGIONS: CPT | Performed by: PHYSICAL THERAPIST

## 2021-12-23 PROCEDURE — 97110 THERAPEUTIC EXERCISES: CPT | Performed by: PHYSICAL THERAPIST

## 2021-12-27 ENCOUNTER — OFFICE VISIT (OUTPATIENT)
Dept: PHYSICAL THERAPY | Facility: CLINIC | Age: 76
End: 2021-12-27
Payer: COMMERCIAL

## 2021-12-27 DIAGNOSIS — S42.294D OTHER CLOSED NONDISPLACED FRACTURE OF PROXIMAL END OF RIGHT HUMERUS WITH ROUTINE HEALING, SUBSEQUENT ENCOUNTER: Primary | ICD-10-CM

## 2021-12-27 PROCEDURE — 97112 NEUROMUSCULAR REEDUCATION: CPT

## 2021-12-27 PROCEDURE — 97110 THERAPEUTIC EXERCISES: CPT

## 2021-12-27 PROCEDURE — 97140 MANUAL THERAPY 1/> REGIONS: CPT

## 2021-12-30 ENCOUNTER — APPOINTMENT (OUTPATIENT)
Dept: PHYSICAL THERAPY | Facility: CLINIC | Age: 76
End: 2021-12-30
Payer: COMMERCIAL

## 2022-01-03 ENCOUNTER — VBI (OUTPATIENT)
Dept: ADMINISTRATIVE | Facility: OTHER | Age: 77
End: 2022-01-03

## 2022-02-15 DIAGNOSIS — E11.65 TYPE 2 DIABETES MELLITUS WITH HYPERGLYCEMIA, WITHOUT LONG-TERM CURRENT USE OF INSULIN (HCC): ICD-10-CM

## 2022-02-15 RX ORDER — GLIPIZIDE 5 MG/1
TABLET, FILM COATED, EXTENDED RELEASE ORAL
Qty: 90 TABLET | Refills: 0 | Status: SHIPPED | OUTPATIENT
Start: 2022-02-15 | End: 2022-05-16

## 2022-03-07 DIAGNOSIS — E11.65 TYPE 2 DIABETES MELLITUS WITH HYPERGLYCEMIA, WITHOUT LONG-TERM CURRENT USE OF INSULIN (HCC): ICD-10-CM

## 2022-03-07 DIAGNOSIS — E66.9 OBESITY (BMI 35.0-39.9 WITHOUT COMORBIDITY): ICD-10-CM

## 2022-03-07 RX ORDER — DULAGLUTIDE 3 MG/.5ML
INJECTION, SOLUTION SUBCUTANEOUS
Qty: 6 ML | Refills: 1 | Status: SHIPPED | OUTPATIENT
Start: 2022-03-07 | End: 2022-05-18 | Stop reason: SDUPTHER

## 2022-04-20 DIAGNOSIS — E11.65 UNCONTROLLED TYPE 2 DIABETES MELLITUS WITH HYPERGLYCEMIA (HCC): ICD-10-CM

## 2022-04-20 RX ORDER — FLASH GLUCOSE SENSOR
KIT MISCELLANEOUS
Qty: 2 EACH | Refills: 0 | Status: SHIPPED | OUTPATIENT
Start: 2022-04-20 | End: 2022-07-06 | Stop reason: SDUPTHER

## 2022-04-22 DIAGNOSIS — E78.5 DYSLIPIDEMIA: ICD-10-CM

## 2022-04-22 RX ORDER — ATORVASTATIN CALCIUM 40 MG/1
TABLET, FILM COATED ORAL
Qty: 90 TABLET | Refills: 1 | Status: SHIPPED | OUTPATIENT
Start: 2022-04-22

## 2022-05-02 ENCOUNTER — LAB (OUTPATIENT)
Dept: LAB | Facility: CLINIC | Age: 77
End: 2022-05-02
Payer: COMMERCIAL

## 2022-05-02 DIAGNOSIS — M81.0 OSTEOPOROSIS WITHOUT CURRENT PATHOLOGICAL FRACTURE, UNSPECIFIED OSTEOPOROSIS TYPE: ICD-10-CM

## 2022-05-02 DIAGNOSIS — I10 BENIGN ESSENTIAL HTN: ICD-10-CM

## 2022-05-02 DIAGNOSIS — C61 PROSTATE CANCER (HCC): ICD-10-CM

## 2022-05-02 DIAGNOSIS — E11.65 TYPE 2 DIABETES MELLITUS WITH HYPERGLYCEMIA, WITHOUT LONG-TERM CURRENT USE OF INSULIN (HCC): ICD-10-CM

## 2022-05-02 DIAGNOSIS — E55.9 VITAMIN D DEFICIENCY: ICD-10-CM

## 2022-05-02 LAB
25(OH)D3 SERPL-MCNC: 57.1 NG/ML (ref 30–100)
ANION GAP SERPL CALCULATED.3IONS-SCNC: 10 MMOL/L (ref 4–13)
BUN SERPL-MCNC: 13 MG/DL (ref 5–25)
CALCIUM SERPL-MCNC: 9.5 MG/DL (ref 8.3–10.1)
CHLORIDE SERPL-SCNC: 106 MMOL/L (ref 100–108)
CO2 SERPL-SCNC: 28 MMOL/L (ref 21–32)
CREAT SERPL-MCNC: 1.04 MG/DL (ref 0.6–1.3)
EST. AVERAGE GLUCOSE BLD GHB EST-MCNC: 151 MG/DL
GFR SERPL CREATININE-BSD FRML MDRD: 69 ML/MIN/1.73SQ M
GLUCOSE P FAST SERPL-MCNC: 106 MG/DL (ref 65–99)
HBA1C MFR BLD: 6.9 %
POTASSIUM SERPL-SCNC: 4.5 MMOL/L (ref 3.5–5.3)
PSA SERPL-MCNC: <0.1 NG/ML (ref 0–4)
SODIUM SERPL-SCNC: 144 MMOL/L (ref 136–145)

## 2022-05-02 PROCEDURE — 83036 HEMOGLOBIN GLYCOSYLATED A1C: CPT

## 2022-05-02 PROCEDURE — 80048 BASIC METABOLIC PNL TOTAL CA: CPT

## 2022-05-02 PROCEDURE — 3044F HG A1C LEVEL LT 7.0%: CPT | Performed by: INTERNAL MEDICINE

## 2022-05-02 PROCEDURE — 82306 VITAMIN D 25 HYDROXY: CPT

## 2022-05-02 PROCEDURE — 36415 COLL VENOUS BLD VENIPUNCTURE: CPT

## 2022-05-02 PROCEDURE — 84153 ASSAY OF PSA TOTAL: CPT

## 2022-05-03 ENCOUNTER — OFFICE VISIT (OUTPATIENT)
Dept: ENDOCRINOLOGY | Facility: CLINIC | Age: 77
End: 2022-05-03
Payer: COMMERCIAL

## 2022-05-03 VITALS
SYSTOLIC BLOOD PRESSURE: 128 MMHG | HEIGHT: 67 IN | BODY MASS INDEX: 38.52 KG/M2 | DIASTOLIC BLOOD PRESSURE: 74 MMHG | WEIGHT: 245.4 LBS

## 2022-05-03 DIAGNOSIS — M81.0 OSTEOPOROSIS WITHOUT CURRENT PATHOLOGICAL FRACTURE, UNSPECIFIED OSTEOPOROSIS TYPE: ICD-10-CM

## 2022-05-03 DIAGNOSIS — E66.9 OBESITY (BMI 35.0-39.9 WITHOUT COMORBIDITY): ICD-10-CM

## 2022-05-03 DIAGNOSIS — E78.2 MIXED HYPERLIPIDEMIA: ICD-10-CM

## 2022-05-03 DIAGNOSIS — E11.65 UNCONTROLLED TYPE 2 DIABETES MELLITUS WITH HYPERGLYCEMIA (HCC): Primary | ICD-10-CM

## 2022-05-03 DIAGNOSIS — E66.01 CLASS 2 SEVERE OBESITY DUE TO EXCESS CALORIES WITH SERIOUS COMORBIDITY AND BODY MASS INDEX (BMI) OF 36.0 TO 36.9 IN ADULT (HCC): ICD-10-CM

## 2022-05-03 DIAGNOSIS — E11.65 TYPE 2 DIABETES MELLITUS WITH HYPERGLYCEMIA, WITHOUT LONG-TERM CURRENT USE OF INSULIN (HCC): ICD-10-CM

## 2022-05-03 DIAGNOSIS — I25.10 ARTERIOSCLEROTIC CORONARY ARTERY DISEASE: ICD-10-CM

## 2022-05-03 DIAGNOSIS — I10 BENIGN ESSENTIAL HTN: ICD-10-CM

## 2022-05-03 DIAGNOSIS — K75.81 NASH (NONALCOHOLIC STEATOHEPATITIS): ICD-10-CM

## 2022-05-03 PROCEDURE — 99215 OFFICE O/P EST HI 40 MIN: CPT | Performed by: INTERNAL MEDICINE

## 2022-05-03 PROCEDURE — 1160F RVW MEDS BY RX/DR IN RCRD: CPT | Performed by: INTERNAL MEDICINE

## 2022-05-03 PROCEDURE — 95251 CONT GLUC MNTR ANALYSIS I&R: CPT | Performed by: INTERNAL MEDICINE

## 2022-05-03 PROCEDURE — 1036F TOBACCO NON-USER: CPT | Performed by: INTERNAL MEDICINE

## 2022-05-03 PROCEDURE — 3078F DIAST BP <80 MM HG: CPT | Performed by: INTERNAL MEDICINE

## 2022-05-03 PROCEDURE — 3074F SYST BP LT 130 MM HG: CPT | Performed by: INTERNAL MEDICINE

## 2022-05-03 RX ORDER — ALENDRONATE SODIUM 70 MG/1
TABLET ORAL
Qty: 12 TABLET | Refills: 2 | Status: SHIPPED | OUTPATIENT
Start: 2022-05-03

## 2022-05-03 NOTE — PROGRESS NOTES
Singh Speargerald 77 y o  male MRN: [id number]    Encounter: [id number]      Assessment/Plan     Assessment: This is a 77y o -year-old male with diabetes with hyperglycemia  Plan:  Maria Isabel Garcia was seen today for follow-up  Diagnoses and all orders for this visit:    Uncontrolled type 2 diabetes mellitus with hyperglycemia ([facility] [address] )  Lab Results   Component Value Date    HGBA1C 6 9 (H) 05/02/2022   A1c 6 9%  Continue current management  Discussed to call if blood sugar less than 70 persistently  Educated about hypoglycemia symptoms and treatment    -     Basic metabolic panel; Future  -     Microalbumin / creatinine urine ratio; Future  -     Hemoglobin A1C; Future    Obesity (BMI 35 0-39 9 without comorbidity)  Discussed importance of weight loss, lifestyle modifications, dietary modifications such as cutting down on free sugars, free carbohydrates, as well as saturated fats  Discussed to increase serving of fruits and vegetables in diet(3-5 servings per day)     Benign essential HTN  BP Readings from Last 3 Encounters:   05/03/22 128/74   12/15/21 128/70   11/26/21 150/82   Continue current management as per PCP  Mixed hyperlipidemia  Continue statins  YATES (nonalcoholic steatohepatitis)  Patient is on Trulicity which can also help with YATES  Educated the importance of weight loss  Type 2 diabetes mellitus with hyperglycemia, without long-term current use of insulin (HCC)  Continue current management  Arteriosclerotic coronary artery disease  Follow-up with cardiology  Class 2 severe obesity due to excess calories with serious comorbidity and body mass index (BMI) of 36 0 to 36 9 in [facility])  Educated about lifestyle modifications  Osteoporosis without current pathological fracture, unspecified osteoporosis type  Continue Fosamax weekly  Discussed importance of weight-bearing exercises and fall precautions  Patient is due for DEXA scan, order is in epic    Discussed to make appointment for DEXA scan follow-up  Continue vitamin-D 3 supplementation  -     alendronate (FOSAMAX) 70 mg tablet; Take 1 tablet once a week on empty stomach with a full glass of water, do not lie down for 30 minutes after taking    I have spent 40 minutes with Patient  today in which greater than 50% of this time was spent in counseling/coordination of care regarding Diagnostic results, Prognosis, Risks and benefits of tx options, Intructions for management, Patient and family education, Importance of tx compliance, Risk factor reductions and Impressions  CC: Diabetes    History of Present Illness     HPI    Mayra Menchaca is 71-year-old gentleman with medical history of type 2 diabetes with no known complication is here for follow-up  He denies any recent illnesses or hospitalizations  He denies any severe hypoglycemia or hyperglycemia recently  He takes Trulicity 1 5 mg weekly metformin 500 mg twice a day and glipizide 5 mg daily  His currently using continues glucose monitor sensor freestyle Denny  April 20 to May 3, 2022, 14 days over review reviewed  More than 72 hour of data reviewed  % time C GM is active is 73%  Average glucose is 149 mg/dL  GM I is 6 9%  Glucose variability is 24 6%  79% blood sugars are in target range 70 to 1 80 mg/dL  0% blood sugars are low or very low  20% blood sugars are high  1% blood sugars are very high    For hyperlipidemia, he takes Lipitor 40 mg daily at bedtime  For osteoporosis he takes Fosamax 70 mg once a week  He takes vitamin-D 3 supplementation 2000 International Units daily      Results for Xavier Macias (MRN [id number]) as of 5/3/2022 13:19   Ref   Range 5/2/2022 07:10   Sodium Latest Ref Range: 136 - 145 mmol/L 144   Potassium Latest Ref Range: 3 5 - 5 3 mmol/L 4 5   Chloride Latest Ref Range: 100 - 108 mmol/L 106   CO2 Latest Ref Range: 21 - 32 mmol/L 28   Anion Gap Latest Ref Range: 4 - 13 mmol/L 10   BUN Latest Ref Range: 5 - 25 mg/dL 13   Creatinine Latest Ref Range: 0 60 - 1 30 mg/dL 1 04   GLUCOSE FASTING Latest Ref Range: 65 - 99 mg/dL 106 (H)   Calcium Latest Ref Range: 8 3 - 10 1 mg/dL 9 5   eGFR Latest Units: ml/min/1 73sq m 69   Vit D, 25-Hydroxy Latest Ref Range: 30 0 - 100 0 ng/mL 57 1   PSA, Total Latest Ref Range: 0 0 - 4 0 ng/mL <0 1     Results for Jimbo Long (MRN [id number]) as of 5/3/2022 13:19   Ref  Range 12/8/2021 07:30   Cholesterol Latest Ref Range: See Comment mg/dL 127   Triglycerides Latest Ref Range: See Comment mg/dL 90   HDL Latest Ref Range: >=40 mg/dL 46   Non-HDL Cholesterol Latest Units: mg/dl 81   LDL Calculated Latest Ref Range: 0 - 100 mg/dL 63     Lab Results   Component Value Date    HGBA1C 6 9 (H) 05/02/2022   Results for Jimbo Long (MRN [id number]) as of 5/3/2022 13:19   Ref  Range 1/26/2017 00:00 5/19/2018 15:28 2/25/2021 11:24   Left Eye Diabetic Retinopathy Unknown None None None     Results for Jimbo Long (MRN [id number]) as of 5/3/2022 13:19   Ref  Range 5/28/2021 07:23   MICROALBUMIN/CREATININE RATIO Latest Ref Range: 0 - 30 mg/g creatinine 17        Review of Systems   Constitutional: Negative for activity change, diaphoresis, fatigue, fever and unexpected weight change  HENT: Negative  Eyes: Negative for visual disturbance  Respiratory: Negative for cough, chest tightness and shortness of breath  Cardiovascular: Negative for chest pain, palpitations and leg swelling  Gastrointestinal: Negative for abdominal pain, blood in stool, constipation, diarrhea, nausea and vomiting  Endocrine: Negative for cold intolerance, heat intolerance, polydipsia, polyphagia and polyuria  Genitourinary: Negative for dysuria, enuresis, frequency and urgency  Musculoskeletal: Negative for arthralgias and myalgias  Skin: Negative for pallor, rash and wound  Allergic/Immunologic: Negative  Neurological: Negative for dizziness, tremors, weakness and numbness  Hematological: Negative      Psychiatric/Behavioral: Negative  Historical Information   Past Medical History:   Diagnosis Date    Arteriosclerotic coronary artery disease 2017    Benign prostate hyperplasia     L A  12   R   17      COPD (chronic obstructive pulmonary disease) (HCC)     Deep venous embolism and thrombosis (HCC)     Diabetes mellitus ([facility] )     Hypertension     Impacted cerumen     unspecified laterity / L A  12       Osteoarthritis 2012    Prostate cancer ([facility] )     Pulmonary embolism (HCC)      Past Surgical History:   Procedure Laterality Date    CARDIAC VALVE REPLACEMENT      CORONARY ARTERY BYPASS GRAFT      FRACTURE SURGERY      left knee surgery    KNEE ARTHROSCOPY      Therapeutic    KNEE SURGERY      replacement    LUNG SURGERY      LUNG SURGERY      wedge resection     TONSILLECTOMY      UMBILICAL HERNIA REPAIR       Social History   Social History     Substance and Sexual Activity   Alcohol Use No    Comment: social drinker per [employer]     Social History     Substance and Sexual Activity   Drug Use No     Social History     Tobacco Use   Smoking Status Former Smoker    Types: Cigarettes    Quit date: 3/23/2000    Years since quittin 1   Smokeless Tobacco Never Used     Family History:   Family History   Problem Relation Age of Onset    COPD Mother     Heart attack Mother     Heart attack Father        Meds/Allergies   Current Outpatient Medications   Medication Sig Dispense Refill    alendronate (FOSAMAX) 70 mg tablet Take 1 tablet once a week on empty stomach with a full glass of water, do not lie down for 30 minutes after taking 12 tablet 2    aspirin 81 MG tablet Take by mouth      atorvastatin (LIPITOR) 40 mg tablet TAKE 1 TABLET BY MOUTH EVERY DAY 90 tablet 1    Blood Glucose Calibration (OT ULTRA/FASTTK CNTRL SOLN) SOLN by In Vitro route as needed (to check meter calibration) 1 each 3    Blood Glucose Monitoring Suppl (ONE TOUCH ULTRA 2) w/Device KIT by Does not apply route daily DX E11 65  Pt to   Check  fbs  daily 1 each 0    Cholecalciferol (VITAMIN D3) 2000 units capsule Take 2,000 Units by mouth daily  11    Coenzyme Q10 (COQ-10) 100 MG CAPS Take 100 mg by mouth   Continuous Blood Gluc  (FREESTYLE CEDRIC 14 DAY READER) ADELAIDA 1 CGM DEVICE  USE TO CHECK BG LEVELS 4+ TIMES DAILY AND AS NEEDED 1 Device 0    Continuous Blood Gluc Sensor (FreeStyle Cedric 14 Day Sensor) MISC CHANGE SENSOR EVERY 14 DAYS TO CHECK BG LEVELS 4+ TIMES DAILY 2 each 0    Dulaglutide (Trulicity) 3 AG/0 1DW SOPN Inject 3 mg total once a week 6 mL 1    fluorouracil (EFUDEX) 5 % cream APPLY TWICE A DAY TO SCALP FOR 1 MONTH 40 g 0    glipiZIDE (GLUCOTROL XL) 5 mg 24 hr tablet TAKE 1 TABLET BY MOUTH EVERY DAY 90 tablet 0    glucose blood (FREESTYLE LITE) test strip Test 3 times a day  300 each 1    glucose blood (ONE TOUCH ULTRA TEST) test strip Check  fbs  Daily  DX E11 65 100 each 3    leuprolide (LUPRON DEPOT, 1-MONTH,) 3 75 mg injection Inject into a muscle Once yearly       metFORMIN (GLUCOPHAGE-XR) 500 mg 24 hr tablet TAKE 1 TABLET WITH BREAKFAST AND DINNER DAILY 180 tablet 1    Multiple Vitamin-Folic Acid TABS Take by mouth      Omega-3 Fatty Acids (FISH OIL) 1,000 mg Take 1,000 mg by mouth daily   polyethylene glycol (MIRALAX) 17 g packet Take 17 g by mouth daily as needed (constipation)  0    sildenafil (VIAGRA) 50 MG tablet Take 1 tablet (50 mg total) by mouth daily as needed for erectile dysfunction 5 tablet 5    senna-docusate sodium (SENOKOT S) 8 6-50 mg per tablet Take 1 tablet by mouth 2 (two) times a day (Patient not taking: Reported on 5/3/2022 )  0     No current facility-administered medications for this visit  No Known Allergies    Objective   Vitals: Blood pressure 128/74, height 5' 7" (1 702 m), weight 111 kg (245 lb 6 4 oz)  Physical Exam  Vitals reviewed  Constitutional:       General: He is not in acute distress       Appearance: Normal appearance  He is well-developed  He is obese  HENT:      Head: Normocephalic and atraumatic  Nose: Nose normal       Mouth/Throat:      Mouth: Mucous membranes are dry  Eyes:      Pupils: Pupils are equal, round, and reactive to light  Neck:      Thyroid: No thyromegaly  Cardiovascular:      Rate and Rhythm: Normal rate and regular rhythm  Heart sounds: Normal heart sounds  Pulmonary:      Effort: Pulmonary effort is normal  No respiratory distress  Breath sounds: Normal breath sounds  Abdominal:      General: Bowel sounds are normal       Palpations: Abdomen is soft  Musculoskeletal:         General: No deformity  Normal range of motion  Cervical back: Normal range of motion and neck supple  Skin:     General: Skin is warm and dry  Findings: No erythema  Neurological:      General: No focal deficit present  Mental Status: He is alert and oriented to person, place, and time  Psychiatric:         Mood and Affect: Mood normal          Behavior: Behavior normal          The history was obtained from the review of the chart, patient      Lab Results:   Lab Results   Component Value Date/Time    Hemoglobin A1C 6 9 (H) 05/02/2022 07:10 AM    Hemoglobin A1C 7 0 (H) 12/08/2021 07:30 AM    Hemoglobin A1C 6 7 (H) 05/28/2021 07:23 AM    WBC 10 87 (H) 08/26/2021 11:19 AM    WBC 13 58 (H) 08/26/2021 02:56 AM    Hemoglobin 11 7 (L) 08/26/2021 11:19 AM    Hemoglobin 12 9 08/26/2021 02:56 AM    Hematocrit 35 4 (L) 08/26/2021 11:19 AM    Hematocrit 39 6 08/26/2021 02:56 AM    MCV 95 08/26/2021 11:19 AM    MCV 96 08/26/2021 02:56 AM    Platelets 968 86/39/5159 11:19 AM    Platelets 450 32/91/8588 02:56 AM    BUN 13 05/02/2022 07:10 AM    BUN 15 12/08/2021 07:30 AM    BUN 22 08/26/2021 11:19 AM    Potassium 4 5 05/02/2022 07:10 AM    Potassium 4 2 12/08/2021 07:30 AM    Potassium 3 9 08/26/2021 11:19 AM    Chloride 106 05/02/2022 07:10 AM    Chloride 103 12/08/2021 07:30 AM    Chloride 102 08/26/2021 11:19 AM    CO2 28 05/02/2022 07:10 AM    CO2 29 12/08/2021 07:30 AM    CO2 26 08/26/2021 11:19 AM    Creatinine 1 04 05/02/2022 07:10 AM    Creatinine 1 09 12/08/2021 07:30 AM    Creatinine 1 18 08/26/2021 11:19 AM    AST 48 (H) 12/08/2021 07:30 AM    AST 55 (H) 08/26/2021 02:59 AM    AST 65 (H) 05/28/2021 07:23 AM    ALT 65 12/08/2021 07:30 AM    ALT 92 (H) 08/26/2021 02:59 AM    ALT 90 (H) 05/28/2021 07:23 AM    Albumin 3 5 12/08/2021 07:30 AM    Albumin 3 9 08/26/2021 02:59 AM    Albumin 3 7 05/28/2021 07:23 AM    HDL, Direct 46 12/08/2021 07:30 AM    HDL, Direct 48 05/28/2021 07:23 AM    Triglycerides 90 12/08/2021 07:30 AM    Triglycerides 95 05/28/2021 07:23 AM           Imaging Studies: I have personally reviewed pertinent reports  Portions of the record may have been created with voice recognition software  Occasional wrong word or "sound a like" substitutions may have occurred due to the inherent limitations of voice recognition software  Read the chart carefully and recognize, using context, where substitutions have occurred

## 2022-05-15 DIAGNOSIS — E11.65 TYPE 2 DIABETES MELLITUS WITH HYPERGLYCEMIA, WITHOUT LONG-TERM CURRENT USE OF INSULIN (HCC): ICD-10-CM

## 2022-05-16 RX ORDER — GLIPIZIDE 5 MG/1
TABLET, FILM COATED, EXTENDED RELEASE ORAL
Qty: 90 TABLET | Refills: 0 | Status: SHIPPED | OUTPATIENT
Start: 2022-05-16

## 2022-05-18 DIAGNOSIS — E66.9 OBESITY (BMI 35.0-39.9 WITHOUT COMORBIDITY): ICD-10-CM

## 2022-05-18 DIAGNOSIS — E11.65 TYPE 2 DIABETES MELLITUS WITH HYPERGLYCEMIA, WITHOUT LONG-TERM CURRENT USE OF INSULIN (HCC): ICD-10-CM

## 2022-05-18 RX ORDER — DULAGLUTIDE 3 MG/.5ML
INJECTION, SOLUTION SUBCUTANEOUS
Qty: 6 ML | Refills: 1 | Status: SHIPPED | OUTPATIENT
Start: 2022-05-18

## 2022-07-06 DIAGNOSIS — E11.65 UNCONTROLLED TYPE 2 DIABETES MELLITUS WITH HYPERGLYCEMIA (HCC): ICD-10-CM

## 2022-07-06 RX ORDER — FLASH GLUCOSE SENSOR
KIT MISCELLANEOUS
Qty: 2 EACH | Refills: 0 | Status: SHIPPED | OUTPATIENT
Start: 2022-07-06 | End: 2022-10-05 | Stop reason: SDUPTHER

## 2022-08-16 DIAGNOSIS — E11.65 TYPE 2 DIABETES MELLITUS WITH HYPERGLYCEMIA, WITHOUT LONG-TERM CURRENT USE OF INSULIN (HCC): ICD-10-CM

## 2022-08-16 DIAGNOSIS — E78.5 DYSLIPIDEMIA: ICD-10-CM

## 2022-08-16 RX ORDER — GLIPIZIDE 5 MG/1
TABLET, FILM COATED, EXTENDED RELEASE ORAL
Qty: 90 TABLET | Refills: 0 | Status: SHIPPED | OUTPATIENT
Start: 2022-08-16

## 2022-08-17 DIAGNOSIS — E11.65 TYPE 2 DIABETES MELLITUS WITH HYPERGLYCEMIA, WITHOUT LONG-TERM CURRENT USE OF INSULIN (HCC): ICD-10-CM

## 2022-08-17 DIAGNOSIS — E66.9 OBESITY (BMI 35.0-39.9 WITHOUT COMORBIDITY): ICD-10-CM

## 2022-08-17 RX ORDER — ATORVASTATIN CALCIUM 40 MG/1
TABLET, FILM COATED ORAL
Qty: 90 TABLET | Refills: 1 | Status: SHIPPED | OUTPATIENT
Start: 2022-08-17

## 2022-08-17 RX ORDER — DULAGLUTIDE 3 MG/.5ML
INJECTION, SOLUTION SUBCUTANEOUS
Qty: 6 ML | Refills: 1 | Status: SHIPPED | OUTPATIENT
Start: 2022-08-17

## 2022-08-31 DIAGNOSIS — E11.65 TYPE 2 DIABETES MELLITUS WITH HYPERGLYCEMIA, WITHOUT LONG-TERM CURRENT USE OF INSULIN (HCC): ICD-10-CM

## 2022-08-31 RX ORDER — METFORMIN HYDROCHLORIDE 500 MG/1
TABLET, EXTENDED RELEASE ORAL
Qty: 180 TABLET | Refills: 1 | Status: SHIPPED | OUTPATIENT
Start: 2022-08-31

## 2022-09-08 ENCOUNTER — RA CDI HCC (OUTPATIENT)
Dept: OTHER | Facility: HOSPITAL | Age: 77
End: 2022-09-08

## 2022-09-08 NOTE — PROGRESS NOTES
[facility]  coding opportunities       Chart reviewed, no opportunity found: CHART REVIEWED, NO OPPORTUNITY FOUND        Patients Insurance     Medicare Insurance: [employer] [facility] Advantage

## 2022-09-13 ENCOUNTER — LAB (OUTPATIENT)
Dept: LAB | Facility: CLINIC | Age: 77
End: 2022-09-13
Payer: COMMERCIAL

## 2022-09-13 DIAGNOSIS — E11.65 UNCONTROLLED TYPE 2 DIABETES MELLITUS WITH HYPERGLYCEMIA (HCC): ICD-10-CM

## 2022-09-13 DIAGNOSIS — E11.65 TYPE 2 DIABETES MELLITUS WITH HYPERGLYCEMIA, WITHOUT LONG-TERM CURRENT USE OF INSULIN (HCC): ICD-10-CM

## 2022-09-13 DIAGNOSIS — I25.10 ARTERIOSCLEROTIC CORONARY ARTERY DISEASE: ICD-10-CM

## 2022-09-13 DIAGNOSIS — E78.2 MIXED HYPERLIPIDEMIA: ICD-10-CM

## 2022-09-13 DIAGNOSIS — M81.0 OSTEOPOROSIS WITHOUT CURRENT PATHOLOGICAL FRACTURE, UNSPECIFIED OSTEOPOROSIS TYPE: ICD-10-CM

## 2022-09-13 DIAGNOSIS — I10 BENIGN ESSENTIAL HTN: ICD-10-CM

## 2022-09-13 LAB
25(OH)D3 SERPL-MCNC: 78 NG/ML (ref 30–100)
ALBUMIN SERPL BCP-MCNC: 4.1 G/DL (ref 3.5–5)
ALP SERPL-CCNC: 57 U/L (ref 34–104)
ALT SERPL W P-5'-P-CCNC: 53 U/L (ref 7–52)
ANION GAP SERPL CALCULATED.3IONS-SCNC: 8 MMOL/L (ref 4–13)
AST SERPL W P-5'-P-CCNC: 50 U/L (ref 13–39)
BILIRUB SERPL-MCNC: 0.74 MG/DL (ref 0.2–1)
BUN SERPL-MCNC: 20 MG/DL (ref 5–25)
CALCIUM SERPL-MCNC: 9.8 MG/DL (ref 8.4–10.2)
CHLORIDE SERPL-SCNC: 102 MMOL/L (ref 96–108)
CHOLEST SERPL-MCNC: 116 MG/DL
CO2 SERPL-SCNC: 27 MMOL/L (ref 21–32)
CREAT SERPL-MCNC: 1.22 MG/DL (ref 0.6–1.3)
CREAT UR-MCNC: 139 MG/DL
EST. AVERAGE GLUCOSE BLD GHB EST-MCNC: 146 MG/DL
GFR SERPL CREATININE-BSD FRML MDRD: 57 ML/MIN/1.73SQ M
GLUCOSE P FAST SERPL-MCNC: 124 MG/DL (ref 65–99)
HBA1C MFR BLD: 6.7 %
HDLC SERPL-MCNC: 40 MG/DL
LDLC SERPL CALC-MCNC: 51 MG/DL (ref 0–100)
MICROALBUMIN UR-MCNC: 491 MG/L (ref 0–20)
MICROALBUMIN/CREAT 24H UR: 353 MG/G CREATININE (ref 0–30)
NONHDLC SERPL-MCNC: 76 MG/DL
POTASSIUM SERPL-SCNC: 4.8 MMOL/L (ref 3.5–5.3)
PROT SERPL-MCNC: 7.6 G/DL (ref 6.4–8.4)
SODIUM SERPL-SCNC: 137 MMOL/L (ref 135–147)
TRIGL SERPL-MCNC: 124 MG/DL

## 2022-09-13 PROCEDURE — 83036 HEMOGLOBIN GLYCOSYLATED A1C: CPT

## 2022-09-13 PROCEDURE — 82043 UR ALBUMIN QUANTITATIVE: CPT

## 2022-09-13 PROCEDURE — 3044F HG A1C LEVEL LT 7.0%: CPT | Performed by: PHYSICIAN ASSISTANT

## 2022-09-13 PROCEDURE — 80061 LIPID PANEL: CPT

## 2022-09-13 PROCEDURE — 82570 ASSAY OF URINE CREATININE: CPT

## 2022-09-13 PROCEDURE — 36415 COLL VENOUS BLD VENIPUNCTURE: CPT

## 2022-09-13 PROCEDURE — 80053 COMPREHEN METABOLIC PANEL: CPT

## 2022-09-13 PROCEDURE — 3062F POS MACROALBUMINURIA REV: CPT | Performed by: PHYSICIAN ASSISTANT

## 2022-09-13 PROCEDURE — 82306 VITAMIN D 25 HYDROXY: CPT

## 2022-09-14 ENCOUNTER — OFFICE VISIT (OUTPATIENT)
Dept: FAMILY MEDICINE CLINIC | Facility: CLINIC | Age: 77
End: 2022-09-14
Payer: COMMERCIAL

## 2022-09-14 ENCOUNTER — OFFICE VISIT (OUTPATIENT)
Dept: ENDOCRINOLOGY | Facility: CLINIC | Age: 77
End: 2022-09-14
Payer: COMMERCIAL

## 2022-09-14 VITALS
TEMPERATURE: 97.8 F | SYSTOLIC BLOOD PRESSURE: 130 MMHG | OXYGEN SATURATION: 98 % | BODY MASS INDEX: 38.27 KG/M2 | DIASTOLIC BLOOD PRESSURE: 76 MMHG | WEIGHT: 243.8 LBS | HEART RATE: 78 BPM | HEIGHT: 67 IN

## 2022-09-14 VITALS
TEMPERATURE: 98.3 F | WEIGHT: 243 LBS | HEART RATE: 76 BPM | DIASTOLIC BLOOD PRESSURE: 80 MMHG | HEIGHT: 67 IN | BODY MASS INDEX: 38.14 KG/M2 | SYSTOLIC BLOOD PRESSURE: 130 MMHG

## 2022-09-14 DIAGNOSIS — M81.0 OSTEOPOROSIS WITHOUT CURRENT PATHOLOGICAL FRACTURE, UNSPECIFIED OSTEOPOROSIS TYPE: ICD-10-CM

## 2022-09-14 DIAGNOSIS — E66.01 CLASS 2 SEVERE OBESITY DUE TO EXCESS CALORIES WITH SERIOUS COMORBIDITY AND BODY MASS INDEX (BMI) OF 36.0 TO 36.9 IN ADULT (HCC): ICD-10-CM

## 2022-09-14 DIAGNOSIS — E78.2 MIXED HYPERLIPIDEMIA: ICD-10-CM

## 2022-09-14 DIAGNOSIS — Z00.00 MEDICARE ANNUAL WELLNESS VISIT, SUBSEQUENT: Primary | ICD-10-CM

## 2022-09-14 DIAGNOSIS — I10 BENIGN ESSENTIAL HTN: ICD-10-CM

## 2022-09-14 DIAGNOSIS — Z23 ENCOUNTER FOR IMMUNIZATION: ICD-10-CM

## 2022-09-14 DIAGNOSIS — E11.65 TYPE 2 DIABETES MELLITUS WITH HYPERGLYCEMIA, WITHOUT LONG-TERM CURRENT USE OF INSULIN (HCC): Primary | ICD-10-CM

## 2022-09-14 DIAGNOSIS — C61 PROSTATE CANCER (HCC): ICD-10-CM

## 2022-09-14 DIAGNOSIS — I25.10 ARTERIOSCLEROTIC CORONARY ARTERY DISEASE: ICD-10-CM

## 2022-09-14 DIAGNOSIS — E55.9 VITAMIN D DEFICIENCY: ICD-10-CM

## 2022-09-14 DIAGNOSIS — E11.65 TYPE 2 DIABETES MELLITUS WITH HYPERGLYCEMIA, WITHOUT LONG-TERM CURRENT USE OF INSULIN (HCC): ICD-10-CM

## 2022-09-14 DIAGNOSIS — K75.81 NASH (NONALCOHOLIC STEATOHEPATITIS): ICD-10-CM

## 2022-09-14 DIAGNOSIS — Z95.2 HISTORY OF AORTIC VALVE REPLACEMENT: ICD-10-CM
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
## 2022-09-14 PROCEDURE — G0009 ADMIN PNEUMOCOCCAL VACCINE: HCPCS

## 2022-09-14 PROCEDURE — 3288F FALL RISK ASSESSMENT DOCD: CPT | Performed by: PHYSICIAN ASSISTANT

## 2022-09-14 PROCEDURE — 3079F DIAST BP 80-89 MM HG: CPT | Performed by: PHYSICIAN ASSISTANT

## 2022-09-14 PROCEDURE — 99214 OFFICE O/P EST MOD 30 MIN: CPT | Performed by: PHYSICIAN ASSISTANT

## 2022-09-14 PROCEDURE — G0439 PPPS, SUBSEQ VISIT: HCPCS | Performed by: PHYSICIAN ASSISTANT

## 2022-09-14 PROCEDURE — 3075F SYST BP GE 130 - 139MM HG: CPT | Performed by: PHYSICIAN ASSISTANT

## 2022-09-14 PROCEDURE — 1125F AMNT PAIN NOTED PAIN PRSNT: CPT | Performed by: PHYSICIAN ASSISTANT

## 2022-09-14 PROCEDURE — 90677 PCV20 VACCINE IM: CPT

## 2022-09-14 PROCEDURE — 1170F FXNL STATUS ASSESSED: CPT | Performed by: PHYSICIAN ASSISTANT

## 2022-09-14 PROCEDURE — 3725F SCREEN DEPRESSION PERFORMED: CPT | Performed by: PHYSICIAN ASSISTANT

## 2022-09-14 PROCEDURE — 95251 CONT GLUC MNTR ANALYSIS I&R: CPT | Performed by: PHYSICIAN ASSISTANT

## 2022-09-14 PROCEDURE — 4010F ACE/ARB THERAPY RXD/TAKEN: CPT | Performed by: PHYSICIAN ASSISTANT

## 2022-09-14 PROCEDURE — 1160F RVW MEDS BY RX/DR IN RCRD: CPT | Performed by: PHYSICIAN ASSISTANT

## 2022-09-14 RX ORDER — LISINOPRIL 2.5 MG/1
2.5 TABLET ORAL DAILY
Qty: 90 TABLET | Refills: 1 | Status: SHIPPED | OUTPATIENT
Start: 2022-09-14

## 2022-09-14 NOTE — PROGRESS NOTES
Patient Progress Note      CC: DM     Referring Provider  No referring provider defined for this encounter  History of Present Illness:   Patrick Reynolds is a 69 y o  male with a history of type 2 diabetes without long term use of insulin  Diabetes course has been stable  Complications of DM: none reported  Denies any issues with his current regimen  Home glucose monitoring: are performed regularly  Using [employer]  In target range 78%, above range 22%, below range 0%  Average glucose 149 mg/dl   Glucose management indicator 6 9%     Current regimen: Trulicity 3 mg weekly, metformin 500 mg BID, glipizide 5 mg daily  compliant most of the time, denies any side effects from medications  Hypoglycemic episodes: No, rare  H/o of hypoglycemia causing hospitalization or Intervention such as glucagon injection or ambulance call : No  Hypoglycemia symptoms: dizziness  Treatment of hypoglycemia: orange juice     Diet: 3 meals per day, 1-2 snacks per day  Timing of meals is predictable  Diabetic diet compliance: noncompliant much of the time  Activity: Daily activity is predictable: Yes  He plans to exercise 3 times a week  Ophthamology: Feb 2021  Scheduled for this Friday  Podiatry: foot exam UTD, 9/2022     Not on ACE inhibitor/ARB  Has hyperlipidemia: on statin - tolerating well, no myalgias  compliant most of the time, denies any side effects from medications  Thyroid disorders: No  History of pancreatitis: No     Osteopenia / vitamin D deficiency: previously treated with Prolia injections but it was denied by [employer]  Currently on Fosamax 70 mg weekly and taking it as instructed and tolerating it well  He is also taking vitamin D3 2000 IU daily  He did have a fall in August 2021 which resulted in a fracture  Does do weight bearing exercises  Last DEXA scan in February 2019 showed osteopenia of the lumbar spine  Repeat DEXA was ordered but not yet completed       Patient Active Problem List   Diagnosis    History of aortic valve replacement    Arteriosclerotic coronary artery disease    Benign essential HTN    Type 2 diabetes mellitus with hyperglycemia, without long-term current use of insulin (HCC)    Hyperlipidemia    YATES (nonalcoholic steatohepatitis)    Class 2 severe obesity with serious comorbidity and body mass index (BMI) of 36 0 to 36 9 in adult [facility])    Osteoarthritis    Osteoporosis    Prostate cancer ([place] [address] )    Erectile dysfunction due to diseases classified elsewhere    Vitamin D deficiency    Humerus fracture      Past Medical History:   Diagnosis Date    Arteriosclerotic coronary artery disease 2017    Benign prostate hyperplasia     L A  12   R   17      [facility]     Deep venous embolism and thrombosis (HCC)     [facility] )     Hypertension     Impacted cerumen     unspecified laterity / L A  12       Osteoarthritis 2012    Prostate cancer ([place] [address] )     Pulmonary embolism ([facility])       Past Surgical History:   Procedure Laterality Date    CARDIAC VALVE REPLACEMENT      CORONARY ARTERY BYPASS GRAFT      FRACTURE SURGERY      left knee surgery    KNEE ARTHROSCOPY      Therapeutic    KNEE SURGERY      replacement    LUNG SURGERY      LUNG SURGERY      wedge resection     TONSILLECTOMY      UMBILICAL HERNIA REPAIR        Family History   Problem Relation Age of Onset    COPD Mother     Heart attack Mother     Heart attack Father      Social History     Tobacco Use    Smoking status: Former Smoker     Types: Cigarettes     Quit date: 3/23/2000     Years since quittin 4    Smokeless tobacco: Never Used   Substance Use Topics    Alcohol use: No     Comment: social drinker per [employer]     No Known Allergies      Current Outpatient Medications:     alendronate (FOSAMAX) 70 mg tablet, Take 1 tablet once a week on empty stomach with a full glass of water, do not lie down for 30 minutes after taking, Disp: 12 tablet, Rfl: 2    aspirin 81 MG tablet, Take by mouth, Disp: , Rfl:     atorvastatin (LIPITOR) 40 mg tablet, TAKE 1 TABLET BY MOUTH EVERY DAY, Disp: 90 tablet, Rfl: 1    Blood Glucose Calibration (OT ULTRA/FASTTK CNTRL SOLN) SOLN, by In Vitro route as needed (to check meter calibration), Disp: 1 each, Rfl: 3    Blood Glucose Monitoring Suppl (ONE TOUCH ULTRA 2) w/Device KIT, by Does not apply route daily DX E11 65  Pt to   Check  fbs  daily, Disp: 1 each, Rfl: 0    Cholecalciferol (VITAMIN D3) 2000 units capsule, Take 2,000 Units by mouth daily, Disp: , Rfl: 11    Coenzyme Q10 (COQ-10) 100 MG CAPS, Take 100 mg by mouth , Disp: , Rfl:     Continuous Blood Gluc  (FREESTYLE CEDRIC 14 DAY READER) ADELAIDA, 1 CGM DEVICE  USE TO CHECK BG LEVELS 4+ TIMES DAILY AND AS NEEDED, Disp: 1 Device, Rfl: 0    Continuous Blood Gluc Sensor (FreeStyle Cedric 14 Day Sensor) MISC, CHANGE SENSOR EVERY 14 DAYS TO CHECK BG LEVELS 4+ TIMES DAILY, Disp: 2 each, Rfl: 0    Dulaglutide (Trulicity) 3 GT/3 9QO SOPN, Inject 3 mg total once a week, Disp: 6 mL, Rfl: 1    fluorouracil (EFUDEX) 5 % cream, APPLY TWICE A DAY TO SCALP FOR 1 MONTH, Disp: 40 g, Rfl: 0    glipiZIDE (GLUCOTROL XL) 5 mg 24 hr tablet, TAKE 1 TABLET BY MOUTH EVERY DAY, Disp: 90 tablet, Rfl: 0    glucose blood (FREESTYLE LITE) test strip, Test 3 times a day , Disp: 300 each, Rfl: 1    leuprolide (LUPRON DEPOT) 3 75 mg injection, Inject into a muscle Once yearly , Disp: , Rfl:     lisinopril (ZESTRIL) 2 5 mg tablet, Take 1 tablet (2 5 mg total) by mouth daily, Disp: 90 tablet, Rfl: 1    metFORMIN (GLUCOPHAGE-XR) 500 mg 24 hr tablet, TAKE 1 TABLET WITH BREAKFAST AND DINNER DAILY, Disp: 180 tablet, Rfl: 1    Multiple Vitamin-Folic Acid TABS, Take by mouth, Disp: , Rfl:     Omega-3 Fatty Acids (FISH OIL) 1,000 mg, Take 1,000 mg by mouth daily  , Disp: , Rfl:     polyethylene glycol (MIRALAX) 17 g packet, Take 17 g by mouth daily as needed (constipation), Disp: , Rfl: 0    senna-docusate sodium (SENOKOT S) 8 6-50 mg per tablet, Take 1 tablet by mouth 2 (two) times a day, Disp: , Rfl: 0    sildenafil (VIAGRA) 50 MG tablet, Take 1 tablet (50 mg total) by mouth daily as needed for erectile dysfunction, Disp: 5 tablet, Rfl: 5  Review of Systems   Constitutional: Negative for activity change, appetite change, fatigue and unexpected weight change  HENT: Negative for trouble swallowing  Eyes: Negative for visual disturbance  Respiratory: Negative for shortness of breath  Cardiovascular: Negative for chest pain and palpitations  Gastrointestinal: Negative for constipation and diarrhea  Endocrine: Negative for polydipsia and polyuria  Musculoskeletal: Negative  Skin: Negative for wound  Neurological: Negative for numbness  Psychiatric/Behavioral: Negative  Physical Exam:  Body mass index is 38 06 kg/m²  /80   Pulse 76   Temp 98 3 °F (36 8 °C) (Tympanic)   Ht 5' 7" (1 702 m)   Wt 110 kg (243 lb)   BMI 38 06 kg/m²    Wt Readings from Last 3 Encounters:   09/14/22 110 kg (243 lb)   09/14/22 111 kg (243 lb 12 8 oz)   05/03/22 111 kg (245 lb 6 4 oz)       Physical Exam  Vitals and nursing note reviewed  Constitutional:       Appearance: He is well-developed  HENT:      Head: Normocephalic  Eyes:      General: No scleral icterus  Pupils: Pupils are equal, round, and reactive to light  Neck:      Thyroid: No thyromegaly  Cardiovascular:      Rate and Rhythm: Normal rate and regular rhythm  Pulses:           Radial pulses are 2+ on the right side and 2+ on the left side  Heart sounds: No murmur heard  Pulmonary:      Effort: Pulmonary effort is normal  No respiratory distress  Breath sounds: Normal breath sounds  No wheezing  Musculoskeletal:      Cervical back: Neck supple  Skin:     General: Skin is warm and dry  Neurological:      Mental Status: He is alert  Patient's shoes and socks were not removed  Labs:   Component      Latest Ref Rng & Units 5/2/2022 9/13/2022   Sodium      135 - 147 mmol/L 144 137   Potassium      3 5 - 5 3 mmol/L 4 5 4 8   Chloride      96 - 108 mmol/L 106 102   CO2      21 - 32 mmol/L 28 27   Anion Gap      4 - 13 mmol/L 10 8   BUN      5 - 25 mg/dL 13 20   Creatinine      0 60 - 1 30 mg/dL 1 04 1 22   GLUCOSE FASTING      65 - 99 mg/dL 106 (H) 124 (H)   Calcium      8 4 - 10 2 mg/dL 9 5 9 8   AST      13 - 39 U/L  50 (H)   ALT      7 - 52 U/L  53 (H)   Alkaline Phosphatase      34 - 104 U/L  57   Total Protein      6 4 - 8 4 g/dL  7 6   Albumin      3 5 - 5 0 g/dL  4 1   TOTAL BILIRUBIN      0 20 - 1 00 mg/dL  0 74   eGFR      ml/min/1 73sq m 69 57   Cholesterol      See Comment mg/dL  116   Triglycerides      See Comment mg/dL  124   HDL      >=40 mg/dL  40   LDL Calculated      0 - 100 mg/dL  51   Non-HDL Cholesterol      mg/dl  76   EXT Creatinine Urine      mg/dL  139 0   MICROALBUM ,U,RANDOM      0 0 - 20 0 mg/L  491 0 (H)   MICROALBUMIN/CREATININE RATIO      0 - 30 mg/g creatinine  353 (H)   Hemoglobin A1C      Normal 3 8-5 6%; PreDiabetic 5 7-6 4%; Diabetic >=6 5%; Glycemic control for adults with diabetes <7 0% % 6 9 (H) 6 7 (H)   eAG, EST AVG Glucose      mg/dl 151 146   Vit D, 25-Hydroxy      30 0 - 100 0 ng/mL 57 1 78 0       Plan:    Diagnoses and all orders for this visit:    Type 2 diabetes mellitus with hyperglycemia, without long-term current use of insulin (HCC)  HGA1C 6 7%  Improved  Treatment regimen: continue current treatment  Episodes of hypoglycemia can lead to permanent disability and death  Discussed risks/complications associated with uncontrolled diabetes  Advised to adhere to diabetic diet, and recommended staying active/exercising routinely as tolerated  Keep carbohydrates consistent to limit blood glucose fluctuations  Advised to call if blood sugars less than 70 mg/dl or over 300 mg/dl  Check blood glucose 2+ times a day  Discussed symptoms and treatment of hypoglycemia  Discussed use of CGM to collect additional blood glucose data to reveal trends and patterns that can be used to optimize treatment plan  Recommended routine follow-up with podiatry and ophthalmology  Ordered blood work to complete prior to next visit  -     Hemoglobin A1C; Future  -     Basic metabolic panel; Future  -     lisinopril (ZESTRIL) 2 5 mg tablet; Take 1 tablet (2 5 mg total) by mouth daily    Benign essential HTN  Blood pressure well controlled  Start low dose lisinopril due to elevated microalbumin creatinine ratio  Discussed SE, safety/efficacy  Call for SE and go to ER for angioedema  -     Basic metabolic panel; Future    Mixed hyperlipidemia  LDL 51  Continue statin therapy     Osteoporosis without current pathological fracture  Continue Fosamax and vitamin D supplementation  Take precautions to avoid falls  Complete DEXA scan  -     DXA bone density spine hip and pelvis; Future    Vitamin D deficiency  Vitamin D normal at 78 0  Continue current treatment  -     DXA bone density spine hip and pelvis; Future           Discussed with the patient diagnosis and treatment and all questions fully answered  He will call me if any problems arise  Counseled patient on diagnostic results, prognosis, risk and benefit of treatment options, instruction for management, importance of treatment compliance, risk factor reduction and impressions        Deisy Black PA-C

## 2022-09-14 NOTE — PATIENT INSTRUCTIONS
Hypoglycemia instructions   Emely Sharp  9/14/2022  [id number]    Low Blood Sugar    Steps to treat low blood sugar  1  Test blood sugar if you have symptoms of low blood sugar:   Low Blood Sugar Symptoms:  o Sweaty  o Dizzy  o Rapid heartbeat  o Shaky  o Bad mood  o Hungry      2  Treat blood sugar less than 70 with 15 grams of fast-acting carbohydrate:   Examples of 15 grams Fast-Acting Carbohydrate:  o 4 oz juice  o 4 oz regular soda  o 3-4 glucose tablets (chew)  o 3-4 hard candies (chew)          3  Wait 15 minutes and test your blood sugar again     4   If blood sugar is less than 100, repeat steps 2-3     5  When your blood sugar is 100 or more, eat a snack if it will be longer than one hour until your next meal  The snack should be 15 grams of carbohydrate and a protein:   Examples of snacks:  o ½ sandwich  o 6 crackers with cheese  o Piece of fruit with cheese or peanut butter  o 6 crackers with peanut butter

## 2022-09-14 NOTE — PROGRESS NOTES
Assessment and Plan:     Problem List Items Addressed This Visit        Digestive    YATES (nonalcoholic steatohepatitis)       Endocrine    Type 2 diabetes mellitus with hyperglycemia, without long-term current use of insulin (HCC)    Relevant Orders    Comprehensive metabolic panel    Hemoglobin A1C    Microalbumin / creatinine urine ratio       Cardiovascular and Mediastinum    Arteriosclerotic coronary artery disease    Benign essential HTN    Relevant Orders    Comprehensive metabolic panel       Musculoskeletal and Integument    Osteoporosis    Relevant Orders    Vitamin D 25 hydroxy       Genitourinary    Prostate cancer ([facility] [address] )       Other    Hyperlipidemia    Relevant Orders    Lipid panel    Class 2 severe obesity with serious comorbidity and body mass index (BMI) of 36 0 to 36 9 in adult [facility])    History of aortic valve replacement      Other Visit Diagnoses     Medicare annual wellness visit, subsequent    -  Primary        BMI Counseling: Body mass index is 38 18 kg/m²  The BMI is above normal  Nutrition recommendations include decreasing portion sizes and moderation in carbohydrate intake  Exercise recommendations include exercising 3-5 times per week  Rationale for BMI follow-up plan is due to patient being overweight or obese  Depression Screening and Follow-up Plan: Patient was screened for depression during today's encounter  They screened negative with a PHQ-2 score of 0  Preventive health issues were discussed with patient, and age appropriate screening tests were ordered as noted in patient's After Visit Summary  Personalized health advice and appropriate referrals for health education or preventive services given if needed, as noted in patient's After Visit Summary  History of Present Illness:     Patient presents for a Medicare Wellness Visit    Presents in the office for follow up chronic conditions    Patient has non-insulin diabetes mellitus type 2 with chronic kidney disease stage 2  His current regimen includes Trulicity injected weekly  He is also on metformin  mg twice a day  He also takes glimepiride ER 5 mg in the morning  He does follow with endocrinology  He is going to his appointment with them today  Blood pressure is controlled off medication  For lipids he is on Lipitor 40 mg  Lipid panel is at goal   History of aortic valve replacement and coronary artery disease  Patient is under care of Cardiology  He is on a baby aspirin daily  Patient has a history of prostate cancer follows with Oncology  Patient has osteoporosis he is on Fosamax weekly calcium and vitamin-D supplements  He did sustain a Jesse will fracture last year secondary to a fall  He is due for his DEXA scan  Other labs show normal vitamin-D  His A1c is 6 7  AST and ALT are slightly elevated  Patient has known fatty liver  Patient Care Team:  Tao Connors PA-C as PCP - General (Family Medicine)  Geovany Harris MD as PCP - Endocrinology (Endocrinology)  Wendy Lawrence DO as PCP - PCP-[facility]  Nghia Gonzalez PA-C as Physician Assistant (Endocrinology)     Review of Systems:     Review of Systems   Constitutional: Negative for activity change, appetite change, chills, fatigue and fever  HENT: Negative for ear pain and sore throat  Eyes: Negative for visual disturbance  Respiratory: Negative for cough and shortness of breath  Cardiovascular: Negative for chest pain, palpitations and leg swelling  Gastrointestinal: Negative for abdominal pain, blood in stool, constipation, diarrhea and nausea  Genitourinary: Negative for difficulty urinating  Musculoskeletal: Positive for arthralgias (knee left)  Negative for back pain and myalgias  Skin: Negative for rash  Neurological: Negative for dizziness, syncope and headaches  Psychiatric/Behavioral: Negative for sleep disturbance          Problem List:     Patient Active Problem List Diagnosis    History of aortic valve replacement    Arteriosclerotic coronary artery disease    Benign essential HTN    Type 2 diabetes mellitus with hyperglycemia, without long-term current use of insulin (HCC)    Hyperlipidemia    YATES (nonalcoholic steatohepatitis)    Class 2 severe obesity with serious comorbidity and body mass index (BMI) of 36 0 to 36 9 in adult [facility])    Osteoarthritis    Osteoporosis    Prostate cancer ([place] [address] )    Erectile dysfunction due to diseases classified elsewhere    Humerus fracture      Past Medical and Surgical History:     Past Medical History:   Diagnosis Date    Arteriosclerotic coronary artery disease 2017    Benign prostate hyperplasia     L A  12   R   17      COPD [facility]ch[facility] obstructive pulmonary disease) [facility]     Deep venous embolism and thrombosis ([facility])     Diabetes mellitus ([facility] )     Hypertension     Impacted cerumen     unspecified laterity / L A  12       Osteoarthritis 2012    Prostate cancer ([place] [address] )     Pulmonary embolism ([facility])      Past Surgical History:   Procedure Laterality Date    CARDIAC VALVE REPLACEMENT      CORONARY ARTERY BYPASS GRAFT      FRACTURE SURGERY      left knee surgery    KNEE ARTHROSCOPY      Therapeutic    KNEE SURGERY      replacement    LUNG SURGERY      LUNG SURGERY      wedge resection     TONSILLECTOMY      UMBILICAL HERNIA REPAIR        Family History:     Family History   Problem Relation Age of Onset    COPD Mother     Heart attack Mother     Heart attack Father       Social History:     Social History     Socioeconomic History    Marital status: /Civil Union     Spouse name: None    Number of children: None    Years of education: None    Highest education level: None   Occupational History    None   Tobacco Use    Smoking status: Former Smoker     Types: Cigarettes     Quit date: 3/23/2000     Years since quittin 4    Smokeless tobacco: Never Used   Substance and Sexual Activity    Alcohol use: No     Comment: social drinker per [employer]script    Drug use: No    Sexual activity: Yes   Other Topics Concern    None   Social History Narrative    Daily coffee consumption     Uses safety equipment seatbelts     Social Determinants of Health     Financial Resource Strain: Low Risk     Difficulty of Paying Living Expenses: Not very hard   Food Insecurity: Not on file   Transportation Needs: No Transportation Needs    Lack of Transportation (Medical): No    Lack of Transportation (Non-Medical): No   Physical Activity: Not on file   Stress: Not on file   Social Connections: Not on file   Intimate Partner Violence: Not on file   Housing Stability: Not on file      Medications and Allergies:     Current Outpatient Medications   Medication Sig Dispense Refill    alendronate (FOSAMAX) 70 mg tablet Take 1 tablet once a week on empty stomach with a full glass of water, do not lie down for 30 minutes after taking 12 tablet 2    aspirin 81 MG tablet Take by mouth      atorvastatin (LIPITOR) 40 mg tablet TAKE 1 TABLET BY MOUTH EVERY DAY 90 tablet 1    Blood Glucose Calibration (OT ULTRA/FASTTK CNTRL SOLN) SOLN by In Vitro route as needed (to check meter calibration) 1 each 3    Blood Glucose Monitoring Suppl (ONE TOUCH ULTRA 2) w/Device KIT by Does not apply route daily DX E11 65  Pt to   Check  fbs  daily 1 each 0    Cholecalciferol (VITAMIN D3) 2000 units capsule Take 2,000 Units by mouth daily  11    Coenzyme Q10 (COQ-10) 100 MG CAPS Take 100 mg by mouth   Continuous Blood Gluc  (FREESTYLE CEDRIC 14 DAY READER) ADELAIDA 1 CGM DEVICE   USE TO CHECK BG LEVELS 4+ TIMES DAILY AND AS NEEDED 1 Device 0    Continuous Blood Gluc Sensor (FreeStyle Cedric 14 Day Sensor) MISC CHANGE SENSOR EVERY 14 DAYS TO CHECK BG LEVELS 4+ TIMES DAILY 2 each 0    Dulaglutide (Trulicity) 3 JT/5 8HY SOPN Inject 3 mg total once a week 6 mL 1    fluorouracil (EFUDEX) 5 % cream APPLY TWICE A DAY TO SCALP FOR 1 MONTH 40 g 0    glipiZIDE (GLUCOTROL XL) 5 mg 24 hr tablet TAKE 1 TABLET BY MOUTH EVERY DAY 90 tablet 0    glucose blood (FREESTYLE LITE) test strip Test 3 times a day  300 each 1    leuprolide (LUPRON DEPOT) 3 75 mg injection Inject into a muscle Once yearly       metFORMIN (GLUCOPHAGE-XR) 500 mg 24 hr tablet TAKE 1 TABLET WITH BREAKFAST AND DINNER DAILY 180 tablet 1    Multiple Vitamin-Folic Acid TABS Take by mouth      Omega-3 Fatty Acids (FISH OIL) 1,000 mg Take 1,000 mg by mouth daily   polyethylene glycol (MIRALAX) 17 g packet Take 17 g by mouth daily as needed (constipation)  0    senna-docusate sodium (SENOKOT S) 8 6-50 mg per tablet Take 1 tablet by mouth 2 (two) times a day  0    sildenafil (VIAGRA) 50 MG tablet Take 1 tablet (50 mg total) by mouth daily as needed for erectile dysfunction 5 tablet 5     No current facility-administered medications for this visit  No Known Allergies   Immunizations:     Immunization History   Administered Date(s) Administered    COVID-19 PFIZER VACCINE 0 3 ML IM 02/24/2021, 03/19/2021    Pneumococcal Conjugate 13-Valent 03/02/2016    Pneumococcal Polysaccharide PPV23 01/30/2014      Health Maintenance:         Topic Date Due    Colorectal Cancer Screening  05/04/2020    Hepatitis C Screening  Completed         Topic Date Due    COVID-19 Vaccine (3 - Booster for Pfizer series) 08/19/2021    Influenza Vaccine (1) 09/01/2022      Medicare Screening Tests and Risk Assessments:     Sheila Cuevas is here for his Subsequent Wellness visit  Health Risk Assessment:   Patient rates overall health as good  Patient feels that their physical health rating is same  Patient is satisfied with their life  Eyesight was rated as same  Hearing was rated as same  Patient feels that their emotional and mental health rating is same  Patients states they are never, rarely angry  Patient states they are never, rarely unusually tired/fatigued   Pain experienced in the last 7 days has been none  Patient states that he has experienced no weight loss or gain in last 6 months  Depression Screening:   PHQ-2 Score: 0      Fall Risk Screening: In the past year, patient has experienced: no history of falling in past year      Home Safety:  Patient does not have trouble with stairs inside or outside of their home  Patient has working smoke alarms and has no working carbon monoxide detector  Home safety hazards include: none  Nutrition:   Current diet is Regular  Medications:   Patient is currently taking over-the-counter supplements  OTC medications include: see medication list  Patient is able to manage medications  Activities of Daily Living (ADLs)/Instrumental Activities of Daily Living (IADLs):   Walk and transfer into and out of bed and chair?: Yes  Dress and groom yourself?: Yes    Bathe or shower yourself?: Yes    Feed yourself?  Yes  Do your laundry/housekeeping?: Yes  Manage your money, pay your bills and track your expenses?: Yes  Make your own meals?: Yes    Do your own shopping?: Yes    Previous Hospitalizations:   Any hospitalizations or ED visits within the last 12 months?: No      Advance Care Planning:   Living will: No    Advanced directive: No      Cognitive Screening:   Provider or family/friend/caregiver concerned regarding cognition?: No    PREVENTIVE SCREENINGS      Cardiovascular Screening:    General: History Lipid Disorder and Screening Current      Diabetes Screening:     General: History Diabetes and Screening Current      Colorectal Cancer Screening:       Due for: Colonoscopy - Low Risk      Prostate Cancer Screening:    General: History Prostate Cancer and Screening Current      Osteoporosis Screening:    General: History Osteoporosis    Due for: DXA Axial      Abdominal Aortic Aneurysm (AAA) Screening:    Risk factors include: tobacco use        General: Screening Not Indicated      Lung Cancer Screening:     General: Screening Not Indicated      Hepatitis C Screening:    General: Screening Current    Screening, Brief Intervention, and Referral to Treatment (SBIRT)    Screening  Typical number of drinks in a day: 0  Typical number of drinks in a week: 3  Interpretation: Low risk drinking behavior  Single Item Drug Screening:  How often have you used an illegal drug (including marijuana) or a prescription medication for non-medical reasons in the past year? never    Single Item Drug Screen Score: 0  Interpretation: Negative screen for possible drug use disorder    Brief Intervention  Alcohol & drug use screenings were reviewed  No concerns regarding substance use disorder identified  No exam data present     Physical Exam:     /76 (BP Location: Left arm, Patient Position: Sitting, Cuff Size: Standard)   Pulse 78   Temp 97 8 °F (36 6 °C)   Ht 5' 7" (1 702 m)   Wt 111 kg (243 lb 12 8 oz)   SpO2 98%   BMI 38 18 kg/m²     Physical Exam  Vitals and nursing note reviewed  Constitutional:       General: He is not in acute distress  Appearance: He is obese  He is not ill-appearing  HENT:      Head: Normocephalic and atraumatic  Right Ear: Tympanic membrane, ear canal and external ear normal       Left Ear: Tympanic membrane, ear canal and external ear normal    Eyes:      Conjunctiva/sclera: Conjunctivae normal       Pupils: Pupils are equal, round, and reactive to light  Neck:      Thyroid: No thyromegaly  Vascular: No carotid bruit  Cardiovascular:      Rate and Rhythm: Normal rate and regular rhythm  Pulses: Normal pulses  no weak pulses          Dorsalis pedis pulses are 2+ on the right side and 2+ on the left side  Heart sounds: Normal heart sounds  No murmur heard  Pulmonary:      Effort: Pulmonary effort is normal       Breath sounds: Normal breath sounds  Abdominal:      General: Abdomen is protuberant  Bowel sounds are normal       Palpations: Abdomen is soft  There is no mass  Tenderness: There is no abdominal tenderness  Musculoskeletal:      Right lower leg: No edema  Left lower leg: No edema  Feet:      Right foot:      Skin integrity: No ulcer, skin breakdown, erythema, warmth, callus or dry skin  Left foot:      Skin integrity: No ulcer, skin breakdown, erythema, warmth, callus or dry skin  Lymphadenopathy:      Cervical: No cervical adenopathy  Skin:     General: Skin is warm and dry  Coloration: Skin is not pale  Findings: No erythema  Neurological:      General: No focal deficit present  Mental Status: He is alert and oriented to person, place, and time  Psychiatric:         Mood and Affect: Mood normal          Behavior: Behavior normal          Thought Content: Thought content normal          Judgment: Judgment normal       Diabetic Foot Exam    Patient's shoes and socks removed  Right Foot/Ankle   Right Foot Inspection  Skin Exam: skin normal and skin intact  No dry skin, no warmth, no callus, no erythema, no maceration, no abnormal color, no pre-ulcer, no ulcer and no callus  Toe Exam: No tenderness and  no right toe deformity    Sensory   Vibration: intact  Monofilament testing: intact    Vascular  The right DP pulse is 2+  Left Foot/Ankle  Left Foot Inspection  Skin Exam: skin normal and skin intact  No dry skin, no warmth, no erythema, no maceration, normal color, no pre-ulcer, no ulcer and no callus  Toe Exam: No tenderness and no left toe deformity  Sensory   Vibration: intact  Monofilament testing: intact    Vascular  The left DP pulse is 2+       Assign Risk Category  No deformity present  No loss of protective sensation  No weak pulses  Risk: 0    Clifford Aquino PA-C

## 2022-10-05 DIAGNOSIS — E11.65 UNCONTROLLED TYPE 2 DIABETES MELLITUS WITH HYPERGLYCEMIA (HCC): ICD-10-CM

## 2022-10-05 RX ORDER — FLASH GLUCOSE SENSOR
KIT MISCELLANEOUS
Qty: 6 EACH | Refills: 1 | Status: SHIPPED | OUTPATIENT
Start: 2022-10-05

## 2022-11-08 DIAGNOSIS — E11.65 TYPE 2 DIABETES MELLITUS WITH HYPERGLYCEMIA, WITHOUT LONG-TERM CURRENT USE OF INSULIN (HCC): ICD-10-CM

## 2022-11-08 DIAGNOSIS — E66.9 OBESITY (BMI 35.0-39.9 WITHOUT COMORBIDITY): ICD-10-CM

## 2022-11-08 RX ORDER — DULAGLUTIDE 3 MG/.5ML
INJECTION, SOLUTION SUBCUTANEOUS
Qty: 6 ML | Refills: 1 | Status: SHIPPED | OUTPATIENT
Start: 2022-11-08

## 2022-11-09 ENCOUNTER — OFFICE VISIT (OUTPATIENT)
Dept: FAMILY MEDICINE CLINIC | Facility: CLINIC | Age: 77
End: 2022-11-09

## 2022-11-09 VITALS
WEIGHT: 242.2 LBS | TEMPERATURE: 97.3 F | HEART RATE: 80 BPM | DIASTOLIC BLOOD PRESSURE: 74 MMHG | BODY MASS INDEX: 38.01 KG/M2 | SYSTOLIC BLOOD PRESSURE: 124 MMHG | HEIGHT: 67 IN | OXYGEN SATURATION: 97 %

## 2022-11-09 DIAGNOSIS — Z12.11 SCREENING FOR COLORECTAL CANCER: ICD-10-CM

## 2022-11-09 DIAGNOSIS — Z91.89 HIGH RISK FOR COLON CANCER: ICD-10-CM

## 2022-11-09 DIAGNOSIS — G56.21 CUBITAL TUNNEL SYNDROME ON RIGHT: Primary | ICD-10-CM

## 2022-11-09 DIAGNOSIS — Z12.12 SCREENING FOR COLORECTAL CANCER: ICD-10-CM

## 2022-11-09 DIAGNOSIS — M77.11 LATERAL EPICONDYLITIS OF RIGHT ELBOW: ICD-10-CM

## 2022-11-09 NOTE — ASSESSMENT & PLAN NOTE
Patient with history of right humeral fracture presenting with persistent right-sided arm pain radiates from the elbow down  Physical exam revealed patient's pain is most predominantly on the medial aspect of the elbow radiating down toward the pinky  Pain is exaggerated by extreme pronation of the right arm as well as resisting internal rotation  Will have patient see physical therapy for evaluation  In the meantime patient may use Tylenol arthritis 1 tablet twice a day  After straight is activity use ice on the affected area 15 minutes at a time over cause

## 2022-11-09 NOTE — PROGRESS NOTES
Name: Kareen Abebe      :       MRN: [id number]  Encounter Provider: Dorina Daly MD  Encounter Date: 2022   Encounter department: [address] [place] [address]  Cubital tunnel syndrome on right  Assessment & Plan:  Patient with history of right humeral fracture presenting with persistent right-sided arm pain radiates from the elbow down  Physical exam revealed patient's pain is most predominantly on the medial aspect of the elbow radiating down toward the pinky  Pain is exaggerated by extreme pronation of the right arm as well as resisting internal rotation  Will have patient see physical therapy for evaluation  In the meantime patient may use Tylenol arthritis 1 tablet twice a day  After straight is activity use ice on the affected area 15 minutes at a time over cause    Orders:  -     Ambulatory Referral to Physical Therapy; Future    2  High risk for colon cancer    3  Screening for colorectal cancer  -     Ambulatory Referral to Gastroenterology; Future    4  Lateral epicondylitis of right elbow         Subjective      HPI     52-year-old male patient presents for evaluation of acute onset right-sided arm pain  Pain has been persistent for about a month  Of no last year in September patient did sustain the spiral fracture of the right proximal humerus which has been healing appropriately  Patient reports the pain is most significant when he is having his right arm on a driving will for prolonged period time  Pain starts from the elbow and radiates gradually down to his hand  Patient denies any significant numbness or tingling  Patient is right-handed, used to be a very avid bowler, has not over exerted or injure his right arm any weight recently  Would like to be able to ball by the spring  Is only present with persistent elevation of the right arm, denies any pain at this time  Review of Systems   Constitutional: Negative for chills and fever  HENT: Negative for congestion, rhinorrhea and sore throat  Respiratory: Negative for shortness of breath  Cardiovascular: Negative for chest pain  Gastrointestinal: Negative for abdominal pain  Genitourinary: Negative for dysuria  Musculoskeletal: Positive for arthralgias  Neurological: Negative for weakness, numbness and headaches  Current Outpatient Medications on File Prior to Visit   Medication Sig   • alendronate (FOSAMAX) 70 mg tablet Take 1 tablet once a week on empty stomach with a full glass of water, do not lie down for 30 minutes after taking   • aspirin 81 MG tablet Take by mouth   • atorvastatin (LIPITOR) 40 mg tablet TAKE 1 TABLET BY MOUTH EVERY DAY   • Blood Glucose Calibration (OT ULTRA/FASTTK CNTRL SOLN) SOLN by In Vitro route as needed (to check meter calibration)   • Blood Glucose Monitoring Suppl (ONE TOUCH ULTRA 2) w/Device KIT by Does not apply route daily DX E11 65  Pt to   Check  fbs  daily   • Cholecalciferol (VITAMIN D3) 2000 units capsule Take 2,000 Units by mouth daily   • Coenzyme Q10 (COQ-10) 100 MG CAPS Take 100 mg by mouth  • Continuous Blood Gluc  (FREESTYLE CEDRIC 14 DAY READER) ADELAIDA 1 CGM DEVICE  USE TO CHECK BG LEVELS 4+ TIMES DAILY AND AS NEEDED   • Continuous Blood Gluc Sensor (FreeStyle Cedric 14 Day Sensor) MISC CHANGE SENSOR EVERY 14 DAYS TO CHECK BG LEVELS 4+ TIMES DAILY   • Dulaglutide (Trulicity) 3 NIX/7 8WV SOPN Inject 3 mg total once a week   • fluorouracil (EFUDEX) 5 % cream APPLY TWICE A DAY TO SCALP FOR 1 MONTH   • glipiZIDE (GLUCOTROL XL) 5 mg 24 hr tablet TAKE 1 TABLET BY MOUTH EVERY DAY   • glucose blood (FREESTYLE LITE) test strip Test 3 times a day     • leuprolide (LUPRON DEPOT) 3 75 mg injection Inject into a muscle Once yearly    • lisinopril (ZESTRIL) 2 5 mg tablet Take 1 tablet (2 5 mg total) by mouth daily   • metFORMIN (GLUCOPHAGE-XR) 500 mg 24 hr tablet TAKE 1 TABLET WITH BREAKFAST AND DINNER DAILY   • Multiple Vitamin-Folic Acid TABS Take by mouth   • Omega-3 Fatty Acids (FISH OIL) 1,000 mg Take 1,000 mg by mouth daily  • polyethylene glycol (MIRALAX) 17 g packet Take 17 g by mouth daily as needed (constipation)   • senna-docusate sodium (SENOKOT S) 8 6-50 mg per tablet Take 1 tablet by mouth 2 (two) times a day   • sildenafil (VIAGRA) 50 MG tablet Take 1 tablet (50 mg total) by mouth daily as needed for erectile dysfunction       Objective     /74 (BP Location: Left arm, Patient Position: Sitting, Cuff Size: Large)   Pulse 80   Temp (!) 97 3 °F (36 3 °C) (Temporal)   Ht 5' 7" (1 702 m)   Wt 110 kg (242 lb 3 2 oz)   SpO2 97%   BMI 37 93 kg/m²     Physical Exam  Vitals reviewed  Constitutional:       General: He is not in acute distress  Appearance: Normal appearance  He is not ill-appearing, toxic-appearing or diaphoretic  Cardiovascular:      Rate and Rhythm: Normal rate and regular rhythm  Pulses: Normal pulses  Heart sounds: Normal heart sounds  No murmur heard  Pulmonary:      Effort: Pulmonary effort is normal  No respiratory distress  Breath sounds: Normal breath sounds  Abdominal:      General: Abdomen is flat  Musculoskeletal:         General: No swelling, tenderness, deformity or signs of injury  Normal range of motion  Comments: Mild tenderness of over the lateral and medial epicondyl  Pain with extreme of pronation of the right arm starting at elbow  Pain with resistance to internal rotation  Negative Phalen, negative Finkelstein test   Skin:     General: Skin is warm and dry  Capillary Refill: Capillary refill takes less than 2 seconds  Coloration: Skin is not jaundiced  Neurological:      General: No focal deficit present  Mental Status: He is alert     Psychiatric:         Mood and Affect: Mood normal             Na Hernandez MD

## 2022-11-17 DIAGNOSIS — E11.65 TYPE 2 DIABETES MELLITUS WITH HYPERGLYCEMIA, WITHOUT LONG-TERM CURRENT USE OF INSULIN (HCC): ICD-10-CM

## 2022-11-17 RX ORDER — GLIPIZIDE 5 MG/1
TABLET, FILM COATED, EXTENDED RELEASE ORAL
Qty: 90 TABLET | Refills: 0 | Status: SHIPPED | OUTPATIENT
Start: 2022-11-17

## 2022-11-22 ENCOUNTER — EVALUATION (OUTPATIENT)
Dept: PHYSICAL THERAPY | Facility: CLINIC | Age: 77
End: 2022-11-22

## 2022-11-22 DIAGNOSIS — M54.12 CERVICAL RADICULOPATHY: ICD-10-CM

## 2022-11-22 DIAGNOSIS — G56.21 CUBITAL TUNNEL SYNDROME ON RIGHT: Primary | ICD-10-CM

## 2022-11-22 NOTE — PROGRESS NOTES
PT Evaluation     Today's date: 2022  Patient name: Francisco Pena  : [id number]  MRN: [id number]  Referring provider: James Selby MD  Dx:   Encounter Diagnosis     ICD-10-CM    1  Cubital tunnel syndrome on right  G56 21 Ambulatory Referral to Physical Therapy      2  Cervical radiculopathy  M54 12                      Assessment  Assessment details: Pt presents with s/s consistent with a R lower cervical radiculopathy with an opening preference and demonstrated an unremarkable R elbow assessment  He will benefit from skilled PT services to address his impairments in order to decrease pain and restore function  Impairments: abnormal muscle firing, abnormal or restricted ROM, abnormal movement, impaired physical strength, lacks appropriate home exercise program, pain with function, poor posture  and poor body mechanics  Understanding of Dx/Px/POC: good   Prognosis: good    Goals  STG - 2-4 wks  1) pt will decrease intensity and frequency of RUE symptoms 50%  2) pt will able to perform c/s retraction correctly for postural correction    LTG - 6-8 wks  1) pt will decrease intensity and frequency        Subjective Evaluation    History of Present Illness  Mechanism of injury: Pt is a 69 y o  male with PMHx significant for PMHx: prostate CA, osteoporosis, aortic valve replacement, R TKA, HTN, DM II, CAD, falls, R humeral fx 21  He reports insidious onset of posterior R elbow pain and paraesthesias radiating into the last 2 fingers  He denies trauma, red flags, subjective weakness      Pain  Current pain ratin  At best pain ratin  At worst pain ratin  Quality: radiating and dull ache  Relieving factors: change in position  Aggravating factors: sitting  Progression: no change    Hand dominance: right      Diagnostic Tests  No diagnostic tests performed  Treatments  No previous or current treatments  Patient Goals  Patient goals for therapy: decreased pain          Objective     Postural Observations  Seated posture: poor  Standing posture: poor  Correction of posture: makes symptoms better        Neurological Testing     Sensation   Cervical/Thoracic   Left   Intact: light touch    Right   Intact: light touch    Comments   Left light touch: C1-T1  Right light touch: C1-T1       Reflexes   Left   Biceps (C5/C6): normal (2+)  Brachioradialis (C6): normal (2+)  Triceps (C7): normal (2+)    Right   Biceps (C5/C6): normal (2+)  Brachioradialis (C6): normal (2+)  Triceps (C7): normal (2+)    Active Range of Motion   Cervical/Thoracic Spine       Cervical  Subcranial protraction:   Restriction level: minimal  Subcranial retraction:   Restriction level: maximal  Flexion:  Restriction level: minimal  Extension:  Restriction level: moderate  Left lateral flexion:  Restriction level: maximal  Right lateral flexion:  Restriction level maximal  Left rotation:  Restriction level: minimal  Right rotation:  Restriction level: moderate    Left Elbow   Flexion: WFL  Extension: WFL  Forearm supination: 70 degrees   Forearm pronation: WFL    Right Elbow   Flexion: WFL  Extension: WFL  Forearm supination: 65 degrees   Forearm pronation: WFL    Left Wrist   Wrist flexion: WFL  Wrist extension: WFL    Right Wrist   Wrist flexion: WFL  Wrist extension: [facility]    Joint Play     Hypomobile: C2, C3, C4, C5, C6, C7 and T1     Pain: C7   Mechanical Assessment    Cervical    Seated Protrusion: repeated movements   Pain location: peripheralized  Pain level: produced  Seated retraction: repeated movements   Pain location: peripheralized  Pain level: produced  Seated Flexion:  repeated movements  Pain location: no change  Seated Extension: repeated movements  Pain location: peripheralized  Pain level: produced    Thoracic      Lumbar      Strength/Myotome Testing   Cervical Spine     Left   Interossei strength (t1): 4    Right   Interossei strength (t1): 4+    Left Shoulder     Planes of Motion   Abduction: 4+   External rotation at 0°: 4+   Internal rotation at 0°: 4+     Right Shoulder     Planes of Motion   Abduction: 4   External rotation at 0°: 4-   Internal rotation at 0°: 4     Left Elbow   Flexion: 4+  Extension: 4+  Forearm supination: 4+  Forearm pronation: 4+    Right Elbow   Flexion: 4+  Extension: 4+  Forearm supination: 4  Forearm pronation: 4+    Left Wrist/Hand   Wrist extension: 4+  Wrist flexion: 4+  Thumb extension: 4+    Right Wrist/Hand   Wrist extension: 4+  Wrist flexion: 4+  Thumb extension: 4+    Additional Strength Details  : R: 55 lbs, L: 65 lbs  Pt is R-handed    Tests   Cervical   Positive vertical compression and cervical distraction test     Right   Positive Spurling's Test A  Right Shoulder   Positive ULTT1  Negative ULTT4  Right Elbow   Negative Tinel's sign (cubital tunnel) (cubital tunnel, guyon's canal), valgus stress at 0 degrees, valgus stress at 30 degrees, varus stress at 0 degrees and varus stress at 30 degrees  Lumbar   Positive vertical compression   Precautions:  PMHx: PMHx: prostate CA, osteoporosis, aortic valve replacement, R TKA, HTN, DM II, CAD, falls, R humeral fx 8/25/21  Dx: lower R cervical radiculopathy with an opening preference    Manuals 11/22                                                   Mechanical traction 20#  30 deg  2x5 min            Neuro Re-Ed             Postural correction and awareness training 5 min            Seated chin tucks 2"x10            C/s rotation with laser No laser  1x10 W/ laser           C/s SB with laser             scap retraction 5"x10            TB rows             B TB ER             Supine c/s retraction: symptom-free                                       Ther Ex                                                                                                                     Ther Activity                                       Gait Training                                       Modalities

## 2022-11-28 ENCOUNTER — OFFICE VISIT (OUTPATIENT)
Dept: PHYSICAL THERAPY | Facility: CLINIC | Age: 77
End: 2022-11-28

## 2022-11-28 DIAGNOSIS — M54.12 CERVICAL RADICULOPATHY: ICD-10-CM

## 2022-11-28 DIAGNOSIS — G56.21 CUBITAL TUNNEL SYNDROME ON RIGHT: Primary | ICD-10-CM

## 2022-11-28 NOTE — PROGRESS NOTES
Daily Note     Today's date: 2022  Patient name: Barbi Coe  : [id number]  MRN: [id number]  Referring provider: Pastor Tutu MD  Dx:   Encounter Diagnosis     ICD-10-CM    1  Cubital tunnel syndrome on right  G56 21       2  Cervical radiculopathy  M54 12                      Subjective: Pt reports good compliance with HEP, no RUE symptoms for 18 hrs following mechanical traction last visit  Objective: See treatment diary below    Assessment: Pt demonstrated symptom-free c/s ROM except for retraction in both standing and supine  Plan: Cont  POC  Precautions:  PMHx: PMHx: prostate CA, osteoporosis, aortic valve replacement, R TKA, HTN, DM II, CAD, falls, R humeral fx 21  Dx: lower R cervical radiculopathy with an opening preference    Manuals            Gr IV L C2-7 side slides  1x40 ea                                     Mechanical traction 20#  30 deg  2x5 min 20#  30 deg  2x5 min           Neuro Re-Ed             Postural correction and awareness training 5 min 3 min           Seated chin tucks 2"x10 1x15           C/s rotation at mirror 1x10 1x15           C/s SB with laser  1x15           scap retraction 5"x10 5"x15           TB rows             B TB ER  RTB  3x10           Supine c/s retraction: symptom-free  held           Supine chin tucks  3"x10           DNF   3"x5                                                 Ther Ex                                                                                                                     Ther Activity                                       Gait Training                                       Modalities

## 2022-12-01 ENCOUNTER — OFFICE VISIT (OUTPATIENT)
Dept: PHYSICAL THERAPY | Facility: CLINIC | Age: 77
End: 2022-12-01

## 2022-12-01 DIAGNOSIS — G56.21 CUBITAL TUNNEL SYNDROME ON RIGHT: Primary | ICD-10-CM

## 2022-12-01 DIAGNOSIS — M54.12 CERVICAL RADICULOPATHY: ICD-10-CM

## 2022-12-01 NOTE — PROGRESS NOTES
Daily Note     Today's date: 2022  Patient name: Robbin Adler  : [id number]  MRN: [id number]  Referring provider: Chioma Winter MD  Dx:   Encounter Diagnosis     ICD-10-CM    1  Cubital tunnel syndrome on right  G56 21       2  Cervical radiculopathy  M54 12                      Subjective: Pt reports no RUE symptom through most of the next day following traction  Objective: See treatment diary below  Added R median nerve flossing to POC    Assessment: Pt demonstrated good tolerance to DNF strengthening  Plan: Cont  POC  Precautions:  PMHx: PMHx: prostate CA, osteoporosis, aortic valve replacement, R TKA, HTN, DM II, CAD, falls, R humeral fx 21  Dx: lower R cervical radiculopathy with an opening preference    Manuals            Gr IV L C2-7 side slides  1x40 ea 1x40 ea                                    Mechanical traction 20#  30 deg  2x5 min 20#  30 deg  2x5 min 25#  30 deg  2x5 min          Neuro Re-Ed             Postural correction and awareness training 5 min 3 min           Seated chin tucks 2"x10 1x15 1x15          C/s rotation at mirror 1x10 1x15 1x15          C/s SB with laser  1x15 1x15          scap retraction 5"x10 5"x15           TB rows             B TB ER  RTB  3x10 RTB  3x15          Supine c/s retraction: symptom-free  held           Supine chin tucks  3"x10 3"x10          DNF   3"x5          Median nerve flossing   1x10                                    Ther Ex                                                                                                                     Ther Activity                                       Gait Training                                       Modalities

## 2022-12-05 ENCOUNTER — APPOINTMENT (OUTPATIENT)
Dept: PHYSICAL THERAPY | Facility: CLINIC | Age: 77
End: 2022-12-05

## 2022-12-07 ENCOUNTER — APPOINTMENT (OUTPATIENT)
Dept: PHYSICAL THERAPY | Facility: CLINIC | Age: 77
End: 2022-12-07

## 2022-12-12 ENCOUNTER — OFFICE VISIT (OUTPATIENT)
Dept: PHYSICAL THERAPY | Facility: CLINIC | Age: 77
End: 2022-12-12

## 2022-12-12 DIAGNOSIS — G56.21 CUBITAL TUNNEL SYNDROME ON RIGHT: Primary | ICD-10-CM

## 2022-12-12 DIAGNOSIS — M54.12 CERVICAL RADICULOPATHY: ICD-10-CM

## 2022-12-12 NOTE — PROGRESS NOTES
Daily Note     Today's date: 2022  Patient name: Jose Miguel Eric  : [id number]  MRN: [id number]  Referring provider: Perez Jackson MD  Dx:   Encounter Diagnosis     ICD-10-CM    1  Cubital tunnel syndrome on right  G56 21       2  Cervical radiculopathy  M54 12                      Subjective: Pt reports fair HEP compliance since being away last week in [place]  Pt reports no change in right hand symptoms  Objective: See treatment diary below    Assessment: Pt required cueing to correct technique with seated chin tucks and DNF, fair postural awareness  Resume TB ER NV  Plan: Cont  POC  Pt performed mechanical traction 11:     Precautions:  PMHx: PMHx: prostate CA, osteoporosis, aortic valve replacement, R TKA, HTN, DM II, CAD, falls, R humeral fx 21  Dx: lower R cervical radiculopathy with an opening preference    Manuals          Gr IV L C2-7 side slides  1x40 ea 1x40 ea                                    Mechanical traction 20#  30 deg  2x5 min 20#  30 deg  2x5 min 25#  30 deg  2x5 min 25#  30 deg  2x5 min         Neuro Re-Ed             Postural correction and awareness training 5 min 3 min           Seated chin tucks 2"x10 1x15 1x15 1x15         C/s rotation at mirror 1x10 1x15 1x15 1x15         C/s SB with laser  1x15 1x15 1x15         scap retraction 5"x10 5"x15           TB rows    RTB 3x15         B TB ER  RTB  3x10 RTB  3x15 nv         Supine c/s retraction: symptom-free  held           Supine chin tucks  3"x10 3"x10 3"x10         DNF   3"x5 3"x5         Median nerve flossing   1x10 1x10                                   Ther Ex                                                                                                                     Ther Activity                                       Gait Training                                       Modalities

## 2022-12-14 ENCOUNTER — OFFICE VISIT (OUTPATIENT)
Dept: PHYSICAL THERAPY | Facility: CLINIC | Age: 77
End: 2022-12-14

## 2022-12-14 DIAGNOSIS — M54.12 CERVICAL RADICULOPATHY: ICD-10-CM

## 2022-12-14 DIAGNOSIS — G56.21 CUBITAL TUNNEL SYNDROME ON RIGHT: Primary | ICD-10-CM

## 2022-12-14 NOTE — PROGRESS NOTES
Daily Note     Today's date: 2022  Patient name: Zaina Daily  : [birth year]  MRN: [id number]  Referring provider: Eleazar Sinclair MD  Dx:   Encounter Diagnosis     ICD-10-CM    1  Cubital tunnel syndrome on right  G56 21       2  Cervical radiculopathy  M54 12           Start Time: 1030  Stop Time: 1115  Total time in clinic (min): 45 minutes    Subjective: Patient reports his symptoms are less frequent and intense in his RUE  Objective: See treatment diary below    Assessment: Patient required cueing to engage his R arm during B/L TB ER  Better muscle activation and fatigue following these cues  Overall, responding really well to the current PT POC  Plan: Cont  POC  Precautions:  PMHx: PMHx: prostate CA, osteoporosis, aortic valve replacement, R TKA, HTN, DM II, CAD, falls, R humeral fx 21  Dx: lower R cervical radiculopathy with an opening preference    Manuals         Gr IV L C2-7 side slides  1x40 ea 1x40 ea                                    Mechanical traction 20#  30 deg  2x5 min 20#  30 deg  2x5 min 25#  30 deg  2x5 min 25#  30 deg  2x5 min 25#  30 deg  2x5 min        Neuro Re-Ed             Postural correction and awareness training 5 min 3 min           Seated chin tucks 2"x10 1x15 1x15 1x15 1x15        C/s rotation at mirror 1x10 1x15 1x15 1x15 1x15        C/s SB with laser  1x15 1x15 1x15 1x15        scap retraction 5"x10 5"x15           TB rows    RTB 3x15 GTB  3x15        B TB ER  RTB  3x10 RTB  3x15 nv RTB  3x15        Supine c/s retraction: symptom-free  held           Supine chin tucks  3"x10 3"x10 3"x10 3"x10        DNF   3"x5 3"x5 3"x5        Median nerve flossing   1x10 1x10 1x10                                  Ther Ex                                                                                                                     Ther Activity                                       Gait Training Modalities

## 2022-12-19 ENCOUNTER — OFFICE VISIT (OUTPATIENT)
Dept: PHYSICAL THERAPY | Facility: CLINIC | Age: 77
End: 2022-12-19

## 2022-12-19 DIAGNOSIS — M54.12 CERVICAL RADICULOPATHY: ICD-10-CM

## 2022-12-19 DIAGNOSIS — E11.65 UNCONTROLLED TYPE 2 DIABETES MELLITUS WITH HYPERGLYCEMIA (HCC): ICD-10-CM

## 2022-12-19 DIAGNOSIS — G56.21 CUBITAL TUNNEL SYNDROME ON RIGHT: Primary | ICD-10-CM

## 2022-12-19 RX ORDER — FLASH GLUCOSE SENSOR
KIT MISCELLANEOUS
Qty: 6 EACH | Refills: 1 | Status: SHIPPED | OUTPATIENT
Start: 2022-12-19

## 2022-12-19 RX ORDER — FLASH GLUCOSE SENSOR
KIT MISCELLANEOUS
COMMUNITY
End: 2022-12-19 | Stop reason: SDUPTHER

## 2022-12-19 NOTE — PROGRESS NOTES
Daily Note     Today's date: 2022  Patient name: Lore Bojorquez  :   MRN: [id number]  Referring provider: Jono Spann MD  Dx:   Encounter Diagnosis     ICD-10-CM    1  Cubital tunnel syndrome on right  G56 21       2  Cervical radiculopathy  M54 12                      Subjective: Patient reports a slight decrease in frequency of right UE radicular symptoms  Objective: See treatment diary below    Assessment: Patient required moderate cueing for postural correction during TE program  Pt's symptoms continue to improve but poor postural awareness may be limiting progress  Plan: Cont  POC  Mechanical traction performed -     Precautions:  PMHx: PMHx: prostate CA, osteoporosis, aortic valve replacement, R TKA, HTN, DM II, CAD, falls, R humeral fx 21  Dx: lower R cervical radiculopathy with an opening preference    Manuals        Gr IV L C2-7 side slides  1x40 ea 1x40 ea                                    Mechanical traction 20#  30 deg  2x5 min 20#  30 deg  2x5 min 25#  30 deg  2x5 min 25#  30 deg  2x5 min 25#  30 deg  2x5 min 25#  30 deg  2x5 min       Neuro Re-Ed             Postural correction and awareness training 5 min 3 min           Seated chin tucks 2"x10 1x15 1x15 1x15 1x15 1x15       C/s rotation at mirror 1x10 1x15 1x15 1x15 1x15 1x15       C/s SB with laser  1x15 1x15 1x15 1x15 1x15       scap retraction 5"x10 5"x15           TB rows    RTB 3x15 GTB  3x15 GTB  3x15       B TB ER  RTB  3x10 RTB  3x15 nv RTB  3x15 RTB  3x15       Supine c/s retraction: symptom-free  held           Supine chin tucks  3"x10 3"x10 3"x10 3"x10 3"x10       DNF   3"x5 3"x5 3"x5 4"x5       Median nerve flossing   1x10 1x10 1x10 1x10                                 Ther Ex                                                                                                                     Ther Activity                                       Gait Training Modalities

## 2022-12-21 ENCOUNTER — OFFICE VISIT (OUTPATIENT)
Dept: PHYSICAL THERAPY | Facility: CLINIC | Age: 77
End: 2022-12-21

## 2022-12-21 DIAGNOSIS — M54.12 CERVICAL RADICULOPATHY: ICD-10-CM

## 2022-12-21 DIAGNOSIS — G56.21 CUBITAL TUNNEL SYNDROME ON RIGHT: Primary | ICD-10-CM

## 2022-12-21 NOTE — PROGRESS NOTES
Daily Note     Today's date: 2022  Patient name: Andrew Motley  :   MRN: [id number]  Referring provider: Mark Pabon MD  Dx:   Encounter Diagnosis     ICD-10-CM    1  Cubital tunnel syndrome on right  G56 21       2  Cervical radiculopathy  M54 12                      Subjective: Patient reports decreased right UE radicular symptoms  Objective: See treatment diary below    Assessment: Patient demonstrated decreased intensity and frequency of right UE radicular symptoms, fair postural awareness  Plan: Cont  POC  Mechanical traction 11:03-11:13     Precautions:  PMHx: PMHx: prostate CA, osteoporosis, aortic valve replacement, R TKA, HTN, DM II, CAD, falls, R humeral fx 21  Dx: lower R cervical radiculopathy with an opening preference    Manuals       Gr IV L C2-7 side slides  1x40 ea 1x40 ea                                    Mechanical traction 20#  30 deg  2x5 min 20#  30 deg  2x5 min 25#  30 deg  2x5 min 25#  30 deg  2x5 min 25#  30 deg  2x5 min 25#  30 deg  2x5 min 25#  30 deg  2x5 min      Neuro Re-Ed             Postural correction and awareness training 5 min 3 min           Seated chin tucks 2"x10 1x15 1x15 1x15 1x15 1x15 1x15      C/s rotation at mirror 1x10 1x15 1x15 1x15 1x15 1x15 1x15      C/s SB with laser  1x15 1x15 1x15 1x15 1x15 1x15      scap retraction 5"x10 5"x15           TB rows    RTB 3x15 GTB  3x15 GTB  3x15 GTB  3x15      B TB ER  RTB  3x10 RTB  3x15 nv RTB  3x15 RTB  3x15 RTB  3x15      Supine c/s retraction: symptom-free  held           Supine chin tucks  3"x10 3"x10 3"x10 3"x10 3"x10 3"x10      DNF   3"x5 3"x5 3"x5 4"x5 4"x5      Median nerve flossing   1x10 1x10 1x10 1x10 1x10                                Ther Ex                                                                                                                     Ther Activity                                       Gait Training Modalities

## 2022-12-27 ENCOUNTER — HOSPITAL ENCOUNTER (OUTPATIENT)
Dept: RADIOLOGY | Facility: HOSPITAL | Age: 77
Discharge: HOME | End: 2022-12-27

## 2022-12-27 VITALS — WEIGHT: 230 LBS | BODY MASS INDEX: 34.86 KG/M2 | HEIGHT: 68 IN

## 2022-12-27 DIAGNOSIS — M81.0 OSTEOPOROSIS WITHOUT CURRENT PATHOLOGICAL FRACTURE, UNSPECIFIED OSTEOPOROSIS TYPE: ICD-10-CM

## 2022-12-27 DIAGNOSIS — E55.9 VITAMIN D DEFICIENCY: ICD-10-CM

## 2022-12-28 ENCOUNTER — OFFICE VISIT (OUTPATIENT)
Dept: PHYSICAL THERAPY | Facility: CLINIC | Age: 77
End: 2022-12-28

## 2022-12-28 DIAGNOSIS — G56.21 CUBITAL TUNNEL SYNDROME ON RIGHT: Primary | ICD-10-CM

## 2022-12-28 DIAGNOSIS — M54.12 CERVICAL RADICULOPATHY: ICD-10-CM

## 2022-12-28 NOTE — PROGRESS NOTES
Daily Note     Today's date: 2022  Patient name: Jessica Hunter  :   MRN: [id number]  Referring provider: Judah Waldron MD  Dx:   Encounter Diagnosis     ICD-10-CM    1  Cubital tunnel syndrome on right  G56 21       2  Cervical radiculopathy  M54 12                      Subjective: Patient reports minimal intermittent radicular sx since LV, managed with HEP  Objective: See treatment diary below      Assessment: Tolerated treatment well  Limited C/s mobility noted with rotation and sidebending  Improved rotation with practice, less so with lateral flexion  Fair carryover from cuing for periscapular activation with rows  Positive response to mechanical traction  Patient is able to complete session without radicular sx  Plan: Continue per plan of care  Precautions:  PMHx: PMHx: prostate CA, osteoporosis, aortic valve replacement, R TKA, HTN, DM II, CAD, falls, R humeral fx 21  Dx: lower R cervical radiculopathy with an opening preference    Manuals      Gr IV L C2-7 side slides  1x40 ea 1x40 ea                                    Mechanical traction 20#  30 deg  2x5 min 20#  30 deg  2x5 min 25#  30 deg  2x5 min 25#  30 deg  2x5 min 25#  30 deg  2x5 min 25#  30 deg  2x5 min 25#  30 deg  2x5 min 25#  30 deg  2x5 min     Neuro Re-Ed             Postural correction and awareness training 5 min 3 min           Seated chin tucks 2"x10 1x15 1x15 1x15 1x15 1x15 1x15 1x15     C/s rotation at mirror 1x10 1x15 1x15 1x15 1x15 1x15 1x15 1x15     C/s SB with laser  1x15 1x15 1x15 1x15 1x15 1x15 1x15     scap retraction 5"x10 5"x15           TB rows    RTB 3x15 GTB  3x15 GTB  3x15 GTB  3x15 BTB  3x15     B TB ER  RTB  3x10 RTB  3x15 nv RTB  3x15 RTB  3x15 RTB  3x15 RTB  3x15     Supine c/s retraction: symptom-free  held           Supine chin tucks  3"x10 3"x10 3"x10 3"x10 3"x10 3"x10 3"x10     DNF   3"x5 3"x5 3"x5 4"x5 4"x5 4"x5     Median nerve flossing   1x10 1x10 1x10 1x10 1x10 1x10                               Ther Ex                                                                                                                     Ther Activity                                       Gait Training                                       Modalities

## 2022-12-30 ENCOUNTER — OFFICE VISIT (OUTPATIENT)
Dept: PHYSICAL THERAPY | Facility: CLINIC | Age: 77
End: 2022-12-30

## 2022-12-30 DIAGNOSIS — M54.12 CERVICAL RADICULOPATHY: ICD-10-CM

## 2022-12-30 DIAGNOSIS — G56.21 CUBITAL TUNNEL SYNDROME ON RIGHT: Primary | ICD-10-CM

## 2022-12-30 NOTE — PROGRESS NOTES
Daily Note     Today's date: 2022  Patient name: Citlalli Torre  :   MRN: [id number]  Referring provider: Jose F Enriquez MD  Dx:   Encounter Diagnosis     ICD-10-CM    1  Cubital tunnel syndrome on right  G56 21       2  Cervical radiculopathy  M54 12                      Subjective: Pt reports no RUE symptoms x 4 days  Objective: See treatment diary below    Assessment: Pt demonstrated improved posture, neuromuscular control, and radicular symptoms  Plan: Cont  POC through 23 then d/c to HEP  Precautions:  PMHx: PMHx: prostate CA, osteoporosis, aortic valve replacement, R TKA, HTN, DM II, CAD, falls, R humeral fx 21  Dx: lower R cervical radiculopathy with an opening preference    Manuals     Gr IV L C2-7 side slides  1x40 ea 1x40 ea                                    Mechanical traction 20#  30 deg  2x5 min 20#  30 deg  2x5 min 25#  30 deg  2x5 min 25#  30 deg  2x5 min 25#  30 deg  2x5 min 25#  30 deg  2x5 min 25#  30 deg  2x5 min 25#  30 deg  2x5 min 25#  L3  2x5 min    Neuro Re-Ed             Postural correction and awareness training 5 min 3 min           Seated chin tucks 2"x10 1x15 1x15 1x15 1x15 1x15 1x15 1x15 1x15    C/s rotation at mirror 1x10 1x15 1x15 1x15 1x15 1x15 1x15 1x15 1x15    C/s SB with laser  1x15 1x15 1x15 1x15 1x15 1x15 1x15 1x15    scap retraction 5"x10 5"x15           TB rows    RTB 3x15 GTB  3x15 GTB  3x15 GTB  3x15 BTB  3x15 Blue  3x15    B TB ER  RTB  3x10 RTB  3x15 nv RTB  3x15 RTB  3x15 RTB  3x15 RTB  3x15 Green  3x15    Supine c/s retraction: symptom-free  held           Supine chin tucks  3"x10 3"x10 3"x10 3"x10 3"x10 3"x10 3"x10 3"x10    DNF   3"x5 3"x5 3"x5 4"x5 4"x5 4"x5 4"x10    Median nerve flossing   1x10 1x10 1x10 1x10 1x10 1x10 1x10                              Ther Ex Ther Activity                                       Gait Training                                       Modalities

## 2023-01-03 ENCOUNTER — EVALUATION (OUTPATIENT)
Dept: PHYSICAL THERAPY | Facility: CLINIC | Age: 78
End: 2023-01-03

## 2023-01-03 DIAGNOSIS — G56.21 CUBITAL TUNNEL SYNDROME ON RIGHT: Primary | ICD-10-CM

## 2023-01-03 DIAGNOSIS — M54.12 CERVICAL RADICULOPATHY: ICD-10-CM

## 2023-01-03 DIAGNOSIS — M81.0 OSTEOPOROSIS WITHOUT CURRENT PATHOLOGICAL FRACTURE, UNSPECIFIED OSTEOPOROSIS TYPE: ICD-10-CM

## 2023-01-03 RX ORDER — ALENDRONATE SODIUM 70 MG/1
TABLET ORAL
Qty: 12 TABLET | Refills: 2 | Status: SHIPPED | OUTPATIENT
Start: 2023-01-03

## 2023-01-03 NOTE — PROGRESS NOTES
PT Re-Evaluation     Today's date: 1/3/2023  Patient name: Zaina Daily  :   MRN: [id number]  Referring provider: Eleazar Sinclair MD  Dx:   Encounter Diagnosis     ICD-10-CM    1  Cubital tunnel syndrome on right  G56 21       2  Cervical radiculopathy  M54 12                      Assessment  Assessment details: Pt demonstrates improved c/s ROM, postural awareness, and RUE paraesthesias  He will benefit from skilled PT services for 3 more visit to address his remaining impairments before d/c to a maintenance HEP at that time as he will be going to [facility] for the next several months  Impairments: abnormal muscle firing, abnormal or restricted ROM, abnormal movement, impaired physical strength, lacks appropriate home exercise program, pain with function, poor posture  and poor body mechanics  Understanding of Dx/Px/POC: good   Prognosis: good    Goals  STG - 2-4 wks  1) pt will decrease intensity and frequency of RUE symptoms 50% (met)  2) pt will able to perform c/s retraction correctly for postural correction (met)    LTG - 6-8 wks  1) pt will resolve RUE symptoms (met)  2) pt will return to bowling without difficulty (not met)    Plan  Planned modality interventions: traction  Planned therapy interventions: joint mobilization, manual therapy, neuromuscular re-education, patient education, postural training, strengthening, stretching, therapeutic activities, therapeutic exercise, home exercise program, functional ROM exercises, flexibility and body mechanics training  Frequency: 2x week  Duration in weeks: 2  Treatment plan discussed with: patient        Subjective Evaluation    History of Present Illness  Mechanism of injury: Pt is a 69 y o  male with PMHx significant for PMHx: prostate CA, osteoporosis, aortic valve replacement, R TKA, HTN, DM II, CAD, falls, R humeral fx 21  He reports no RUE symptoms x 1 wk  He will be leaving for [place] in 1 5 wks until     Pain  Current pain rating: 0  At best pain ratin  At worst pain ratin  Quality: radiating and dull ache  Relieving factors: change in position  Aggravating factors: sitting  Progression: improved    Hand dominance: right      Diagnostic Tests  No diagnostic tests performed  Treatments  Previous treatment: physical therapy  Current treatment: physical therapy  Patient Goals  Patient goals for therapy: decreased pain          Objective     Postural Observations  Seated posture: poor  Standing posture: poor  Correction of posture: makes symptoms better        Neurological Testing     Sensation   Cervical/Thoracic   Left   Intact: light touch    Right   Intact: light touch    Comments   Left light touch: C1-T1  Right light touch: C1-T1       Reflexes   Left   Biceps (C5/C6): normal (2+)  Brachioradialis (C6): normal (2+)  Triceps (C7): normal (2+)    Right   Biceps (C5/C6): normal (2+)  Brachioradialis (C6): normal (2+)  Triceps (C7): normal (2+)    Active Range of Motion   Cervical/Thoracic Spine       Cervical  Subcranial protraction:  WFL   Subcranial retraction:   Restriction level: minimal  Flexion:  Restriction level: minimal  Extension:  Restriction level: moderate  Left lateral flexion:  Restriction level: moderate  Right lateral flexion:  Restriction level maximal  Left rotation:  Restriction level: minimal  Right rotation:  Restriction level: minimal    Left Elbow   Flexion: WFL  Extension: WFL  Forearm supination: 70 degrees   Forearm pronation: WFL    Right Elbow   Flexion: WFL  Extension: WFL  Forearm supination: 65 degrees   Forearm pronation: WFL    Left Wrist   Wrist flexion: WFL  Wrist extension: WFL    Right Wrist   Wrist flexion: WFL  Wrist extension: [facility]    Joint Play     Hypomobile: C2, C3, C4, C5, C6, C7 and T1   Mechanical Assessment    Cervical    Seated retraction: repeated movements   Pain location: no change  Seated Extension:   Pain level: produced    Thoracic      Lumbar      Strength/Myotome Testing Cervical Spine     Left   Interossei strength (t1): 4+    Right   Interossei strength (t1): 4    Left Shoulder     Planes of Motion   Abduction: 4+   External rotation at 0°: 4+   Internal rotation at 0°: 4+     Right Shoulder     Planes of Motion   Abduction: 4   External rotation at 0°: 4-   Internal rotation at 0°: 4     Left Elbow   Flexion: 4+  Extension: 4+  Forearm supination: 4+  Forearm pronation: 4+    Right Elbow   Flexion: 4+  Extension: 4+  Forearm supination: 4  Forearm pronation: 4+    Left Wrist/Hand   Wrist extension: 4+  Wrist flexion: 4+  Thumb extension: 4+     (2nd hand position)     Trial 1: 52    Trial 2: 50    Trial 3: 42    Right Wrist/Hand   Wrist extension: 4+  Wrist flexion: 4+  Thumb extension: 4+     (2nd hand position)     Trial 1: 50    Trial 2: 50    Trial 3: 40    Additional Strength Details  : R: 55 lbs, L: 65 lbs  Pt is R-handed    Tests   Cervical   Positive vertical compression and cervical distraction test     Right   Positive Spurling's Test A  Right Shoulder   Positive ULTT1  Negative ULTT4  Right Elbow   Negative Tinel's sign (cubital tunnel) (cubital tunnel, guyon's canal), valgus stress at 0 degrees, valgus stress at 30 degrees, varus stress at 0 degrees and varus stress at 30 degrees  Lumbar   Positive vertical compression   Precautions:  PMHx: PMHx: prostate CA, osteoporosis, aortic valve replacement, R TKA, HTN, DM II, CAD, falls, R humeral fx 8/25/21  Dx: lower R cervical radiculopathy with an opening preference    Manuals 1/3/23                                                   Mechanical traction 25#  30 deg  2x5 min            Neuro Re-Ed             Postural correction and awareness training             Standing chin tucks 1x15            C/s rotation at mirror 1x15            C/s SB at mirror 1x15            scap retraction 5"x10            TB rows Blue  3x20            B TB ER G/  3x20            Supine c/s retraction: symptom-free 1x10            DNF 5"x6                         Ther Ex                                                                                                                     Ther Activity                                       Gait Training                                       Modalities

## 2023-01-04 ENCOUNTER — RA CDI HCC (OUTPATIENT)
Dept: OTHER | Facility: HOSPITAL | Age: 78
End: 2023-01-04

## 2023-01-04 NOTE — PROGRESS NOTES
[facility] [place] [facility]  coding opportunities       Chart reviewed, no opportunity found: CHART REVIEWED, NO OPPORTUNITY FOUND        Patients Insurance     Medicare Insurance: [employer] [facility] Advantage

## 2023-01-06 ENCOUNTER — OFFICE VISIT (OUTPATIENT)
Dept: PHYSICAL THERAPY | Facility: CLINIC | Age: 78
End: 2023-01-06

## 2023-01-06 DIAGNOSIS — G56.21 CUBITAL TUNNEL SYNDROME ON RIGHT: ICD-10-CM

## 2023-01-06 DIAGNOSIS — M54.12 CERVICAL RADICULOPATHY: Primary | ICD-10-CM

## 2023-01-09 ENCOUNTER — OFFICE VISIT (OUTPATIENT)
Dept: PHYSICAL THERAPY | Facility: CLINIC | Age: 78
End: 2023-01-09

## 2023-01-09 ENCOUNTER — OFFICE VISIT (OUTPATIENT)
Dept: FAMILY MEDICINE CLINIC | Facility: CLINIC | Age: 78
End: 2023-01-09

## 2023-01-09 VITALS
SYSTOLIC BLOOD PRESSURE: 110 MMHG | HEIGHT: 68 IN | OXYGEN SATURATION: 96 % | TEMPERATURE: 97.6 F | WEIGHT: 242.4 LBS | HEART RATE: 90 BPM | DIASTOLIC BLOOD PRESSURE: 68 MMHG | BODY MASS INDEX: 36.74 KG/M2

## 2023-01-09 DIAGNOSIS — I10 BENIGN ESSENTIAL HTN: ICD-10-CM

## 2023-01-09 DIAGNOSIS — G56.21 CUBITAL TUNNEL SYNDROME ON RIGHT: ICD-10-CM

## 2023-01-09 DIAGNOSIS — R09.82 POST-NASAL DRIP: ICD-10-CM

## 2023-01-09 DIAGNOSIS — M54.12 CERVICAL RADICULOPATHY: Primary | ICD-10-CM

## 2023-01-09 DIAGNOSIS — G56.21 CUBITAL TUNNEL SYNDROME ON RIGHT: Primary | ICD-10-CM

## 2023-01-09 RX ORDER — GUAIFENESIN 600 MG/1
1200 TABLET, EXTENDED RELEASE ORAL EVERY 12 HOURS SCHEDULED
Qty: 56 TABLET | Refills: 0 | Status: SHIPPED | OUTPATIENT
Start: 2023-01-09 | End: 2023-01-23

## 2023-01-09 NOTE — PROGRESS NOTES
Daily Note     Today's date: 2023  Patient name: Viola Chacon  : [id number]  MRN: [id number]  Referring provider: Maida Gómez MD  Dx:   Encounter Diagnosis     ICD-10-CM    1  Cervical radiculopathy  M54 12       2  Cubital tunnel syndrome on right  G56 21                      Subjective: Pt reports decreasing frequency and intensity of RUE paraesthesias since trial of c/s retraction last visit  Objective: See treatment diary below  D/c'ed c/s retraction, updated HEP    Assessment: Pt demonstrated good understanding of updated HEP and is appropriate for d/c from skilled PT services at this time  Plan: D/C to HEP  Precautions:  PMHx: PMHx: prostate CA, osteoporosis, aortic valve replacement, R TKA, HTN, DM II, CAD, falls, R humeral fx 21  Dx: lower R cervical radiculopathy with an opening preference    Manuals 1/3/23 1/6 1/9                                                 Mechanical traction 25#  30 deg  2x5 min 25#  30 deg  2x5 min 25#  30 deg  2x5 min          Neuro Re-Ed             Postural correction and awareness training             Standing chin tucks 1x15 1x10 1x15          C/s rotation at mirror 1x15 1x15 1x15          C/s SB at mirror 1x15 1x15 1x15          scap retraction 5"x10 5"x15 5"x15          TB rows Blue  3x20 Black  3x10 Black  3x15          B TB ER G/  3x20 G/  3x20 G/  3x20          Standing c/s retraction: symptom-free 1x10 1x6* D/c          DNF 5"x6 5"x10 5"x10                       Ther Ex                                                                                                                     Ther Activity                                       Gait Training                                       Modalities

## 2023-01-09 NOTE — PROGRESS NOTES
Name: Bar Rogers      :       MRN: [id number]  Encounter Provider: Regino Riley MD  Encounter Date: 2023   Encounter department: [address] [place] [address]     1  Cubital tunnel syndrome on right    2  Benign essential HTN    3  Post-nasal drip      Throat discomfort and cough secondary to postnasal drip, will start patient on Mucinex for 2 weeks  Pressure goal today, 110/68  Cubital tunnel syndrome the right completely resolved       Subjective      HPI     56-year-old male patient presents for follow-up regarding his cubital tunnel syndrome on the right  Patient has completed physical therapy steadily since our last evaluation  Patient reports complete resolution of his symptoms and is very happy with the results  He does have osteopenia based on most recent DEXA scan result  Patient does take vitamin D and calcium supplement  Last vitamin D completed on 2022 shows normal range  Patient's only concern today is with mild upper respiratory symptoms with congestion, postnasal drip  Travelling to [facility], will start going to the gym  Review of Systems   Constitutional: Negative for appetite change, chills and fever  HENT: Positive for congestion and rhinorrhea  Negative for sore throat (tickle)  Respiratory: Positive for cough  Negative for shortness of breath  Cardiovascular: Negative for chest pain  Gastrointestinal: Negative for abdominal pain  Musculoskeletal: Negative for arthralgias, gait problem and joint swelling  Neurological: Negative for headaches  Psychiatric/Behavioral: Negative for sleep disturbance         Current Outpatient Medications on File Prior to Visit   Medication Sig   • alendronate (FOSAMAX) 70 mg tablet TAKE 1 TABLET ONCE A WEEK ON EMPTY STOMACH WITH A FULL GLASS OF WATER, DO NOT LIE DOWN FOR 30 MINUTES AFTER TAKING   • aspirin 81 MG tablet Take by mouth   • atorvastatin (LIPITOR) 40 mg tablet TAKE 1 TABLET BY MOUTH EVERY DAY   • Blood Glucose Calibration (OT ULTRA/FASTTK CNTRL SOLN) SOLN by In Vitro route as needed (to check meter calibration)   • Blood Glucose Monitoring Suppl (ONE TOUCH ULTRA 2) w/Device KIT by Does not apply route daily DX E11 65  Pt to   Check  fbs  daily   • Cholecalciferol (VITAMIN D3) 2000 units capsule Take 2,000 Units by mouth daily   • Coenzyme Q10 (COQ-10) 100 MG CAPS Take 100 mg by mouth  • Continuous Blood Gluc Sensor (FreeStyle Denny 2 Sensor) MISC Use 4 times a day   • Dulaglutide (Trulicity) 3 VU/9 8ZM SOPN Inject 3 mg total once a week   • fluorouracil (EFUDEX) 5 % cream APPLY TWICE A DAY TO SCALP FOR 1 MONTH   • glipiZIDE (GLUCOTROL XL) 5 mg 24 hr tablet TAKE 1 TABLET BY MOUTH EVERY DAY   • glucose blood (FREESTYLE LITE) test strip Test 3 times a day  • leuprolide (LUPRON DEPOT) 3 75 mg injection Inject into a muscle Once yearly    • lisinopril (ZESTRIL) 2 5 mg tablet Take 1 tablet (2 5 mg total) by mouth daily   • metFORMIN (GLUCOPHAGE-XR) 500 mg 24 hr tablet TAKE 1 TABLET WITH BREAKFAST AND DINNER DAILY   • Multiple Vitamin-Folic Acid TABS Take by mouth   • Omega-3 Fatty Acids (FISH OIL) 1,000 mg Take 1,000 mg by mouth daily  • polyethylene glycol (MIRALAX) 17 g packet Take 17 g by mouth daily as needed (constipation)   • senna-docusate sodium (SENOKOT S) 8 6-50 mg per tablet Take 1 tablet by mouth 2 (two) times a day   • sildenafil (VIAGRA) 50 MG tablet Take 1 tablet (50 mg total) by mouth daily as needed for erectile dysfunction       Objective     /68 (BP Location: Left arm, Patient Position: Sitting, Cuff Size: Large)   Pulse 90   Temp 97 6 °F (36 4 °C)   Ht 5' 8" (1 727 m)   Wt 110 kg (242 lb 6 4 oz)   SpO2 96%   BMI 36 86 kg/m²     Physical Exam  Vitals reviewed  Constitutional:       General: He is not in acute distress  Appearance: Normal appearance  He is obese  He is not ill-appearing, toxic-appearing or diaphoretic     Cardiovascular:      Rate and Rhythm: Normal rate and regular rhythm  Pulses: Normal pulses  Heart sounds: Normal heart sounds  No murmur heard  Pulmonary:      Effort: Pulmonary effort is normal  No respiratory distress  Breath sounds: Normal breath sounds  Abdominal:      General: Abdomen is flat  Musculoskeletal:         General: No swelling  Normal range of motion  Skin:     General: Skin is warm and dry  Capillary Refill: Capillary refill takes less than 2 seconds  Coloration: Skin is not jaundiced  Comments: Multiple seborrheic keratosis as well as sun related skin damage noted   Neurological:      General: No focal deficit present  Mental Status: He is alert and oriented to person, place, and time     Psychiatric:         Mood and Affect: Mood normal               Jacqulyne Brittle, MD

## 2023-01-11 DIAGNOSIS — E11.65 TYPE 2 DIABETES MELLITUS WITH HYPERGLYCEMIA, WITHOUT LONG-TERM CURRENT USE OF INSULIN (HCC): Primary | ICD-10-CM

## 2023-01-11 RX ORDER — FLASH GLUCOSE SCANNING READER
EACH MISCELLANEOUS
COMMUNITY
End: 2023-01-11 | Stop reason: SDUPTHER

## 2023-01-11 RX ORDER — FLASH GLUCOSE SCANNING READER
EACH MISCELLANEOUS
Qty: 1 EACH | Refills: 0 | Status: SHIPPED | OUTPATIENT
Start: 2023-01-11

## 2023-01-11 NOTE — TELEPHONE ENCOUNTER
Patient stopped by office and he said Venice Hernandez 2 in his cell phone and it keeps alarming and he wants to know is there another way to check his sugar?   Please call patient  Thank you

## 2023-01-12 ENCOUNTER — APPOINTMENT (OUTPATIENT)
Dept: PHYSICAL THERAPY | Facility: CLINIC | Age: 78
End: 2023-01-12

## 2023-01-26 DIAGNOSIS — E11.65 TYPE 2 DIABETES MELLITUS WITH HYPERGLYCEMIA, WITHOUT LONG-TERM CURRENT USE OF INSULIN (HCC): ICD-10-CM

## 2023-01-26 DIAGNOSIS — E66.9 OBESITY (BMI 35.0-39.9 WITHOUT COMORBIDITY): ICD-10-CM

## 2023-01-26 RX ORDER — DULAGLUTIDE 3 MG/.5ML
INJECTION, SOLUTION SUBCUTANEOUS
Qty: 6 ML | Refills: 1 | Status: SHIPPED | OUTPATIENT
Start: 2023-01-26 | End: 2023-01-26

## 2023-01-26 RX ORDER — DULAGLUTIDE 3 MG/.5ML
INJECTION, SOLUTION SUBCUTANEOUS
Qty: 6 ML | Refills: 1 | Status: SHIPPED | OUTPATIENT
Start: 2023-01-26 | End: 2023-02-06

## 2023-01-26 NOTE — TELEPHONE ENCOUNTER
Pt called requested trulicity be sent to [email or web address]      Also pt states rosa maria reader needs PA

## 2023-02-01 ENCOUNTER — TELEPHONE (OUTPATIENT)
Dept: ENDOCRINOLOGY | Facility: CLINIC | Age: 78
End: 2023-02-01

## 2023-02-01 NOTE — TELEPHONE ENCOUNTER
Did [address] again for pts rosa maria reader, will not get approved pt is not on insulin

## 2023-02-02 DIAGNOSIS — E11.65 TYPE 2 DIABETES MELLITUS WITH HYPERGLYCEMIA, WITHOUT LONG-TERM CURRENT USE OF INSULIN (HCC): ICD-10-CM

## 2023-02-02 RX ORDER — METFORMIN HYDROCHLORIDE 500 MG/1
TABLET, EXTENDED RELEASE ORAL
Qty: 180 TABLET | Refills: 1 | Status: SHIPPED | OUTPATIENT
Start: 2023-02-02

## 2023-02-03 DIAGNOSIS — E11.65 TYPE 2 DIABETES MELLITUS WITH HYPERGLYCEMIA, WITHOUT LONG-TERM CURRENT USE OF INSULIN (HCC): ICD-10-CM

## 2023-02-03 DIAGNOSIS — E66.9 OBESITY (BMI 35.0-39.9 WITHOUT COMORBIDITY): ICD-10-CM

## 2023-02-06 RX ORDER — DULAGLUTIDE 3 MG/.5ML
INJECTION, SOLUTION SUBCUTANEOUS
Qty: 6 ML | Refills: 1 | Status: SHIPPED | OUTPATIENT
Start: 2023-02-06 | End: 2023-02-08

## 2023-02-08 DIAGNOSIS — E11.65 TYPE 2 DIABETES MELLITUS WITH HYPERGLYCEMIA, WITHOUT LONG-TERM CURRENT USE OF INSULIN (HCC): ICD-10-CM

## 2023-02-08 RX ORDER — FLASH GLUCOSE SCANNING READER
EACH MISCELLANEOUS
Qty: 1 EACH | Refills: 0 | Status: SHIPPED | OUTPATIENT
Start: 2023-02-08

## 2023-02-08 NOTE — TELEPHONE ENCOUNTER
Pt called and can not get trulicity at all he wants to know if we can prescribe something else? Denny reader was approved, fax is in chart

## 2023-02-17 DIAGNOSIS — E11.65 TYPE 2 DIABETES MELLITUS WITH HYPERGLYCEMIA, WITHOUT LONG-TERM CURRENT USE OF INSULIN (HCC): ICD-10-CM

## 2023-02-17 RX ORDER — GLIPIZIDE 5 MG/1
TABLET, FILM COATED, EXTENDED RELEASE ORAL
Qty: 90 TABLET | Refills: 0 | Status: SHIPPED | OUTPATIENT
Start: 2023-02-17

## 2023-03-03 ENCOUNTER — RA CDI HCC (OUTPATIENT)
Dept: OTHER | Facility: HOSPITAL | Age: 78
End: 2023-03-03

## 2023-03-03 NOTE — PROGRESS NOTES
[facility]  coding opportunities       Chart Reviewed number of suggestions sent to Provider: 1     Patients Insurance     Medicare Insurance: [employer] Advantage          E11 22: Refer to 9/14/2022 note - please review and assess if applicable   "Presents in the office for follow up chronic conditions  Patient has non-insulin diabetes mellitus type 2 with chronic kidney disease stage 2  His current regimen includes Trulicity injected weekly   "

## 2023-03-09 DIAGNOSIS — E11.65 TYPE 2 DIABETES MELLITUS WITH HYPERGLYCEMIA, WITHOUT LONG-TERM CURRENT USE OF INSULIN (HCC): ICD-10-CM

## 2023-03-09 RX ORDER — LISINOPRIL 2.5 MG/1
2.5 TABLET ORAL DAILY
Qty: 90 TABLET | Refills: 0 | Status: SHIPPED | OUTPATIENT
Start: 2023-03-09

## 2023-03-20 DIAGNOSIS — E11.65 UNCONTROLLED TYPE 2 DIABETES MELLITUS WITH HYPERGLYCEMIA (HCC): ICD-10-CM

## 2023-03-22 ENCOUNTER — APPOINTMENT (OUTPATIENT)
Dept: LAB | Facility: CLINIC | Age: 78
End: 2023-03-22

## 2023-03-22 DIAGNOSIS — I10 BENIGN ESSENTIAL HTN: ICD-10-CM

## 2023-03-22 DIAGNOSIS — M81.0 OSTEOPOROSIS WITHOUT CURRENT PATHOLOGICAL FRACTURE, UNSPECIFIED OSTEOPOROSIS TYPE: ICD-10-CM

## 2023-03-22 DIAGNOSIS — E78.2 MIXED HYPERLIPIDEMIA: ICD-10-CM

## 2023-03-22 DIAGNOSIS — E11.65 TYPE 2 DIABETES MELLITUS WITH HYPERGLYCEMIA, WITHOUT LONG-TERM CURRENT USE OF INSULIN (HCC): ICD-10-CM

## 2023-03-22 LAB
25(OH)D3 SERPL-MCNC: 52.1 NG/ML (ref 30–100)
ALBUMIN SERPL BCP-MCNC: 4.1 G/DL (ref 3.5–5)
ALP SERPL-CCNC: 58 U/L (ref 34–104)
ALT SERPL W P-5'-P-CCNC: 51 U/L (ref 7–52)
ANION GAP SERPL CALCULATED.3IONS-SCNC: 9 MMOL/L (ref 4–13)
AST SERPL W P-5'-P-CCNC: 46 U/L (ref 13–39)
BILIRUB SERPL-MCNC: 0.73 MG/DL (ref 0.2–1)
BUN SERPL-MCNC: 22 MG/DL (ref 5–25)
CALCIUM SERPL-MCNC: 9.7 MG/DL (ref 8.4–10.2)
CHLORIDE SERPL-SCNC: 105 MMOL/L (ref 96–108)
CHOLEST SERPL-MCNC: 127 MG/DL
CO2 SERPL-SCNC: 27 MMOL/L (ref 21–32)
CREAT SERPL-MCNC: 1.02 MG/DL (ref 0.6–1.3)
CREAT UR-MCNC: 120 MG/DL
EST. AVERAGE GLUCOSE BLD GHB EST-MCNC: 169 MG/DL
GFR SERPL CREATININE-BSD FRML MDRD: 71 ML/MIN/1.73SQ M
GLUCOSE P FAST SERPL-MCNC: 138 MG/DL (ref 65–99)
HBA1C MFR BLD: 7.5 %
HDLC SERPL-MCNC: 41 MG/DL
LDLC SERPL CALC-MCNC: 60 MG/DL (ref 0–100)
MICROALBUMIN UR-MCNC: 30.2 MG/L (ref 0–20)
MICROALBUMIN/CREAT 24H UR: 25 MG/G CREATININE (ref 0–30)
NONHDLC SERPL-MCNC: 86 MG/DL
POTASSIUM SERPL-SCNC: 4.4 MMOL/L (ref 3.5–5.3)
PROT SERPL-MCNC: 7.5 G/DL (ref 6.4–8.4)
SODIUM SERPL-SCNC: 141 MMOL/L (ref 135–147)
TRIGL SERPL-MCNC: 132 MG/DL

## 2023-03-24 ENCOUNTER — OFFICE VISIT (OUTPATIENT)
Dept: FAMILY MEDICINE CLINIC | Facility: CLINIC | Age: 78
End: 2023-03-24

## 2023-03-24 VITALS
HEIGHT: 68 IN | TEMPERATURE: 98 F | DIASTOLIC BLOOD PRESSURE: 78 MMHG | BODY MASS INDEX: 37.56 KG/M2 | OXYGEN SATURATION: 95 % | HEART RATE: 86 BPM | WEIGHT: 247.8 LBS | SYSTOLIC BLOOD PRESSURE: 118 MMHG

## 2023-03-24 DIAGNOSIS — E11.65 TYPE 2 DIABETES MELLITUS WITH HYPERGLYCEMIA, WITHOUT LONG-TERM CURRENT USE OF INSULIN (HCC): Primary | ICD-10-CM

## 2023-03-24 DIAGNOSIS — N52.1 ERECTILE DYSFUNCTION DUE TO DISEASES CLASSIFIED ELSEWHERE: ICD-10-CM

## 2023-03-24 DIAGNOSIS — E78.2 MIXED HYPERLIPIDEMIA: ICD-10-CM

## 2023-03-24 DIAGNOSIS — C61 PROSTATE CANCER (HCC): ICD-10-CM

## 2023-03-24 DIAGNOSIS — E66.01 CLASS 2 SEVERE OBESITY DUE TO EXCESS CALORIES WITH SERIOUS COMORBIDITY AND BODY MASS INDEX (BMI) OF 36.0 TO 36.9 IN ADULT (HCC): ICD-10-CM

## 2023-03-24 DIAGNOSIS — I25.10 ARTERIOSCLEROTIC CORONARY ARTERY DISEASE: ICD-10-CM

## 2023-03-24 DIAGNOSIS — I10 BENIGN ESSENTIAL HTN: ICD-10-CM

## 2023-03-24 DIAGNOSIS — M81.0 OSTEOPOROSIS WITHOUT CURRENT PATHOLOGICAL FRACTURE, UNSPECIFIED OSTEOPOROSIS TYPE: ICD-10-CM

## 2023-03-24 DIAGNOSIS — Z95.2 HISTORY OF AORTIC VALVE REPLACEMENT: ICD-10-CM

## 2023-03-24 RX ORDER — SILDENAFIL 50 MG/1
50 TABLET, FILM COATED ORAL DAILY PRN
Qty: 5 TABLET | Refills: 5 | Status: SHIPPED | OUTPATIENT
Start: 2023-03-24

## 2023-03-24 NOTE — PROGRESS NOTES
Diabetic Foot Exam    Patient's shoes and socks removed  Right Foot/Ankle   Right Foot Inspection  Skin Exam: skin intact, dry skin, callus and callus  Sensory   Vibration: intact  Monofilament testing: intact    Vascular  The right DP pulse is 1+  Left Foot/Ankle  Left Foot Inspection  Skin Exam: skin intact, dry skin and callus  Sensory   Vibration: intact  Monofilament testing: intact    Vascular  The left DP pulse is 1+  Assign Risk Category  No deformity present  No loss of protective sensation  Weak pulses  Risk: 1  Assessment/Plan:     Diagnoses and all orders for this visit:    Type 2 diabetes mellitus with hyperglycemia, without long-term current use of insulin (HCC)  Comments:  Increase Ozempic to 0 5 mg weekly  Patient will be seeing endocrinology on Monday  Continue metformin and per side  Check blood sugar daily  Orders:  -     Hemoglobin A1C; Future    Benign essential HTN  Comments:  Blood pressure is at goal continue lisinopril 2 5 mg  Orders:  -     Comprehensive metabolic panel; Future    Arteriosclerotic coronary artery disease  Comments:  Has no angina or signs of congestive heart failure  Need to see a cardiologist  Orders:  -     Ambulatory Referral to Cardiology; Future    Mixed hyperlipidemia  Comments:  Continue statin therapy and low-fat diet lipids are at goal  Orders:  -     Lipid Panel with Direct LDL reflex; Future    Erectile dysfunction due to diseases classified elsewhere  -     sildenafil (VIAGRA) 50 MG tablet; Take 1 tablet (50 mg total) by mouth daily as needed for erectile dysfunction    Class 2 severe obesity due to excess calories with serious comorbidity and body mass index (BMI) of 36 0 to 36 9 in adult [facility])  Comments:  Weight loss encouraged    Prostate cancer [facility])  Comments:  Patient needs to see urology  Orders:  -     Ambulatory Referral to Urology;  Future  -     CBC and differential; Future    Osteoporosis without current pathological fracture, unspecified osteoporosis type  Comments:  Continue calcium vitamin D supplements along with Fosamax  The scan is up-to-date    History of aortic valve replacement  -     Ambulatory Referral to Cardiology; Future    Erectile dysfunction due to diseases classified elsewhere  Comments: Will try Viagra 50 mg a day  Orders:  -     sildenafil (VIAGRA) 50 MG tablet; Take 1 tablet (50 mg total) by mouth daily as needed for erectile dysfunction          Subjective:      Patient ID: Norma Benson is a 69 y o  male  Patient presents in the office for follow-up chronic conditions  Patient has not insulin diabetes mellitus type 2  He does follow with endocrinology  Currently on glipizide ER 5 in the morning  He also takes metformin  twice daily  She is currently on Ozempic  States since he has been on Ozempic his sugars are not controlled  Was on Trulicity however there is a backorder of this medication  And is currently taking Ozempic 0 25 mg  He did not increase to the 0 5 mg   Going to see his endocrinologist on Monday  Patient has a history of CAD and aortic valve replacement  He is on a baby aspirin and lisinopril 2 5 for blood pressure control and renal protection  Needs to see new cardiologist   For lipid control he is on atorvastatin 40 mg  History of prostate cancer  He took Lupron in the past   He is due for a urology eval   Patient has osteoporosis he is on calcium and vitamin D supplements  He also takes Fosamax weekly  His labs show hemoglobin A1c at 7 5  MD is good  Lipid panel is at goal   Blood sugar elevated at 138    TG slightly elevated and ALT is normal       The following portions of the patient's history were reviewed and updated as appropriate:   He   Patient Active Problem List    Diagnosis Date Noted   • Post-nasal drip 01/09/2023   • Cubital tunnel syndrome on right 11/09/2022   • Humerus fracture 08/26/2021   • Vitamin D deficiency 07/18/2019   • Erectile dysfunction due to diseases classified elsewhere 04/16/2019   • History of aortic valve replacement 11/30/2017   • Arteriosclerotic coronary artery disease 11/30/2017   • Class 2 severe obesity with serious comorbidity and body mass index (BMI) of 36 0 to 36 9 in adult [facility]) 11/30/2017   • Osteoporosis 11/30/2017   • Type 2 diabetes mellitus with hyperglycemia, without long-term current use of insulin ([facility] [facility] ) 06/20/2017   • YATES (nonalcoholic steatohepatitis) 09/14/2015   • [facility] ) 06/18/2013   • Hyperlipidemia 07/17/2012   • Osteoarthritis 07/17/2012   • Benign essential HTN 04/30/2012     Current Outpatient Medications   Medication Sig Dispense Refill   • alendronate (FOSAMAX) 70 mg tablet TAKE 1 TABLET ONCE A WEEK ON EMPTY STOMACH WITH A FULL GLASS OF WATER, DO NOT LIE DOWN FOR 30 MINUTES AFTER TAKING 12 tablet 2   • aspirin 81 MG tablet Take by mouth     • atorvastatin (LIPITOR) 40 mg tablet TAKE 1 TABLET BY MOUTH EVERY DAY 90 tablet 1   • Blood Glucose Calibration (OT ULTRA/FASTTK CNTRL SOLN) SOLN by In Vitro route as needed (to check meter calibration) 1 each 3   • Blood Glucose Monitoring Suppl (ONE TOUCH ULTRA 2) w/Device KIT by Does not apply route daily DX E11 65  Pt to   Check  fbs  daily 1 each 0   • Cholecalciferol (VITAMIN D3) 2000 units capsule Take 2,000 Units by mouth daily  11   • Coenzyme Q10 (COQ-10) 100 MG CAPS Take 100 mg by mouth  • Continuous Blood Gluc  (FreeStyle Reddy 2 [place]) ADELAIDA Use 4 times a day 1 each 0   • Continuous Blood Gluc Sensor (FreeStyle Denny 2 Sensor) MISC Use 4 times a day 6 each 1   • glipiZIDE (GLUCOTROL XL) 5 mg 24 hr tablet TAKE 1 TABLET BY MOUTH EVERY DAY 90 tablet 0   • glucose blood (FREESTYLE LITE) test strip Test 3 times a day   300 each 1   • lisinopril (ZESTRIL) 2 5 mg tablet Take 1 tablet (2 5 mg total) by mouth daily 90 tablet 0   • metFORMIN (GLUCOPHAGE-XR) 500 mg 24 hr tablet TAKE 1 TABLET WITH BREAKFAST AND DINNER DAILY 180 tablet 1   • Multiple Vitamin-Folic Acid TABS Take by mouth     • Omega-3 Fatty Acids (FISH OIL) 1,000 mg Take 1,000 mg by mouth daily  • polyethylene glycol (MIRALAX) 17 g packet Take 17 g by mouth daily as needed (constipation)  0   • semaglutide, 0 25 or 0 5 mg/dose, (Ozempic) 2 mg/1 5 mL injection pen Inject 0 25 mg once a week for 4 weeks and then increase to 0 5 mg weekly 6 mL 1   • senna-docusate sodium (SENOKOT S) 8 6-50 mg per tablet Take 1 tablet by mouth 2 (two) times a day  0   • sildenafil (VIAGRA) 50 MG tablet Take 1 tablet (50 mg total) by mouth daily as needed for erectile dysfunction 5 tablet 5   • fluorouracil (EFUDEX) 5 % cream APPLY TWICE A DAY TO SCALP FOR 1 MONTH (Patient not taking: Reported on 3/24/2023) 40 g 0   • leuprolide (LUPRON DEPOT) 3 75 mg injection Inject into a muscle Once yearly  (Patient not taking: Reported on 3/24/2023)       No current facility-administered medications for this visit  He has No Known Allergies       Review of Systems   Constitutional: Negative for activity change, appetite change, chills, fatigue and fever  HENT: Negative for ear pain and sore throat  Eyes: Negative for visual disturbance  Respiratory: Negative for cough and shortness of breath  Cardiovascular: Negative for chest pain, palpitations and leg swelling  Gastrointestinal: Negative for abdominal pain, blood in stool, constipation, diarrhea and nausea  Genitourinary: Negative for difficulty urinating  Musculoskeletal: Negative for arthralgias, back pain and myalgias  Skin: Negative for rash  Neurological: Negative for dizziness, syncope and headaches  Psychiatric/Behavioral: Negative for sleep disturbance  Objective:        Physical Exam  Vitals and nursing note reviewed  Constitutional:       Appearance: He is well-developed  He is obese  HENT:      Head: Normocephalic and atraumatic        Right Ear: Tympanic membrane, ear canal and external ear normal       Left Ear: Tympanic membrane, ear canal and external ear normal    Eyes:      Conjunctiva/sclera: Conjunctivae normal       Pupils: Pupils are equal, round, and reactive to light  Neck:      Thyroid: No thyromegaly  Vascular: No carotid bruit  Cardiovascular:      Rate and Rhythm: Normal rate  Pulses: Pulses are weak  Dorsalis pedis pulses are 1+ on the right side and 1+ on the left side  Heart sounds: Normal heart sounds  No murmur heard  Comments: AVR  Pulmonary:      Effort: Pulmonary effort is normal       Breath sounds: Normal breath sounds  No wheezing  Abdominal:      General: Abdomen is protuberant  Bowel sounds are normal       Palpations: Abdomen is soft  There is no mass  Tenderness: There is no abdominal tenderness  Musculoskeletal:      Right lower leg: Edema present  Left lower leg: Edema present  Feet:      Right foot:      Skin integrity: Callus and dry skin present  Left foot:      Skin integrity: Callus and dry skin present  Lymphadenopathy:      Cervical: No cervical adenopathy  Skin:     General: Skin is warm and dry  Neurological:      General: No focal deficit present  Mental Status: He is alert and oriented to person, place, and time  Psychiatric:         Mood and Affect: Mood normal          Behavior: Behavior normal          Thought Content:  Thought content normal          Judgment: Judgment normal

## 2023-03-27 ENCOUNTER — OFFICE VISIT (OUTPATIENT)
Dept: ENDOCRINOLOGY | Facility: CLINIC | Age: 78
End: 2023-03-27

## 2023-03-27 VITALS
BODY MASS INDEX: 37.56 KG/M2 | SYSTOLIC BLOOD PRESSURE: 124 MMHG | DIASTOLIC BLOOD PRESSURE: 70 MMHG | WEIGHT: 247 LBS | HEART RATE: 51 BPM

## 2023-03-27 DIAGNOSIS — K75.81 NASH (NONALCOHOLIC STEATOHEPATITIS): ICD-10-CM

## 2023-03-27 DIAGNOSIS — E78.2 MIXED HYPERLIPIDEMIA: ICD-10-CM

## 2023-03-27 DIAGNOSIS — E55.9 VITAMIN D DEFICIENCY: ICD-10-CM

## 2023-03-27 DIAGNOSIS — E66.9 OBESITY (BMI 35.0-39.9 WITHOUT COMORBIDITY): ICD-10-CM

## 2023-03-27 DIAGNOSIS — E11.65 UNCONTROLLED TYPE 2 DIABETES MELLITUS WITH HYPERGLYCEMIA (HCC): Primary | ICD-10-CM

## 2023-03-27 DIAGNOSIS — I10 BENIGN ESSENTIAL HTN: ICD-10-CM

## 2023-03-27 DIAGNOSIS — M81.0 OSTEOPOROSIS WITHOUT CURRENT PATHOLOGICAL FRACTURE, UNSPECIFIED OSTEOPOROSIS TYPE: ICD-10-CM

## 2023-03-27 NOTE — PROGRESS NOTES
Dinorah Burger 69 y o  male MRN: [id number]    Encounter: [id number]      Assessment/Plan     Assessment: This is a 69y o -year-old male with diabetes with hyperglycemia  Plan:    Diagnoses and all orders for this visit:    Uncontrolled type 2 diabetes mellitus with hyperglycemia ([facility] [address] )  Lab Results   Component Value Date    HGBA1C 7 5 (H) 03/22/2023   A1c 7 5%  Improved  Discussed to increase the dose of Ozempic to 0 5 mg once a week, he is tolerating this dose for 4 weeks we can increase the dose to 1 mg once a week  Continue glipizide, metformin  Discussed to check blood sugars regularly by using continuous glucose monitor sensor, continue to use continuous glucose monitor sensor as patient is benefiting from it  Discussed hypoglycemia symptoms and treatment    -     Basic metabolic panel; Future  -     Hemoglobin A1C; Future  -     Microalbumin / creatinine urine ratio; Future    Obesity (BMI 35 0-39 9 without comorbidity)  Increase Ozempic to 0 5 mg once a week  Educated about lifestyle modifications and importance of weight loss  Mixed hyperlipidemia  Lab Results   Component Value Date    LDLCALC 60 03/22/2023   Continue statins  Benign essential HTN  Blood pressure well controlled, continue current management  Osteoporosis without current pathological fracture, unspecified osteoporosis type    Patient is currently managed on Fosamax 70 mg once a week  He is tolerating Fosamax  Most recent DEXA scan from December 2022, showed left total hip T score is -0 6, left femoral neck T score -1 7, right total hip T score -0 9, right femoral neck T score is -2 2, history of osteopenia  10-year risk of hip fracture is 4 1% and 10-year risk of major osteoporotic fracture is 11 8%    Discussed the importance of fall precautions    Vitamin D deficiency  Continue current dose of vitamin D supplementation  YATES (nonalcoholic steatohepatitis)  Patient is currently on glucagon like peptide therapy which should help with MILAN      CC: Diabetes    History of Present Illness     HPI:    Leandra Fisher is 31-year-old gentleman with medical history of type 2 diabetes with complications such as coronary artery disease is here for follow-up  Diabetes course has been stable  He uses continuous glucose monitor sensor by [employer] regularly  14 days overview from March 14 to March 27, 2023 reviewed  More than 90% time CGM is active  More than 72 hours of data reviewed  47% blood sugars are in target range, 0% blood sugars are low, 53% blood sugars are elevated  GMI is 7 9%, average glucose 193 mg per DL patient has elevated blood sugar especially after breakfast and after dinner  Current regimen is Ozempic 0 25 mg once a week, he was switched to Ozempic as Trulicity was not available  He is tolerating Ozempic very well     , metformin 500 mg twice a day, glipizide 5 mg daily  He admits compliance to the medications, denies side effects  Denies severe hypoglycemia requiring hospitalization    He sees ophthalmology and podiatry regularly    No history of pancreatitis  Hyperlipidemia, he takes Lipitor 40 mg daily  He also has history of osteoporosis for which he was started on Fosamax 70 mg once a week, tolerating it well  No History of fracture bones    DEXA scan from December 2022 showed lumbar spine T score -1 1, left total hip T score -0 6, left femoral neck T score is -1 7, right total hip T score -0 9, right femoral neck T score -2 2, suggestive of osteopenia, 10-year risk of hip fracture is 4 1 and 10-year risk of major osteoporotic fracture is 11 8%    Lab Results   Component Value Date    HGBA1C 7 5 (H) 03/22/2023     Lab Results   Component Value Date    CREATININE 1 02 03/22/2023       Component      Latest Ref Rng & Units 3/22/2023   Sodium      135 - 147 mmol/L 141   Potassium      3 5 - 5 3 mmol/L 4 4   Chloride      96 - 108 mmol/L 105   CO2      21 - 32 mmol/L 27   Anion Gap      4 - 13 mmol/L 9   BUN      5 - 25 mg/dL 22   Creatinine      0 60 - 1 30 mg/dL 1 02   GLUCOSE FASTING      65 - 99 mg/dL 138 (H)   Calcium      8 4 - 10 2 mg/dL 9 7   AST      13 - 39 U/L 46 (H)   ALT      7 - 52 U/L 51   Alkaline Phosphatase      34 - 104 U/L 58   Total Protein      6 4 - 8 4 g/dL 7 5   Albumin      3 5 - 5 0 g/dL 4 1   TOTAL BILIRUBIN      0 20 - 1 00 mg/dL 0 73   eGFR      ml/min/1 73sq m 71     Wt Readings from Last 3 Encounters:   03/27/23 112 kg (247 lb)   03/24/23 112 kg (247 lb 12 8 oz)   01/09/23 110 kg (242 lb 6 4 oz)     Lab Results   Component Value Date    LDLCALC 60 03/22/2023       Review of Systems   Constitutional: Negative for activity change, diaphoresis, fatigue, fever and unexpected weight change  HENT: Negative  Eyes: Negative for visual disturbance  Respiratory: Negative for cough, chest tightness and shortness of breath  Cardiovascular: Negative for chest pain, palpitations and leg swelling  Gastrointestinal: Negative for abdominal pain, blood in stool, constipation, diarrhea, nausea and vomiting  Endocrine: Negative for cold intolerance, heat intolerance, polydipsia, polyphagia and polyuria  Genitourinary: Negative for dysuria, enuresis, frequency and urgency  Musculoskeletal: Negative for arthralgias and myalgias  Skin: Negative for pallor, rash and wound  Allergic/Immunologic: Negative  Neurological: Negative for dizziness, tremors, weakness and numbness  Hematological: Negative  Psychiatric/Behavioral: Negative  The patient is not nervous/anxious  Historical Information   Past Medical History:   Diagnosis Date   • Arteriosclerotic coronary artery disease 11/30/2017   • Benign prostate hyperplasia     L A  7/17/12   R   8/30/17     • COPD (chronic obstructive pulmonary disease) (HCC)    • Deep venous embolism and thrombosis (HCC)    • Diabetes mellitus ([facility] [address] )    • Hypertension    • Impacted cerumen     unspecified laterity / L A  6/20/12      • Osteoarthritis 2012   • Prostate cancer ([facility] [address] )    • Pulmonary embolism (HCC)      Past Surgical History:   Procedure Laterality Date   • CARDIAC VALVE REPLACEMENT     • CORONARY ARTERY BYPASS GRAFT     • FRACTURE SURGERY      left knee surgery   • KNEE ARTHROSCOPY      Therapeutic   • KNEE SURGERY      replacement   • LUNG SURGERY     • LUNG SURGERY      wedge resection    • TONSILLECTOMY     • UMBILICAL HERNIA REPAIR       Social History   Social History     Substance and Sexual Activity   Alcohol Use No    Comment: social drinker per [employer]     Social History     Substance and Sexual Activity   Drug Use No     Social History     Tobacco Use   Smoking Status Former   • Types: Cigarettes   • Start date:    • Quit date: 3/23/2000   • Years since quittin 0   Smokeless Tobacco Never     Family History:   Family History   Problem Relation Age of Onset   • COPD Mother    • Heart attack Mother    • Heart attack Father        Meds/Allergies   Current Outpatient Medications   Medication Sig Dispense Refill   • alendronate (FOSAMAX) 70 mg tablet TAKE 1 TABLET ONCE A WEEK ON EMPTY STOMACH WITH A FULL GLASS OF WATER, DO NOT LIE DOWN FOR 30 MINUTES AFTER TAKING 12 tablet 2   • aspirin 81 MG tablet Take by mouth     • atorvastatin (LIPITOR) 40 mg tablet TAKE 1 TABLET BY MOUTH EVERY DAY 90 tablet 1   • Blood Glucose Calibration (OT ULTRA/FASTTK CNTRL SOLN) SOLN by In Vitro route as needed (to check meter calibration) 1 each 3   • Blood Glucose Monitoring Suppl (ONE TOUCH ULTRA 2) w/Device KIT by Does not apply route daily DX E11 65  Pt to   Check  fbs  daily 1 each 0   • Cholecalciferol (VITAMIN D3) 2000 units capsule Take 2,000 Units by mouth daily  11   • Coenzyme Q10 (COQ-10) 100 MG CAPS Take 100 mg by mouth       • Continuous Blood Gluc  (FreeStyle Reddy 2 [place]) ADELAIDA Use 4 times a day 1 each 0   • Continuous Blood Gluc Sensor (FreeStyle Denny 2 Sensor) MISC Use 4 times a day 6 each 1   • glipiZIDE (GLUCOTROL XL) 5 mg 24 hr tablet TAKE 1 TABLET BY MOUTH EVERY DAY 90 tablet 0   • glucose blood (FREESTYLE LITE) test strip Test 3 times a day  300 each 1   • metFORMIN (GLUCOPHAGE-XR) 500 mg 24 hr tablet TAKE 1 TABLET WITH BREAKFAST AND DINNER DAILY 180 tablet 1   • Multiple Vitamin-Folic Acid TABS Take by mouth     • Omega-3 Fatty Acids (FISH OIL) 1,000 mg Take 1,000 mg by mouth daily  • polyethylene glycol (MIRALAX) 17 g packet Take 17 g by mouth daily as needed (constipation)  0   • semaglutide, 0 25 or 0 5 mg/dose, (Ozempic) 2 mg/1 5 mL injection pen Inject 0 25 mg once a week for 4 weeks and then increase to 0 5 mg weekly 6 mL 1   • senna-docusate sodium (SENOKOT S) 8 6-50 mg per tablet Take 1 tablet by mouth 2 (two) times a day  0   • sildenafil (VIAGRA) 50 MG tablet Take 1 tablet (50 mg total) by mouth daily as needed for erectile dysfunction 5 tablet 5   • fluorouracil (EFUDEX) 5 % cream APPLY TWICE A DAY TO SCALP FOR 1 MONTH (Patient not taking: Reported on 3/24/2023) 40 g 0   • leuprolide (LUPRON DEPOT) 3 75 mg injection Inject into a muscle Once yearly  (Patient not taking: Reported on 3/24/2023)     • lisinopril (ZESTRIL) 2 5 mg tablet Take 1 tablet (2 5 mg total) by mouth daily (Patient not taking: Reported on 3/27/2023) 90 tablet 0     No current facility-administered medications for this visit  No Known Allergies    Objective   Vitals: Blood pressure 124/70, pulse (!) 51, weight 112 kg (247 lb)  Physical Exam  Vitals reviewed  Constitutional:       General: He is not in acute distress  Appearance: Normal appearance  He is not ill-appearing  HENT:      Head: Normocephalic and atraumatic  Nose: Nose normal    Eyes:      Extraocular Movements: Extraocular movements intact  Conjunctiva/sclera: Conjunctivae normal    Cardiovascular:      Rate and Rhythm: Normal rate and regular rhythm  Pulses: Normal pulses  Heart sounds: Normal heart sounds     Pulmonary:      Effort: No respiratory "distress  Musculoskeletal:         General: Normal range of motion  Cervical back: Normal range of motion and neck supple  Right lower leg: No edema  Left lower leg: No edema  Skin:     General: Skin is warm and dry  Findings: No rash  Neurological:      General: No focal deficit present  Mental Status: He is alert and oriented to person, place, and time  Psychiatric:         Mood and Affect: Mood normal          Behavior: Behavior normal          The history was obtained from the review of the chart, patient  Lab Results:   Lab Results   Component Value Date/Time    Hemoglobin A1C 7 5 (H) 03/22/2023 07:23 AM    Hemoglobin A1C 6 7 (H) 09/13/2022 07:48 AM    Hemoglobin A1C 6 9 (H) 05/02/2022 07:10 AM    BUN 22 03/22/2023 07:23 AM    BUN 20 09/13/2022 07:48 AM    BUN 13 05/02/2022 07:10 AM    Potassium 4 4 03/22/2023 07:23 AM    Potassium 4 8 09/13/2022 07:48 AM    Potassium 4 5 05/02/2022 07:10 AM    Chloride 105 03/22/2023 07:23 AM    Chloride 102 09/13/2022 07:48 AM    Chloride 106 05/02/2022 07:10 AM    CO2 27 03/22/2023 07:23 AM    CO2 27 09/13/2022 07:48 AM    CO2 28 05/02/2022 07:10 AM    Creatinine 1 02 03/22/2023 07:23 AM    Creatinine 1 22 09/13/2022 07:48 AM    Creatinine 1 04 05/02/2022 07:10 AM    AST 46 (H) 03/22/2023 07:23 AM    AST 50 (H) 09/13/2022 07:48 AM    ALT 51 03/22/2023 07:23 AM    ALT 53 (H) 09/13/2022 07:48 AM    Albumin 4 1 03/22/2023 07:23 AM    Albumin 4 1 09/13/2022 07:48 AM    HDL, Direct 41 03/22/2023 07:23 AM    HDL, Direct 40 09/13/2022 07:48 AM    Triglycerides 132 03/22/2023 07:23 AM    Triglycerides 124 09/13/2022 07:48 AM           Imaging Studies: I have personally reviewed pertinent reports  Portions of the record may have been created with voice recognition software  Occasional wrong word or \"sound a like\" substitutions may have occurred due to the inherent limitations of voice recognition software   Read the chart carefully and " recognize, using context, where substitutions have occurred

## 2023-03-31 ENCOUNTER — TELEPHONE (OUTPATIENT)
Dept: ENDOCRINOLOGY | Facility: CLINIC | Age: 78
End: 2023-03-31

## 2023-03-31 NOTE — TELEPHONE ENCOUNTER
pts pharmacy not able to get ozempic I asked the pt to look around at other pharmacy and we can send a script there

## 2023-04-04 ENCOUNTER — APPOINTMENT (OUTPATIENT)
Dept: LAB | Facility: CLINIC | Age: 78
End: 2023-04-04

## 2023-04-04 ENCOUNTER — OFFICE VISIT (OUTPATIENT)
Dept: UROLOGY | Facility: AMBULATORY SURGERY CENTER | Age: 78
End: 2023-04-04

## 2023-04-04 VITALS
SYSTOLIC BLOOD PRESSURE: 120 MMHG | HEART RATE: 78 BPM | OXYGEN SATURATION: 98 % | DIASTOLIC BLOOD PRESSURE: 78 MMHG | BODY MASS INDEX: 37.44 KG/M2 | WEIGHT: 247 LBS | HEIGHT: 68 IN

## 2023-04-04 DIAGNOSIS — N52.01 ERECTILE DYSFUNCTION DUE TO ARTERIAL INSUFFICIENCY: ICD-10-CM

## 2023-04-04 DIAGNOSIS — C61 PROSTATE CANCER (HCC): Primary | ICD-10-CM

## 2023-04-04 DIAGNOSIS — C61 PROSTATE CANCER (HCC): ICD-10-CM

## 2023-04-04 DIAGNOSIS — R31.1 BENIGN ESSENTIAL MICROSCOPIC HEMATURIA: ICD-10-CM

## 2023-04-04 LAB
BACTERIA UR QL AUTO: ABNORMAL /HPF
BILIRUB UR QL STRIP: NEGATIVE
CLARITY UR: CLEAR
COLOR UR: YELLOW
GLUCOSE UR STRIP-MCNC: NEGATIVE MG/DL
HGB UR QL STRIP.AUTO: NEGATIVE
KETONES UR STRIP-MCNC: NEGATIVE MG/DL
LEUKOCYTE ESTERASE UR QL STRIP: NEGATIVE
MUCOUS THREADS UR QL AUTO: ABNORMAL
NITRITE UR QL STRIP: NEGATIVE
NON-SQ EPI CELLS URNS QL MICRO: ABNORMAL /HPF
PH UR STRIP.AUTO: 5.5 [PH]
PROT UR STRIP-MCNC: ABNORMAL MG/DL
PSA SERPL-MCNC: <0.1 NG/ML (ref 0–4)
RBC #/AREA URNS AUTO: ABNORMAL /HPF
SL AMB  POCT GLUCOSE, UA: ABNORMAL
SL AMB LEUKOCYTE ESTERASE,UA: ABNORMAL
SL AMB POCT BILIRUBIN,UA: ABNORMAL
SL AMB POCT BLOOD,UA: ABNORMAL
SL AMB POCT CLARITY,UA: CLEAR
SL AMB POCT COLOR,UA: YELLOW
SL AMB POCT KETONES,UA: ABNORMAL
SL AMB POCT NITRITE,UA: ABNORMAL
SL AMB POCT PH,UA: 5
SL AMB POCT SPECIFIC GRAVITY,UA: 1.02
SL AMB POCT URINE PROTEIN: ABNORMAL
SL AMB POCT UROBILINOGEN: 0.2
SP GR UR STRIP.AUTO: 1.02 (ref 1–1.03)
UROBILINOGEN UR STRIP-ACNC: <2 MG/DL
WBC #/AREA URNS AUTO: ABNORMAL /HPF

## 2023-04-04 NOTE — PROGRESS NOTES
Assessment/Plan:    Erectile dysfunction due to arterial insufficiency  The patient is doing okay on Viagra 50 mg  We discussed upping the dose to 100 mg  He will consider trying this at home  Prostate cancer [facility])  Patient with undetectable PSA since brachytherapy  Due for PSA check now  If it is abnormal we will contact him  Otherwise plan for follow-up in 1 year  At some point if he wishes we can consider checking PSAs every other year since he is now 10 years out    Benign essential microscopic hematuria  The patient's urine dip suggested heme positive urine  We will send out for micro urinalysis to determine if he truly has microscopic hematuria  If he does would warrant cystoscopy and imaging          Subjective:      Patient ID: Grey Caraballo is a 69 y o  male  HPI    77 y o  male with a history of prostate cancer presents today for follow up  The patient's history of brachytherapy in 2013  His last PSA was in May 2022 at which time his PSA remained undetectable  He is doing well overall  Denies voiding issues  Urine dip today was heme +  Denies gross hematuria  Has ED and is on viagra 50mg    Past Surgical History:   Procedure Laterality Date   • CARDIAC VALVE REPLACEMENT     • CORONARY ARTERY BYPASS GRAFT     • FRACTURE SURGERY      left knee surgery   • KNEE ARTHROSCOPY      Therapeutic   • KNEE SURGERY      replacement   • LUNG SURGERY     • LUNG SURGERY      wedge resection    • TONSILLECTOMY     • UMBILICAL HERNIA REPAIR          Past Medical History:   Diagnosis Date   • Arteriosclerotic coronary artery disease 11/30/2017   • Benign prostate hyperplasia     L A  7/17/12   R   8/30/17     • COPD (chronic obstructive pulmonary disease) (HCC)    • Deep venous embolism and thrombosis (HCC)    • Diabetes mellitus ([facility] [address] )    • Hypertension    • Impacted cerumen     unspecified laterity / L A  6/20/12      • Osteoarthritis 7/17/2012   • Prostate cancer [facility])    • "Pulmonary embolism ([facility] [address] )              Review of Systems   Constitutional: Negative for chills and fever  HENT: Negative for ear pain and sore throat  Eyes: Negative for pain and visual disturbance  Respiratory: Negative for cough and shortness of breath  Cardiovascular: Negative for chest pain and palpitations  Gastrointestinal: Negative for abdominal pain and vomiting  Genitourinary: Negative for dysuria and hematuria  Musculoskeletal: Negative for arthralgias and back pain  Skin: Negative for color change and rash  Neurological: Negative for seizures and syncope  All other systems reviewed and are negative  Objective:      /78 (BP Location: Left arm, Patient Position: Sitting, Cuff Size: Large)   Pulse 78   Ht 5' 8\" (1 727 m)   Wt 112 kg (247 lb)   SpO2 98%   BMI 37 56 kg/m²     Lab Results   Component Value Date    PSA <0 1 05/02/2022    PSA <0 1 11/23/2021    PSA <0 1 05/14/2020    PSA <0 1 04/25/2019    PSA <0 1 06/08/2017    PSA <0 1 03/22/2017    PSA <0 1 02/13/2017    PSA <0 1 11/22/2016    PSA <0 1 05/09/2016    PSA <0 1 03/03/2016          Physical Exam  Vitals reviewed  Constitutional:       Appearance: Normal appearance  He is normal weight  HENT:      Head: Normocephalic and atraumatic  Eyes:      Pupils: Pupils are equal, round, and reactive to light  Abdominal:      General: Abdomen is flat  Neurological:      General: No focal deficit present  Mental Status: He is alert and oriented to person, place, and time  Psychiatric:         Mood and Affect: Mood normal          Thought Content:  Thought content normal            Orders  Orders Placed This Encounter   Procedures   • PSA Total, Diagnostic     Standing Status:   Future     Standing Expiration Date:   4/4/2024   • Urinalysis with microscopic   • POCT urine dip     "

## 2023-04-04 NOTE — ASSESSMENT & PLAN NOTE
The patient's urine dip suggested heme positive urine  We will send out for micro urinalysis to determine if he truly has microscopic hematuria    If he does would warrant cystoscopy and imaging

## 2023-04-04 NOTE — ASSESSMENT & PLAN NOTE
Patient with undetectable PSA since brachytherapy  Due for PSA check now  If it is abnormal we will contact him  Otherwise plan for follow-up in 1 year    At some point if he wishes we can consider checking PSAs every other year since he is now 10 years out

## 2023-04-04 NOTE — ASSESSMENT & PLAN NOTE
The patient is doing okay on Viagra 50 mg  We discussed upping the dose to 100 mg  He will consider trying this at home

## 2023-04-05 ENCOUNTER — TELEPHONE (OUTPATIENT)
Dept: GASTROENTEROLOGY | Facility: CLINIC | Age: 78
End: 2023-04-05

## 2023-04-07 DIAGNOSIS — E78.5 DYSLIPIDEMIA: ICD-10-CM

## 2023-04-07 RX ORDER — ATORVASTATIN CALCIUM 40 MG/1
TABLET, FILM COATED ORAL
Qty: 90 TABLET | Refills: 1 | Status: SHIPPED | OUTPATIENT
Start: 2023-04-07

## 2023-05-04 ENCOUNTER — TELEPHONE (OUTPATIENT)
Dept: OTHER | Facility: OTHER | Age: 78
End: 2023-05-04

## 2023-05-04 DIAGNOSIS — E11.65 TYPE 2 DIABETES MELLITUS WITH HYPERGLYCEMIA, WITHOUT LONG-TERM CURRENT USE OF INSULIN (HCC): ICD-10-CM

## 2023-05-04 NOTE — TELEPHONE ENCOUNTER
Refilled patient's urgent prescription for ozempic  Patient needs a new prescription sent next month to the [facility] in [facility] that he uses

## 2023-05-05 DIAGNOSIS — E11.65 TYPE 2 DIABETES MELLITUS WITH HYPERGLYCEMIA, WITHOUT LONG-TERM CURRENT USE OF INSULIN (HCC): ICD-10-CM

## 2023-05-09 ENCOUNTER — OFFICE VISIT (OUTPATIENT)
Dept: CARDIOLOGY CLINIC | Facility: MEDICAL CENTER | Age: 78
End: 2023-05-09

## 2023-05-09 VITALS
HEIGHT: 68 IN | HEART RATE: 72 BPM | OXYGEN SATURATION: 97 % | WEIGHT: 246 LBS | DIASTOLIC BLOOD PRESSURE: 76 MMHG | BODY MASS INDEX: 37.28 KG/M2 | SYSTOLIC BLOOD PRESSURE: 128 MMHG

## 2023-05-09 DIAGNOSIS — Z01.810 PREOPERATIVE CARDIOVASCULAR EXAMINATION: Primary | ICD-10-CM

## 2023-05-09 DIAGNOSIS — E78.2 MIXED HYPERLIPIDEMIA: ICD-10-CM

## 2023-05-09 DIAGNOSIS — E11.65 TYPE 2 DIABETES MELLITUS WITH HYPERGLYCEMIA, WITHOUT LONG-TERM CURRENT USE OF INSULIN (HCC): ICD-10-CM

## 2023-05-09 DIAGNOSIS — Z95.1 HX OF CABG: ICD-10-CM

## 2023-05-09 DIAGNOSIS — Z95.2 HISTORY OF AORTIC VALVE REPLACEMENT: ICD-10-CM

## 2023-05-09 DIAGNOSIS — E66.9 CLASS 2 OBESITY WITH BODY MASS INDEX (BMI) OF 37.0 TO 37.9 IN ADULT, UNSPECIFIED OBESITY TYPE, UNSPECIFIED WHETHER SERIOUS COMORBIDITY PRESENT: ICD-10-CM

## 2023-05-09 DIAGNOSIS — N52.01 ERECTILE DYSFUNCTION DUE TO ARTERIAL INSUFFICIENCY: ICD-10-CM

## 2023-05-09 DIAGNOSIS — I25.10 ARTERIOSCLEROTIC CORONARY ARTERY DISEASE: ICD-10-CM

## 2023-05-09 DIAGNOSIS — I10 BENIGN ESSENTIAL HTN: ICD-10-CM

## 2023-05-09 NOTE — PROGRESS NOTES
Cardiology Consultation Visit       Meng Paige   [id number]  1945      [facility] [place]  [facility] [address] [address]      Physician Requesting Consult:   Michael Galeazzi, PA-C    Reason for Consultation / Principal Problem:  Mr Meng Paige is a 69 y o  male and former smoker with a PMH significant for but not limited to CAD (s/p CABG), AS (s/p AVR), PE (post surgical, remote), HTN, HLD, DMII, YATES, Prostate Cancer (s/p brachytherapy/hormone deprivation therapy), Osteoporosis and Obesity  He presents to clinic for Preoperative Risk Stratification  History of Present Illness:  Mr Zina Sanchez was at his baseline state of health: independent of adl's, retired from Management at [employer], and exercising regularly with 2-mile walks every other day, when he decided in April to reschedule a colonoscopy he'd had to postpone two years ago  He admits to being lost to CV follow up after his cardiologist retired  He'd previously been followed closely for progressive AS, and in 2017 underwent successful 1V-CABG/AVR on 08/04/17  His last visit with Dr Simmie Siemens MD appears to have been shortly thereafter  On review of his medications today, he was previously on carvedilol and lisinopril in 2018, but the patient is uncertain if her is taking those medications at this time  He currently denies any cp, diaphoresis, n/v, sob, lay, palpitations, near syncope, syncope, orthopnea, pnd, or ble edema  He denies any recent hospitalizations, prior cardiac history, family history of early cad, or family history of scd  He notes improved control of his diet and exercise habits  He reports a diet that is somewhat balanced but has room for improvement, sufficient daily water intake and exercise that is consistent with walks every other day with plans to return to his daily 5-mile walks   He is otherwise compliant with medications but does not maintain a detailed BP log  Of note, the patient's cardiac risk factor(s) include: advanced age (older than 54 for men, 72 for women), diabetes mellitus, dyslipidemia, hypertension, male gender and obesity (BMI >= 30 kg/m2)      Assessment & Plan   Preoperative Cardiac Risk Stratification for Colonoscopy planned for 05/17/13  Functionally, the patient is able to perform adls and reports moderate functional capacity, tolerating regular exercise without anginal complaints  Clinical exam is unremarkable for acute cv instability or illness, EKG demonstrates stable ST/TW findings in increased PACs, Last TTE was in 9/2017  The patient's [facility] preoperative analysis yields a 0 7% risk of myocardial infarction or cardiac arrest, intraoperatively or up to 30 days post-op  At this time, the patient is deemed a low risk and may proceed without further cardiac testing     Frequent PACs, etiology unclear at this time  Patient is asymptomatic of palpitations or near-syncope  Will hold on bb initiation for now, patient to call back confirming his home htn medications  Will likely monitor with holter prior to next visit    CAD, s/p 1V-CABG ([facility], 9804, Dr Atif Sandoval)  Patient is asymptomatic of anginal complaints  Cont GDMT for CAD on asa/statin for now  Monitor routinely for symptomatic changes, Record BP/HR and log if symptoms return, ED/Clinic precautions reviewed    AS, s/p 23mm [facility] [place] AVR, normal function by TTE on 09/2017  Asymptomatic at this time, No reports of progressive lay, chest pain, near syncope, or fatigue, Patient states that they are at their baseline  TTE prior to next visit  Monitor closely for symptomatic changes    HLD, most recent FLP: , , HDL 41, and LDL 60 on 03/22/23  Tolerating statin without significant adverse reactions  Cont counseling on diet and lifestyle modification  Monitor FLP routinely as ordered by PCP, will follow peripherally    HTN, long-standing   No home BP log for review, but clinic SBP in the 110-130 mmHg range  Will adjust current medication regimen: pending patient's call back to clarify his home meds, cont counseling on low-sodium diet, BP log given  Monitor BP at home routinely and review log on next visit    DM Type II, most recent A1c 7 5 on 03/22/23  Tolerating medical mgmt without progressive symptoms  Cont current medication regimen  Monitor Blood Sugar/A1c routinely as ordered by Endocrinology, will follow peripherally    Overweight, Body mass index is 37 4 kg/m²  Weight has been up-trending overall  Cont to review the importance of diet and lifestyle modification  Will monitor routinely at this time    Discussion / Summary:  Precautions and reasons to call our office or proceed to ER were discussed in detail  Patient expressed understanding and questions were answered  Plan on follow up in-clinic in 4 months  Office Visit Diagnosis:  1  Preoperative cardiovascular examination        2  Arteriosclerotic coronary artery disease  Ambulatory Referral to Cardiology    POCT ECG    Has no angina or signs of congestive heart failure  Need to see a cardiologist      3  Hx of CABG        4  History of aortic valve replacement  Ambulatory Referral to Cardiology    POCT ECG    Echo complete w/ contrast if indicated      5  Erectile dysfunction due to arterial insufficiency        6  Mixed hyperlipidemia        7  Benign essential HTN        8  Type 2 diabetes mellitus with hyperglycemia, without long-term current use of insulin (HCC)        9  Class 2 obesity with body mass index (BMI) of 37 0 to 37 9 in adult, unspecified obesity type, unspecified whether serious comorbidity present            Subjective   Review of Systems   Constitutional: Negative for chills and fever  HENT: Negative for rhinorrhea and sore throat  Eyes: Negative for pain and visual disturbance  Respiratory: Negative for cough, shortness of breath and wheezing      Cardiovascular: Negative for chest pain, palpitations and leg swelling  Gastrointestinal: Negative for abdominal distention, constipation, diarrhea, nausea and vomiting  Genitourinary: Negative for dysuria and hematuria  Musculoskeletal: Negative for arthralgias and back pain  Skin: Negative for rash  s/p left skin biopsy lesion (bandaged) with f/u appointment on 23   Neurological: Negative for syncope and light-headedness  Hematological: Bruises/bleeds easily  Psychiatric/Behavioral: Negative for dysphoric mood  The patient is not nervous/anxious  The following portions of the patient's history were reviewed and updated as appropriate: past medical history, past social history, past family history, past surgical history, current medications, allergies, past surgical history and problem list   Past Medical History:   Diagnosis Date   • Arteriosclerotic coronary artery disease 2017   • Benign prostate hyperplasia     L A  12   R   17     • COPD (chronic obstructive pulmonary disease) (HCC)    • Deep venous embolism and thrombosis (HCC)    • Diabetes mellitus ([facility] )    • Hypertension    • Impacted cerumen     unspecified laterity / L A  12      • Osteoarthritis 2012   • Prostate cancer ([facility] )    • Pulmonary embolism (HCC)      Social History     Socioeconomic History   • Marital status: /Civil Union     Spouse name: Not on file   • Number of children: Not on file   • Years of education: Not on file   • Highest education level: Not on file   Occupational History   • Not on file   Tobacco Use   • Smoking status: Former     Types: Cigarettes     Start date: 5     Quit date: 3/23/2000     Years since quittin 1   • Smokeless tobacco: Never   Substance and Sexual Activity   • Alcohol use: No     Comment: social drinker per [employer]script   • Drug use: No   • Sexual activity: Yes   Other Topics Concern   • Not on file   Social History Narrative    Daily coffee consumption     Uses safety equipment seatbelts     Social Determinants of Health     Financial Resource Strain: Low Risk    • Difficulty of Paying Living Expenses: Not very hard   Food Insecurity: Not on file   Transportation Needs: No Transportation Needs   • Lack of Transportation (Medical): No   • Lack of Transportation (Non-Medical):  No   Physical Activity: Not on file   Stress: Not on file   Social Connections: Not on file   Intimate Partner Violence: Not on file   Housing Stability: Not on file     Family History   Problem Relation Age of Onset   • COPD Mother    • Heart attack Mother    • Heart attack Father      Past Surgical History:   Procedure Laterality Date   • CARDIAC VALVE REPLACEMENT     • CORONARY ARTERY BYPASS GRAFT     • FRACTURE SURGERY      left knee surgery   • KNEE ARTHROSCOPY      Therapeutic   • KNEE SURGERY      replacement   • LUNG SURGERY     • LUNG SURGERY      wedge resection    • TONSILLECTOMY     • UMBILICAL HERNIA REPAIR         Current Outpatient Medications:   •  alendronate (FOSAMAX) 70 mg tablet, TAKE 1 TABLET ONCE A WEEK ON EMPTY STOMACH WITH A FULL GLASS OF WATER, DO NOT LIE DOWN FOR 30 MINUTES AFTER TAKING, Disp: 12 tablet, Rfl: 2  •  aspirin 81 MG tablet, Take by mouth, Disp: , Rfl:   •  atorvastatin (LIPITOR) 40 mg tablet, TAKE 1 TABLET BY MOUTH EVERY DAY, Disp: 90 tablet, Rfl: 1  •  Blood Glucose Calibration (OT ULTRA/FASTTK CNTRL SOLN) SOLN, by In Vitro route as needed (to check meter calibration), Disp: 1 each, Rfl: 3  •  Blood Glucose Monitoring Suppl (ONE TOUCH ULTRA 2) w/Device KIT, by Does not apply route daily DX E11 65  Pt to   Check  fbs  daily, Disp: 1 each, Rfl: 0  •  Cholecalciferol (VITAMIN D3) 2000 units capsule, Take 2,000 Units by mouth daily, Disp: , Rfl: 11  •  Coenzyme Q10 (COQ-10) 100 MG CAPS, Take 100 mg by mouth , Disp: , Rfl:   •  Continuous Blood Gluc  (FreeStyle Denny 2 [place]) ADELAIDA, Use 4 times a day, Disp: 1 each, Rfl: 0  •  fluorouracil (EFUDEX) 5 % cream, APPLY TWICE A DAY TO SCALP FOR 1 MONTH, Disp: 40 g, Rfl: 0  •  glipiZIDE (GLUCOTROL XL) 5 mg 24 hr tablet, TAKE 1 TABLET BY MOUTH EVERY DAY, Disp: 90 tablet, Rfl: 0  •  leuprolide (LUPRON DEPOT) 3 75 mg injection, Inject into a muscle Once yearly, Disp: , Rfl:   •  lisinopril (ZESTRIL) 2 5 mg tablet, TAKE 1 TABLET BY MOUTH EVERY DAY, Disp: 90 tablet, Rfl: 1  •  metFORMIN (GLUCOPHAGE-XR) 500 mg 24 hr tablet, TAKE 1 TABLET WITH BREAKFAST AND DINNER DAILY, Disp: 180 tablet, Rfl: 1  •  Multiple Vitamin-Folic Acid TABS, Take by mouth, Disp: , Rfl:   •  Omega-3 Fatty Acids (FISH OIL) 1,000 mg, Take 1,000 mg by mouth daily  , Disp: , Rfl:   •  senna-docusate sodium (SENOKOT S) 8 6-50 mg per tablet, Take 1 tablet by mouth 2 (two) times a day, Disp: , Rfl: 0  •  sildenafil (VIAGRA) 50 MG tablet, Take 1 tablet (50 mg total) by mouth daily as needed for erectile dysfunction, Disp: 5 tablet, Rfl: 5  •  semaglutide, 0 25 or 0 5 mg/dose, (Ozempic, 0 25 or 0 5 MG/DOSE,) 2 mg/3 mL injection pen, Inject 0 75 mL (0 5 mg total) under the skin every 7 days (Patient not taking: Reported on 5/9/2023), Disp: 9 mL, Rfl: 0  No Known Allergies  Patient Active Problem List   Diagnosis   • History of aortic valve replacement   • Arteriosclerotic coronary artery disease   • Benign essential HTN   • Type 2 diabetes mellitus with hyperglycemia, without long-term current use of insulin (HCC)   • Hyperlipidemia   • YATES (nonalcoholic steatohepatitis)   • Class 2 severe obesity with serious comorbidity and body mass index (BMI) of 36 0 to 36 9 in [facility])   • Osteoarthritis   • Osteoporosis   • Prostate cancer ([facility] )   • Erectile dysfunction due to diseases classified elsewhere   • Vitamin D deficiency   • Humerus fracture   • Cubital tunnel syndrome on right   • Post-nasal drip   • Erectile dysfunction due to arterial insufficiency   • Benign essential microscopic hematuria       Objective     Vitals:    05/09/23 1116   BP: 128/76   BP Location: Right arm   Patient "Position: Sitting   Cuff Size: Standard   Pulse: 72   SpO2: 97%   Weight: 112 kg (246 lb)   Height: 5' 8\" (1 727 m)     Body mass index is 37 4 kg/m²  ? Physical Exam  Constitutional:       General: He is not in acute distress  Appearance: Normal appearance  He is obese  He is not toxic-appearing  HENT:      Head: Normocephalic and atraumatic  Right Ear: External ear normal       Left Ear: External ear normal       Nose: Nose normal    Eyes:      General: No scleral icterus  Right eye: No discharge  Left eye: No discharge  Neck:      Vascular: No carotid bruit  Cardiovascular:      Rate and Rhythm: Normal rate  Rhythm irregular  Pulses: Normal pulses  Heart sounds: No murmur heard  Pulmonary:      Effort: Pulmonary effort is normal  No respiratory distress  Breath sounds: Normal breath sounds  No wheezing or rales  Musculoskeletal:      Cervical back: Neck supple  Right lower leg: No edema  Left lower leg: No edema  Skin:     General: Skin is warm and dry  Capillary Refill: Capillary refill takes less than 2 seconds  Neurological:      General: No focal deficit present  Mental Status: He is alert and oriented to person, place, and time     Psychiatric:         Mood and Affect: Mood normal        Labs:  Lab Results   Component Value Date     09/14/2015     09/08/2015    K 4 4 03/22/2023    K 4 8 09/13/2022    K 4 5 05/02/2022    K 3 5 09/14/2015    K 4 3 09/08/2015     03/22/2023     09/13/2022     05/02/2022     09/14/2015     09/08/2015    CO2 27 03/22/2023    CO2 27 09/13/2022    CO2 28 05/02/2022    CO2 28 1 09/14/2015    CO2 28 8 09/08/2015    BUN 22 03/22/2023    BUN 20 09/13/2022    BUN 13 05/02/2022    BUN 18 09/14/2015    BUN 21 09/08/2015    CREATININE 1 02 03/22/2023    CREATININE 1 22 09/13/2022    CREATININE 1 04 05/02/2022    CREATININE 1 16 09/14/2015    CREATININE 1 15 09/08/2015    EGFR " 71 03/22/2023    EGFR 57 09/13/2022    EGFR 69 05/02/2022    GLUC 192 (H) 08/26/2021    GLUC 222 (H) 08/26/2021    GLUC 294 (H) 09/19/2018    AST 46 (H) 03/22/2023    AST 50 (H) 09/13/2022    AST 48 (H) 12/08/2021     (H) 11/23/2015     (H) 09/14/2015     (H) 09/08/2015    ALT 51 03/22/2023    ALT 53 (H) 09/13/2022    ALT 65 12/08/2021     (H) 11/23/2015     (H) 09/14/2015     (H) 09/08/2015    TBILI 0 73 03/22/2023    TBILI 0 74 09/13/2022    TBILI 0 59 12/08/2021    ALB 4 1 03/22/2023    ALB 4 1 09/13/2022    ALB 3 5 12/08/2021    ALB 3 4 (L) 11/23/2015    ALB 3 4 (L) 09/14/2015    ALB 3 4 (L) 09/08/2015    CALCIUM 9 7 03/22/2023    CALCIUM 9 8 09/13/2022    CALCIUM 9 5 05/02/2022    CALCIUM 9 2 09/14/2015    CALCIUM 9 4 09/08/2015      Lab Results   Component Value Date    WBC 10 87 (H) 08/26/2021    WBC 13 58 (H) 08/26/2021    WBC 7 33 09/14/2015    HGB 11 7 (L) 08/26/2021    HGB 12 9 08/26/2021    HGB 12 2 09/14/2015    HCT 35 4 (L) 08/26/2021    HCT 39 6 08/26/2021    HCT 36 5 09/14/2015     08/26/2021     08/26/2021     09/14/2015      Lab Results   Component Value Date    CHOLESTEROL 127 03/22/2023    CHOLESTEROL 116 09/13/2022    CHOLESTEROL 127 12/08/2021    TRIG 132 03/22/2023    TRIG 124 09/13/2022    TRIG 90 12/08/2021    TRIG 163 09/08/2015    HDL 41 03/22/2023    HDL 40 09/13/2022    HDL 46 12/08/2021    HDL 39 09/08/2015    LDLCALC 60 03/22/2023    LDLCALC 51 09/13/2022    LDLCALC 63 12/08/2021    LDLCALC 136 (H) 09/08/2015     Lab Results   Component Value Date    HGBA1C 7 5 (H) 03/22/2023    HGBA1C 6 7 (H) 09/13/2022    HGBA1C 6 9 (H) 05/02/2022    HGBA1C 8 3 (H) 09/08/2015    HGBA1C 7 6 (H) 05/01/2014    HGBA1C 7 8 (H) 01/17/2014    GLUF 138 (H) 03/22/2023    GLUF 124 (H) 09/13/2022    GLUF 106 (H) 05/02/2022    GLUF 158 (H) 05/01/2014    POCGLU 187 (H) 08/26/2021    POCGLU 206 (H) 08/26/2021     No results found for: TSH, FT4  No results found for: HSTNI0, HSTNI2, HSTNI4, TROPONINI  No results found for: BNP, NTBNP     Imaging:  I have personally reviewed pertinent reports  No results found  Cardiac Studies:  EKG:   Date: 08/07/17, normal sinus rhythm, probable antsep infarct old, repol abnormalities suggest ischemia  Date: 08/03/17, normal sinus rhythm, repolarization abnormality suggistive of ischemia  Date: 08/26/21, normal sinus rhythm, LVH with Repolarization Abnormality, possible septal infarct age undetermined  Date: 05/09/23, normal sinus rhythm, frequent PACS (when compared to prior EKG)    Tele:   No results found for this or any previous visit  Holter:   No results found for this or any previous visit  Previous Cath/PCI:  Results for testing completed on the encounter of  08/02/17  Study: Left Heart Catheterization  Interpreted Summary  99% LAD stenosis    Previous STRESS TEST:  Results for testing completed on the encounter of 10/13/15  Study: Echo exercise stress test  Interpreted Summary  The findings are consistent with significant aortic valve stenosis with  secondary moderate to severe concentric LVH and impaired diastolic LV  relaxation  Unfortunately, inadequate doppler velocity measurements were taken for aortic  valve area calculation  Patient will be contacted to come in for those  measurements, which will follow separately  SUMMARY  LEFT VENTRICLE:  Systolic function was hyperdynamic  Ejection fraction was estimated in the  range of 65 % to 70 %  There were no regional wall motion abnormalities  There was moderate to severe concentric hypertrophy  Doppler parameters were consistent with abnormal left ventricular relaxation  MITRAL VALVE:  There was trace regurgitation  AORTIC VALVE:  The valve was trileaflet  Leaflets exhibited moderately to markedly increased  thickness, moderate to marked calcification, markedly reduced cuspal  separation, and reduced mobility   There was moderate to severe stenosis by  visual assessment, but insufficient doppler measurements for aortic valve area  calculation  TRICUSPID VALVE:  There was trace regurgitation  ECHO:  Results for testing completed on the encounter of 09/12/17  Study: Transthoracic Echo  Interpreted Summary      Results for testing completed on the encounter of  10/19/15  Study: Transthoracic Echo  Interpreted Summary  SUMMARY  LEFT VENTRICLE:  Systolic function was normal  Ejection fraction was estimated in the range of  55 % to 65 %  There were no regional wall motion abnormalities  MITRAL VALVE:  There was mild annular calcification  There was trace regurgitation  AORTIC VALVE:  There was moderate to severe stenosis  TRICUSPID VALVE:  There was mild regurgitation  LISA:  No results found for this or any previous visit  CMR:  No results found for this or any previous visit  Counseling / Coordination of Care:  During our visit, I spent 40 minutes with the patient, and greater than 55% of this time was spent on counseling and coordination of care, including addressing diagnostic results, prognosis, risks and benefits of treatment options, instructions for management, patient/family education, importance of treatment compliance, along with risk factor reductions  Dictation Disclaimer: This note was completed in part utilizing [employer] direct voice recognition software  Grammatical errors, random word insertion, spelling mistakes, and incomplete sentences may be an occasional consequence of the system secondary to software limitations, ambient noise and hardware issues  At the time of dictation, efforts were made to edit, clarify and /or correct errors  Please read the chart carefully and recognize, using context, where substitutions have occurred  If you have any questions or concerns about the context, text or information contained within the body of this dictation, please contact myself, the provider, for further clarification  Binu Moarn DO 05/09/23

## 2023-05-10 ENCOUNTER — TELEPHONE (OUTPATIENT)
Dept: CARDIOLOGY CLINIC | Facility: CLINIC | Age: 78
End: 2023-05-10

## 2023-06-21 DIAGNOSIS — E11.65 TYPE 2 DIABETES MELLITUS WITH HYPERGLYCEMIA, WITHOUT LONG-TERM CURRENT USE OF INSULIN (HCC): Primary | ICD-10-CM

## 2023-06-22 NOTE — TELEPHONE ENCOUNTER
Spoke to Lavell at [facility]  He confirmed pt can bring wrong sensors back and they will resubmit to insurance for the correct sensors  Called pt back and notified him

## 2023-08-10 NOTE — TELEPHONE ENCOUNTER
Patient called that he missed a call not sure the reason  I see a referral back in Nov 2022 for screening of colon rectal cancer, it stated 1x called attempt not sure if it was recently   Please call him back to set up consultation for GI and referral 
Referral states he meets criteria for a colonoscopy only may have been a  
yes

## 2023-08-14 DIAGNOSIS — E11.65 TYPE 2 DIABETES MELLITUS WITH HYPERGLYCEMIA, WITHOUT LONG-TERM CURRENT USE OF INSULIN (HCC): ICD-10-CM

## 2023-08-14 DIAGNOSIS — E78.5 DYSLIPIDEMIA: ICD-10-CM

## 2023-08-14 DIAGNOSIS — M81.0 OSTEOPOROSIS WITHOUT CURRENT PATHOLOGICAL FRACTURE, UNSPECIFIED OSTEOPOROSIS TYPE: ICD-10-CM

## 2023-08-14 RX ORDER — ATORVASTATIN CALCIUM 40 MG/1
TABLET, FILM COATED ORAL
Qty: 90 TABLET | Refills: 1 | Status: SHIPPED | OUTPATIENT
Start: 2023-08-14

## 2023-08-14 RX ORDER — ALENDRONATE SODIUM 70 MG/1
TABLET ORAL
Qty: 12 TABLET | Refills: 2 | Status: SHIPPED | OUTPATIENT
Start: 2023-08-14

## 2023-08-14 RX ORDER — GLIPIZIDE 5 MG/1
5 TABLET, FILM COATED, EXTENDED RELEASE ORAL DAILY
Qty: 90 TABLET | Refills: 1 | Status: SHIPPED | OUTPATIENT
Start: 2023-08-14 | End: 2024-02-10

## 2023-08-17 DIAGNOSIS — E11.65 TYPE 2 DIABETES MELLITUS WITH HYPERGLYCEMIA, WITHOUT LONG-TERM CURRENT USE OF INSULIN (HCC): ICD-10-CM

## 2023-09-05 NOTE — PROGRESS NOTES
Daily Note     Today's date: 2023  Patient name: Norma Benson  : [id number]  MRN: [id number]  Referring provider: Ana Trejo MD  Dx:   Encounter Diagnosis     ICD-10-CM    1  Cervical radiculopathy  M54 12       2  Cubital tunnel syndrome on right  G56 21                      Subjective: Pt reports no RUE symptoms since last visit  Objective: See treatment diary below    Assessment: Pt unable to complete c/s retraction secondary to R hand paraesthesias that he abolished by stopping  Plan: Hold c/s retraction, progress supine DNF as tolerated, and prepare for d/c nv  Precautions:  PMHx: PMHx: prostate CA, osteoporosis, aortic valve replacement, R TKA, HTN, DM II, CAD, falls, R humeral fx 21  Dx: lower R cervical radiculopathy with an opening preference    Manuals 1/3/23 1/6                                                  Mechanical traction 25#  30 deg  2x5 min 25#  30 deg  2x5 min           Neuro Re-Ed             Postural correction and awareness training             Standing chin tucks 1x15 1x10           C/s rotation at mirror 1x15 1x15           C/s SB at mirror 1x15 1x15           scap retraction 5"x10 5"x15           TB rows Blue  3x20 Black  3x10           B TB ER G/  3x20 G/  3x20           Standing c/s retraction: symptom-free 1x10 1x6*           DNF 5"x6 5"x10                        Ther Ex                                                                                                                     Ther Activity                                       Gait Training                                       Modalities Retention Suture Bite Size: 3 mm

## 2023-09-06 ENCOUNTER — TELEPHONE (OUTPATIENT)
Dept: GASTROENTEROLOGY | Facility: AMBULATORY SURGERY CENTER | Age: 78
End: 2023-09-06

## 2023-09-06 ENCOUNTER — HOSPITAL ENCOUNTER (OUTPATIENT)
Dept: NON INVASIVE DIAGNOSTICS | Facility: CLINIC | Age: 78
Discharge: HOME | End: 2023-09-06
Payer: COMMERCIAL

## 2023-09-06 VITALS
HEIGHT: 68 IN | BODY MASS INDEX: 37.42 KG/M2 | DIASTOLIC BLOOD PRESSURE: 76 MMHG | HEART RATE: 72 BPM | SYSTOLIC BLOOD PRESSURE: 128 MMHG | WEIGHT: 246.91 LBS

## 2023-09-06 DIAGNOSIS — Z95.2 HISTORY OF AORTIC VALVE REPLACEMENT: ICD-10-CM

## 2023-09-06 LAB
AORTIC ROOT: 3.6 CM
AORTIC VALVE MEAN VELOCITY: 15.5 M/S
APICAL FOUR CHAMBER EJECTION FRACTION: 53 %
ASCENDING AORTA: 3.7 CM
AV AREA BY CONTINUOUS VTI: 1.7 CM2
AV AREA PEAK VELOCITY: 1.5 CM2
AV LVOT MEAN GRADIENT: 2 MMHG
AV LVOT PEAK GRADIENT: 3 MMHG
AV MEAN GRADIENT: 11 MMHG
AV PEAK GRADIENT: 22 MMHG
AV VALVE AREA: 1.66 CM2
AV VELOCITY RATIO: 0.38
DOP CALC AO PEAK VEL: 2.34 M/S
DOP CALC AO VTI: 41.25 CM
DOP CALC LVOT AREA: 3.8 CM2
DOP CALC LVOT CARDIAC INDEX: 2.06 L/MIN/M2
DOP CALC LVOT CARDIAC OUTPUT: 4.59 L/MIN
DOP CALC LVOT DIAMETER: 2.2 CM
DOP CALC LVOT PEAK VEL VTI: 18.01 CM
DOP CALC LVOT PEAK VEL: 0.9 M/S
DOP CALC LVOT STROKE INDEX: 31.4 ML/M2
DOP CALC LVOT STROKE VOLUME: 68.43
E WAVE DECELERATION TIME: 220 MS
FRACTIONAL SHORTENING: 29 (ref 28–44)
INTERVENTRICULAR SEPTUM IN DIASTOLE (PARASTERNAL SHORT AXIS VIEW): 1.2 CM
INTERVENTRICULAR SEPTUM: 1.2 CM (ref 0.6–1.1)
LAAS-AP2: 19.2 CM2
LAAS-AP4: 23.6 CM2
LEFT ATRIUM SIZE: 5.1 CM
LEFT ATRIUM VOLUME (MOD BIPLANE): 68 ML
LEFT INTERNAL DIMENSION IN SYSTOLE: 3.7 CM (ref 2.1–4)
LEFT VENTRICULAR INTERNAL DIMENSION IN DIASTOLE: 5.2 CM (ref 3.5–6)
LEFT VENTRICULAR POSTERIOR WALL IN END DIASTOLE: 1.1 CM
LEFT VENTRICULAR STROKE VOLUME: 74 ML
LVSV (TEICH): 74 ML
MV E'TISSUE VEL-SEP: 7 CM/S
MV PEAK A VEL: 0.77 M/S
MV PEAK E VEL: 62 CM/S
MV STENOSIS PRESSURE HALF TIME: 64 MS
MV VALVE AREA P 1/2 METHOD: 3.44
RIGHT ATRIAL 2D VOLUME: 65 ML
RIGHT ATRIUM AREA SYSTOLE A4C: 22.2 CM2
RIGHT VENTRICLE ID DIMENSION: 4.1 CM
SL CV LEFT ATRIUM LENGTH A2C: 5.3 CM
SL CV LV EF: 60
SL CV PED ECHO LEFT VENTRICLE DIASTOLIC VOLUME (MOD BIPLANE) 2D: 131 ML
SL CV PED ECHO LEFT VENTRICLE SYSTOLIC VOLUME (MOD BIPLANE) 2D: 57 ML
TRICUSPID ANNULAR PLANE SYSTOLIC EXCURSION: 1.8 CM

## 2023-09-06 PROCEDURE — 93306 TTE W/DOPPLER COMPLETE: CPT | Performed by: INTERNAL MEDICINE

## 2023-09-06 PROCEDURE — 93306 TTE W/DOPPLER COMPLETE: CPT

## 2023-09-08 ENCOUNTER — HOSPITAL ENCOUNTER (OUTPATIENT)
Dept: GASTROENTEROLOGY | Facility: AMBULATORY SURGERY CENTER | Age: 78
Discharge: HOME | End: 2023-09-08
Attending: COLON & RECTAL SURGERY

## 2023-09-08 ENCOUNTER — ANESTHESIA EVENT (OUTPATIENT)
Dept: GASTROENTEROLOGY | Facility: AMBULATORY SURGERY CENTER | Age: 78
End: 2023-09-08

## 2023-09-08 ENCOUNTER — ANESTHESIA (OUTPATIENT)
Dept: GASTROENTEROLOGY | Facility: AMBULATORY SURGERY CENTER | Age: 78
End: 2023-09-08

## 2023-09-08 VITALS
SYSTOLIC BLOOD PRESSURE: 127 MMHG | HEART RATE: 68 BPM | BODY MASS INDEX: 37.28 KG/M2 | DIASTOLIC BLOOD PRESSURE: 76 MMHG | HEIGHT: 68 IN | RESPIRATION RATE: 18 BRPM | OXYGEN SATURATION: 98 % | WEIGHT: 246 LBS | TEMPERATURE: 97.4 F

## 2023-09-08 DIAGNOSIS — K57.90 DIVERTICULOSIS: ICD-10-CM

## 2023-09-08 DIAGNOSIS — Z86.010 HX OF COLONIC POLYPS: ICD-10-CM

## 2023-09-08 PROBLEM — Z95.1 S/P CABG (CORONARY ARTERY BYPASS GRAFT): Status: ACTIVE | Noted: 2023-09-08

## 2023-09-08 PROCEDURE — 88305 TISSUE EXAM BY PATHOLOGIST: CPT | Performed by: PATHOLOGY

## 2023-09-08 RX ORDER — PROPOFOL 10 MG/ML
INJECTION, EMULSION INTRAVENOUS AS NEEDED
Status: DISCONTINUED | OUTPATIENT
Start: 2023-09-08 | End: 2023-09-08

## 2023-09-08 RX ORDER — CLOTRIMAZOLE AND BETAMETHASONE DIPROPIONATE 10; .64 MG/G; MG/G
CREAM TOPICAL
COMMUNITY
Start: 2023-08-14

## 2023-09-08 RX ORDER — SODIUM CHLORIDE, SODIUM LACTATE, POTASSIUM CHLORIDE, CALCIUM CHLORIDE 600; 310; 30; 20 MG/100ML; MG/100ML; MG/100ML; MG/100ML
50 INJECTION, SOLUTION INTRAVENOUS CONTINUOUS
Status: DISCONTINUED | OUTPATIENT
Start: 2023-09-08 | End: 2023-09-08

## 2023-09-08 RX ORDER — SODIUM CHLORIDE 9 MG/ML
50 INJECTION, SOLUTION INTRAVENOUS CONTINUOUS
Status: DISCONTINUED | OUTPATIENT
Start: 2023-09-08 | End: 2023-09-12 | Stop reason: HOSPADM

## 2023-09-08 RX ADMIN — PROPOFOL 50 MG: 10 INJECTION, EMULSION INTRAVENOUS at 07:57

## 2023-09-08 RX ADMIN — SODIUM CHLORIDE, SODIUM LACTATE, POTASSIUM CHLORIDE, CALCIUM CHLORIDE: 600; 310; 30; 20 INJECTION, SOLUTION INTRAVENOUS at 07:50

## 2023-09-08 RX ADMIN — PROPOFOL 100 MG: 10 INJECTION, EMULSION INTRAVENOUS at 07:54

## 2023-09-08 RX ADMIN — PROPOFOL 150 MG: 10 INJECTION, EMULSION INTRAVENOUS at 08:20

## 2023-09-08 RX ADMIN — PROPOFOL 100 MG: 10 INJECTION, EMULSION INTRAVENOUS at 08:03

## 2023-09-08 NOTE — ANESTHESIA PREPROCEDURE EVALUATION
Procedure:  COLONOSCOPY    Relevant Problems   ANESTHESIA (within normal limits)   (-) History of anesthesia complications      CARDIO   (+) Arteriosclerotic coronary artery disease   (+) Benign essential HTN   (+) History of aortic valve replacement   (+) Hyperlipidemia   (-) Chest pain   (-) WILSON (dyspnea on exertion)      ENDO   (+) Type 2 diabetes mellitus with hyperglycemia, without long-term current use of insulin (HCC)      GI/HEPATIC   (+) YATES (nonalcoholic steatohepatitis)      PULMONARY   (-) Shortness of breath   (-) URI (upper respiratory infection)      Other   (+) Class 2 severe obesity with serious comorbidity and body mass index (BMI) of 36.0 to 36.9 in adult ([address])   (+) S/P CABG (coronary artery bypass graft)      •  Left Ventricle: Left ventricular cavity size is normal. Wall thickness is normal. The left ventricular ejection fraction is 60%. Systolic function is normal. Wall motion is normal. Diastolic function is mildly abnormal, consistent with grade I (abnormal) relaxation. •  Left Atrium: The atrium is dilated. •  Right Atrium: The atrium is dilated. •  Aortic Valve: There is a bioprosthetic valve. There is no evidence of paravalvular regurgitation. There is trace transvalvular regurgitation. The aortic valve has no significant stenosis. The gradient recorded across the prosthetic aortic valve is within the expected range. The aortic valve peak velocity is 2.34 m/s. The aortic valve peak gradient is 22 mmHg. The aortic valve mean gradient is 11 mmHg. The dimensionless velocity index is 0.38.  •  Mitral Valve: There is mild regurgitation. •  Aorta: The aortic root is normal in size. The ascending aorta is mildly dilated. The ascending aorta is 3.7 cm.     Physical Exam    Airway    Mallampati score: II  TM Distance: <3 FB  Neck ROM: full     Dental       Cardiovascular      Pulmonary      Other Findings        Anesthesia Plan  ASA Score- 3     Anesthesia Type- IV sedation with anesthesia with ASA Monitors. Additional Monitors:   Airway Plan:           Plan Factors-Exercise tolerance (METS): >4 METS. Chart reviewed. EKG reviewed. Existing labs reviewed. Patient summary reviewed. Induction- intravenous. Postoperative Plan-     Informed Consent- Anesthetic plan and risks discussed with patient. I personally reviewed this patient with the CRNA. Discussed and agreed on the Anesthesia Plan with the CRNA. Delonte Connelly

## 2023-09-08 NOTE — ANESTHESIA POSTPROCEDURE EVALUATION
Post-Op Assessment Note    CV Status:  Stable  Pain Score: 0    Pain management: adequate     Mental Status:  Alert and awake   Hydration Status:  Euvolemic   PONV Controlled:  Controlled   Airway Patency:  Patent      Post Op Vitals Reviewed: Yes      Staff: CRNA         No notable events documented.     BP   110/60   Temp  98.0   Pulse  78   Resp   18   SpO2   100

## 2023-09-08 NOTE — H&P
History and Physical   Colon and Rectal Surgery   Marija Hart 69 y.o. male MRN: [id number]  Unit/Bed#:  Encounter: [id number]  09/08/23   7:32 AM      CC:  History of polyps    History of Present Illness   HPI:  Marija Hart is a 69 y.o. male for surveillance. Historical Information   Past Medical History:   Diagnosis Date   • Arteriosclerotic coronary artery disease 11/30/2017   • Benign prostate hyperplasia     L.A.... 7/17/12   R. ...8/30/17     • COPD (chronic obstructive pulmonary disease) (HCC)     questionable   • Deep venous embolism and thrombosis (HCC)    • Diabetes mellitus ([address] )    • Hyperlipidemia    • Hypertension    • Impacted cerumen     unspecified laterity / L.A..... 6/20/12      • Osteoarthritis 07/17/2012   • Prostate cancer ([address] [facility])    • Pulmonary embolism (HCC)      Past Surgical History:   Procedure Laterality Date   • CARDIAC VALVE REPLACEMENT     • COLONOSCOPY     • CORONARY ARTERY BYPASS GRAFT     • FRACTURE SURGERY      left knee surgery   • JOINT REPLACEMENT Left     tkr   • KNEE ARTHROSCOPY      Therapeutic   • KNEE SURGERY      replacement   • LUNG SURGERY     • LUNG SURGERY      wedge resection    • TONSILLECTOMY     • UMBILICAL HERNIA REPAIR         Meds/Allergies     (Not in a hospital admission)        Current Outpatient Medications:   •  alendronate (FOSAMAX) 70 mg tablet, TAKE 1 TABLET ONCE A WEEK ON EMPTY STOMACH WITH A FULL GLASS OF WATER, DO NOT LIE DOWN FOR 30 MINUTES AFTER TAKING, Disp: 12 tablet, Rfl: 2  •  aspirin 81 MG tablet, Take 81 mg by mouth daily, Disp: , Rfl:   •  atorvastatin (LIPITOR) 40 mg tablet, TAKE 1 TABLET BY MOUTH EVERY DAY, Disp: 90 tablet, Rfl: 1  •  Cholecalciferol (VITAMIN D3) 2000 units capsule, Take 2,000 Units by mouth daily, Disp: , Rfl: 11  •  clotrimazole-betamethasone (LOTRISONE) 1-0.05 % cream, APPLY TO AFFECTED AREA TWICE A DAY BY TOPICAL ROUTE, Disp: , Rfl:   •  Coenzyme Q10 (COQ-10) 100 MG CAPS, Take 100 mg by mouth daily, Disp: , Rfl:   • Continuous Blood Gluc  (FreeStyle [place] 2 [place]) ADELAIDA, Use 4 times a day, Disp: 1 each, Rfl: 0  •  Continuous Blood Gluc Sensor (FreeStyle Denny 2 Sensor) MISC, Use daily as directed for CGM - Change every 14 days, Disp: 6 each, Rfl: 3  •  fluorouracil (EFUDEX) 5 % cream, APPLY TWICE A DAY TO SCALP FOR 1 MONTH, Disp: 40 g, Rfl: 0  •  glipiZIDE (GLUCOTROL XL) 5 mg 24 hr tablet, TAKE 1 TABLET (5 MG TOTAL) BY MOUTH DAILY. , Disp: 90 tablet, Rfl: 1  •  lisinopril (ZESTRIL) 2.5 mg tablet, TAKE 1 TABLET BY MOUTH EVERY DAY, Disp: 90 tablet, Rfl: 1  •  metFORMIN (GLUCOPHAGE-XR) 500 mg 24 hr tablet, Take 1 tablet (500 mg total) by mouth 2 (two) times a day with meals (breakfast & dinner), Disp: 180 tablet, Rfl: 3  •  Multiple Vitamin-Folic Acid TABS, Take by mouth in the morning, Disp: , Rfl:   •  Omega-3 Fatty Acids (FISH OIL) 1,000 mg, Take 1,000 mg by mouth daily. , Disp: , Rfl:   •  semaglutide, 0.25 or 0.5 mg/dose, (Ozempic, 0.25 or 0.5 MG/DOSE,) 2 mg/3 mL injection pen, Inject 0.75 mL (0.5 mg total) under the skin every 7 days, Disp: 9 mL, Rfl: 0  •  sildenafil (VIAGRA) 50 MG tablet, Take 1 tablet (50 mg total) by mouth daily as needed for erectile dysfunction, Disp: 5 tablet, Rfl: 5  •  Blood Glucose Calibration (OT ULTRA/FASTTK CNTRL SOLN) SOLN, by In Vitro route as needed (to check meter calibration), Disp: 1 each, Rfl: 3  •  Blood Glucose Monitoring Suppl (ONE TOUCH ULTRA 2) w/Device KIT, by Does not apply route daily DX E11.65  Pt to   Check  fbs  daily, Disp: 1 each, Rfl: 0  •  leuprolide (LUPRON DEPOT) 3.75 mg injection, Inject into a muscle Once yearly, Disp: , Rfl:   •  senna-docusate sodium (SENOKOT S) 8.6-50 mg per tablet, Take 1 tablet by mouth 2 (two) times a day, Disp: , Rfl: 0    Current Facility-Administered Medications:   •  lactated ringers infusion, 50 mL/hr, Intravenous, Continuous, Jasmyne Anna MD    No Known Allergies      Social History   Social History     Substance and Sexual Activity Alcohol Use Yes    Comment: social drinker rare     Social History     Substance and Sexual Activity   Drug Use No     Social History     Tobacco Use   Smoking Status Former   • Types: Cigarettes   • Start date:    • Quit date: 3/23/2000   • Years since quittin.4   Smokeless Tobacco Never         Family History:   Family History   Problem Relation Age of Onset   • COPD Mother    • Heart attack Mother    • Heart attack Father          Objective     Current Vitals:   Blood Pressure: 129/79 (23)  Pulse: 85 (23)  Temperature: (!) 97.4 °F (36.3 °C) (23)  Temp Source: Temporal (23)  Respirations: 18 (23)  Height: 5' 8" (172.7 cm) (23)  Weight - Scale: 112 kg (246 lb) (23)  SpO2: 94 % (23)  No intake or output data in the 24 hours ending 23    Physical Exam:  General:  Well nourished, no distress. Neuro: Alert and oriented  Eyes:Sclera anicteric, conjunctiva pink. Pulm: Clear to auscultation bilaterally. No respiratory Distress. CV:  Regular rate and rhythm. No murmurs. Abdomen:  Soft, flat, non-tender, without masses or hepatosplenomegaly. Lab Results:       ASSESSMENT:  Micaela Ireland is a 69 y.o. male for surveillance. PLAN:  Colonoscopy. Risks , including, but not limited to, bleeding, perforation, missed lesions, and potential need for surgery, were reviewed. Alternatives to colonoscopy were discussed.   Erickson Reddy MD

## 2023-09-13 ENCOUNTER — OFFICE VISIT (OUTPATIENT)
Dept: ENDOCRINOLOGY | Facility: CLINIC | Age: 78
End: 2023-09-13
Payer: COMMERCIAL

## 2023-09-13 VITALS
OXYGEN SATURATION: 95 % | SYSTOLIC BLOOD PRESSURE: 122 MMHG | WEIGHT: 250 LBS | DIASTOLIC BLOOD PRESSURE: 68 MMHG | BODY MASS INDEX: 38.01 KG/M2 | HEART RATE: 83 BPM

## 2023-09-13 DIAGNOSIS — E66.9 OBESITY (BMI 35.0-39.9 WITHOUT COMORBIDITY): ICD-10-CM

## 2023-09-13 DIAGNOSIS — E55.9 VITAMIN D DEFICIENCY: ICD-10-CM

## 2023-09-13 DIAGNOSIS — K75.81 NASH (NONALCOHOLIC STEATOHEPATITIS): ICD-10-CM

## 2023-09-13 DIAGNOSIS — E11.65 UNCONTROLLED TYPE 2 DIABETES MELLITUS WITH HYPERGLYCEMIA (HCC): Primary | ICD-10-CM

## 2023-09-13 LAB — SL AMB POCT HEMOGLOBIN AIC: 8.1 (ref ?–6.5)

## 2023-09-13 PROCEDURE — 99214 OFFICE O/P EST MOD 30 MIN: CPT | Performed by: INTERNAL MEDICINE

## 2023-09-13 PROCEDURE — 88305 TISSUE EXAM BY PATHOLOGIST: CPT | Performed by: PATHOLOGY

## 2023-09-13 PROCEDURE — 83036 HEMOGLOBIN GLYCOSYLATED A1C: CPT | Performed by: INTERNAL MEDICINE

## 2023-09-13 NOTE — PROGRESS NOTES
Kaiden Landon 69 y.o. male MRN: [id number]    Encounter: [id number]      Assessment/Plan     Assessment: This is a 69y.o.-year-old male with diabetes with hyperglycemia. Plan:    Diagnoses and all orders for this visit:    Uncontrolled type 2 diabetes mellitus with hyperglycemia ([address] [facility])    Lab Results   Component Value Date    HGBA1C 8.1 (A) 09/13/2023   A1C is 8.1%, uncontrolled. Discussed to take Ozempic 0.5 mg for 4 weeks and then increase the dose to 1 mg once a week after 4 weeks  And is using continuous glucose monitor sensor by [employer], we could not download the sensor today however I have reviewed through meter and his blood sugars are usually 80 averaging in 220 mg per DL range, blood sugars are elevated after meals. Discussed with patient to continue using continuous glucose monitor sensor as he is benefiting from it to prevent hypoglycemia and hyperglycemia also able to adjust his medication based on blood sugars. Continuing glipizide XL 5 mg daily  Continue metformin 500 mg twice a day  Discussed to keep carbohydrate consistent with meals discussed importance of exercise routine. Discussed to call [facility] customer service as we are not able to download his sensor  Also discussed to write down the numbers on glucose log and send the log in 2 weeks    Discussed hypoglycemia symptoms and treatment  -     semaglutide, 1 mg/dose, (Ozempic, 1 MG/DOSE,) 4 mg/3 mL injection pen; Inject 1 mg once a week SQ  -     POCT hemoglobin A1c  -     Hemoglobin A1C; Future  -     Basic metabolic panel; Future  -     Albumin / creatinine urine ratio;  Future    Obesity (BMI 35.0-39.9 without comorbidity)    Discussed importance of weight loss, lifestyle modifications, dietary modifications such as cutting down on free sugars, free carbohydrates, as well as saturated fats  Discussed to increase serving of fruits and vegetables in diet(3-5 servings per day)   -     POCT hemoglobin A1c    YATES (nonalcoholic steatohepatitis)    Continue glucagon like peptide therapy which would benefit YATES  -     POCT hemoglobin A1c    Vitamin D deficiency  Continue Vitamin D3 supplementation 2000 IU daily  -     POCT hemoglobin A1c        CC: Diabetes    History of Present Illness     HPI:    Emilia Mcwilliams is 76-year-old male with medical history of uncontrolled diabetes, obesity, YATES, vitamin D deficiency is here for follow-up.   He denies any recent hospitalization for hyperglycemia or hypoglycemia  Current regimen-glipizide XL 5 mg daily, metformin 500 mg twice a day, Ozempic 0.5 mg once a week    Patient stated that he was not getting supply for Ozempic for some time, just called Ozempic 2 weeks ago and started taking 0.5 mg once a week  His A1c went up to 8.1%  Locations of diabetes are nonalcoholic steatohepatosis, coronary artery disease    For hyperlipidemia, he takes Lipitor 40 mg daily  For osteoporosis, he takes Fosamax 70 mg once a week  Lab Results   Component Value Date    HGBA1C 8.1 (A) 09/13/2023     Lab Results   Component Value Date    LDLCALC 60 03/22/2023      Latest Reference Range & Units 03/22/23 07:23   Sodium 135 - 147 mmol/L 141   Potassium 3.5 - 5.3 mmol/L 4.4   Chloride 96 - 108 mmol/L 105   CO2 21 - 32 mmol/L 27   Anion Gap 4 - 13 mmol/L 9   BUN 5 - 25 mg/dL 22   Creatinine 0.60 - 1.30 mg/dL 1.02   GLUCOSE FASTING 65 - 99 mg/dL 138 (H)   Calcium 8.4 - 10.2 mg/dL 9.7   AST 13 - 39 U/L 46 (H)   ALT 7 - 52 U/L 51   Alkaline Phosphatase 34 - 104 U/L 58   Total Protein 6.4 - 8.4 g/dL 7.5   Albumin 3.5 - 5.0 g/dL 4.1   TOTAL BILIRUBIN 0.20 - 1.00 mg/dL 0.73   eGFR ml/min/1.73sq m 71   Cholesterol See Comment mg/dL 127   Triglycerides See Comment mg/dL 132   HDL >=40 mg/dL 41   Non-HDL Cholesterol mg/dl 86   LDL Calculated 0 - 100 mg/dL 60   Vit D, 25-Hydroxy 30.0 - 100.0 ng/mL 52.1   (  Wt Readings from Last 3 Encounters:   09/13/23 113 kg (250 lb)   09/08/23 112 kg (246 lb)   09/06/23 112 kg (246 lb 14.6 oz) BP Readings from Last 3 Encounters:   09/13/23 122/68   09/08/23 127/76   09/06/23 128/76     Lab Results   Component Value Date    HGBA1C 8.1 (A) 09/13/2023         Lab Results   Component Value Date    [id number] 1.667 11/21/2018          Review of Systems   Constitutional: Negative for activity change, diaphoresis, fatigue, fever and unexpected weight change. HENT: Negative. Eyes: Negative for visual disturbance. Respiratory: Negative for cough, chest tightness and shortness of breath. Cardiovascular: Negative for chest pain, palpitations and leg swelling. Gastrointestinal: Negative for abdominal pain, blood in stool, constipation, diarrhea, nausea and vomiting. Endocrine: Negative for cold intolerance, heat intolerance, polydipsia, polyphagia and polyuria. Genitourinary: Negative for dysuria, enuresis, frequency and urgency. Musculoskeletal: Negative for arthralgias and myalgias. Skin: Negative for pallor, rash and wound. Allergic/Immunologic: Negative. Neurological: Negative for dizziness, tremors, weakness and numbness. Hematological: Negative. Psychiatric/Behavioral: Negative. Historical Information   Past Medical History:   Diagnosis Date   • Arteriosclerotic coronary artery disease 11/30/2017   • Benign prostate hyperplasia     L.A.... 7/17/12   R. ...8/30/17     • COPD (chronic obstructive pulmonary disease) (HCC)     questionable   • Deep venous embolism and thrombosis (HCC)    • Diabetes mellitus ([address] [facility])    • Hyperlipidemia    • Hypertension    • Impacted cerumen     unspecified laterity / L.A..... 6/20/12      • Osteoarthritis 07/17/2012   • Prostate cancer ([address] )    • Pulmonary embolism (HCC)      Past Surgical History:   Procedure Laterality Date   • CARDIAC VALVE REPLACEMENT     • COLONOSCOPY     • CORONARY ARTERY BYPASS GRAFT     • FRACTURE SURGERY      left knee surgery   • JOINT REPLACEMENT Left     tkr   • KNEE ARTHROSCOPY      Therapeutic   • KNEE SURGERY replacement   • LUNG SURGERY     • LUNG SURGERY      wedge resection    • TONSILLECTOMY     • UMBILICAL HERNIA REPAIR       Social History   Social History     Substance and Sexual Activity   Alcohol Use Yes    Comment: social drinker rare     Social History     Substance and Sexual Activity   Drug Use No     Social History     Tobacco Use   Smoking Status Former   • Types: Cigarettes   • Start date:    • Quit date: 3/23/2000   • Years since quittin.4   Smokeless Tobacco Never     Family History:   Family History   Problem Relation Age of Onset   • COPD Mother    • Heart attack Mother    • Heart attack Father        Meds/Allergies   Current Outpatient Medications   Medication Sig Dispense Refill   • alendronate (FOSAMAX) 70 mg tablet TAKE 1 TABLET ONCE A WEEK ON EMPTY STOMACH WITH A FULL GLASS OF WATER, DO NOT LIE DOWN FOR 30 MINUTES AFTER TAKING 12 tablet 2   • aspirin 81 MG tablet Take 81 mg by mouth daily     • atorvastatin (LIPITOR) 40 mg tablet TAKE 1 TABLET BY MOUTH EVERY DAY 90 tablet 1   • Blood Glucose Monitoring Suppl (ONE TOUCH ULTRA 2) w/Device KIT by Does not apply route daily DX E11.65  Pt to   Check  fbs  daily 1 each 0   • Cholecalciferol (VITAMIN D3) 2000 units capsule Take 2,000 Units by mouth daily  11   • clotrimazole-betamethasone (LOTRISONE) 1-0.05 % cream APPLY TO AFFECTED AREA TWICE A DAY BY TOPICAL ROUTE     • Coenzyme Q10 (COQ-10) 100 MG CAPS Take 100 mg by mouth daily     • Continuous Blood Gluc  (FreeStyle Denny 2 [place]) ADELAIDA Use 4 times a day 1 each 0   • Continuous Blood Gluc Sensor (FreeStyle Denny 2 Sensor) MISC Use daily as directed for CGM - Change every 14 days 6 each 3   • fluorouracil (EFUDEX) 5 % cream APPLY TWICE A DAY TO SCALP FOR 1 MONTH 40 g 0   • glipiZIDE (GLUCOTROL XL) 5 mg 24 hr tablet TAKE 1 TABLET (5 MG TOTAL) BY MOUTH DAILY.  90 tablet 1   • leuprolide (LUPRON DEPOT) 3.75 mg injection Inject into a muscle Once yearly     • lisinopril (ZESTRIL) 2.5 mg tablet TAKE 1 TABLET BY MOUTH EVERY DAY 90 tablet 1   • metFORMIN (GLUCOPHAGE-XR) 500 mg 24 hr tablet Take 1 tablet (500 mg total) by mouth 2 (two) times a day with meals (breakfast & dinner) 180 tablet 3   • Multiple Vitamin-Folic Acid TABS Take by mouth in the morning     • Omega-3 Fatty Acids (FISH OIL) 1,000 mg Take 1,000 mg by mouth daily. • semaglutide, 1 mg/dose, (Ozempic, 1 MG/DOSE,) 4 mg/3 mL injection pen Inject 1 mg once a week SQ 3 mL 5   • senna-docusate sodium (SENOKOT S) 8.6-50 mg per tablet Take 1 tablet by mouth 2 (two) times a day  0   • sildenafil (VIAGRA) 50 MG tablet Take 1 tablet (50 mg total) by mouth daily as needed for erectile dysfunction 5 tablet 5   • Blood Glucose Calibration (OT ULTRA/FASTTK CNTRL SOLN) SOLN by In Vitro route as needed (to check meter calibration) 1 each 3     No current facility-administered medications for this visit. No Known Allergies    Objective   Vitals: Blood pressure 122/68, pulse 83, weight 113 kg (250 lb), SpO2 95 %. Physical Exam  Vitals reviewed. Constitutional:       General: He is not in acute distress. Appearance: Normal appearance. He is well-developed. He is obese. HENT:      Head: Normocephalic and atraumatic. Eyes:      Pupils: Pupils are equal, round, and reactive to light. Neck:      Thyroid: No thyromegaly. Cardiovascular:      Rate and Rhythm: Normal rate and regular rhythm. Heart sounds: Normal heart sounds. Pulmonary:      Effort: Pulmonary effort is normal. No respiratory distress. Breath sounds: Normal breath sounds. Musculoskeletal:         General: No deformity. Normal range of motion. Cervical back: Normal range of motion and neck supple. Skin:     General: Skin is warm and dry. Findings: No erythema. Neurological:      Mental Status: He is alert and oriented to person, place, and time. The history was obtained from the review of the chart, patient.     Lab Results:   Lab Results   Component Value Date/Time    Hemoglobin A1C 8.1 (A) 09/13/2023 01:32 PM    Hemoglobin A1C 7.5 (H) 03/22/2023 07:23 AM    BUN 22 03/22/2023 07:23 AM    Potassium 4.4 03/22/2023 07:23 AM    Chloride 105 03/22/2023 07:23 AM    CO2 27 03/22/2023 07:23 AM    Creatinine 1.02 03/22/2023 07:23 AM    AST 46 (H) 03/22/2023 07:23 AM    ALT 51 03/22/2023 07:23 AM    Total Protein 7.5 03/22/2023 07:23 AM    Albumin 4.1 03/22/2023 07:23 AM    HDL, Direct 41 03/22/2023 07:23 AM    Triglycerides 132 03/22/2023 07:23 AM           Imaging Studies: I have personally reviewed pertinent reports. Portions of the record may have been created with voice recognition software. Occasional wrong word or "sound a like" substitutions may have occurred due to the inherent limitations of voice recognition software. Read the chart carefully and recognize, using context, where substitutions have occurred.

## 2023-09-14 ENCOUNTER — RA CDI HCC (OUTPATIENT)
Dept: OTHER | Facility: HOSPITAL | Age: 78
End: 2023-09-14

## 2023-09-14 NOTE — PROGRESS NOTES
[address] [facility] coding opportunities       Chart reviewed, no opportunity found: CHART REVIEWED, NO OPPORTUNITY FOUND     Patients Insurance     Medicare Insurance: [employer]

## 2023-09-25 DIAGNOSIS — M81.0 OSTEOPOROSIS WITHOUT CURRENT PATHOLOGICAL FRACTURE, UNSPECIFIED OSTEOPOROSIS TYPE: ICD-10-CM

## 2023-09-25 RX ORDER — ALENDRONATE SODIUM 70 MG/1
TABLET ORAL
Qty: 12 TABLET | Refills: 2 | Status: SHIPPED | OUTPATIENT
Start: 2023-09-25

## 2023-09-26 ENCOUNTER — OFFICE VISIT (OUTPATIENT)
Dept: FAMILY MEDICINE CLINIC | Facility: CLINIC | Age: 78
End: 2023-09-26
Payer: COMMERCIAL

## 2023-09-26 VITALS
BODY MASS INDEX: 37.83 KG/M2 | DIASTOLIC BLOOD PRESSURE: 74 MMHG | OXYGEN SATURATION: 96 % | HEIGHT: 68 IN | SYSTOLIC BLOOD PRESSURE: 118 MMHG | HEART RATE: 81 BPM | WEIGHT: 249.6 LBS | TEMPERATURE: 97.7 F

## 2023-09-26 DIAGNOSIS — K75.81 NASH (NONALCOHOLIC STEATOHEPATITIS): ICD-10-CM

## 2023-09-26 DIAGNOSIS — Z00.00 MEDICARE ANNUAL WELLNESS VISIT, SUBSEQUENT: Primary | ICD-10-CM

## 2023-09-26 DIAGNOSIS — E11.65 TYPE 2 DIABETES MELLITUS WITH HYPERGLYCEMIA, WITHOUT LONG-TERM CURRENT USE OF INSULIN (HCC): ICD-10-CM

## 2023-09-26 DIAGNOSIS — E66.01 CLASS 2 SEVERE OBESITY DUE TO EXCESS CALORIES WITH SERIOUS COMORBIDITY AND BODY MASS INDEX (BMI) OF 36.0 TO 36.9 IN ADULT: ICD-10-CM

## 2023-09-26 DIAGNOSIS — C61 PROSTATE CANCER (HCC): ICD-10-CM

## 2023-09-26 DIAGNOSIS — M81.0 OSTEOPOROSIS WITHOUT CURRENT PATHOLOGICAL FRACTURE, UNSPECIFIED OSTEOPOROSIS TYPE: ICD-10-CM

## 2023-09-26 DIAGNOSIS — Z95.2 HISTORY OF AORTIC VALVE REPLACEMENT: ICD-10-CM

## 2023-09-26 DIAGNOSIS — I25.10 ARTERIOSCLEROTIC CORONARY ARTERY DISEASE: ICD-10-CM

## 2023-09-26 DIAGNOSIS — I10 BENIGN ESSENTIAL HTN: ICD-10-CM

## 2023-09-26 DIAGNOSIS — E78.2 MIXED HYPERLIPIDEMIA: ICD-10-CM

## 2023-09-26 PROCEDURE — 99214 OFFICE O/P EST MOD 30 MIN: CPT | Performed by: PHYSICIAN ASSISTANT

## 2023-09-26 PROCEDURE — G0439 PPPS, SUBSEQ VISIT: HCPCS | Performed by: PHYSICIAN ASSISTANT

## 2023-09-26 NOTE — PROGRESS NOTES
Assessment and Plan:     Problem List Items Addressed This Visit        Digestive    YATES (nonalcoholic steatohepatitis)       Endocrine    Type 2 diabetes mellitus with hyperglycemia, without long-term current use of insulin (HCC)       Cardiovascular and Mediastinum    Arteriosclerotic coronary artery disease    Benign essential HTN       Musculoskeletal and Integument    Osteoporosis       Genitourinary    Prostate cancer ([address])       Other    Hyperlipidemia    Class 2 severe obesity with serious comorbidity and body mass index (BMI) of 36.0 to 36.9 in adult [facility])    History of aortic valve replacement   Other Visit Diagnoses     Medicare annual wellness visit, subsequent    -  Primary        BMI Counseling: Body mass index is 37.95 kg/m². The BMI is above normal. Nutrition recommendations include decreasing portion sizes, encouraging healthy choices of fruits and vegetables and moderation in carbohydrate intake. Exercise recommendations include exercising 3-5 times per week. No pharmacotherapy was ordered. Rationale for BMI follow-up plan is due to patient being overweight or obese. Depression Screening and Follow-up Plan: Patient was screened for depression during today's encounter. They screened negative with a PHQ-2 score of 0. Preventive health issues were discussed with patient, and age appropriate screening tests were ordered as noted in patient's After Visit Summary. Personalized health advice and appropriate referrals for health education or preventive services given if needed, as noted in patient's After Visit Summary. History of Present Illness:     Patient presents for a Medicare Wellness Visit    Patient presents in the office for follow-up chronic conditions. Patient has not insulin diabetes mellitus type 2. Is on metformin  twice daily and glipizide ER 5 mg. To the endocrinologist.  A1c in the office was 8.1. He was started on Ozempic 1 mg weekly.   States due to the shortage he is having trouble to get this medication. Stressors in life have caused him to be off his diet. Has cardiac history of CAD status post CABG and aortic valve replacement. Nataliartli on a baby aspirin and lisinopril 2.5 mg. Follows with cardiology. His blood pressure is controlled. He has no signs of congestive heart failure and denies angina. Per lipidemia he is on atorvastatin 40 mg. He is due for lab work. Patient is overweight and he does have YATES. He is to zoom his exercise. He has a history of prostate cancer he is on Lupron and follows with oncology. He also has osteoporosis for which she is on calcium vitamin D supplements and Fosamax. Patient Care Team:  Ashley Fink PA-C as PCP - General (Family Medicine)  Cortney Weathers MD as PCP - Endocrinology (Endocrinology)  Lauren Garcia DO as PCP - PCP-[facility]  Paris Kingston PA-C as Physician Assistant (Endocrinology)     Review of Systems:     Review of Systems   Constitutional: Negative for activity change, appetite change, chills, fatigue and fever. HENT: Negative for ear pain and sore throat. Eyes: Negative for visual disturbance. Respiratory: Negative for cough and shortness of breath. Cardiovascular: Negative for chest pain, palpitations and leg swelling. Gastrointestinal: Negative for abdominal pain, blood in stool, constipation, diarrhea and nausea. Genitourinary: Negative for difficulty urinating. Musculoskeletal: Negative for arthralgias, back pain and myalgias. Skin: Negative for rash. Neurological: Negative for dizziness, syncope and headaches. Psychiatric/Behavioral: Negative for sleep disturbance.         Problem List:     Patient Active Problem List   Diagnosis   • History of aortic valve replacement   • Arteriosclerotic coronary artery disease   • Benign essential HTN   • Type 2 diabetes mellitus with hyperglycemia, without long-term current use of insulin (HCC)   • Hyperlipidemia   • YATES (nonalcoholic steatohepatitis)   • Class 2 severe obesity with serious comorbidity and body mass index (BMI) of 36.0 to 36.9 in adult [facility])   • Osteoarthritis   • Osteoporosis   • Prostate cancer ([address] )   • Erectile dysfunction due to diseases classified elsewhere   • Vitamin D deficiency   • Humerus fracture   • Cubital tunnel syndrome on right   • Post-nasal drip   • Erectile dysfunction due to arterial insufficiency   • Benign essential microscopic hematuria   • S/P CABG (coronary artery bypass graft)      Past Medical and Surgical History:     Past Medical History:   Diagnosis Date   • Arteriosclerotic coronary artery disease 11/30/2017   • Benign prostate hyperplasia     L.A.... 7/17/12   R. ...8/30/17     • COPD (chronic obstructive pulmonary disease) (HCC)     questionable   • Deep venous embolism and thrombosis (HCC)    • Diabetes mellitus ([address])    • Hyperlipidemia    • Hypertension    • Impacted cerumen     unspecified laterity / L.A..... 6/20/12      • Osteoarthritis 07/17/2012   • Prostate cancer ([address] [facility])    • Pulmonary embolism (HCC)      Past Surgical History:   Procedure Laterality Date   • CARDIAC VALVE REPLACEMENT     • COLONOSCOPY     • CORONARY ARTERY BYPASS GRAFT     • FRACTURE SURGERY      left knee surgery   • JOINT REPLACEMENT Left     tkr   • KNEE ARTHROSCOPY      Therapeutic   • KNEE SURGERY      replacement   • LUNG SURGERY     • LUNG SURGERY      wedge resection    • TONSILLECTOMY     • UMBILICAL HERNIA REPAIR        Family History:     Family History   Problem Relation Age of Onset   • COPD Mother    • Heart attack Mother    • Heart attack Father       Social History:     Social History     Socioeconomic History   • Marital status: /Civil Union     Spouse name: None   • Number of children: None   • Years of education: None   • Highest education level: None   Occupational History   • None   Tobacco Use   • Smoking status: Former     Types: Cigarettes     Start date: 1970     Quit date: 3/23/2000     Years since quittin.5   • Smokeless tobacco: Never   Vaping Use   • Vaping Use: Never used   Substance and Sexual Activity   • Alcohol use: Yes     Comment: social drinker rare   • Drug use: No   • Sexual activity: Yes   Other Topics Concern   • None   Social History Narrative    Daily coffee consumption     Uses safety equipment seatbelts     Social Determinants of Health     Financial Resource Strain: Low Risk  (2023)    Overall Financial Resource Strain (CARDIA)    • Difficulty of Paying Living Expenses: Not hard at all   Food Insecurity: Not on file   Transportation Needs: No Transportation Needs (2023)    PRAPARE - Transportation    • Lack of Transportation (Medical): No    • Lack of Transportation (Non-Medical):  No   Physical Activity: Not on file   Stress: Not on file   Social Connections: Not on file   Intimate Partner Violence: Not on file   Housing Stability: Not on file      Medications and Allergies:     Current Outpatient Medications   Medication Sig Dispense Refill   • alendronate (FOSAMAX) 70 mg tablet TAKE 1 TABLET ONCE A WEEK ON EMPTY STOMACH WITH A FULL GLASS OF WATER, DO NOT LIE DOWN FOR 30 MINUTES AFTER TAKING 12 tablet 2   • aspirin 81 MG tablet Take 81 mg by mouth daily     • atorvastatin (LIPITOR) 40 mg tablet TAKE 1 TABLET BY MOUTH EVERY DAY 90 tablet 1   • Blood Glucose Monitoring Suppl (ONE TOUCH ULTRA 2) w/Device KIT by Does not apply route daily DX E11.65  Pt to   Check  fbs  daily 1 each 0   • Cholecalciferol (VITAMIN D3) 2000 units capsule Take 2,000 Units by mouth daily  11   • clotrimazole-betamethasone (LOTRISONE) 1-0.05 % cream APPLY TO AFFECTED AREA TWICE A DAY BY TOPICAL ROUTE     • Coenzyme Q10 (COQ-10) 100 MG CAPS Take 100 mg by mouth daily     • Continuous Blood Gluc  (FreeStyle Denny 2 [place]) ADELAIDA Use 4 times a day 1 each 0   • Continuous Blood Gluc Sensor (FreeStyle Denny 2 Sensor) MISC Use daily as directed for CGM - Change every 14 days 6 each 3   • fluorouracil (EFUDEX) 5 % cream APPLY TWICE A DAY TO SCALP FOR 1 MONTH 40 g 0   • glipiZIDE (GLUCOTROL XL) 5 mg 24 hr tablet TAKE 1 TABLET (5 MG TOTAL) BY MOUTH DAILY. 90 tablet 1   • leuprolide (LUPRON DEPOT) 3.75 mg injection Inject into a muscle Once yearly     • lisinopril (ZESTRIL) 2.5 mg tablet TAKE 1 TABLET BY MOUTH EVERY DAY 90 tablet 1   • metFORMIN (GLUCOPHAGE-XR) 500 mg 24 hr tablet Take 1 tablet (500 mg total) by mouth 2 (two) times a day with meals (breakfast & dinner) 180 tablet 3   • Multiple Vitamin-Folic Acid TABS Take by mouth in the morning     • Omega-3 Fatty Acids (FISH OIL) 1,000 mg Take 1,000 mg by mouth daily. • semaglutide, 1 mg/dose, (Ozempic, 1 MG/DOSE,) 4 mg/3 mL injection pen Inject 1 mg once a week SQ 3 mL 5   • senna-docusate sodium (SENOKOT S) 8.6-50 mg per tablet Take 1 tablet by mouth 2 (two) times a day  0   • sildenafil (VIAGRA) 50 MG tablet Take 1 tablet (50 mg total) by mouth daily as needed for erectile dysfunction 5 tablet 5   • Blood Glucose Calibration (OT ULTRA/FASTTK CNTRL SOLN) SOLN by In Vitro route as needed (to check meter calibration) 1 each 3     No current facility-administered medications for this visit. No Known Allergies   Immunizations:     Immunization History   Administered Date(s) Administered   • COVID-19 PFIZER VACCINE 0.3 ML IM 02/24/2021, 03/19/2021, 10/18/2021   • Pneumococcal Conjugate 13-Valent 03/02/2016   • Pneumococcal Conjugate Vaccine 20-valent (Pcv20), Polysace 09/14/2022   • Pneumococcal Polysaccharide PPV23 01/30/2014      Health Maintenance:         Topic Date Due   • Hepatitis C Screening  Completed   • Colorectal Cancer Screening  Discontinued         Topic Date Due   • COVID-19 Vaccine (4 - Pfizer series) 12/13/2021   • Influenza Vaccine (1) 09/01/2023      Medicare Screening Tests and Risk Assessments:         Health Risk Assessment:   Patient rates overall health as very good.  Patient feels that their physical health rating is same. Patient is very satisfied with their life. Eyesight was rated as same. Hearing was rated as same. Patient feels that their emotional and mental health rating is slightly better. Patients states they are never, rarely angry. Patient states they are sometimes unusually tired/fatigued. Pain experienced in the last 7 days has been none. Patient states that he has experienced no weight loss or gain in last 6 months. Depression Screening:   PHQ-2 Score: 0      Fall Risk Screening: In the past year, patient has experienced: no history of falling in past year      Home Safety:  Patient does not have trouble with stairs inside or outside of their home. Patient has working smoke alarms and has no working carbon monoxide detector. Home safety hazards include: none. Nutrition:   Current diet is Diabetic. Medications:   Patient is not currently taking any over-the-counter supplements. Patient is able to manage medications. Activities of Daily Living (ADLs)/Instrumental Activities of Daily Living (IADLs):   Walk and transfer into and out of bed and chair?: Yes  Dress and groom yourself?: Yes    Bathe or shower yourself?: Yes    Feed yourself?  Yes  Do your laundry/housekeeping?: Yes  Manage your money, pay your bills and track your expenses?: Yes  Make your own meals?: Yes    Do your own shopping?: Yes    Previous Hospitalizations:   Any hospitalizations or ED visits within the last 12 months?: No      Advance Care Planning:   Living will: No    Durable POA for healthcare: No    Advanced directive: No      PREVENTIVE SCREENINGS      Cardiovascular Screening:    General: Screening Not Indicated and History Lipid Disorder      Diabetes Screening:     General: Screening Not Indicated and History Diabetes      Colorectal Cancer Screening:     General: Screening Current      Prostate Cancer Screening:    General: History Prostate Cancer and Screening Not Indicated Osteoporosis Screening:    General: Screening Not Indicated and History Osteoporosis      Abdominal Aortic Aneurysm (AAA) Screening:    Risk factors include: tobacco use        Lung Cancer Screening:     General: Screening Not Indicated      Hepatitis C Screening:    General: Screening Current    Screening, Brief Intervention, and Referral to Treatment (SBIRT)    Screening  Typical number of drinks in a day: 1  Typical number of drinks in a week: 1  Interpretation: Low risk drinking behavior. AUDIT-C Screenin) How often did you have a drink containing alcohol in the past year? monthly or less  2) How many drinks did you have on a typical day when you were drinking in the past year? 0  3) How often did you have 6 or more drinks on one occasion in the past year? never    AUDIT-C Score: 1  Interpretation: Score 0-3 (male): Negative screen for alcohol misuse    Single Item Drug Screening:  How often have you used an illegal drug (including marijuana) or a prescription medication for non-medical reasons in the past year? never    Single Item Drug Screen Score: 0  Interpretation: Negative screen for possible drug use disorder    No results found. Physical Exam:     /74 (BP Location: Right arm, Patient Position: Sitting, Cuff Size: Large)   Pulse 81   Temp 97.7 °F (36.5 °C) (Temporal)   Ht 5' 8" (1.727 m)   Wt 113 kg (249 lb 9.6 oz)   SpO2 96%   BMI 37.95 kg/m²     Physical Exam  Vitals and nursing note reviewed. Constitutional:       General: He is not in acute distress. Appearance: He is well-developed. He is obese. He is not ill-appearing. HENT:      Head: Normocephalic and atraumatic. Right Ear: Tympanic membrane, ear canal and external ear normal.      Left Ear: Tympanic membrane, ear canal and external ear normal.   Eyes:      Conjunctiva/sclera: Conjunctivae normal.   Neck:      Vascular: No carotid bruit. Cardiovascular:      Rate and Rhythm: Normal rate and regular rhythm. Pulses:           Dorsalis pedis pulses are 2+ on the right side and 2+ on the left side. Heart sounds: Murmur heard. Pulmonary:      Effort: Pulmonary effort is normal. No respiratory distress. Breath sounds: Normal breath sounds. Abdominal:      General: Bowel sounds are normal.      Palpations: Abdomen is soft. Tenderness: There is no abdominal tenderness. Comments: Obese   Abd. Diastasis  rectus     Musculoskeletal:      Right lower leg: No edema. Left lower leg: No edema. Lymphadenopathy:      Cervical: No cervical adenopathy. Skin:     General: Skin is warm and dry. Capillary Refill: Capillary refill takes less than 2 seconds. Neurological:      General: No focal deficit present. Mental Status: He is alert and oriented to person, place, and time. Psychiatric:         Mood and Affect: Mood normal.         Behavior: Behavior normal.         Thought Content: Thought content normal.         Judgment: Judgment normal.      Diabetic Foot Exam    Patient's shoes and socks removed. Right Foot/Ankle   Right Foot Inspection  Skin Exam: skin intact. Toe Exam: No tenderness and  no right toe deformity    Sensory   Vibration: intact  Monofilament testing: intact    Vascular  The right DP pulse is 2+. Left Foot/Ankle  Left Foot Inspection  Skin Exam: skin intact. Toe Exam: erythema and left toe deformity (ingrown  toenails). No swelling and no tenderness. Sensory   Vibration: intact  Monofilament testing: intact    Vascular  The left DP pulse is 2+.      Assign Risk Category  No loss of protective sensation        Clearance JANIE Ritchie

## 2023-09-26 NOTE — PATIENT INSTRUCTIONS
Medicare Preventive Visit Patient Instructions  Thank you for completing your Welcome to Medicare Visit or Medicare Annual Wellness Visit today. Your next wellness visit will be due in one year (9/26/2024). The screening/preventive services that you may require over the next 5-10 years are detailed below. Some tests may not apply to you based off risk factors and/or age. Screening tests ordered at today's visit but not completed yet may show as past due. Also, please note that scanned in results may not display below. Preventive Screenings:  Service Recommendations Previous Testing/Comments   Colorectal Cancer Screening  · Colonoscopy    · Fecal Occult Blood Test (FOBT)/Fecal Immunochemical Test (FIT)  · Fecal DNA/Cologuard Test  · Flexible Sigmoidoscopy Age: 43-73 years old   Colonoscopy: every 10 years (May be performed more frequently if at higher risk)  OR  FOBT/FIT: every 1 year  OR  Cologuard: every 3 years  OR  Sigmoidoscopy: every 5 years  Screening may be recommended earlier than age 39 if at higher risk for colorectal cancer. Also, an individualized decision between you and your healthcare provider will decide whether screening between the ages of 77-80 would be appropriate.  Colonoscopy: 09/08/2023  FOBT/FIT: Not on file  Cologuard: Not on file  Sigmoidoscopy: Not on file    Screening Current     Prostate Cancer Screening Individualized decision between patient and health care provider in men between ages of 53-66   Medicare will cover every 12 months beginning on the day after your 50th birthday PSA: <0.1 ng/mL     History Prostate Cancer  Screening Not Indicated     Hepatitis C Screening Once for adults born between 1945 and 1965  More frequently in patients at high risk for Hepatitis C Hep C Antibody: 06/24/2014    Screening Current   Diabetes Screening 1-2 times per year if you're at risk for diabetes or have pre-diabetes Fasting glucose: 138 mg/dL (3/22/2023)  A1C: 8.1 (9/13/2023)  Screening Not Indicated  History Diabetes   Cholesterol Screening Once every 5 years if you don't have a lipid disorder. May order more often based on risk factors. Lipid panel: 03/22/2023  Screening Not Indicated  History Lipid Disorder      Other Preventive Screenings Covered by Medicare:  1. Abdominal Aortic Aneurysm (AAA) Screening: covered once if your at risk. You're considered to be at risk if you have a family history of AAA or a male between the age of 70-76 who smoking at least 100 cigarettes in your lifetime. 2. Lung Cancer Screening: covers low dose CT scan once per year if you meet all of the following conditions: (1) Age 48-67; (2) No signs or symptoms of lung cancer; (3) Current smoker or have quit smoking within the last 15 years; (4) You have a tobacco smoking history of at least 20 pack years (packs per day x number of years you smoked); (5) You get a written order from a healthcare provider. 3. Glaucoma Screening: covered annually if you're considered high risk: (1) You have diabetes OR (2) Family history of glaucoma OR (3)  aged 48 and older OR (3)  American aged 72 and older  3. Osteoporosis Screening: covered every 2 years if you meet one of the following conditions: (1) Have a vertebral abnormality; (2) On glucocorticoid therapy for more than 3 months; (3) Have primary hyperparathyroidism; (4) On osteoporosis medications and need to assess response to drug therapy. 5. HIV Screening: covered annually if you're between the age of 14-79. Also covered annually if you are younger than 13 and older than 72 with risk factors for HIV infection. For pregnant patients, it is covered up to 3 times per pregnancy.     Immunizations:  Immunization Recommendations   Influenza Vaccine Annual influenza vaccination during flu season is recommended for all persons aged >= 6 months who do not have contraindications   Pneumococcal Vaccine   * Pneumococcal conjugate vaccine = PCV13 (Prevnar 13), PCV15 (Vaxneuvance), PCV20 (Prevnar 20)  * Pneumococcal polysaccharide vaccine = PPSV23 (Pneumovax) Adults 2364 years old: 1-3 doses may be recommended based on certain risk factors  Adults 72 years old: 1-2 doses may be recommended based off what pneumonia vaccine you previously received   Hepatitis B Vaccine 3 dose series if at intermediate or high risk (ex: diabetes, end stage renal disease, liver disease)   Tetanus (Td) Vaccine - COST NOT COVERED BY MEDICARE PART B Following completion of primary series, a booster dose should be given every 10 years to maintain immunity against tetanus. Td may also be given as tetanus wound prophylaxis. Tdap Vaccine - COST NOT COVERED BY MEDICARE PART B Recommended at least once for all adults. For pregnant patients, recommended with each pregnancy. Shingles Vaccine (Shingrix) - COST NOT COVERED BY MEDICARE PART B  2 shot series recommended in those aged 48 and above     Health Maintenance Due:      Topic Date Due   • Hepatitis C Screening  Completed   • Colorectal Cancer Screening  Discontinued     Immunizations Due:      Topic Date Due   • COVID-19 Vaccine (4 - Pfizer series) 12/13/2021   • Influenza Vaccine (1) 09/01/2023     Advance Directives   What are advance directives? Advance directives are legal documents that state your wishes and plans for medical care. These plans are made ahead of time in case you lose your ability to make decisions for yourself. Advance directives can apply to any medical decision, such as the treatments you want, and if you want to donate organs. What are the types of advance directives? There are many types of advance directives, and each state has rules about how to use them. You may choose a combination of any of the following:  · Living will: This is a written record of the treatment you want. You can also choose which treatments you do not want, which to limit, and which to stop at a certain time.  This includes surgery, medicine, IV fluid, and tube feedings. · Durable power of  for healthcare [facility]): This is a written record that states who you want to make healthcare choices for you when you are unable to make them for yourself. This person, called a proxy, is usually a family member or a friend. You may choose more than 1 proxy. · Do not resuscitate (DNR) order:  A DNR order is used in case your heart stops beating or you stop breathing. It is a request not to have certain forms of treatment, such as CPR. A DNR order may be included in other types of advance directives. · Medical directive: This covers the care that you want if you are in a coma, near death, or unable to make decisions for yourself. You can list the treatments you want for each condition. Treatment may include pain medicine, surgery, blood transfusions, dialysis, IV or tube feedings, and a ventilator (breathing machine). · Values history: This document has questions about your views, beliefs, and how you feel and think about life. This information can help others choose the care that you would choose. Why are advance directives important? An advance directive helps you control your care. Although spoken wishes may be used, it is better to have your wishes written down. Spoken wishes can be misunderstood, or not followed. Treatments may be given even if you do not want them. An advance directive may make it easier for your family to make difficult choices about your care. Weight Management   Why it is important to manage your weight:  Being overweight increases your risk of health conditions such as heart disease, high blood pressure, type 2 diabetes, and certain types of cancer. It can also increase your risk for osteoarthritis, sleep apnea, and other respiratory problems. Aim for a slow, steady weight loss. Even a small amount of weight loss can lower your risk of health problems.   How to lose weight safely:  A safe and healthy way to lose weight is to eat fewer calories and get regular exercise. You can lose up about 1 pound a week by decreasing the number of calories you eat by 500 calories each day. Healthy meal plan for weight management:  A healthy meal plan includes a variety of foods, contains fewer calories, and helps you stay healthy. A healthy meal plan includes the following:  · Eat whole-grain foods more often. A healthy meal plan should contain fiber. Fiber is the part of grains, fruits, and vegetables that is not broken down by your body. Whole-grain foods are healthy and provide extra fiber in your diet. Some examples of whole-grain foods are whole-wheat breads and pastas, oatmeal, brown rice, and bulgur. · Eat a variety of vegetables every day. Include dark, leafy greens such as spinach, kale, teresa greens, and mustard greens. Eat yellow and orange vegetables such as carrots, sweet potatoes, and winter squash. · Eat a variety of fruits every day. Choose fresh or canned fruit (canned in its own juice or light syrup) instead of juice. Fruit juice has very little or no fiber. · Eat low-fat dairy foods. Drink fat-free (skim) milk or 1% milk. Eat fat-free yogurt and low-fat cottage cheese. Try low-fat cheeses such as mozzarella and other reduced-fat cheeses. · Choose meat and other protein foods that are low in fat. Choose beans or other legumes such as split peas or lentils. Choose fish, skinless poultry (chicken or turkey), or lean cuts of red meat (beef or pork). Before you cook meat or poultry, cut off any visible fat. · Use less fat and oil. Try baking foods instead of frying them. Add less fat, such as margarine, sour cream, regular salad dressing and mayonnaise to foods. Eat fewer high-fat foods. Some examples of high-fat foods include french fries, doughnuts, ice cream, and cakes. · Eat fewer sweets. Limit foods and drinks that are high in sugar. This includes candy, cookies, regular soda, and sweetened drinks.   Exercise: Exercise at least 30 minutes per day on most days of the week. Some examples of exercise include walking, biking, dancing, and swimming. You can also fit in more physical activity by taking the stairs instead of the elevator or parking farther away from stores. Ask your healthcare provider about the best exercise plan for you. © Copyright [employer] 2018 Information is for End User's use only and may not be sold, redistributed or otherwise used for commercial purposes.  All illustrations and images included in CareNotes® are the copyrighted property of [employer] or  [employer]

## 2023-10-24 NOTE — TELEPHONE ENCOUNTER
Refill rosa maria sensors to [facility] Requested Prescriptions   Pending Prescriptions Disp Refills    atorvastatin (LIPITOR) 80 MG tablet  Last Written Prescription Date:  07/05/23  Last Fill Quantity: 45,  # refills: 2   Last office visit: 10/17/2023 ; last virtual visit: Visit date not found with prescribing provider:  10/17/23   Future Office Visit:           45 tablet 2     Sig: Take 0.5 tablets (40 mg) by mouth daily       There is no refill protocol information for this order

## 2023-11-02 ENCOUNTER — OFFICE VISIT (OUTPATIENT)
Dept: CARDIOLOGY CLINIC | Facility: CLINIC | Age: 78
End: 2023-11-02
Payer: COMMERCIAL

## 2023-11-02 VITALS
HEART RATE: 82 BPM | DIASTOLIC BLOOD PRESSURE: 62 MMHG | HEIGHT: 68 IN | BODY MASS INDEX: 37.95 KG/M2 | WEIGHT: 250.4 LBS | SYSTOLIC BLOOD PRESSURE: 124 MMHG

## 2023-11-02 DIAGNOSIS — Z95.2 HISTORY OF AORTIC VALVE REPLACEMENT: ICD-10-CM

## 2023-11-02 DIAGNOSIS — I25.10 ARTERIOSCLEROTIC CORONARY ARTERY DISEASE: Primary | ICD-10-CM

## 2023-11-02 DIAGNOSIS — I10 HYPERTENSION, UNSPECIFIED TYPE: ICD-10-CM

## 2023-11-02 DIAGNOSIS — Z95.1 S/P CABG (CORONARY ARTERY BYPASS GRAFT): ICD-10-CM

## 2023-11-02 DIAGNOSIS — E11.65 TYPE 2 DIABETES MELLITUS WITH HYPERGLYCEMIA, WITHOUT LONG-TERM CURRENT USE OF INSULIN (HCC): ICD-10-CM

## 2023-11-02 DIAGNOSIS — K75.81 NASH (NONALCOHOLIC STEATOHEPATITIS): ICD-10-CM

## 2023-11-02 DIAGNOSIS — E66.01 CLASS 2 SEVERE OBESITY DUE TO EXCESS CALORIES WITH SERIOUS COMORBIDITY AND BODY MASS INDEX (BMI) OF 36.0 TO 36.9 IN ADULT: ICD-10-CM

## 2023-11-02 DIAGNOSIS — E78.2 MIXED HYPERLIPIDEMIA: ICD-10-CM

## 2023-11-02 PROCEDURE — 99214 OFFICE O/P EST MOD 30 MIN: CPT | Performed by: INTERNAL MEDICINE

## 2023-11-02 RX ORDER — BLOOD PRESSURE TEST KIT-MEDIUM
KIT MISCELLANEOUS 3 TIMES WEEKLY
Qty: 1 EACH | Refills: 0 | Status: SHIPPED | OUTPATIENT
Start: 2023-11-03

## 2023-11-02 NOTE — PROGRESS NOTES
Cardiology Follow Up Visit       Lee Carlson   [id number]  1945      [facility] [place] [facility] [place] [address] [place] [address]    Primary Care Physician:   Meghna Cook PA-C    Reason for Follow Up Visit / Principal Problem:  Mr. Lee Carlson is a 69 y.o. male and former smoker with a PMH significant for but not limited to CAD (s/p CABG), AS (s/p AVR), PE (post surgical, remote), HTN, HLD, DMII, YATES, Prostate Cancer (s/p brachytherapy/hormone deprivation therapy), Osteoporosis and Obesity. He presents to clinic for routine Follow Up. Interval History:  Mr. Alber Brizuela was originally seen in clinic on 05/09/23 for for Preoperative Risk Stratification. He'd been at his baseline state of health: independent of adl's, retired from Management at [employer] , and exercising regularly with 2-mile walks every other day , when he  decided in April to reschedule a colonoscopy he'd had to postpone two years ago. He admitted to being lost to CV follow up after his cardiologist retired. He'd previously been followed closely for progressive AS, and in 2017 underwent successful 1V-CABG/AVR on 08/04/17. His last visit with Dr. Maurisio Nielson MD appears to have been shortly thereafter. On review of his medications, he was previously on carvedilol and lisinopril in 2018, but the patient was uncertain if he was taking those medications at that time. As he was asymptomatic, we decided to cont GDMT for CAD on asa/statin and continued risk factor reduction with an ACEi for htn mgmt. He currently denies any cp, diaphoresis, n/v, sob, lay, palpitations, near syncope, syncope, orthopnea, pnd, or ble edema. He notes difficulty adjusting to the sudden loss of his daughter in Aug 2023, but otherwise has done well clinically. He notes improved control of his diet and exercise habits.  He reports a diet that is somewhat balanced but has room for improvement, close control of salt intake, sufficient daily water intake, and exercise that consists of walks dailys and sometimes on the treadmill at 4.0 mph for 2 miles . He is otherwise compliant with medications but does not maintain a detailed BP log. Of note, the patient's cardiac risk factor(s) include: advanced age, male gender, hypertension, dyslipidemia, diabetes mellitus, and obesity (BMI >= 30 kg/m2).      Assessment & Plan   Frequent PACs , etiology unclear at this time  Patient is asymptomatic of palpitations or near-syncope  Cont supportive measures, valsalva maneuver, hydration maintenance, as well as stimulant avoidance  Monitor routinely for symptomatic changes, record vitals if symptoms recur, ed/clinic precautions reviewed    CAD, s/p 1V-CABG ([facility], 1023, Dr. Eladio Escobar)  Patient is asymptomatic of anginal complaints  Cont GDMT for CAD on asa/statin for now  Monitor routinely for symptomatic changes, Record BP/HR and log if symptoms return, ED/Clinic precautions reviewed    AS, s/p 23mm [place] [employer] AVR , normal function by TTE on 09/2017  Asymptomatic at this time, No reports of progressive lay, chest pain, near syncope, or fatigue, Patient states that they are at their baseline, TTE on 09/06/23 with nml function  Routine surveillance for now  Monitor closely for symptomatic changes    HLD, recent FLP:  /  / HDL 41 / LDL 60 on 03/22/23  Well controlled, Tolerating statin without significant adverse reactions  Cont current medication regimen, cont counseling on diet and lifestyle modification  Monitor FLP routinely as ordered by PCP, will follow peripherally    HTN, long-standing   No home BP log for review, but clinic SBP in the 115-130 mmHg range  Cont current medication regimen, cont counseling on low-sodium diet, BP log given, Review the importance of maintaining a home BP log  Monitor BP at home routinely and review log on next visit    DM Type II, most recent A1c 8.1 on 09/13/23  Tolerating medical mgmt without progressive symptoms  Cont current medication regimen  Monitor Blood Sugar/A1c routinely as ordered by Endocrinology, will follow peripherally    Overweight, Body mass index is 38.07 kg/m². Weight has been up-trending overall  Cont to review the importance of diet and lifestyle modification  Will monitor routinely at this time    Discussion / Summary:  Precautions and reasons to call our office or proceed to ER were discussed in detail. Patient expressed understanding and questions were answered. Plan on follow up in-clinic in 6 months. Office Visit Diagnosis:  1. Arteriosclerotic coronary artery disease        2. S/P CABG (coronary artery bypass graft)        3. History of aortic valve replacement        4. Hypertension, unspecified type  Blood Pressure Monitoring ([employer] Blood Pressure Monitor) MISC      5. Mixed hyperlipidemia        6. Type 2 diabetes mellitus with hyperglycemia, without long-term current use of insulin (HCC)        7. YATES (nonalcoholic steatohepatitis)        8. Class 2 severe obesity due to excess calories with serious comorbidity and body mass index (BMI) of 36.0 to 36.9 in adult               Subjective   Review of Systems   Constitutional: Negative for chills and fever. HENT:  Negative for congestion and sore throat. Eyes:  Negative for pain and visual disturbance. Cardiovascular:  Negative for chest pain, dyspnea on exertion, leg swelling, near-syncope, palpitations, paroxysmal nocturnal dyspnea and syncope. Respiratory:  Negative for cough, shortness of breath and wheezing. Hematologic/Lymphatic: Negative for bleeding problem. Bruises/bleeds easily. Skin:  Negative for itching and rash. s/p left skin biopsy lesion (bandaged) with f/u appointment on 5/16/23   Musculoskeletal:  Negative for back pain. Gastrointestinal:  Negative for abdominal pain, constipation, diarrhea, nausea and vomiting. Genitourinary:  Negative for dysuria and hematuria. Neurological:  Negative for light-headedness, numbness and paresthesias. Psychiatric/Behavioral:  Positive for depression (adjusting to the loss of his daughter (23, 57 yo)). The patient is not nervous/anxious. The following portions of the patient's history were reviewed and updated as appropriate: past medical history, past social history, past family history, past surgical history, current medications, allergies, past surgical history and problem list.  Past Medical History:   Diagnosis Date   • Arteriosclerotic coronary artery disease 2017   • Benign prostate hyperplasia     L.A.... 12   R. ...17     • COPD (chronic obstructive pulmonary disease) (HCC)     questionable   • Deep venous embolism and thrombosis (HCC)    • Diabetes mellitus ([facility])    • Hyperlipidemia    • Hypertension    • Impacted cerumen     unspecified laterity / L.A..... 12      • Osteoarthritis 2012   • Prostate cancer ([facility])    • Pulmonary embolism (HCC)      Social History     Socioeconomic History   • Marital status: /Civil Union     Spouse name: Not on file   • Number of children: Not on file   • Years of education: Not on file   • Highest education level: Not on file   Occupational History   • Not on file   Tobacco Use   • Smoking status: Former     Types: Cigarettes     Start date: 5     Quit date: 3/23/2000     Years since quittin.6   • Smokeless tobacco: Never   Vaping Use   • Vaping Use: Never used   Substance and Sexual Activity   • Alcohol use: Yes     Comment: social drinker rare   • Drug use: No   • Sexual activity: Yes   Other Topics Concern   • Not on file   Social History Narrative    Daily coffee consumption     Uses safety equipment seatbelts     Social Determinants of Health     Financial Resource Strain: Low Risk  (2023)    Overall Financial Resource Strain (CARDIA)    • Difficulty of Paying Living Expenses: Not hard at all   Food Insecurity: Not on file   Transportation Needs: No Transportation Needs (9/26/2023)    PRAPARE - Transportation    • Lack of Transportation (Medical): No    • Lack of Transportation (Non-Medical):  No   Physical Activity: Not on file   Stress: Not on file   Social Connections: Not on file   Intimate Partner Violence: Not on file   Housing Stability: Not on file     Family History   Problem Relation Age of Onset   • COPD Mother    • Heart attack Mother    • Heart attack Father      Past Surgical History:   Procedure Laterality Date   • CARDIAC VALVE REPLACEMENT     • COLONOSCOPY     • CORONARY ARTERY BYPASS GRAFT     • FRACTURE SURGERY      left knee surgery   • JOINT REPLACEMENT Left     tkr   • KNEE ARTHROSCOPY      Therapeutic   • KNEE SURGERY      replacement   • LUNG SURGERY     • LUNG SURGERY      wedge resection    • TONSILLECTOMY     • UMBILICAL HERNIA REPAIR         Current Outpatient Medications:   •  alendronate (FOSAMAX) 70 mg tablet, TAKE 1 TABLET ONCE A WEEK ON EMPTY STOMACH WITH A FULL GLASS OF WATER, DO NOT LIE DOWN FOR 30 MINUTES AFTER TAKING, Disp: 12 tablet, Rfl: 2  •  aspirin 81 MG tablet, Take 81 mg by mouth daily, Disp: , Rfl:   •  atorvastatin (LIPITOR) 40 mg tablet, TAKE 1 TABLET BY MOUTH EVERY DAY, Disp: 90 tablet, Rfl: 1  •  Blood Glucose Monitoring Suppl (ONE TOUCH ULTRA 2) w/Device KIT, by Does not apply route daily DX E11.65  Pt to   Check  fbs  daily, Disp: 1 each, Rfl: 0  •  Blood Pressure Monitoring (CVS Blood Pressure Monitor) MISC, Use 3 (three) times a week, Disp: 1 each, Rfl: 0  •  Cholecalciferol (VITAMIN D3) 2000 units capsule, Take 2,000 Units by mouth daily, Disp: , Rfl: 11  •  clotrimazole-betamethasone (LOTRISONE) 1-0.05 % cream, APPLY TO AFFECTED AREA TWICE A DAY BY TOPICAL ROUTE, Disp: , Rfl:   •  Coenzyme Q10 (COQ-10) 100 MG CAPS, Take 100 mg by mouth daily, Disp: , Rfl:   •  Continuous Blood Gluc  (FreeStyle Denny 2 [place]) ADELAIDA, Use 4 times a day, Disp: 1 each, Rfl: 0  •  Continuous Blood Gluc Sensor (FreeStyle Denny 2 Sensor) MISC, Use daily as directed for CGM - Change every 14 days, Disp: 6 each, Rfl: 3  •  fluorouracil (EFUDEX) 5 % cream, APPLY TWICE A DAY TO SCALP FOR 1 MONTH, Disp: 40 g, Rfl: 0  •  glipiZIDE (GLUCOTROL XL) 5 mg 24 hr tablet, TAKE 1 TABLET (5 MG TOTAL) BY MOUTH DAILY. , Disp: 90 tablet, Rfl: 1  •  lisinopril (ZESTRIL) 2.5 mg tablet, TAKE 1 TABLET BY MOUTH EVERY DAY, Disp: 90 tablet, Rfl: 1  •  metFORMIN (GLUCOPHAGE-XR) 500 mg 24 hr tablet, Take 1 tablet (500 mg total) by mouth 2 (two) times a day with meals (breakfast & dinner), Disp: 180 tablet, Rfl: 3  •  Multiple Vitamin-Folic Acid TABS, Take by mouth in the morning, Disp: , Rfl:   •  Omega-3 Fatty Acids (FISH OIL) 1,000 mg, Take 1,000 mg by mouth daily. , Disp: , Rfl:   •  semaglutide, 1 mg/dose, (Ozempic, 1 MG/DOSE,) 4 mg/3 mL injection pen, Inject 1 mg once a week SQ, Disp: 3 mL, Rfl: 5  •  sildenafil (VIAGRA) 50 MG tablet, Take 1 tablet (50 mg total) by mouth daily as needed for erectile dysfunction, Disp: 5 tablet, Rfl: 5  •  Blood Glucose Calibration (OT ULTRA/FASTTK CNTRL SOLN) SOLN, by In Vitro route as needed (to check meter calibration), Disp: 1 each, Rfl: 3  •  leuprolide (LUPRON DEPOT) 3.75 mg injection, Inject into a muscle Once yearly, Disp: , Rfl:   •  senna-docusate sodium (SENOKOT S) 8.6-50 mg per tablet, Take 1 tablet by mouth 2 (two) times a day (Patient not taking: Reported on 11/2/2023), Disp: , Rfl: 0  No Known Allergies  Patient Active Problem List   Diagnosis   • History of aortic valve replacement   • Arteriosclerotic coronary artery disease   • Benign essential HTN   • Type 2 diabetes mellitus with hyperglycemia, without long-term current use of insulin (HCC)   • Hyperlipidemia   • YATES (nonalcoholic steatohepatitis)   • Class 2 severe obesity with serious comorbidity and body mass index (BMI) of 36.0 to 36.9 in adult    • Osteoarthritis   • Osteoporosis   • Prostate cancer ([address])   • Erectile dysfunction due to diseases classified elsewhere   • Vitamin D deficiency   • Humerus fracture   • Cubital tunnel syndrome on right   • Post-nasal drip   • Erectile dysfunction due to arterial insufficiency   • Benign essential microscopic hematuria   • S/P CABG (coronary artery bypass graft)       Objective     Vitals:    11/02/23 1317   BP: 124/62   BP Location: Right arm   Patient Position: Sitting   Cuff Size: Large   Pulse: 82   Weight: 114 kg (250 lb 6.4 oz)   Height: 5' 8" (1.727 m)     Body mass index is 38.07 kg/m². ? Physical Exam  Constitutional:       General: He is not in acute distress. Appearance: Normal appearance. He is obese. He is not toxic-appearing. HENT:      Head: Normocephalic and atraumatic. Right Ear: External ear normal.      Left Ear: External ear normal.      Nose: Nose normal.   Eyes:      General: No scleral icterus. Right eye: No discharge. Left eye: No discharge. Neck:      Vascular: No carotid bruit. Cardiovascular:      Rate and Rhythm: Normal rate. Rhythm irregular. Pulses: Normal pulses. Heart sounds: No murmur heard. Pulmonary:      Effort: Pulmonary effort is normal. No respiratory distress. Breath sounds: Normal breath sounds. No wheezing or rales. Musculoskeletal:      Cervical back: Neck supple. Right lower leg: No edema. Left lower leg: No edema. Skin:     General: Skin is warm and dry. Capillary Refill: Capillary refill takes less than 2 seconds. Neurological:      General: No focal deficit present. Mental Status: He is alert and oriented to person, place, and time.    Psychiatric:         Mood and Affect: Mood normal.       Labs:  Lab Results   Component Value Date     09/14/2015     09/08/2015    K 4.4 03/22/2023    K 4.8 09/13/2022    K 4.5 05/02/2022    K 3.5 09/14/2015    K 4.3 09/08/2015     03/22/2023     09/13/2022     05/02/2022     09/14/2015     09/08/2015    CO2 27 03/22/2023    CO2 27 09/13/2022    CO2 28 05/02/2022    CO2 28.1 09/14/2015    CO2 28.8 09/08/2015    BUN 22 03/22/2023    BUN 20 09/13/2022    BUN 13 05/02/2022    BUN 18 09/14/2015    BUN 21 09/08/2015    CREATININE 1.02 03/22/2023    CREATININE 1.22 09/13/2022    CREATININE 1.04 05/02/2022    CREATININE 1.16 09/14/2015    CREATININE 1.15 09/08/2015    EGFR 71 03/22/2023    EGFR 57 09/13/2022    EGFR 69 05/02/2022    GLUC 192 (H) 08/26/2021    GLUC 222 (H) 08/26/2021    GLUC 294 (H) 09/19/2018    AST 46 (H) 03/22/2023    AST 50 (H) 09/13/2022    AST 48 (H) 12/08/2021     (H) 11/23/2015     (H) 09/14/2015     (H) 09/08/2015    ALT 51 03/22/2023    ALT 53 (H) 09/13/2022    ALT 65 12/08/2021     (H) 11/23/2015     (H) 09/14/2015     (H) 09/08/2015    TBILI 0.73 03/22/2023    TBILI 0.74 09/13/2022    TBILI 0.59 12/08/2021    ALB 4.1 03/22/2023    ALB 4.1 09/13/2022    ALB 3.5 12/08/2021    ALB 3.4 (L) 11/23/2015    ALB 3.4 (L) 09/14/2015    ALB 3.4 (L) 09/08/2015    CALCIUM 9.7 03/22/2023    CALCIUM 9.8 09/13/2022    CALCIUM 9.5 05/02/2022    CALCIUM 9.2 09/14/2015    CALCIUM 9.4 09/08/2015      Lab Results   Component Value Date    WBC 10.87 (H) 08/26/2021    WBC 13.58 (H) 08/26/2021    WBC 7.33 09/14/2015    HGB 11.7 (L) 08/26/2021    HGB 12.9 08/26/2021    HGB 12.2 09/14/2015    HCT 35.4 (L) 08/26/2021    HCT 39.6 08/26/2021    HCT 36.5 09/14/2015     08/26/2021     08/26/2021     09/14/2015      Lab Results   Component Value Date    CHOLESTEROL 127 03/22/2023    CHOLESTEROL 116 09/13/2022    CHOLESTEROL 127 12/08/2021    TRIG 132 03/22/2023    TRIG 124 09/13/2022    TRIG 90 12/08/2021    TRIG 163 09/08/2015    HDL 41 03/22/2023    HDL 40 09/13/2022    HDL 46 12/08/2021    HDL 39 09/08/2015    LDLCALC 60 03/22/2023    LDLCALC 51 09/13/2022    LDLCALC 63 12/08/2021    LDLCALC 136 (H) 09/08/2015     Lab Results   Component Value Date    HGBA1C 8.1 (A) 09/13/2023    HGBA1C 7.5 (H) 03/22/2023    HGBA1C 6.7 (H) 09/13/2022    HGBA1C 6.9 (H) 05/02/2022    HGBA1C 8.3 (H) 09/08/2015    HGBA1C 7.6 (H) 05/01/2014    HGBA1C 7.8 (H) 01/17/2014    GLUF 138 (H) 03/22/2023    GLUF 124 (H) 09/13/2022    GLUF 106 (H) 05/02/2022    GLUF 158 (H) 05/01/2014    POCGLU 187 (H) 08/26/2021    POCGLU 206 (H) 08/26/2021     No results found for: "TSH", "FT4"  No results found for: "HSTNI0", "HSTNI2", "HSTNI4", "TROPONINI"  No results found for: "BNP", "NTBNP"     Imaging:  I have personally reviewed pertinent reports. No results found. Cardiac Studies:  EKG:   Date: 08/07/17, normal sinus rhythm, probable antsep infarct old, repol abnormalities suggest ischemia  Date: 08/03/17, normal sinus rhythm, repolarization abnormality suggistive of ischemia  Date: 08/26/21, normal sinus rhythm, LVH with Repolarization Abnormality, possible septal infarct age undetermined  Date: 05/09/23, normal sinus rhythm, frequent PACS (when compared to prior EKG)    Tele:   No results found for this or any previous visit. Holter:   No results found for this or any previous visit. Recent Device Check:  No results found for this or any previous visit. Previous EPS/Interventions:  No results found for this or any previous visit. Previous Cath/PCI:  Results for testing completed on the encounter of  08/02/17  Study: Left Heart Catheterization  Interpreted Summary  99% LAD stenosis    Previous STRESS TEST:  Results for testing completed on the encounter of 10/13/15  Study: Echo exercise stress test  Interpreted Summary  The findings are consistent with significant aortic valve stenosis with  secondary moderate to severe concentric LVH and impaired diastolic LV  relaxation. Unfortunately, inadequate doppler velocity measurements were taken for aortic  valve area calculation. Patient will be contacted to come in for those  measurements, which will follow separately.   SUMMARY  LEFT VENTRICLE:  Systolic function was hyperdynamic. Ejection fraction was estimated in the  range of 65 % to 70 %. There were no regional wall motion abnormalities. There was moderate to severe concentric hypertrophy. Doppler parameters were consistent with abnormal left ventricular relaxation. MITRAL VALVE:  There was trace regurgitation. AORTIC VALVE:  The valve was trileaflet. Leaflets exhibited moderately to markedly increased  thickness, moderate to marked calcification, markedly reduced cuspal  separation, and reduced mobility. There was moderate to severe stenosis by  visual assessment, but insufficient doppler measurements for aortic valve area  calculation. TRICUSPID VALVE:  There was trace regurgitation. ECHO:  Results for orders placed during the hospital encounter of 09/06/23  Echo complete w/ contrast if indicated  Interpretation Summary  •  Left Ventricle: Left ventricular cavity size is normal. Wall thickness is normal. The left ventricular ejection fraction is 60%. Systolic function is normal. Wall motion is normal. Diastolic function is mildly abnormal, consistent with grade I (abnormal) relaxation. •  Left Atrium: The atrium is dilated. •  Right Atrium: The atrium is dilated. •  Aortic Valve: There is a bioprosthetic valve. There is no evidence of paravalvular regurgitation. There is trace transvalvular regurgitation. The aortic valve has no significant stenosis. The gradient recorded across the prosthetic aortic valve is within the expected range. The aortic valve peak velocity is 2.34 m/s. The aortic valve peak gradient is 22 mmHg. The aortic valve mean gradient is 11 mmHg. The dimensionless velocity index is 0.38.  •  Mitral Valve: There is mild regurgitation. •  Aorta: The aortic root is normal in size. The ascending aorta is mildly dilated. The ascending aorta is 3.7 cm.     Results for testing completed on the encounter of 09/12/17  Study: Transthoracic Echo  Interpreted Summary      Results for testing completed on the encounter of  10/19/15  Study: Transthoracic Echo  Interpreted Summary  SUMMARY  LEFT VENTRICLE:  Systolic function was normal. Ejection fraction was estimated in the range of  55 % to 65 %. There were no regional wall motion abnormalities. MITRAL VALVE:  There was mild annular calcification. There was trace regurgitation. AORTIC VALVE:  There was moderate to severe stenosis. TRICUSPID VALVE:  There was mild regurgitation. LISA:  No results found for this or any previous visit. CMR:  No results found for this or any previous visit. Counseling / Coordination of Care:  During our visit, I spent 20 minutes with the patient, and greater than 55% of this time was spent on counseling and coordination of care, including addressing diagnostic results, prognosis, risks and benefits of treatment options, instructions for management, patient/family education, importance of treatment compliance, along with risk factor reductions. Dictation Disclaimer: This note was completed in part utilizing [employer] direct voice recognition software. Grammatical errors, random word insertion, spelling mistakes, and incomplete sentences may be an occasional consequence of the system secondary to software limitations, ambient noise and hardware issues. At the time of dictation, efforts were made to edit, clarify and /or correct errors. Please read the chart carefully and recognize, using context, where substitutions have occurred. If you have any questions or concerns about the context, text or information contained within the body of this dictation, please contact myself, the provider, for further clarification.      Dell Sharp, DO 11/12/23

## 2023-11-24 DIAGNOSIS — E11.65 TYPE 2 DIABETES MELLITUS WITH HYPERGLYCEMIA, WITHOUT LONG-TERM CURRENT USE OF INSULIN (HCC): ICD-10-CM

## 2023-11-24 DIAGNOSIS — E11.65 UNCONTROLLED TYPE 2 DIABETES MELLITUS WITH HYPERGLYCEMIA (HCC): ICD-10-CM

## 2023-11-28 DIAGNOSIS — E11.65 UNCONTROLLED TYPE 2 DIABETES MELLITUS WITH HYPERGLYCEMIA (HCC): ICD-10-CM

## 2023-12-13 DIAGNOSIS — E11.65 TYPE 2 DIABETES MELLITUS WITH HYPERGLYCEMIA, WITHOUT LONG-TERM CURRENT USE OF INSULIN (HCC): ICD-10-CM

## 2023-12-13 RX ORDER — LISINOPRIL 2.5 MG/1
2.5 TABLET ORAL DAILY
Qty: 90 TABLET | Refills: 3 | Status: SHIPPED | OUTPATIENT
Start: 2023-12-13

## 2023-12-28 ENCOUNTER — OFFICE VISIT (OUTPATIENT)
Dept: FAMILY MEDICINE CLINIC | Facility: CLINIC | Age: 78
End: 2023-12-28
Payer: COMMERCIAL

## 2023-12-28 VITALS
TEMPERATURE: 97.6 F | HEART RATE: 91 BPM | WEIGHT: 248.8 LBS | OXYGEN SATURATION: 95 % | DIASTOLIC BLOOD PRESSURE: 68 MMHG | SYSTOLIC BLOOD PRESSURE: 122 MMHG | HEIGHT: 68 IN | BODY MASS INDEX: 37.71 KG/M2

## 2023-12-28 DIAGNOSIS — J06.9 UPPER RESPIRATORY TRACT INFECTION, UNSPECIFIED TYPE: ICD-10-CM

## 2023-12-28 DIAGNOSIS — J02.9 SORE THROAT: ICD-10-CM

## 2023-12-28 DIAGNOSIS — I10 BENIGN ESSENTIAL HTN: ICD-10-CM

## 2023-12-28 DIAGNOSIS — E11.65 TYPE 2 DIABETES MELLITUS WITH HYPERGLYCEMIA, WITHOUT LONG-TERM CURRENT USE OF INSULIN (HCC): Primary | ICD-10-CM

## 2023-12-28 LAB — SL AMB POCT HEMOGLOBIN AIC: 8.8 (ref ?–6.5)

## 2023-12-28 PROCEDURE — 99214 OFFICE O/P EST MOD 30 MIN: CPT | Performed by: FAMILY MEDICINE

## 2023-12-28 PROCEDURE — 83036 HEMOGLOBIN GLYCOSYLATED A1C: CPT | Performed by: FAMILY MEDICINE

## 2023-12-28 RX ORDER — BENZONATATE 100 MG/1
100 CAPSULE ORAL 3 TIMES DAILY PRN
Qty: 45 CAPSULE | Refills: 0 | Status: SHIPPED | OUTPATIENT
Start: 2023-12-28

## 2023-12-28 RX ORDER — METFORMIN HYDROCHLORIDE 500 MG/1
1000 TABLET, EXTENDED RELEASE ORAL 2 TIMES DAILY WITH MEALS
Qty: 180 TABLET | Refills: 3 | Status: SHIPPED | OUTPATIENT
Start: 2023-12-28 | End: 2023-12-28

## 2023-12-28 RX ORDER — GUAIFENESIN 600 MG/1
1200 TABLET, EXTENDED RELEASE ORAL EVERY 12 HOURS SCHEDULED
Qty: 56 TABLET | Refills: 0 | Status: SHIPPED | OUTPATIENT
Start: 2023-12-28 | End: 2024-01-11

## 2023-12-28 RX ORDER — METFORMIN HYDROCHLORIDE 500 MG/1
1000 TABLET, EXTENDED RELEASE ORAL 2 TIMES DAILY WITH MEALS
Qty: 180 TABLET | Refills: 3 | Status: SHIPPED | OUTPATIENT
Start: 2023-12-28

## 2023-12-28 NOTE — PROGRESS NOTES
Outpatient Progress Note    Assessment/Plan:    Problem List Items Addressed This Visit          Endocrine    Type 2 diabetes mellitus with hyperglycemia, without long-term current use of insulin (HCC) - Primary    Relevant Medications    metFORMIN (GLUCOPHAGE-XR) 500 mg 24 hr tablet    Other Relevant Orders    POCT hemoglobin A1c       Cardiovascular and Mediastinum    Benign essential HTN     Other Visit Diagnoses       Upper respiratory tract infection, unspecified type        Relevant Medications    guaiFENesin (MUCINEX) 600 mg 12 hr tablet    benzonatate (TESSALON PERLES) 100 mg capsule    Sore throat        Relevant Medications    guaiFENesin (MUCINEX) 600 mg 12 hr tablet    benzonatate (TESSALON PERLES) 100 mg capsule           Patient with 2-day history of URI symptoms, mild postnasal drip with sore throat  No fevers  Will treat conservatively at this time, Mucinex and Tessalon Perle for symptomatic relief    Patient's hemoglobin A1c continues to trend up, 8.8 encourage patient to maintain a diabetic diet, will adjust medication, metformin 500 mg 2 tablets twice daily    Please complete your blood work including BMP to monitor your kidney function    Follow-up in 3 months    Disposition:     I have spent a total time of 10 minutes on the day of the encounter for this patient including       Encounter provider: Evan Barreto MD     Provider located at: [place]  [facility]  [address]  [facility] [address] [phone]     Recent Visits  No visits were found meeting these conditions.  Showing recent visits within past 7 days and meeting all other requirements  Today's Visits  Date Type Provider Dept   12/28/23 Office Visit Evan Barreto MD [facility]   Showing today's visits and meeting all other requirements  Future Appointments  No visits were found meeting these conditions.  Showing future appointments within next 150 days and meeting all other  "requirements     Subjective:   Derick Blanton is a 78 y.o. male who is concerned about COVID-19. Patient's symptoms include rhinorrhea, sore throat, cough and headache. Patient denies fever, chills, fatigue, malaise, congestion, anosmia, loss of taste, shortness of breath, chest tightness, abdominal pain, nausea, vomiting, diarrhea and myalgias.     - Date of symptom onset: 12/26/2023      COVID-19 vaccination status: Fully vaccinated with booster    Exposure:   Contact with a person who is under investigation (PUI) for or who is positive for COVID-19 within the last 14 days?: No    Hospitalized recently for fever and/or lower respiratory symptoms?: No      Currently a healthcare worker that is involved in direct patient care?: No      Works in a special setting where the risk of COVID-19 transmission may be high? (this may include long-term care, correctional and skilled nursing facilities; homeless shelters; assisted-living facilities and group homes.): No      Resident in a special setting where the risk of COVID-19 transmission may be high? (this may include long-term care, correctional and skilled nursing facilities; homeless shelters; assisted-living facilities and group homes.): No      Hoarse voice  Maintaining good oral intake and hydration     Patient has been having a lot of stress, daughter passed away  Has not been compliant with diabetic diet.     Most recent CMP completed back in March 2023, EGFR 71    No results found for: \"SARSCOV2\", \"GHKKHXS6BBP\", \"SARSCORONAVI\", \"CORONAVIRUSR\", \"SARSCOVAG\", \"SARSCOVAGH\"    Review of Systems   Constitutional:  Negative for chills, fatigue and fever.   HENT:  Positive for rhinorrhea and sore throat. Negative for congestion.    Respiratory:  Positive for cough. Negative for chest tightness and shortness of breath.    Gastrointestinal:  Negative for abdominal pain, diarrhea, nausea and vomiting.   Musculoskeletal:  Negative for myalgias.   Neurological:  Positive for headaches. "     Current Outpatient Medications on File Prior to Visit   Medication Sig    alendronate (FOSAMAX) 70 mg tablet TAKE 1 TABLET ONCE A WEEK ON EMPTY STOMACH WITH A FULL GLASS OF WATER, DO NOT LIE DOWN FOR 30 MINUTES AFTER TAKING    aspirin 81 MG tablet Take 81 mg by mouth daily    atorvastatin (LIPITOR) 40 mg tablet TAKE 1 TABLET BY MOUTH EVERY DAY    Blood Glucose Monitoring Suppl (ONE TOUCH ULTRA 2) w/Device KIT by Does not apply route daily DX E11.65  Pt to   Check  fbs  daily    Blood Pressure Monitoring ([employer] Blood Pressure Monitor) MISC Use 3 (three) times a week    Cholecalciferol (VITAMIN D3) 2000 units capsule Take 2,000 Units by mouth daily    clotrimazole-betamethasone (LOTRISONE) 1-0.05 % cream APPLY TO AFFECTED AREA TWICE A DAY BY TOPICAL ROUTE    Coenzyme Q10 (COQ-10) 100 MG CAPS Take 100 mg by mouth daily    Continuous Blood Gluc  (FreeStyle Denny 2 [place]) ADELAIDA Use 4 times a day    Continuous Blood Gluc Sensor (FreeStyle Denny 2 Sensor) MISC Use daily as directed for CGM - Change every 14 days    fluorouracil (EFUDEX) 5 % cream APPLY TWICE A DAY TO SCALP FOR 1 MONTH    glipiZIDE (GLUCOTROL XL) 5 mg 24 hr tablet TAKE 1 TABLET (5 MG TOTAL) BY MOUTH DAILY.    lisinopril (ZESTRIL) 2.5 mg tablet Take 1 tablet (2.5 mg total) by mouth daily    Multiple Vitamin-Folic Acid TABS Take by mouth in the morning    Omega-3 Fatty Acids (FISH OIL) 1,000 mg Take 1,000 mg by mouth daily.    semaglutide, 1 mg/dose, (Ozempic, 1 MG/DOSE,) 4 mg/3 mL injection pen Inject 1 mg once a week SQ    sildenafil (VIAGRA) 50 MG tablet Take 1 tablet (50 mg total) by mouth daily as needed for erectile dysfunction    [DISCONTINUED] metFORMIN (GLUCOPHAGE-XR) 500 mg 24 hr tablet Take 1 tablet (500 mg total) by mouth 2 (two) times a day with meals (breakfast & dinner)    Blood Glucose Calibration (OT ULTRA/FASTTK CNTRL SOLN) SOLN by In Vitro route as needed (to check meter calibration)    leuprolide (LUPRON DEPOT) 3.75 mg injection  "Inject into a muscle Once yearly    senna-docusate sodium (SENOKOT S) 8.6-50 mg per tablet Take 1 tablet by mouth 2 (two) times a day (Patient not taking: Reported on 11/2/2023)       Objective:    /68 (BP Location: Left arm, Patient Position: Sitting, Cuff Size: Large)   Pulse 91   Temp 97.6 °F (36.4 °C) (Temporal)   Ht 5' 8\" (1.727 m)   Wt 113 kg (248 lb 12.8 oz)   SpO2 95%   BMI 37.83 kg/m²      Physical Exam  Vitals reviewed.   Constitutional:       General: He is not in acute distress.     Appearance: Normal appearance. He is obese. He is not ill-appearing, toxic-appearing or diaphoretic.   HENT:      Head: Normocephalic.      Right Ear: Tympanic membrane, ear canal and external ear normal. There is no impacted cerumen.      Left Ear: Tympanic membrane, ear canal and external ear normal. There is no impacted cerumen.   Eyes:      Comments: Slight redness of the conjunctivas   Cardiovascular:      Pulses: Normal pulses.      Heart sounds: Normal heart sounds. No murmur heard.  Pulmonary:      Effort: Pulmonary effort is normal. No respiratory distress.      Breath sounds: Normal breath sounds.   Abdominal:      General: Abdomen is flat.   Musculoskeletal:         General: No swelling or deformity.   Skin:     General: Skin is warm and dry.      Capillary Refill: Capillary refill takes less than 2 seconds.      Coloration: Skin is not jaundiced.      Comments: Large amount of seborrheic keratosis on the scalp  And bruising on the dorsal aspect of the bilateral hands   Neurological:      General: No focal deficit present.      Mental Status: He is alert.   Psychiatric:         Mood and Affect: Mood normal.         Evan Barreto MD    "

## 2024-01-16 DIAGNOSIS — E11.65 UNCONTROLLED TYPE 2 DIABETES MELLITUS WITH HYPERGLYCEMIA (HCC): ICD-10-CM

## 2024-01-25 DIAGNOSIS — E11.65 TYPE 2 DIABETES MELLITUS WITH HYPERGLYCEMIA, WITHOUT LONG-TERM CURRENT USE OF INSULIN (HCC): ICD-10-CM

## 2024-02-22 ENCOUNTER — TELEPHONE (OUTPATIENT)
Dept: ENDOCRINOLOGY | Facility: CLINIC | Age: 79
End: 2024-02-22

## 2024-03-15 ENCOUNTER — RA CDI HCC (OUTPATIENT)
Dept: OTHER | Facility: HOSPITAL | Age: 79
End: 2024-03-15

## 2024-03-15 NOTE — PROGRESS NOTES
"HCC coding opportunities          Chart Reviewed number of suggestions sent to Provider: 1     Patients Insurance     Medicare Insurance: [employer]          E11.22:     Refer to 9/14/2022 note - please review and assess if applicable     \"Presents in the office for follow up chronic conditions.  Patient has non-insulin diabetes mellitus type 2 with chronic kidney disease stage 2.  His current regimen includes Trulicity injected weekly. \"  "

## 2024-03-21 ENCOUNTER — TELEPHONE (OUTPATIENT)
Age: 79
End: 2024-03-21

## 2024-03-21 NOTE — TELEPHONE ENCOUNTER
Pt called to see if there was any b/w to be done. I advised him there was and he should have it done before 3/24.   
General

## 2024-03-23 DIAGNOSIS — E78.5 DYSLIPIDEMIA: ICD-10-CM

## 2024-03-25 RX ORDER — ATORVASTATIN CALCIUM 40 MG/1
TABLET, FILM COATED ORAL
Qty: 90 TABLET | Refills: 1 | Status: SHIPPED | OUTPATIENT
Start: 2024-03-25

## 2024-03-26 ENCOUNTER — OFFICE VISIT (OUTPATIENT)
Dept: FAMILY MEDICINE CLINIC | Facility: CLINIC | Age: 79
End: 2024-03-26
Payer: COMMERCIAL

## 2024-03-26 VITALS
DIASTOLIC BLOOD PRESSURE: 72 MMHG | SYSTOLIC BLOOD PRESSURE: 126 MMHG | BODY MASS INDEX: 35.89 KG/M2 | HEART RATE: 101 BPM | OXYGEN SATURATION: 95 % | TEMPERATURE: 97.7 F | WEIGHT: 236.8 LBS | HEIGHT: 68 IN

## 2024-03-26 DIAGNOSIS — C61 PROSTATE CANCER (HCC): ICD-10-CM

## 2024-03-26 DIAGNOSIS — I10 BENIGN ESSENTIAL HTN: ICD-10-CM

## 2024-03-26 DIAGNOSIS — M81.0 OSTEOPOROSIS WITHOUT CURRENT PATHOLOGICAL FRACTURE, UNSPECIFIED OSTEOPOROSIS TYPE: ICD-10-CM

## 2024-03-26 DIAGNOSIS — E66.01 CLASS 2 SEVERE OBESITY DUE TO EXCESS CALORIES WITH SERIOUS COMORBIDITY AND BODY MASS INDEX (BMI) OF 36.0 TO 36.9 IN ADULT (HCC): ICD-10-CM

## 2024-03-26 DIAGNOSIS — E11.65 TYPE 2 DIABETES MELLITUS WITH HYPERGLYCEMIA, WITHOUT LONG-TERM CURRENT USE OF INSULIN (HCC): Primary | ICD-10-CM

## 2024-03-26 DIAGNOSIS — E78.2 MIXED HYPERLIPIDEMIA: ICD-10-CM

## 2024-03-26 DIAGNOSIS — I25.10 ARTERIOSCLEROTIC CORONARY ARTERY DISEASE: ICD-10-CM

## 2024-03-26 PROCEDURE — G2211 COMPLEX E/M VISIT ADD ON: HCPCS | Performed by: PHYSICIAN ASSISTANT

## 2024-03-26 PROCEDURE — 99214 OFFICE O/P EST MOD 30 MIN: CPT | Performed by: PHYSICIAN ASSISTANT

## 2024-03-26 NOTE — PROGRESS NOTES
Assessment/Plan:     Diagnoses and all orders for this visit:    Type 2 diabetes mellitus with hyperglycemia, without long-term current use of insulin (HCC)  Comments:  Patient is due for labs.  He will continue his current regimen.  Trouble finding Ozempic  Orders:  -     Hemoglobin A1C; Future  -     Comprehensive metabolic panel; Future  -     CBC and differential; Future  -     Albumin / creatinine urine ratio; Future  -     Lipid Panel with Direct LDL reflex; Future    Arteriosclerotic coronary artery disease  Comments:  Patient is stable with no angina    Benign essential HTN  Comments:  Blood pressure at goal continue current regimen  Orders:  -     Comprehensive metabolic panel; Future    Mixed hyperlipidemia  Comments:  Continue statin therapy and low-fat diet check labs  Orders:  -     Lipid Panel with Direct LDL reflex; Future    Class 2 severe obesity due to excess calories with serious comorbidity and body mass index (BMI) of 36.0 to 36.9 in adult   Comments:  Exercise encouraged to promote weight loss    Prostate cancer (HCC)    Osteoporosis without current pathological fracture, unspecified osteoporosis type  Comments:  Continue calcium vitamin D supplements.  DEXA scan due in December          Subjective:      Patient ID: Derick Blanton is a 78 y.o. male.    Patient presents in the office for follow-up chronic conditions.  Patient has insulin diabetes mellitus type 2.  He is currently on glipizide  in the morning metformin 500 twice daily and Ozempic milligram injected weekly.  Is due for labs.  States he is having trouble getting the Ozempic due to manufacturing supply shortage.  Check in again with his local pharmacy.  3 of CAD and aortic valve replacement.  Is on a baby aspirin.  He has no angina or signs of congestive heart failure.  For hypertension he is on lisinopril 2.5 mg.  For hyperlipidemia he is on atorvastatin 40 mg.  Patient has a history of prostate cancer currently in  remission.  Also has osteoporosis he is on calcium vitamin D supplements along with Fosamax.  DEXA scan is due in December        The following portions of the patient's history were reviewed and updated as appropriate: He   Patient Active Problem List    Diagnosis Date Noted    S/P CABG (coronary artery bypass graft) 09/08/2023    Erectile dysfunction due to arterial insufficiency 04/04/2023    Benign essential microscopic hematuria 04/04/2023    Post-nasal drip 01/09/2023    Cubital tunnel syndrome on right 11/09/2022    Humerus fracture 08/26/2021    Vitamin D deficiency 07/18/2019    Erectile dysfunction due to diseases classified elsewhere 04/16/2019    History of aortic valve replacement 11/30/2017    Arteriosclerotic coronary artery disease 11/30/2017    Class 2 severe obesity with serious comorbidity and body mass index (BMI) of 36.0 to 36.9 in adult  11/30/2017    Osteoporosis 11/30/2017    Type 2 diabetes mellitus with hyperglycemia, without long-term current use of insulin (HCC) 06/20/2017    YATES (nonalcoholic steatohepatitis) 09/14/2015    Prostate cancer (HCC) 06/18/2013    Hyperlipidemia 07/17/2012    Osteoarthritis 07/17/2012    Benign essential HTN 04/30/2012     Current Outpatient Medications   Medication Sig Dispense Refill    alendronate (FOSAMAX) 70 mg tablet TAKE 1 TABLET ONCE A WEEK ON EMPTY STOMACH WITH A FULL GLASS OF WATER, DO NOT LIE DOWN FOR 30 MINUTES AFTER TAKING 12 tablet 2    aspirin 81 MG tablet Take 81 mg by mouth daily      atorvastatin (LIPITOR) 40 mg tablet TAKE 1 TABLET BY MOUTH EVERY DAY 90 tablet 1    Blood Glucose Monitoring Suppl (ONE TOUCH ULTRA 2) w/Device KIT by Does not apply route daily DX E11.65  Pt to   Check  fbs  daily 1 each 0    Blood Pressure Monitoring (CVS Blood Pressure Monitor) MISC Use 3 (three) times a week 1 each 0    Cholecalciferol (VITAMIN D3) 2000 units capsule Take 2,000 Units by mouth daily  11    clotrimazole-betamethasone (LOTRISONE) 1-0.05 % cream  APPLY TO AFFECTED AREA TWICE A DAY BY TOPICAL ROUTE      Coenzyme Q10 (COQ-10) 100 MG CAPS Take 100 mg by mouth daily      Continuous Blood Gluc  (FreeStyle Denny 2 [place]) ADELAIDA USE AS DIRECTED 4 TIMES A DAY 1 each 0    Continuous Blood Gluc Sensor (FreeStyle Denny 2 Sensor) MISC Use daily as directed for CGM - Change every 14 days 6 each 3    fluorouracil (EFUDEX) 5 % cream APPLY TWICE A DAY TO SCALP FOR 1 MONTH 40 g 0    glipiZIDE (GLUCOTROL XL) 5 mg 24 hr tablet TAKE 1 TABLET (5 MG TOTAL) BY MOUTH DAILY. 90 tablet 1    lisinopril (ZESTRIL) 2.5 mg tablet Take 1 tablet (2.5 mg total) by mouth daily 90 tablet 3    metFORMIN (GLUCOPHAGE-XR) 500 mg 24 hr tablet Take 2 tablets (1,000 mg total) by mouth 2 (two) times a day with meals (breakfast & dinner) 180 tablet 3    Multiple Vitamin-Folic Acid TABS Take by mouth in the morning      Omega-3 Fatty Acids (FISH OIL) 1,000 mg Take 1,000 mg by mouth daily.      semaglutide, 1 mg/dose, (Ozempic, 1 MG/DOSE,) 4 mg/3 mL injection pen Inject 1 mg once a week SQ (Patient not taking: Reported on 3/26/2024) 9 mL 0    sildenafil (VIAGRA) 50 MG tablet Take 1 tablet (50 mg total) by mouth daily as needed for erectile dysfunction 5 tablet 5    Blood Glucose Calibration (OT ULTRA/FASTTK CNTRL SOLN) SOLN by In Vitro route as needed (to check meter calibration) 1 each 3    senna-docusate sodium (SENOKOT S) 8.6-50 mg per tablet Take 1 tablet by mouth 2 (two) times a day (Patient not taking: Reported on 11/2/2023)  0     No current facility-administered medications for this visit.     He has No Known Allergies..    Review of Systems   Constitutional:  Negative for activity change, appetite change, chills, fatigue and fever.   HENT:  Positive for congestion. Negative for ear pain and sore throat.    Eyes:  Negative for visual disturbance.   Respiratory:  Positive for cough. Negative for shortness of breath.    Cardiovascular:  Negative for chest pain, palpitations and leg swelling.    Gastrointestinal:  Negative for abdominal pain, blood in stool, constipation, diarrhea and nausea.   Genitourinary:  Negative for difficulty urinating.   Musculoskeletal:  Negative for arthralgias, back pain and myalgias.   Skin:  Negative for rash.   Neurological:  Negative for dizziness, syncope and headaches.   Psychiatric/Behavioral:  Negative for self-injury, sleep disturbance and suicidal ideas. The patient is not nervous/anxious.          Objective:        Physical Exam  Vitals and nursing note reviewed.   Constitutional:       General: He is not in acute distress.     Appearance: He is well-developed. He is obese. He is not ill-appearing.   HENT:      Head: Normocephalic and atraumatic.      Right Ear: Tympanic membrane, ear canal and external ear normal.      Left Ear: Tympanic membrane, ear canal and external ear normal.   Eyes:      Conjunctiva/sclera: Conjunctivae normal.      Pupils: Pupils are equal, round, and reactive to light.   Neck:      Thyroid: No thyromegaly.      Vascular: No carotid bruit.   Cardiovascular:      Rate and Rhythm: Normal rate and regular rhythm.      Pulses: no weak pulses.           Dorsalis pedis pulses are 2+ on the right side and 2+ on the left side.      Heart sounds: Normal heart sounds. No murmur heard.  Pulmonary:      Effort: Pulmonary effort is normal.      Breath sounds: Normal breath sounds. No wheezing.   Abdominal:      General: Bowel sounds are normal.      Palpations: Abdomen is soft. There is no mass.      Tenderness: There is no abdominal tenderness.   Musculoskeletal:      Right lower leg: No edema.      Left lower leg: No edema.   Feet:      Right foot:      Skin integrity: Callus and dry skin present.      Left foot:      Skin integrity: Callus and dry skin present.   Lymphadenopathy:      Cervical: No cervical adenopathy.   Skin:     General: Skin is warm and dry.   Neurological:      General: No focal deficit present.      Mental Status: He is alert and  oriented to person, place, and time.   Psychiatric:         Mood and Affect: Mood normal.         Behavior: Behavior normal.         Thought Content: Thought content normal.         Judgment: Judgment normal.     Diabetic Foot Exam    Patient's shoes and socks removed.    Right Foot/Ankle   Right Foot Inspection  Skin Exam: skin intact, dry skin, callus and callus.     Toe Exam: right toe deformity. No swelling and erythema    Sensory   Vibration: intact  Monofilament testing: intact    Vascular  The right DP pulse is 2+.     Left Foot/Ankle  Left Foot Inspection  Skin Exam: skin intact, dry skin and callus.     Toe Exam: left toe deformity. No swelling and no erythema.     Sensory   Vibration: intact  Monofilament testing: intact    Vascular  The left DP pulse is 2+.     Assign Risk Category  Deformity present  No loss of protective sensation  No weak pulses  Risk: 0

## 2024-04-04 ENCOUNTER — LAB (OUTPATIENT)
Dept: LAB | Facility: CLINIC | Age: 79
End: 2024-04-04
Payer: COMMERCIAL

## 2024-04-04 DIAGNOSIS — E78.2 MIXED HYPERLIPIDEMIA: ICD-10-CM

## 2024-04-04 DIAGNOSIS — E11.65 TYPE 2 DIABETES MELLITUS WITH HYPERGLYCEMIA, WITHOUT LONG-TERM CURRENT USE OF INSULIN (HCC): ICD-10-CM

## 2024-04-04 DIAGNOSIS — E11.65 UNCONTROLLED TYPE 2 DIABETES MELLITUS WITH HYPERGLYCEMIA (HCC): ICD-10-CM

## 2024-04-04 DIAGNOSIS — I10 BENIGN ESSENTIAL HTN: ICD-10-CM

## 2024-04-04 LAB
ALBUMIN SERPL BCP-MCNC: 4 G/DL (ref 3.5–5)
ALP SERPL-CCNC: 57 U/L (ref 34–104)
ALT SERPL W P-5'-P-CCNC: 46 U/L (ref 7–52)
ANION GAP SERPL CALCULATED.3IONS-SCNC: 10 MMOL/L (ref 4–13)
AST SERPL W P-5'-P-CCNC: 53 U/L (ref 13–39)
BASOPHILS # BLD AUTO: 0.04 THOUSANDS/ÂΜL (ref 0–0.1)
BASOPHILS NFR BLD AUTO: 0 % (ref 0–1)
BILIRUB SERPL-MCNC: 0.7 MG/DL (ref 0.2–1)
BUN SERPL-MCNC: 17 MG/DL (ref 5–25)
CALCIUM SERPL-MCNC: 9.6 MG/DL (ref 8.4–10.2)
CHLORIDE SERPL-SCNC: 102 MMOL/L (ref 96–108)
CHOLEST SERPL-MCNC: 116 MG/DL
CO2 SERPL-SCNC: 26 MMOL/L (ref 21–32)
CREAT SERPL-MCNC: 1.05 MG/DL (ref 0.6–1.3)
CREAT UR-MCNC: 99.3 MG/DL
EOSINOPHIL # BLD AUTO: 0.26 THOUSAND/ÂΜL (ref 0–0.61)
EOSINOPHIL NFR BLD AUTO: 3 % (ref 0–6)
ERYTHROCYTE [DISTWIDTH] IN BLOOD BY AUTOMATED COUNT: 15 % (ref 11.6–15.1)
EST. AVERAGE GLUCOSE BLD GHB EST-MCNC: 197 MG/DL
GFR SERPL CREATININE-BSD FRML MDRD: 68 ML/MIN/1.73SQ M
GLUCOSE P FAST SERPL-MCNC: 147 MG/DL (ref 65–99)
HBA1C MFR BLD: 8.5 %
HCT VFR BLD AUTO: 39.3 % (ref 36.5–49.3)
HDLC SERPL-MCNC: 37 MG/DL
HGB BLD-MCNC: 12.2 G/DL (ref 12–17)
IMM GRANULOCYTES # BLD AUTO: 0.03 THOUSAND/UL (ref 0–0.2)
IMM GRANULOCYTES NFR BLD AUTO: 0 % (ref 0–2)
LDLC SERPL CALC-MCNC: 51 MG/DL (ref 0–100)
LYMPHOCYTES # BLD AUTO: 4.07 THOUSANDS/ÂΜL (ref 0.6–4.47)
LYMPHOCYTES NFR BLD AUTO: 41 % (ref 14–44)
MCH RBC QN AUTO: 29.9 PG (ref 26.8–34.3)
MCHC RBC AUTO-ENTMCNC: 31 G/DL (ref 31.4–37.4)
MCV RBC AUTO: 96 FL (ref 82–98)
MICROALBUMIN UR-MCNC: 52.5 MG/L
MICROALBUMIN/CREAT 24H UR: 53 MG/G CREATININE (ref 0–30)
MONOCYTES # BLD AUTO: 0.73 THOUSAND/ÂΜL (ref 0.17–1.22)
MONOCYTES NFR BLD AUTO: 7 % (ref 4–12)
NEUTROPHILS # BLD AUTO: 4.76 THOUSANDS/ÂΜL (ref 1.85–7.62)
NEUTS SEG NFR BLD AUTO: 49 % (ref 43–75)
NRBC BLD AUTO-RTO: 0 /100 WBCS
PLATELET # BLD AUTO: 274 THOUSANDS/UL (ref 149–390)
PMV BLD AUTO: 10.1 FL (ref 8.9–12.7)
POTASSIUM SERPL-SCNC: 4.4 MMOL/L (ref 3.5–5.3)
PROT SERPL-MCNC: 7.8 G/DL (ref 6.4–8.4)
RBC # BLD AUTO: 4.08 MILLION/UL (ref 3.88–5.62)
SODIUM SERPL-SCNC: 138 MMOL/L (ref 135–147)
TRIGL SERPL-MCNC: 140 MG/DL
WBC # BLD AUTO: 9.89 THOUSAND/UL (ref 4.31–10.16)

## 2024-04-04 PROCEDURE — 83036 HEMOGLOBIN GLYCOSYLATED A1C: CPT

## 2024-04-04 PROCEDURE — 82043 UR ALBUMIN QUANTITATIVE: CPT

## 2024-04-04 PROCEDURE — 82570 ASSAY OF URINE CREATININE: CPT

## 2024-04-04 PROCEDURE — 36415 COLL VENOUS BLD VENIPUNCTURE: CPT

## 2024-04-04 PROCEDURE — 80061 LIPID PANEL: CPT

## 2024-04-04 PROCEDURE — 85025 COMPLETE CBC W/AUTO DIFF WBC: CPT

## 2024-04-04 PROCEDURE — 80053 COMPREHEN METABOLIC PANEL: CPT

## 2024-04-09 ENCOUNTER — OFFICE VISIT (OUTPATIENT)
Dept: UROLOGY | Facility: AMBULATORY SURGERY CENTER | Age: 79
End: 2024-04-09
Payer: COMMERCIAL

## 2024-04-09 VITALS
DIASTOLIC BLOOD PRESSURE: 80 MMHG | SYSTOLIC BLOOD PRESSURE: 142 MMHG | WEIGHT: 235 LBS | OXYGEN SATURATION: 96 % | HEART RATE: 86 BPM | BODY MASS INDEX: 35.73 KG/M2

## 2024-04-09 DIAGNOSIS — N52.1 ERECTILE DYSFUNCTION DUE TO DISEASES CLASSIFIED ELSEWHERE: ICD-10-CM

## 2024-04-09 DIAGNOSIS — C61 PROSTATE CANCER (HCC): Primary | ICD-10-CM

## 2024-04-09 DIAGNOSIS — N48.83 ACQUIRED BURIED PENIS: ICD-10-CM

## 2024-04-09 PROCEDURE — 99214 OFFICE O/P EST MOD 30 MIN: CPT | Performed by: UROLOGY

## 2024-04-09 NOTE — ASSESSMENT & PLAN NOTE
We briefly discussed the role for [employer] but the patient is not interested as this is no longer an important part of his and his wife's life.

## 2024-04-09 NOTE — PROGRESS NOTES
Assessment/Plan:    Prostate cancer (HCC)  Patient with undetectable PSA since brachytherapy.  Due for PSA check now.  If it is abnormal we will contact him.  Otherwise plan for follow-up in 1 year with a PSA which has been ordered as well.  At some point if he wishes we can consider checking PSAs every other year since he is now 10 years out    Erectile dysfunction due to diseases classified elsewhere  We briefly discussed the role for Trimix but the patient is not interested as this is no longer an important part of his and his wife's life.    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  I showed this to him on exam explaining his penis normal size.  He is trying to lose weight which will help.  Otherwise I do not think it warrants additional intervention as surgery for buried penis is quite complex          Subjective:      Patient ID: Derick Blanton is a 78 y.o. male.    HPI  76 y.o. male with a history of prostate cancer presents today for follow up.       The patient's history of brachytherapy in 2013.  His last PSA was in April 2023 at which time his PSA remained undetectable.     He is doing well overall.     Denies voiding issues other than using a urinal to void because his buried penis from suprapubic fat pad otherwise makes it hard to use a regular toilet.)     Urine dip at last visit was heme +.  As a result UA was sent out April 2023 and returned with no microscopic hematuria and a very minimal pyuria.     Has ED and is on viagra 50mg which we recommended going up to 100 mg as he is not doing well on 50 alone.  This has been minimally effective but sexual intercourse is no longer is important to him and his wife.       Past Surgical History:   Procedure Laterality Date    CARDIAC VALVE REPLACEMENT      COLONOSCOPY      CORONARY ARTERY BYPASS GRAFT      FRACTURE SURGERY      left knee surgery    JOINT REPLACEMENT Left     tkr     KNEE ARTHROSCOPY      Therapeutic    KNEE SURGERY      replacement    LUNG SURGERY      LUNG SURGERY      wedge resection     TONSILLECTOMY      UMBILICAL HERNIA REPAIR          Past Medical History:   Diagnosis Date    Arteriosclerotic coronary artery disease 11/30/2017    Benign prostate hyperplasia     L.A....7/17/12   R....8/30/17      COPD (chronic obstructive pulmonary disease) (HCC)     questionable    Deep venous embolism and thrombosis (HCC)     Diabetes mellitus (HCC)     Hyperlipidemia     Hypertension     Impacted cerumen     unspecified laterity / L.A.....6/20/12       Osteoarthritis 07/17/2012    Prostate cancer (HCC)     Pulmonary embolism (HCC)              Review of Systems   Constitutional:  Negative for chills and fever.   HENT:  Negative for ear pain and sore throat.    Eyes:  Negative for pain and visual disturbance.   Respiratory:  Negative for cough and shortness of breath.    Cardiovascular:  Negative for chest pain and palpitations.   Gastrointestinal:  Negative for abdominal pain and vomiting.   Genitourinary:  Negative for dysuria and hematuria.   Musculoskeletal:  Negative for arthralgias and back pain.   Skin:  Negative for color change and rash.   Neurological:  Negative for seizures and syncope.   All other systems reviewed and are negative.        Objective:      /80 (BP Location: Left arm, Patient Position: Sitting, Cuff Size: Adult)   Pulse 86   Wt 107 kg (235 lb)   SpO2 96%   BMI 35.73 kg/m²     Lab Results   Component Value Date    PSA <0.1 04/04/2023    PSA <0.1 05/02/2022    PSA <0.1 11/23/2021    PSA <0.1 05/14/2020    PSA <0.1 04/25/2019    PSA <0.1 06/08/2017    PSA <0.1 03/22/2017    PSA <0.1 02/13/2017    PSA <0.1 11/22/2016    PSA <0.1 05/09/2016          Physical Exam  Vitals reviewed.   Constitutional:       Appearance: Normal appearance. He is normal weight.   HENT:      Head: Normocephalic and atraumatic.   Eyes:      Pupils: Pupils are equal, round, and  reactive to light.   Abdominal:      General: Abdomen is flat.   Genitourinary:     Comments: Patient has a significant central obesity with suprapubic fat pad burying the penis.  When I retract the pubic fat pad his penis is seen better and is normal in size  Neurological:      General: No focal deficit present.      Mental Status: He is alert and oriented to person, place, and time.   Psychiatric:         Mood and Affect: Mood normal.         Thought Content: Thought content normal.           Orders  Orders Placed This Encounter   Procedures    PSA Total, Diagnostic     Standing Status:   Future     Standing Expiration Date:   4/9/2025    PSA Total, Diagnostic     Standing Status:   Future     Standing Expiration Date:   4/9/2025

## 2024-04-09 NOTE — ASSESSMENT & PLAN NOTE
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  I showed this to him on exam explaining his penis normal size.  He is trying to lose weight which will help.  Otherwise I do not think it warrants additional intervention as surgery for buried penis is quite complex

## 2024-04-09 NOTE — ASSESSMENT & PLAN NOTE
Patient with undetectable PSA since brachytherapy.  Due for PSA check now.  If it is abnormal we will contact him.  Otherwise plan for follow-up in 1 year with a PSA which has been ordered as well.  At some point if he wishes we can consider checking PSAs every other year since he is now 10 years out

## 2024-04-27 DIAGNOSIS — E11.65 TYPE 2 DIABETES MELLITUS WITH HYPERGLYCEMIA, WITHOUT LONG-TERM CURRENT USE OF INSULIN (HCC): ICD-10-CM

## 2024-04-27 RX ORDER — GLIPIZIDE 5 MG/1
5 TABLET, FILM COATED, EXTENDED RELEASE ORAL DAILY
Qty: 90 TABLET | Refills: 1 | Status: SHIPPED | OUTPATIENT
Start: 2024-04-27 | End: 2024-04-29

## 2024-04-29 ENCOUNTER — OFFICE VISIT (OUTPATIENT)
Dept: ENDOCRINOLOGY | Facility: CLINIC | Age: 79
End: 2024-04-29
Payer: COMMERCIAL

## 2024-04-29 VITALS
WEIGHT: 237.2 LBS | SYSTOLIC BLOOD PRESSURE: 122 MMHG | HEIGHT: 68 IN | DIASTOLIC BLOOD PRESSURE: 82 MMHG | OXYGEN SATURATION: 96 % | HEART RATE: 94 BPM | BODY MASS INDEX: 35.95 KG/M2

## 2024-04-29 DIAGNOSIS — E11.65 TYPE 2 DIABETES MELLITUS WITH HYPERGLYCEMIA, WITHOUT LONG-TERM CURRENT USE OF INSULIN (HCC): Primary | ICD-10-CM

## 2024-04-29 DIAGNOSIS — I10 BENIGN ESSENTIAL HTN: ICD-10-CM

## 2024-04-29 DIAGNOSIS — E78.2 MIXED HYPERLIPIDEMIA: ICD-10-CM

## 2024-04-29 DIAGNOSIS — E11.65 UNCONTROLLED TYPE 2 DIABETES MELLITUS WITH HYPERGLYCEMIA (HCC): ICD-10-CM

## 2024-04-29 DIAGNOSIS — M81.0 OSTEOPOROSIS WITHOUT CURRENT PATHOLOGICAL FRACTURE, UNSPECIFIED OSTEOPOROSIS TYPE: ICD-10-CM

## 2024-04-29 DIAGNOSIS — E55.9 VITAMIN D DEFICIENCY: ICD-10-CM

## 2024-04-29 PROCEDURE — 1160F RVW MEDS BY RX/DR IN RCRD: CPT | Performed by: PHYSICIAN ASSISTANT

## 2024-04-29 PROCEDURE — 3079F DIAST BP 80-89 MM HG: CPT | Performed by: PHYSICIAN ASSISTANT

## 2024-04-29 PROCEDURE — 3074F SYST BP LT 130 MM HG: CPT | Performed by: PHYSICIAN ASSISTANT

## 2024-04-29 PROCEDURE — 1159F MED LIST DOCD IN RCRD: CPT | Performed by: PHYSICIAN ASSISTANT

## 2024-04-29 PROCEDURE — 99214 OFFICE O/P EST MOD 30 MIN: CPT | Performed by: PHYSICIAN ASSISTANT

## 2024-04-29 RX ORDER — GLIPIZIDE 5 MG/1
5 TABLET, FILM COATED, EXTENDED RELEASE ORAL 2 TIMES DAILY WITH MEALS
Qty: 180 TABLET | Refills: 1 | Status: SHIPPED | OUTPATIENT
Start: 2024-04-29 | End: 2024-10-26

## 2024-04-29 NOTE — PATIENT INSTRUCTIONS
Hypoglycemia instructions   Derick Blanton  4/29/2024  [id number]    Low Blood Sugar    Steps to treat low blood sugar.    1. Test blood sugar if you have symptoms of low blood sugar:   Low Blood Sugar Symptoms:  o Sweaty  o Dizzy  o Rapid heartbeat  o Shaky  o Bad mood  o Hungry      2. Treat blood sugar less than 70 with 15 grams of fast-acting carbohydrate:   Examples of 15 grams Fast-Acting Carbohydrate:  o 4 oz juice  o 4 oz regular soda  o 3-4 glucose tablets (chew)  o 3-4 hard candies (chew)          3.  Wait 15 minutes and test your blood sugar again     4. If blood sugar is less than 100, repeat steps 2-3.    5. When your blood sugar is 100 or more, eat a snack if it will be longer than one hour until your next meal. The snack should be 15 grams of carbohydrate and a protein:   Examples of snacks:  o ½ sandwich  o 6 crackers with cheese  o Piece of fruit with cheese or peanut butter  o 6 crackers with peanut butter

## 2024-04-29 NOTE — PROGRESS NOTES
Patient Progress Note      CC: DM      Referring Provider  Racquel Marr Pa-c  [address] [place] [address]  [address] [place] [id number]     History of Present Illness:   Derick Blanton is a 78 y.o. male with a history of type 2 diabetes without long term use of insulin. Diabetes course has been stable. Complications of DM: none reported. Denies any issues with his current regimen. Home glucose monitoring: are performed regularly. Using Freestyle Denny.     He did not bring in his denny reader for download today.  He reports his readings average around 180 mg/dl. He states his average is around 160s mg/dl with the Ozempic.     Current regimen: Ozempic 1 mg weekly (has been out of it for 2 weeks), metformin 1000 mg BID, glipizide 5 mg daily  compliant most of the time, denies any side effects from medications.  Hypoglycemic episodes: No, rare  H/o of hypoglycemia causing hospitalization or Intervention such as glucagon injection or ambulance call : No  Hypoglycemia symptoms: dizziness  Treatment of hypoglycemia: orange juice     Diet: 2-3 meals per day, 2 snacks per day. Timing of meals is predictable.   Diabetic diet compliance: noncompliant much of the time. He states his portions are smaller.  Activity: Daily activity is predictable: Yes. No routine exercise but he plans to restart.      Ophthamology: overdue  Podiatry: foot exam UTD, March 2024     On ACE inhibitor/ARB  Has hyperlipidemia: on statin - tolerating well, no myalgias. compliant most of the time, denies any side effects from medications.  Thyroid disorders: No  History of pancreatitis: No     Osteopenia / vitamin D deficiency: previously treated with Prolia injections but it was denied by insurance company. Currently on Fosamax 70 mg weekly and taking it as instructed and tolerating it well.  Fosamax was started in April 2019.  He is also taking vitamin D3 2000 IU daily. He did have a fall in August 2021 which resulted in a fracture. Does do not  weight bearing exercises, but plans to restart. Last DEXA scan in December 2022 showed osteopenia of multiple sites.       Patient Active Problem List   Diagnosis    History of aortic valve replacement    Arteriosclerotic coronary artery disease    Benign essential HTN    Type 2 diabetes mellitus with hyperglycemia, without long-term current use of insulin (HCC)    Hyperlipidemia    YATES (nonalcoholic steatohepatitis)    Class 2 severe obesity with serious comorbidity and body mass index (BMI) of 36.0 to 36.9 in adult (HCC)    Osteoarthritis    Osteoporosis    Prostate cancer (HCC)    Erectile dysfunction due to diseases classified elsewhere    Vitamin D deficiency    Humerus fracture    Cubital tunnel syndrome on right    Post-nasal drip    Erectile dysfunction due to arterial insufficiency    Benign essential microscopic hematuria    S/P CABG (coronary artery bypass graft)    Acquired buried penis      Past Medical History:   Diagnosis Date    Arteriosclerotic coronary artery disease 11/30/2017    Benign prostate hyperplasia     L.A....7/17/12   R....8/30/17      COPD (chronic obstructive pulmonary disease) (HCC)     questionable    Deep venous embolism and thrombosis (HCC)     Diabetes mellitus (HCC)     Hyperlipidemia     Hypertension     Impacted cerumen     unspecified laterity / L.A.....6/20/12       Osteoarthritis 07/17/2012    Prostate cancer (HCC)     Pulmonary embolism (HCC)       Past Surgical History:   Procedure Laterality Date    CARDIAC VALVE REPLACEMENT      COLONOSCOPY      CORONARY ARTERY BYPASS GRAFT      FRACTURE SURGERY      left knee surgery    JOINT REPLACEMENT Left     tkr    KNEE ARTHROSCOPY      Therapeutic    KNEE SURGERY      replacement    LUNG SURGERY      LUNG SURGERY      wedge resection     TONSILLECTOMY      UMBILICAL HERNIA REPAIR        Family History   Problem Relation Age of Onset    COPD Mother     Heart attack Mother     Heart attack Father      Social History     Tobacco Use     Smoking status: Former     Current packs/day: 0.00     Types: Cigarettes     Start date:      Quit date: 3/23/2000     Years since quittin.1     Passive exposure: Past    Smokeless tobacco: Never   Substance Use Topics    Alcohol use: Yes     Comment: social drinker rare     No Known Allergies      Current Outpatient Medications:     alendronate (FOSAMAX) 70 mg tablet, TAKE 1 TABLET ONCE A WEEK ON EMPTY STOMACH WITH A FULL GLASS OF WATER, DO NOT LIE DOWN FOR 30 MINUTES AFTER TAKING, Disp: 12 tablet, Rfl: 2    aspirin 81 MG tablet, Take 81 mg by mouth daily, Disp: , Rfl:     atorvastatin (LIPITOR) 40 mg tablet, TAKE 1 TABLET BY MOUTH EVERY DAY, Disp: 90 tablet, Rfl: 1    Blood Glucose Monitoring Suppl (ONE TOUCH ULTRA 2) w/Device KIT, by Does not apply route daily DX E11.65  Pt to   Check  fbs  daily, Disp: 1 each, Rfl: 0    Blood Pressure Monitoring ([employer] Blood Pressure Monitor) MISC, Use 3 (three) times a week, Disp: 1 each, Rfl: 0    Cholecalciferol (VITAMIN D3) 2000 units capsule, Take 2,000 Units by mouth daily, Disp: , Rfl: 11    clotrimazole-betamethasone (LOTRISONE) 1-0.05 % cream, APPLY TO AFFECTED AREA TWICE A DAY BY TOPICAL ROUTE, Disp: , Rfl:     Coenzyme Q10 (COQ-10) 100 MG CAPS, Take 100 mg by mouth daily, Disp: , Rfl:     Continuous Blood Gluc  (FreeStyle Denny 2 [place]) [facility], USE AS DIRECTED 4 TIMES A DAY, Disp: 1 each, Rfl: 0    Continuous Blood Gluc Sensor (FreeStyle Denny 2 Sensor) MISC, Use daily as directed for CGM - Change every 14 days, Disp: 6 each, Rfl: 3    fluorouracil (EFUDEX) 5 % cream, APPLY TWICE A DAY TO SCALP FOR 1 MONTH, Disp: 40 g, Rfl: 0    glipiZIDE (GLUCOTROL XL) 5 mg 24 hr tablet, Take 1 tablet (5 mg total) by mouth 2 (two) times a day with meals, Disp: 180 tablet, Rfl: 1    lisinopril (ZESTRIL) 2.5 mg tablet, Take 1 tablet (2.5 mg total) by mouth daily, Disp: 90 tablet, Rfl: 3    metFORMIN (GLUCOPHAGE-XR) 500 mg 24 hr tablet, Take 2 tablets (1,000 mg total)  "by mouth 2 (two) times a day with meals (breakfast & dinner), Disp: 180 tablet, Rfl: 3    Multiple Vitamin-Folic Acid TABS, Take by mouth in the morning, Disp: , Rfl:     Omega-3 Fatty Acids (FISH OIL) 1,000 mg, Take 1,000 mg by mouth daily., Disp: , Rfl:     semaglutide, 1 mg/dose, (Ozempic, 1 MG/DOSE,) 4 mg/3 mL injection pen, Inject 1 mg once a week SQ, Disp: 9 mL, Rfl: 0    sildenafil (VIAGRA) 50 MG tablet, Take 1 tablet (50 mg total) by mouth daily as needed for erectile dysfunction, Disp: 5 tablet, Rfl: 5    tretinoin (RETIN-A) 0.025 % cream, , Disp: , Rfl:     senna-docusate sodium (SENOKOT S) 8.6-50 mg per tablet, Take 1 tablet by mouth 2 (two) times a day (Patient not taking: Reported on 11/2/2023), Disp: , Rfl: 0  Review of Systems   Constitutional:  Negative for activity change, appetite change, fatigue and unexpected weight change.   HENT:  Negative for trouble swallowing.    Eyes:  Negative for visual disturbance.   Respiratory:  Negative for shortness of breath.    Cardiovascular:  Negative for chest pain and palpitations.   Gastrointestinal:  Negative for constipation and diarrhea.   Endocrine: Negative for polydipsia and polyuria.   Musculoskeletal: Negative.    Skin:  Negative for wound.   Neurological:  Negative for numbness.   Psychiatric/Behavioral: Negative.         Physical Exam:  Body mass index is 36.07 kg/m².  /82   Pulse 94   Ht 5' 8\" (1.727 m)   Wt 108 kg (237 lb 3.2 oz)   SpO2 96%   BMI 36.07 kg/m²    Wt Readings from Last 3 Encounters:   04/29/24 108 kg (237 lb 3.2 oz)   04/09/24 107 kg (235 lb)   03/26/24 107 kg (236 lb 12.8 oz)       Physical Exam  Vitals and nursing note reviewed.   Constitutional:       Appearance: He is well-developed.   HENT:      Head: Normocephalic.   Eyes:      General: No scleral icterus.  Neck:      Thyroid: No thyromegaly.   Cardiovascular:      Rate and Rhythm: Normal rate and regular rhythm.      Pulses:           Radial pulses are 2+ on the " right side and 2+ on the left side.      Heart sounds: No murmur heard.  Pulmonary:      Effort: Pulmonary effort is normal. No respiratory distress.      Breath sounds: Normal breath sounds. No wheezing.   Musculoskeletal:      Cervical back: Neck supple.   Skin:     General: Skin is warm and dry.   Neurological:      Mental Status: He is alert.       Patient's shoes and socks were not removed.          Labs:   Component      Latest Ref Rng 3/22/2023 9/13/2023 12/28/2023 4/4/2024   Sodium      135 - 147 mmol/L 141    138    Potassium      3.5 - 5.3 mmol/L 4.4    4.4    Chloride      96 - 108 mmol/L 105    102    Carbon Dioxide      21 - 32 mmol/L 27    26    ANION GAP      4 - 13 mmol/L 9    10    BUN      5 - 25 mg/dL 22    17    Creatinine      0.60 - 1.30 mg/dL 1.02    1.05    GLUCOSE, FASTING      65 - 99 mg/dL 138 (H)    147 (H)    Calcium      8.4 - 10.2 mg/dL 9.7    9.6    AST      13 - 39 U/L 46 (H)    53 (H)    ALT      7 - 52 U/L 51    46    ALK PHOS      34 - 104 U/L 58    57    Total Protein      6.4 - 8.4 g/dL 7.5    7.8    Albumin      3.5 - 5.0 g/dL 4.1    4.0    Total Bilirubin      0.20 - 1.00 mg/dL 0.73    0.70    GFR, Calculated      ml/min/1.73sq m 71    68    Cholesterol      See Comment mg/dL 127    116    Triglycerides      See Comment mg/dL 132    140    HDL      >=40 mg/dL 41    37 (L)    LDL Calculated      0 - 100 mg/dL 60    51    Non-HDL Cholesterol      mg/dl 86       EXT Creatinine Urine      Reference range not established. mg/dL 120.0    99.3    Albumin,U,Random      <20.0 mg/L 30.2 (H)    52.5 (H)    Albumin Creat Ratio      0 - 30 mg/g creatinine 25    53 (H)    Hemoglobin A1C      Normal 4.0-5.6%; PreDiabetic 5.7-6.4%; Diabetic >=6.5%; Glycemic control for adults with diabetes <7.0% % 7.5 (H)  8.1 !  8.8 !  8.5 (H)    eAG, EST AVG Glucose      mg/dl 169    197    VITAMIN D, 25-HYDROXY      30.0 - 100.0 ng/mL 52.1          Legend:  (H) High  ! Abnormal  (L)  Low      Plan:    Diagnoses and all orders for this visit:    Type 2 diabetes mellitus with hyperglycemia, without long-term current use of insulin ([facility])  HGA1C 8.5%. Improved.  Treatment regimen: increase glipizide to 5 mg BID. Continue other treatments. Advised to make dietary changes.  Episodes of hypoglycemia can lead to permanent disability and death.  Discussed risks/complications associated with uncontrolled diabetes.    Advised to adhere to diabetic diet, and recommended staying active/exercising routinely as tolerated.  Keep carbohydrates consistent to limit blood glucose fluctuations.  Advised to call if blood sugars less than 70 mg/dl or over 300 mg/dl.   Check blood glucose 2 times a day  Discussed symptoms and treatment of hypoglycemia.   Discussed use of CGM to collect additional blood glucose data to reveal trends and patterns that can be used to optimize treatment plan.   Recommended routine follow-up with podiatry and ophthalmology.   Call to download CGM in 2 weeks   Ordered blood work to complete prior to next visit.  -     Hemoglobin A1C; Future  -     Albumin / creatinine urine ratio; Future  -     Basic metabolic panel; Future  -     Continuous Blood Gluc Sensor (FreeStyle Denny 2 Sensor) MISC; Use daily as directed for CGM - Change every 14 days  -     glipiZIDE (GLUCOTROL XL) 5 mg 24 hr tablet; Take 1 tablet (5 mg total) by mouth 2 (two) times a day with meals  -     semaglutide, 1 mg/dose, (Ozempic, 1 MG/DOSE,) 4 mg/3 mL injection pen; Inject 1 mg once a week SQ    Mixed hyperlipidemia  LDL 51  Continue statin therapy   Managed by PCP    Benign essential HTN  Blood pressure is well controlled     Osteoporosis without current pathological fracture  Continue Fosamax and vitamin D supplementation  DEXA scan due in December 2024  After DEXA scan is completed we will need to reevaluate if patient needs to continue treatment for osteoporosis or if a drug holiday can be taken.  Will need to  consider alternative treatment if he needs continued treatment for osteoporosis as he has been on Fosamax for 4 years.  -     Basic metabolic panel; Future    Vitamin D deficiency  Vitamin D previously 52.1  Continue current supplementation  Check level  -     Vitamin D 25 hydroxy; Future          Discussed with the patient diagnosis and treatment and all questions fully answered. He will call me if any problems arise.    Counseled patient on diagnostic results, prognosis, risk and benefit of treatment options, instruction for management, importance of treatment compliance, risk factor reduction and impressions.      Deisy Black PA-C

## 2024-05-13 ENCOUNTER — OFFICE VISIT (OUTPATIENT)
Dept: ENDOCRINOLOGY | Facility: CLINIC | Age: 79
End: 2024-05-13

## 2024-05-13 ENCOUNTER — TELEPHONE (OUTPATIENT)
Dept: ENDOCRINOLOGY | Facility: CLINIC | Age: 79
End: 2024-05-13

## 2024-05-13 DIAGNOSIS — E11.65 TYPE 2 DIABETES MELLITUS WITH HYPERGLYCEMIA, WITHOUT LONG-TERM CURRENT USE OF INSULIN (HCC): Primary | ICD-10-CM

## 2024-05-13 PROCEDURE — PBNCHG PB NO CHARGE PLACEHOLDER: Performed by: INTERNAL MEDICINE

## 2024-05-13 NOTE — TELEPHONE ENCOUNTER
Sal Khoury would like you to call him because he was in the office today for a download of his rosa maria and he doesn't think he should be charged for downloads or he is going to go somewhere else  Thank you

## 2024-05-14 NOTE — TELEPHONE ENCOUNTER
Average glucose is 178 mg/dL.  He is in target range 58% of the time.  Blood sugars tend to rise in the mornings.  He should limit his carb hydrate intake with meals to limit fluctuations in blood glucose levels.  His first dose of glipizide should be taken before his meal in the morning.

## 2024-05-14 NOTE — TELEPHONE ENCOUNTER
Patient called in regards to CGM log results. Relayed above message from Deisy Black. Patient expressed understanding. No further action needed.

## 2024-06-17 DIAGNOSIS — E11.65 TYPE 2 DIABETES MELLITUS WITH HYPERGLYCEMIA, WITHOUT LONG-TERM CURRENT USE OF INSULIN (HCC): ICD-10-CM

## 2024-06-18 RX ORDER — LISINOPRIL 2.5 MG/1
2.5 TABLET ORAL DAILY
Qty: 90 TABLET | Refills: 1 | Status: SHIPPED | OUTPATIENT
Start: 2024-06-18

## 2024-06-18 RX ORDER — METFORMIN HYDROCHLORIDE 500 MG/1
TABLET, EXTENDED RELEASE ORAL
Qty: 180 TABLET | Refills: 1 | Status: SHIPPED | OUTPATIENT
Start: 2024-06-18

## 2024-07-07 DIAGNOSIS — E78.5 DYSLIPIDEMIA: ICD-10-CM

## 2024-07-08 RX ORDER — ATORVASTATIN CALCIUM 40 MG/1
TABLET, FILM COATED ORAL
Qty: 90 TABLET | Refills: 1 | Status: SHIPPED | OUTPATIENT
Start: 2024-07-08

## 2024-07-30 DIAGNOSIS — E11.65 UNCONTROLLED TYPE 2 DIABETES MELLITUS WITH HYPERGLYCEMIA (HCC): ICD-10-CM

## 2024-07-30 RX ORDER — SEMAGLUTIDE 1.34 MG/ML
INJECTION, SOLUTION SUBCUTANEOUS
Qty: 3 ML | Refills: 2 | Status: SHIPPED | OUTPATIENT
Start: 2024-07-30

## 2024-08-25 DIAGNOSIS — E11.65 TYPE 2 DIABETES MELLITUS WITH HYPERGLYCEMIA, WITHOUT LONG-TERM CURRENT USE OF INSULIN (HCC): ICD-10-CM

## 2024-08-26 RX ORDER — METFORMIN HCL 500 MG
TABLET, EXTENDED RELEASE 24 HR ORAL
Qty: 360 TABLET | Refills: 1 | Status: SHIPPED | OUTPATIENT
Start: 2024-08-26

## 2024-09-16 DIAGNOSIS — M81.0 OSTEOPOROSIS WITHOUT CURRENT PATHOLOGICAL FRACTURE, UNSPECIFIED OSTEOPOROSIS TYPE: ICD-10-CM

## 2024-09-16 RX ORDER — ALENDRONATE SODIUM 70 MG/1
TABLET ORAL
Qty: 4 TABLET | Refills: 0 | Status: SHIPPED | OUTPATIENT
Start: 2024-09-16

## 2024-09-16 NOTE — TELEPHONE ENCOUNTER
Reason for call:   [x] Refill   [] Prior Auth  [] Other:     Office:   [x] Specialty/Provider - endo / [facility]    Medication: alendronate    Dose/Frequency: 70mg once a week    Quantity: 12    Pharmacy: [facility]/pharmacy #[id number] - [place] - [address]     Does the patient have enough for 3 days?   [] Yes   [x] No - Send as HP to POD

## 2024-09-16 NOTE — TELEPHONE ENCOUNTER
Patient needs updated blood work and has previously placed orders. Please contact patient to go for labs. Courtesy refill provided.    Updated Vitamin D needed

## 2024-09-18 DIAGNOSIS — E78.5 DYSLIPIDEMIA: ICD-10-CM

## 2024-09-18 RX ORDER — ATORVASTATIN CALCIUM 40 MG/1
TABLET, FILM COATED ORAL
Qty: 90 TABLET | Refills: 1 | Status: SHIPPED | OUTPATIENT
Start: 2024-09-18

## 2024-09-24 ENCOUNTER — LAB (OUTPATIENT)
Dept: LAB | Facility: CLINIC | Age: 79
End: 2024-09-24
Payer: COMMERCIAL

## 2024-09-24 ENCOUNTER — TELEPHONE (OUTPATIENT)
Age: 79
End: 2024-09-24

## 2024-09-24 DIAGNOSIS — E55.9 VITAMIN D DEFICIENCY: ICD-10-CM

## 2024-09-24 DIAGNOSIS — M81.0 OSTEOPOROSIS WITHOUT CURRENT PATHOLOGICAL FRACTURE, UNSPECIFIED OSTEOPOROSIS TYPE: ICD-10-CM

## 2024-09-24 DIAGNOSIS — C61 PROSTATE CANCER (HCC): ICD-10-CM

## 2024-09-24 DIAGNOSIS — E11.65 TYPE 2 DIABETES MELLITUS WITH HYPERGLYCEMIA, WITHOUT LONG-TERM CURRENT USE OF INSULIN (HCC): ICD-10-CM

## 2024-09-24 LAB
25(OH)D3 SERPL-MCNC: 72.5 NG/ML (ref 30–100)
ANION GAP SERPL CALCULATED.3IONS-SCNC: 10 MMOL/L (ref 4–13)
BUN SERPL-MCNC: 22 MG/DL (ref 5–25)
CALCIUM SERPL-MCNC: 9.6 MG/DL (ref 8.4–10.2)
CHLORIDE SERPL-SCNC: 102 MMOL/L (ref 96–108)
CO2 SERPL-SCNC: 26 MMOL/L (ref 21–32)
CREAT SERPL-MCNC: 1.09 MG/DL (ref 0.6–1.3)
CREAT UR-MCNC: 109.1 MG/DL
EST. AVERAGE GLUCOSE BLD GHB EST-MCNC: 180 MG/DL
GFR SERPL CREATININE-BSD FRML MDRD: 64 ML/MIN/1.73SQ M
GLUCOSE P FAST SERPL-MCNC: 174 MG/DL (ref 65–99)
HBA1C MFR BLD: 7.9 %
MICROALBUMIN UR-MCNC: 62.9 MG/L
MICROALBUMIN/CREAT 24H UR: 58 MG/G CREATININE (ref 0–30)
POTASSIUM SERPL-SCNC: 4.1 MMOL/L (ref 3.5–5.3)
SODIUM SERPL-SCNC: 138 MMOL/L (ref 135–147)

## 2024-09-24 PROCEDURE — 82570 ASSAY OF URINE CREATININE: CPT

## 2024-09-24 PROCEDURE — 83036 HEMOGLOBIN GLYCOSYLATED A1C: CPT

## 2024-09-24 PROCEDURE — 80048 BASIC METABOLIC PNL TOTAL CA: CPT

## 2024-09-24 PROCEDURE — 82306 VITAMIN D 25 HYDROXY: CPT

## 2024-09-24 PROCEDURE — 82043 UR ALBUMIN QUANTITATIVE: CPT

## 2024-09-24 PROCEDURE — 36415 COLL VENOUS BLD VENIPUNCTURE: CPT

## 2024-09-24 NOTE — TELEPHONE ENCOUNTER
Pt called refill line stating he needs to  his Continuous Blood Gluc Sensor (FreeStyle Denny 2 Sensor) MISC sooner due to leaving part of his previous script in [place] while traveling but he believes the pharmacy will give him issues. I placed pt on brief hold and spoke to pharmacy who stated they could not do an early override at this point but if he waited until 9/26 it appears insurance will let them override it then. I encouraged pt to contact them on 9/26 to fill script and he verbalized understanding.    While on the phone, pt mentioned that the cost has gone up on his sensors and he now pays $80 every time he picks them up. I told him I would reach out to our PA team to see if maybe a PA could be submitted to bring the cost down but informed him I could not guarantee if that will do anything. If the cost becomes too much, I encouraged him to reach out to his doctor and see if an alternative brand could be sent. Pt verbalized understanding and would be grateful if we tried.

## 2024-09-27 ENCOUNTER — OFFICE VISIT (OUTPATIENT)
Dept: FAMILY MEDICINE CLINIC | Facility: CLINIC | Age: 79
End: 2024-09-27
Payer: COMMERCIAL

## 2024-09-27 VITALS
WEIGHT: 238.6 LBS | BODY MASS INDEX: 36.16 KG/M2 | TEMPERATURE: 98.2 F | HEIGHT: 68 IN | OXYGEN SATURATION: 93 % | DIASTOLIC BLOOD PRESSURE: 78 MMHG | SYSTOLIC BLOOD PRESSURE: 116 MMHG | HEART RATE: 88 BPM

## 2024-09-27 DIAGNOSIS — E66.01 CLASS 2 SEVERE OBESITY DUE TO EXCESS CALORIES WITH SERIOUS COMORBIDITY AND BODY MASS INDEX (BMI) OF 36.0 TO 36.9 IN ADULT (HCC): ICD-10-CM

## 2024-09-27 DIAGNOSIS — E11.65 TYPE 2 DIABETES MELLITUS WITH HYPERGLYCEMIA, WITHOUT LONG-TERM CURRENT USE OF INSULIN (HCC): ICD-10-CM

## 2024-09-27 DIAGNOSIS — Z00.00 MEDICARE ANNUAL WELLNESS VISIT, SUBSEQUENT: Primary | ICD-10-CM

## 2024-09-27 DIAGNOSIS — I10 BENIGN ESSENTIAL HTN: ICD-10-CM

## 2024-09-27 DIAGNOSIS — E66.812 CLASS 2 SEVERE OBESITY DUE TO EXCESS CALORIES WITH SERIOUS COMORBIDITY AND BODY MASS INDEX (BMI) OF 36.0 TO 36.9 IN ADULT (HCC): ICD-10-CM

## 2024-09-27 DIAGNOSIS — C61 PROSTATE CANCER (HCC): ICD-10-CM

## 2024-09-27 DIAGNOSIS — M81.0 OSTEOPOROSIS WITHOUT CURRENT PATHOLOGICAL FRACTURE, UNSPECIFIED OSTEOPOROSIS TYPE: ICD-10-CM

## 2024-09-27 DIAGNOSIS — Z95.2 HISTORY OF AORTIC VALVE REPLACEMENT: ICD-10-CM

## 2024-09-27 DIAGNOSIS — I25.10 ARTERIOSCLEROTIC CORONARY ARTERY DISEASE: ICD-10-CM

## 2024-09-27 DIAGNOSIS — N52.1 ERECTILE DYSFUNCTION DUE TO DISEASES CLASSIFIED ELSEWHERE: ICD-10-CM

## 2024-09-27 DIAGNOSIS — E78.2 MIXED HYPERLIPIDEMIA: ICD-10-CM

## 2024-09-27 PROCEDURE — 99214 OFFICE O/P EST MOD 30 MIN: CPT | Performed by: PHYSICIAN ASSISTANT

## 2024-09-27 PROCEDURE — G0439 PPPS, SUBSEQ VISIT: HCPCS | Performed by: PHYSICIAN ASSISTANT

## 2024-09-27 RX ORDER — SILDENAFIL 100 MG/1
50 TABLET, FILM COATED ORAL DAILY PRN
Qty: 5 TABLET | Refills: 5 | Status: SHIPPED | OUTPATIENT
Start: 2024-09-27

## 2024-09-27 NOTE — PROGRESS NOTES
Ambulatory Visit  Name: Derick Blanton      : 1945      MRN: [id number]  Encounter Provider: Racquel Marr PA-C  Encounter Date: 2024   Encounter department: [facility]    Assessment & Plan  Medicare annual wellness visit, subsequent         Type 2 diabetes mellitus with hyperglycemia, without long-term current use of insulin (Formerly [facility])    Lab Results   Component Value Date    HGBA1C 7.9 (H) 2024     Diabetes has improved.  He will continue his current regimen.  He will follow-up with chronology next week.  Continue to check blood sugar daily  Orders:    Comprehensive metabolic panel    Lipid panel    Hemoglobin A1C    Benign essential HTN  Blood pressure is at goal he will continue lisinopril 2.5 mg a day  Orders:    CBC and differential    Comprehensive metabolic panel    Arteriosclerotic coronary artery disease  Patient is stable with no angina.  He will follow-up with cardiology as directed       Mixed hyperlipidemia  Lipids are controlled with diet and statin therapy.       Class 2 severe obesity due to excess calories with serious comorbidity and body mass index (BMI) of 36.0 to 36.9 in adult (HCC)  Diet and exercise to promote weight loss       Prostate cancer (HCC)  Following with urology.  Due for PSA       Osteoporosis without current pathological fracture, unspecified osteoporosis type  Continue calcium vitamin D supplements.  Fosamax once a week .weightbearing exercise.       History of aortic valve replacement         Erectile dysfunction due to diseases classified elsewhere    Orders:    sildenafil (VIAGRA) 100 mg tablet; Take 0.5 tablets (50 mg total) by mouth daily as needed for erectile dysfunction      Depression Screening and Follow-up Plan: Patient was screened for depression during today's encounter. They screened negative with a PHQ-2 score of 0.      Preventive health issues were discussed with patient, and age appropriate screening tests were ordered as  noted in patient's After Visit Summary. Personalized health advice and appropriate referrals for health education or preventive services given if needed, as noted in patient's After Visit Summary.    History of Present Illness     Patient presents in the office for follow-up chronic condition.  Patient has non-insulin diabetes mellitus type 2.  No complications.  Does follow with endocrinology.  He is currently on metformin  mg 1 twice a day, glipizide ER 5 mg twice a day and Ozempic 1 mg injected weekly.  Hemoglobin A1c has come down to 7.9.  He has a follow-up appointment with endocrinology next week.  Will defer medication change to her.  Patient has a cardiac history of CAD status post CABG.  He also had a aortic valve replacement.  Has hypertension and hyperlipidemia.  He does follow with cardiology.  He is currently taking a baby aspirin daily.  Lisinopril 2.5 mg for blood pressure.  For hyperlipidemia he is on atorvastatin 40 mg.  Is overweight and has YATES.  History of prostate cancer.  He follows with patient follows with urologist.  Has osteoporosis he is on calcium vitamin D supplements and Fosamax once a week.  He is due for DEXA this year in December       Patient Care Team:  Racquel Marr PA-C as PCP - General (Family Medicine)  Dona Mcnally DO as PCP - PCP-[facility]  Deisy Black PA-C as Physician Assistant (Endocrinology)  Paul Fraser MD (Endocrinology)    Review of Systems   Constitutional:  Negative for activity change, appetite change, chills, fatigue and fever.   HENT:  Negative for ear pain and sore throat.    Eyes:  Negative for visual disturbance.   Respiratory:  Negative for cough and shortness of breath.    Cardiovascular:  Negative for chest pain, palpitations and leg swelling.   Gastrointestinal:  Negative for abdominal pain, blood in stool, constipation, diarrhea and nausea.   Genitourinary:  Negative for difficulty urinating.    Musculoskeletal:  Negative for arthralgias, back pain and myalgias.   Skin:  Negative for rash.   Neurological:  Negative for dizziness, syncope and headaches.   Psychiatric/Behavioral:  Negative for self-injury, sleep disturbance and suicidal ideas. The patient is not nervous/anxious.      Medical History Reviewed by provider this encounter:  Allergies  Meds  Problems       Annual Wellness Visit Questionnaire   Derick is here for his Subsequent Wellness visit.     Health Risk Assessment:   Patient rates overall health as good. Patient feels that their physical health rating is same. Patient is very satisfied with their life. Eyesight was rated as slightly worse. Hearing was rated as same. Patient feels that their emotional and mental health rating is same. Patients states they are sometimes angry. Patient states they are never, rarely unusually tired/fatigued. Pain experienced in the last 7 days has been none. Patient states that he has experienced no weight loss or gain in last 6 months.     Depression Screening:   PHQ-2 Score: 0      Fall Risk Screening:   In the past year, patient has experienced: no history of falling in past year      Home Safety:  Patient has trouble with stairs inside or outside of their home. Patient has working smoke alarms and has no working carbon monoxide detector. Home safety hazards include: none.     Nutrition:   Current diet is Diabetic.     Medications:   Patient is currently taking over-the-counter supplements. OTC medications include: see medication list. Patient is able to manage medications.     Activities of Daily Living (ADLs)/Instrumental Activities of Daily Living (IADLs):   Walk and transfer into and out of bed and chair?: Yes  Dress and groom yourself?: Yes    Bathe or shower yourself?: Yes    Feed yourself? Yes  Do your laundry/housekeeping?: No  Make your own meals?: No    Do your own shopping?: No    Previous Hospitalizations:   Any hospitalizations or ED visits  within the last 12 months?: No      Advance Care Planning:   Living will: No    ACP document given: Yes      Cognitive Screening:   Provider or family/friend/caregiver concerned regarding cognition?: No    PREVENTIVE SCREENINGS      Cardiovascular Screening:    General: History Lipid Disorder and Screening Current      Diabetes Screening:     General: History Diabetes and Screening Current      Colorectal Cancer Screening:     General: Screening Current      Prostate Cancer Screening:    General: History Prostate Cancer    Due for: PSA      Osteoporosis Screening:    General: Screening Not Indicated and History Osteoporosis    Due for: DXA Axial      Abdominal Aortic Aneurysm (AAA) Screening:    Risk factors include: tobacco use        General: Screening Current      Lung Cancer Screening:     General: Screening Not Indicated      Hepatitis C Screening:    General: Screening Current    Screening, Brief Intervention, and Referral to Treatment (SBIRT)    Screening  Typical number of drinks in a day: 0  Typical number of drinks in a week: 0  Interpretation: Low risk drinking behavior.    Single Item Drug Screening:  How often have you used an illegal drug (including marijuana) or a prescription medication for non-medical reasons in the past year? never    Single Item Drug Screen Score: 0  Interpretation: Negative screen for possible drug use disorder    Social Determinants of Health     Financial Resource Strain: Low Risk  (9/26/2023)    Overall Financial Resource Strain (CARDIA)     Difficulty of Paying Living Expenses: Not hard at all   Food Insecurity: No Food Insecurity (9/27/2024)    Hunger Vital Sign     Worried About Running Out of Food in the Last Year: Never true     Ran Out of Food in the Last Year: Never true   Transportation Needs: No Transportation Needs (9/27/2024)    PRAPARE - Transportation     Lack of Transportation (Medical): No     Lack of Transportation (Non-Medical): No   Housing Stability: Low  "Risk  (9/27/2024)    Housing Stability Vital Sign     Unable to Pay for Housing in the Last Year: No     Number of Times Moved in the Last Year: 0     Homeless in the Last Year: No   Utilities: Not At Risk (9/27/2024)    [facility] Utilities     Threatened with loss of utilities: No     No results found.    Objective     /78 (BP Location: Right arm, Patient Position: Sitting, Cuff Size: Extra-Large)   Pulse 88   Temp 98.2 °F (36.8 °C) (Temporal)   Ht 5' 8\" (1.727 m)   Wt 108 kg (238 lb 9.6 oz)   SpO2 93%   BMI 36.28 kg/m²     Physical Exam  Vitals and nursing note reviewed.   Constitutional:       General: He is not in acute distress.     Appearance: Normal appearance. He is well-developed. He is obese. He is not ill-appearing.   HENT:      Head: Normocephalic and atraumatic.      Right Ear: Tympanic membrane, ear canal and external ear normal.      Left Ear: Tympanic membrane, ear canal and external ear normal.      Mouth/Throat:      Pharynx: No posterior oropharyngeal erythema.   Eyes:      Conjunctiva/sclera: Conjunctivae normal.      Pupils: Pupils are equal, round, and reactive to light.   Neck:      Thyroid: No thyromegaly.      Vascular: No carotid bruit.   Cardiovascular:      Rate and Rhythm: Normal rate and regular rhythm.      Pulses: no weak pulses.           Dorsalis pedis pulses are 2+ on the right side and 2+ on the left side.      Heart sounds: Murmur heard.      Comments: Aortic valve replacement  Pulmonary:      Effort: Pulmonary effort is normal.      Breath sounds: Normal breath sounds. No wheezing.   Abdominal:      General: Abdomen is protuberant. Bowel sounds are normal.      Palpations: Abdomen is soft. There is no mass.      Tenderness: There is no abdominal tenderness.   Musculoskeletal:      Right lower leg: No edema.      Left lower leg: No edema.   Feet:      Right foot:      Skin integrity: Dry skin present. No ulcer or callus.      Left foot:      Skin integrity: Dry skin " present. No ulcer or callus.   Lymphadenopathy:      Cervical: No cervical adenopathy.   Skin:     General: Skin is warm and dry.      Comments: Seborrhea capitis   Neurological:      General: No focal deficit present.      Mental Status: He is alert and oriented to person, place, and time.   Psychiatric:         Mood and Affect: Mood normal.         Behavior: Behavior normal.         Thought Content: Thought content normal.         Judgment: Judgment normal.     Diabetic Foot Exam    Patient's shoes and socks removed.    Right Foot/Ankle   Right Foot Inspection  Skin Exam: skin intact and dry skin. No callus, no ulcer and no callus.     Toe Exam: right toe deformity. No swelling, no tenderness and erythema    Sensory   Vibration: intact  Monofilament testing: intact    Vascular  The right DP pulse is 2+.     Left Foot/Ankle  Left Foot Inspection  Skin Exam: skin intact and dry skin. No ulcer and no callus.     Toe Exam: left toe deformity. No swelling, no tenderness and no erythema.     Sensory   Vibration: intact  Monofilament testing: intact    Vascular  The left DP pulse is 2+.     Assign Risk Category  Deformity present  No loss of protective sensation  No weak pulses  Risk: 0

## 2024-09-27 NOTE — PATIENT INSTRUCTIONS
Medicare Preventive Visit Patient Instructions  Thank you for completing your Welcome to Medicare Visit or Medicare Annual Wellness Visit today. Your next wellness visit will be due in one year (9/28/2025).  The screening/preventive services that you may require over the next 5-10 years are detailed below. Some tests may not apply to you based off risk factors and/or age. Screening tests ordered at today's visit but not completed yet may show as past due. Also, please note that scanned in results may not display below.  Preventive Screenings:  Service Recommendations Previous Testing/Comments   Colorectal Cancer Screening  Colonoscopy    Fecal Occult Blood Test (FOBT)/Fecal Immunochemical Test (FIT)  Fecal DNA/Cologuard Test  Flexible Sigmoidoscopy Age: 45-75 years old   Colonoscopy: every 10 years (May be performed more frequently if at higher risk)  OR  FOBT/FIT: every 1 year  OR  Cologuard: every 3 years  OR  Sigmoidoscopy: every 5 years  Screening may be recommended earlier than age 45 if at higher risk for colorectal cancer. Also, an individualized decision between you and your healthcare provider will decide whether screening between the ages of 76-85 would be appropriate. Colonoscopy: 09/08/2023  FOBT/FIT: Not on file  Cologuard: Not on file  Sigmoidoscopy: Not on file    Screening Current     Prostate Cancer Screening Individualized decision between patient and health care provider in men between ages of 55-69   Medicare will cover every 12 months beginning on the day after your 50th birthday PSA: <0.1 ng/mL     History Prostate Cancer  Screening Not Indicated     Hepatitis C Screening Once for adults born between 1945 and 1965  More frequently in patients at high risk for Hepatitis C Hep C Antibody: 06/24/2014    Screening Current   Diabetes Screening 1-2 times per year if you're at risk for diabetes or have pre-diabetes Fasting glucose: 174 mg/dL (9/24/2024)  A1C: 7.9 % (9/24/2024)  Screening Not  Indicated  History Diabetes   Cholesterol Screening Once every 5 years if you don't have a lipid disorder. May order more often based on risk factors. Lipid panel: 04/04/2024  Screening Not Indicated  History Lipid Disorder      Other Preventive Screenings Covered by Medicare:  Abdominal Aortic Aneurysm (AAA) Screening: covered once if your at risk. You're considered to be at risk if you have a family history of AAA or a male between the age of 65-75 who smoking at least 100 cigarettes in your lifetime.  Lung Cancer Screening: covers low dose CT scan once per year if you meet all of the following conditions: (1) Age 55-77; (2) No signs or symptoms of lung cancer; (3) Current smoker or have quit smoking within the last 15 years; (4) You have a tobacco smoking history of at least 20 pack years (packs per day x number of years you smoked); (5) You get a written order from a healthcare provider.  Glaucoma Screening: covered annually if you're considered high risk: (1) You have diabetes OR (2) Family history of glaucoma OR (3)  aged 50 and older OR (4)  American aged 65 and older  Osteoporosis Screening: covered every 2 years if you meet one of the following conditions: (1) Have a vertebral abnormality; (2) On glucocorticoid therapy for more than 3 months; (3) Have primary hyperparathyroidism; (4) On osteoporosis medications and need to assess response to drug therapy.  HIV Screening: covered annually if you're between the age of 15-65. Also covered annually if you are younger than 15 and older than 65 with risk factors for HIV infection. For pregnant patients, it is covered up to 3 times per pregnancy.    Immunizations:  Immunization Recommendations   Influenza Vaccine Annual influenza vaccination during flu season is recommended for all persons aged >= 6 months who do not have contraindications   Pneumococcal Vaccine   * Pneumococcal conjugate vaccine = PCV13 (Prevnar 13), PCV15 (Vaxneuvance),  PCV20 (Prevnar 20)  * Pneumococcal polysaccharide vaccine = PPSV23 (Pneumovax) Adults 19-63 yo with certain risk factors or if 65+ yo  If never received any pneumonia vaccine: recommend Prevnar 20 (PCV20)  Give PCV20 if previously received 1 dose of PCV13 or PPSV23   Hepatitis B Vaccine 3 dose series if at intermediate or high risk (ex: diabetes, end stage renal disease, liver disease)   Respiratory syncytial virus (RSV) Vaccine - COVERED BY MEDICARE PART D  * RSVPreF3 (Arexvy) CDC recommends that adults 60 years of age and older may receive a single dose of RSV vaccine using shared clinical decision-making (SCDM)   Tetanus (Td) Vaccine - COST NOT COVERED BY MEDICARE PART B Following completion of primary series, a booster dose should be given every 10 years to maintain immunity against tetanus. Td may also be given as tetanus wound prophylaxis.   Tdap Vaccine - COST NOT COVERED BY MEDICARE PART B Recommended at least once for all adults. For pregnant patients, recommended with each pregnancy.   Shingles Vaccine (Shingrix) - COST NOT COVERED BY MEDICARE PART B  2 shot series recommended in those 19 years and older who have or will have weakened immune systems or those 50 years and older     Health Maintenance Due:      Topic Date Due   • Hepatitis C Screening  Completed   • Colorectal Cancer Screening  Discontinued     Immunizations Due:      Topic Date Due   • COVID-19 Vaccine (4 - 2023-24 season) 09/01/2024   • Influenza Vaccine (1) 09/01/2024     Advance Directives   What are advance directives?  Advance directives are legal documents that state your wishes and plans for medical care. These plans are made ahead of time in case you lose your ability to make decisions for yourself. Advance directives can apply to any medical decision, such as the treatments you want, and if you want to donate organs.   What are the types of advance directives?  There are many types of advance directives, and each state has rules  about how to use them. You may choose a combination of any of the following:  Living will:  This is a written record of the treatment you want. You can also choose which treatments you do not want, which to limit, and which to stop at a certain time. This includes surgery, medicine, IV fluid, and tube feedings.   Durable power of  for healthcare (DPAHC):  This is a written record that states who you want to make healthcare choices for you when you are unable to make them for yourself. This person, called a proxy, is usually a family member or a friend. You may choose more than 1 proxy.  Do not resuscitate (DNR) order:  A DNR order is used in case your heart stops beating or you stop breathing. It is a request not to have certain forms of treatment, such as CPR. A DNR order may be included in other types of advance directives.  Medical directive:  This covers the care that you want if you are in a coma, near death, or unable to make decisions for yourself. You can list the treatments you want for each condition. Treatment may include pain medicine, surgery, blood transfusions, dialysis, IV or tube feedings, and a ventilator (breathing machine).  Values history:  This document has questions about your views, beliefs, and how you feel and think about life. This information can help others choose the care that you would choose.  Why are advance directives important?  An advance directive helps you control your care. Although spoken wishes may be used, it is better to have your wishes written down. Spoken wishes can be misunderstood, or not followed. Treatments may be given even if you do not want them. An advance directive may make it easier for your family to make difficult choices about your care.   Weight Management   Why it is important to manage your weight:  Being overweight increases your risk of health conditions such as heart disease, high blood pressure, type 2 diabetes, and certain types of cancer. It  can also increase your risk for osteoarthritis, sleep apnea, and other respiratory problems. Aim for a slow, steady weight loss. Even a small amount of weight loss can lower your risk of health problems.  How to lose weight safely:  A safe and healthy way to lose weight is to eat fewer calories and get regular exercise. You can lose up about 1 pound a week by decreasing the number of calories you eat by 500 calories each day.   Healthy meal plan for weight management:  A healthy meal plan includes a variety of foods, contains fewer calories, and helps you stay healthy. A healthy meal plan includes the following:  Eat whole-grain foods more often.  A healthy meal plan should contain fiber. Fiber is the part of grains, fruits, and vegetables that is not broken down by your body. Whole-grain foods are healthy and provide extra fiber in your diet. Some examples of whole-grain foods are whole-wheat breads and pastas, oatmeal, brown rice, and bulgur.  Eat a variety of vegetables every day.  Include dark, leafy greens such as spinach, kale, teresa greens, and mustard greens. Eat yellow and orange vegetables such as carrots, sweet potatoes, and winter squash.   Eat a variety of fruits every day.  Choose fresh or canned fruit (canned in its own juice or light syrup) instead of juice. Fruit juice has very little or no fiber.  Eat low-fat dairy foods.  Drink fat-free (skim) milk or 1% milk. Eat fat-free yogurt and low-fat cottage cheese. Try low-fat cheeses such as mozzarella and other reduced-fat cheeses.  Choose meat and other protein foods that are low in fat.  Choose beans or other legumes such as split peas or lentils. Choose fish, skinless poultry (chicken or turkey), or lean cuts of red meat (beef or pork). Before you cook meat or poultry, cut off any visible fat.   Use less fat and oil.  Try baking foods instead of frying them. Add less fat, such as margarine, sour cream, regular salad dressing and mayonnaise to  foods. Eat fewer high-fat foods. Some examples of high-fat foods include french fries, doughnuts, ice cream, and cakes.  Eat fewer sweets.  Limit foods and drinks that are high in sugar. This includes candy, cookies, regular soda, and sweetened drinks.  Exercise:  Exercise at least 30 minutes per day on most days of the week. Some examples of exercise include walking, biking, dancing, and swimming. You can also fit in more physical activity by taking the stairs instead of the elevator or parking farther away from stores. Ask your healthcare provider about the best exercise plan for you.      © Copyright [employer] 2018 Information is for End User's use only and may not be sold, redistributed or otherwise used for commercial purposes. All illustrations and images included in CareNotes® are the copyrighted property of A.D.A.M., Inc. or [employer]

## 2024-09-27 NOTE — ASSESSMENT & PLAN NOTE
Lab Results   Component Value Date    HGBA1C 7.9 (H) 09/24/2024     Diabetes has improved.  He will continue his current regimen.  He will follow-up with chronology next week.  Continue to check blood sugar daily  Orders:    Comprehensive metabolic panel    Lipid panel    Hemoglobin A1C

## 2024-09-27 NOTE — ASSESSMENT & PLAN NOTE
Blood pressure is at goal he will continue lisinopril 2.5 mg a day  Orders:    CBC and differential    Comprehensive metabolic panel

## 2024-09-27 NOTE — TELEPHONE ENCOUNTER
PA for [employer] 2 Sensor SUBMITTED     via    []CMM-KEY:   [x]Surescripts-Case ID #   []Faxed to plan   []Other website   []Phone call Case ID #     Office notes sent, clinical questions answered. Awaiting determination    Turnaround time for your insurance to make a decision on your Prior Authorization can take 7-21 business days.

## 2024-09-27 NOTE — ASSESSMENT & PLAN NOTE
Orders:    sildenafil (VIAGRA) 100 mg tablet; Take 0.5 tablets (50 mg total) by mouth daily as needed for erectile dysfunction

## 2024-10-07 ENCOUNTER — OFFICE VISIT (OUTPATIENT)
Dept: ENDOCRINOLOGY | Facility: CLINIC | Age: 79
End: 2024-10-07
Payer: COMMERCIAL

## 2024-10-07 VITALS
WEIGHT: 238.6 LBS | HEART RATE: 80 BPM | BODY MASS INDEX: 36.16 KG/M2 | OXYGEN SATURATION: 94 % | HEIGHT: 68 IN | SYSTOLIC BLOOD PRESSURE: 118 MMHG | DIASTOLIC BLOOD PRESSURE: 80 MMHG

## 2024-10-07 DIAGNOSIS — M81.0 OSTEOPOROSIS WITHOUT CURRENT PATHOLOGICAL FRACTURE, UNSPECIFIED OSTEOPOROSIS TYPE: ICD-10-CM

## 2024-10-07 DIAGNOSIS — E78.2 MIXED HYPERLIPIDEMIA: ICD-10-CM

## 2024-10-07 DIAGNOSIS — E11.65 TYPE 2 DIABETES MELLITUS WITH HYPERGLYCEMIA, WITHOUT LONG-TERM CURRENT USE OF INSULIN (HCC): Primary | ICD-10-CM

## 2024-10-07 DIAGNOSIS — I10 BENIGN ESSENTIAL HTN: ICD-10-CM

## 2024-10-07 DIAGNOSIS — E55.9 VITAMIN D DEFICIENCY: ICD-10-CM

## 2024-10-07 PROCEDURE — 95251 CONT GLUC MNTR ANALYSIS I&R: CPT | Performed by: PHYSICIAN ASSISTANT

## 2024-10-07 PROCEDURE — 99214 OFFICE O/P EST MOD 30 MIN: CPT | Performed by: PHYSICIAN ASSISTANT

## 2024-10-07 RX ORDER — METFORMIN HCL 500 MG
TABLET, EXTENDED RELEASE 24 HR ORAL
Qty: 270 TABLET | Refills: 1 | Status: SHIPPED | OUTPATIENT
Start: 2024-10-07

## 2024-10-07 NOTE — PROGRESS NOTES
Patient Progress Note      CC: Dm      Referring Provider  Racquel Marr Pa-c  [address] [place] [address]  [address] [place] [id number]     History of Present Illness:   Derick Blanton is a 79 y.o. male with a history of type 2 diabetes without long term use of insulin. Diabetes course has been stable. Complications of DM: none reported. Denies any issues with his current regimen. Home glucose monitoring: are performed regularly. Using Freestyle Denny.     Average glucose 174 mg/dL  In target range 58%, above range 42%, below range 0%     Current regimen: Ozempic 1 mg weekly, metformin 1000 mg BID (taking 500 mg BID?), glipizide 5 mg BID   compliant most of the time, denies any side effects from medications.  Hypoglycemic episodes: No, rare  H/o of hypoglycemia causing hospitalization or Intervention such as glucagon injection or ambulance call : No  Hypoglycemia symptoms: dizziness  Treatment of hypoglycemia: orange juice     Diet: 2 meals per day, 1 snack per day. Timing of meals is predictable.   Diabetic diet compliance: noncompliant much of the time.   Activity: Daily activity is predictable: Yes. He uses the treadmill 3 times a week.    Ophthamology: overdue  Podiatry: foot exam UTD, Sep 2024     On ACE inhibitor/ARB  Has hyperlipidemia: on statin - tolerating well, no myalgias. compliant most of the time, denies any side effects from medications.  Thyroid disorders: No  History of pancreatitis: No     Osteopenia / vitamin D deficiency: previously treated with Prolia injections but it was denied by insurance company. Currently on Fosamax 70 mg weekly and taking it as instructed and tolerating it well. Fosamax was started in April 2019. He is also taking vitamin D3 2000 IU daily. He did have a fall in August 2021 which resulted in a fracture. Does do not weight bearing exercises, but plans to restart now that he is back home. Last DEXA scan in December 2022 showed osteopenia of multiple sites.        Patient Active Problem List   Diagnosis    History of aortic valve replacement    Arteriosclerotic coronary artery disease    Benign essential HTN    Type 2 diabetes mellitus with hyperglycemia, without long-term current use of insulin (HCC)    Hyperlipidemia    YATES (nonalcoholic steatohepatitis)    Class 2 severe obesity with serious comorbidity and body mass index (BMI) of 36.0 to 36.9 in adult (HCC)    Osteoarthritis    Osteoporosis    Prostate cancer (HCC)    Erectile dysfunction due to diseases classified elsewhere    Vitamin D deficiency    Humerus fracture    Cubital tunnel syndrome on right    Post-nasal drip    Erectile dysfunction due to arterial insufficiency    Benign essential microscopic hematuria    S/P CABG (coronary artery bypass graft)    Acquired buried penis      Past Medical History:   Diagnosis Date    Arteriosclerotic coronary artery disease 11/30/2017    Benign prostate hyperplasia     L.A....7/17/12   R....8/30/17      COPD (chronic obstructive pulmonary disease) (HCC)     questionable    Deep venous embolism and thrombosis (HCC)     Diabetes mellitus (HCC)     Hyperlipidemia     Hypertension     Impacted cerumen     unspecified laterity / L.A.....6/20/12       Osteoarthritis 07/17/2012    Prostate cancer (HCC)     Pulmonary embolism (HCC)       Past Surgical History:   Procedure Laterality Date    CARDIAC VALVE REPLACEMENT      COLONOSCOPY      CORONARY ARTERY BYPASS GRAFT      FRACTURE SURGERY      left knee surgery    JOINT REPLACEMENT Left     tkr    KNEE ARTHROSCOPY      Therapeutic    KNEE SURGERY      replacement    LUNG SURGERY      LUNG SURGERY      wedge resection     TONSILLECTOMY      UMBILICAL HERNIA REPAIR        Family History   Problem Relation Age of Onset    COPD Mother     Heart attack Mother     Heart attack Father      Social History     Tobacco Use    Smoking status: Former     Current packs/day: 0.00     Types: Cigarettes     Start date: 1970     Quit date:  3/23/2000     Years since quittin.5     Passive exposure: Past    Smokeless tobacco: Never   Substance Use Topics    Alcohol use: Yes     Comment: social drinker rare     No Known Allergies      Current Outpatient Medications:     aspirin 81 MG tablet, Take 81 mg by mouth daily, Disp: , Rfl:     atorvastatin (LIPITOR) 40 mg tablet, TAKE 1 TABLET BY MOUTH EVERY DAY, Disp: 90 tablet, Rfl: 1    Blood Glucose Monitoring Suppl (ONE TOUCH ULTRA 2) w/Device KIT, by Does not apply route daily DX E11.65  Pt to   Check  fbs  daily, Disp: 1 each, Rfl: 0    Blood Pressure Monitoring ([employer] Blood Pressure Monitor) MISC, Use 3 (three) times a week, Disp: 1 each, Rfl: 0    Cholecalciferol (VITAMIN D3) 2000 units capsule, Take 2,000 Units by mouth daily, Disp: , Rfl: 11    clotrimazole-betamethasone (LOTRISONE) 1-0.05 % cream, APPLY TO AFFECTED AREA TWICE A DAY BY TOPICAL ROUTE, Disp: , Rfl:     Coenzyme Q10 (COQ-10) 100 MG CAPS, Take 100 mg by mouth daily, Disp: , Rfl:     Continuous Blood Gluc  (FreeStyle Denny 2 Page) [facility], USE AS DIRECTED 4 TIMES A DAY, Disp: 1 each, Rfl: 0    Continuous Blood Gluc Sensor (FreeStyle Denny 2 Sensor) MISC, Use daily as directed for CGM - Change every 14 days, Disp: 6 each, Rfl: 3    fluorouracil (EFUDEX) 5 % cream, APPLY TWICE A DAY TO SCALP FOR 1 MONTH, Disp: 40 g, Rfl: 0    glipiZIDE (GLUCOTROL XL) 5 mg 24 hr tablet, Take 1 tablet (5 mg total) by mouth 2 (two) times a day with meals, Disp: 180 tablet, Rfl: 1    lisinopril (ZESTRIL) 2.5 mg tablet, TAKE 1 TABLET BY MOUTH EVERY DAY, Disp: 90 tablet, Rfl: 1    metFORMIN (GLUCOPHAGE-XR) 500 mg 24 hr tablet, Take 2 tablets with breakfast and 1 tablet with dinner, Disp: 270 tablet, Rfl: 1    Multiple Vitamin-Folic Acid TABS, Take by mouth in the morning, Disp: , Rfl:     Omega-3 Fatty Acids (FISH OIL) 1,000 mg, Take 1,000 mg by mouth daily., Disp: , Rfl:     semaglutide, 1 mg/dose, (Ozempic, 1 MG/DOSE,) 4 mg/3 mL injection pen, INJECT 1  "MG INTO THE SKIN ONCE A WEEK, Disp: 3 mL, Rfl: 2    sildenafil (VIAGRA) 100 mg tablet, Take 0.5 tablets (50 mg total) by mouth daily as needed for erectile dysfunction, Disp: 5 tablet, Rfl: 5    tretinoin (RETIN-A) 0.025 % cream, , Disp: , Rfl:     senna-docusate sodium (SENOKOT S) 8.6-50 mg per tablet, Take 1 tablet by mouth 2 (two) times a day (Patient not taking: Reported on 11/2/2023), Disp: , Rfl: 0  Review of Systems   Constitutional:  Negative for activity change, appetite change, fatigue and unexpected weight change.   HENT:  Negative for trouble swallowing.    Eyes:  Negative for visual disturbance.   Respiratory:  Negative for shortness of breath.    Cardiovascular:  Negative for chest pain and palpitations.   Gastrointestinal:  Negative for constipation and diarrhea.   Endocrine: Negative for polydipsia and polyuria.   Musculoskeletal:  Positive for arthralgias (occasional).   Skin:  Negative for wound.   Neurological:  Negative for numbness.   Psychiatric/Behavioral: Negative.         Physical Exam:  Body mass index is 36.28 kg/m².  /80   Pulse 80   Ht 5' 8\" (1.727 m)   Wt 108 kg (238 lb 9.6 oz)   SpO2 94%   BMI 36.28 kg/m²    Wt Readings from Last 3 Encounters:   10/07/24 108 kg (238 lb 9.6 oz)   09/27/24 108 kg (238 lb 9.6 oz)   04/29/24 108 kg (237 lb 3.2 oz)       Physical Exam  Vitals and nursing note reviewed.   Constitutional:       Appearance: He is well-developed.   HENT:      Head: Normocephalic.   Eyes:      General: No scleral icterus.     Extraocular Movements: EOM normal.   Neck:      Thyroid: No thyromegaly.   Cardiovascular:      Rate and Rhythm: Normal rate and regular rhythm.      Pulses:           Radial pulses are 2+ on the right side and 2+ on the left side.      Heart sounds: No murmur heard.  Pulmonary:      Effort: Pulmonary effort is normal. No respiratory distress.      Breath sounds: Normal breath sounds. No wheezing.   Musculoskeletal:      Cervical back: Neck " supple.   Skin:     General: Skin is warm and dry.   Neurological:      Mental Status: He is alert.   Psychiatric:         Mood and Affect: Mood and affect normal.           Labs:   Component      Latest Ref Rng 4/4/2024 9/24/2024   Sodium      135 - 147 mmol/L 138  138    Potassium      3.5 - 5.3 mmol/L 4.4  4.1    Chloride      96 - 108 mmol/L 102  102    Carbon Dioxide      21 - 32 mmol/L 26  26    ANION GAP      4 - 13 mmol/L 10  10    BUN      5 - 25 mg/dL 17  22    Creatinine      0.60 - 1.30 mg/dL 1.05  1.09    GLUCOSE, FASTING      65 - 99 mg/dL 147 (H)  174 (H)    Calcium      8.4 - 10.2 mg/dL 9.6  9.6    AST      13 - 39 U/L 53 (H)     ALT      7 - 52 U/L 46     ALK PHOS      34 - 104 U/L 57     Total Protein      6.4 - 8.4 g/dL 7.8     Albumin      3.5 - 5.0 g/dL 4.0     Total Bilirubin      0.20 - 1.00 mg/dL 0.70     GFR, Calculated      ml/min/1.73sq m 68  64    Cholesterol      See Comment mg/dL 116     Triglycerides      See Comment mg/dL 140     HDL      >=40 mg/dL 37 (L)     LDL Calculated      0 - 100 mg/dL 51     EXT Creatinine Urine      Reference range not established. mg/dL 99.3  109.1    Albumin,U,Random      <20.0 mg/L 52.5 (H)  62.9 (H)    Albumin Creat Ratio      0 - 30 mg/g creatinine 53 (H)  58 (H)    Hemoglobin A1C      Normal 4.0-5.6%; PreDiabetic 5.7-6.4%; Diabetic >=6.5%; Glycemic control for adults with diabetes <7.0% % 8.5 (H)  7.9 (H)    eAG, EST AVG Glucose      mg/dl 197  180    VITAMIN D, 25-HYDROXY      30.0 - 100.0 ng/mL  72.5       Legend:  (H) High  (L) Low      Plan:    Diagnoses and all orders for this visit:    Type 2 diabetes mellitus with hyperglycemia, without long-term current use of insulin (HCC)  HGA1C 7.9%. Improved.  Treatment regimen: increase metformin to 1000 mg with breakfast and continue 500 mg with dinner. Continue other treatments.  Episodes of hypoglycemia can lead to permanent disability and death.  Discussed risks/complications associated with  uncontrolled diabetes.    Advised to adhere to diabetic diet, and recommended staying active/exercising routinely as tolerated.  Keep carbohydrates consistent to limit blood glucose fluctuations.  Advised to call if blood sugars less than 70 mg/dl or over 300 mg/dl.   Check blood glucose 3+ times a day  Discussed symptoms and treatment of hypoglycemia.   Recommended routine follow-up with podiatry and ophthalmology.   Download CGM in 2-3 weeks  Ordered blood work to complete prior to next visit.  -     Hemoglobin A1C; Future  -     Albumin / creatinine urine ratio; Future  -     Basic metabolic panel; Future  -     metFORMIN (GLUCOPHAGE-XR) 500 mg 24 hr tablet; Take 2 tablets with breakfast and 1 tablet with dinner    Mixed hyperlipidemia  LDL previously 51  On statin therapy   Managed by PCP     Benign essential HTN  Blood pressure well controlled, continue current treatment     Osteoporosis without current pathological fracture, unspecified osteoporosis type  Start drug holiday as he has been on Fosamax for slightly over 5 years  Check DEXA scan in Dec 2024. If there is improvement we can continue drug holiday and if there is worsening or indication for treatment, we can consider Prolia  Take precautions to avoid falls  Do weight bearing exercises as tolerated  -     DXA bone density spine hip and pelvis; Future    Vitamin D deficiency  Vitamin D 72.5  Continue vitamin D supplementation        Discussed with the patient diagnosis and treatment and all questions fully answered. He will call me if any problems arise.    Counseled patient on diagnostic results, prognosis, risk and benefit of treatment options, instruction for management, importance of treatment compliance, risk factor reduction and impressions.      Deisy Black PA-C

## 2024-10-07 NOTE — PATIENT INSTRUCTIONS
Hypoglycemia instructions   Derick Blanton  10/7/2024  [id number]    Low Blood Sugar    Steps to treat low blood sugar.    1. Test blood sugar if you have symptoms of low blood sugar:   Low Blood Sugar Symptoms:  o Sweaty  o Dizzy  o Rapid heartbeat  o Shaky  o Bad mood  o Hungry      2. Treat blood sugar less than 70 with 15 grams of fast-acting carbohydrate:   Examples of 15 grams Fast-Acting Carbohydrate:  o 4 oz juice  o 4 oz regular soda  o 3-4 glucose tablets (chew)  o 3-4 hard candies (chew)          3.  Wait 15 minutes and test your blood sugar again     4. If blood sugar is less than 100, repeat steps 2-3.    5. When your blood sugar is 100 or more, eat a snack if it will be longer than one hour until your next meal. The snack should be 15 grams of carbohydrate and a protein:   Examples of snacks:  o ½ sandwich  o 6 crackers with cheese  o Piece of fruit with cheese or peanut butter  o 6 crackers with peanut butter

## 2024-10-08 RX ORDER — ALENDRONATE SODIUM 70 MG/1
TABLET ORAL
Qty: 12 TABLET | Refills: 1 | OUTPATIENT
Start: 2024-10-08

## 2024-10-08 NOTE — TELEPHONE ENCOUNTER
The original prescription was discontinued on 10/7/2024 by Deisy Black PA-C. Renewing this prescription may not be appropriate.

## 2024-10-28 NOTE — PROGRESS NOTES
Attempted to return patient's call, unable to leave a message as received a message that voicemail was not set up.   Download

## 2024-11-05 ENCOUNTER — HOSPITAL ENCOUNTER (OUTPATIENT)
Dept: RADIOLOGY | Facility: HOSPITAL | Age: 79
Discharge: HOME | End: 2024-11-05
Payer: COMMERCIAL

## 2024-11-05 DIAGNOSIS — M81.0 OSTEOPOROSIS WITHOUT CURRENT PATHOLOGICAL FRACTURE, UNSPECIFIED OSTEOPOROSIS TYPE: ICD-10-CM

## 2024-11-05 PROCEDURE — 77080 DXA BONE DENSITY AXIAL: CPT

## 2024-11-07 ENCOUNTER — TELEPHONE (OUTPATIENT)
Dept: ENDOCRINOLOGY | Facility: CLINIC | Age: 79
End: 2024-11-07

## 2024-11-07 NOTE — TELEPHONE ENCOUNTER
Spoke to patient.  Recent DEXA scan is consistent with osteoporosis and there has been a statistically significant increase in bone mineral density in the hips compared to DEXA scan from 2022.  He was currently started on a drug holiday as he completed 5 years of Fosamax however considering the current results he should continue consider another treatment.  Recommended Prolia.  In the past he has tolerated Prolia well.  I will forward this message to the clinical staff to start Prolia prior authorization and once approved he should be notified to schedule a nurse visit.  He denies any ongoing dental issues.  He states he does not have any teeth.  He will continue his vitamin D supplementation  Reviewed side effects, safety/efficacy.  Reviewed abrupt discontinuation of Prolia or not receiving Prolia every 6 months can increase risk of vertebral fractures.

## 2024-11-07 NOTE — TELEPHONE ENCOUNTER
Patient is agreeable to starting Prolia.  Please complete prior authorization and notify patient once approved so that he can schedule a nurse visit for treatment.

## 2024-11-29 ENCOUNTER — TELEPHONE (OUTPATIENT)
Dept: ENDOCRINOLOGY | Facility: CLINIC | Age: 79
End: 2024-11-29

## 2024-12-04 DIAGNOSIS — E11.65 UNCONTROLLED TYPE 2 DIABETES MELLITUS WITH HYPERGLYCEMIA (HCC): ICD-10-CM

## 2024-12-04 NOTE — TELEPHONE ENCOUNTER
Reason for call:   [x] Refill   [] Prior Auth  [] Other:     Office:   [] PCP/Provider -   [x] Specialty/Provider - Paul Fraser     Medication: semaglutide, 1 mg/dose, (Ozempic, 1 MG/DOSE,)     Dose/Frequency:  INJECT 1 MG INTO THE SKIN ONCE A WEEK     Quantity: 3ml    Pharmacy: [facility]/pharmacy #[id number] - [place]     Does the patient have enough for 3 days?   [x] Yes   [] No - Send as HP to POD

## 2025-01-01 ENCOUNTER — PATIENT OUTREACH (OUTPATIENT)
Dept: HEMATOLOGY ONCOLOGY | Facility: CLINIC | Age: 80
End: 2025-01-01

## 2025-01-01 ENCOUNTER — DOCUMENTATION (OUTPATIENT)
Dept: HEMATOLOGY ONCOLOGY | Facility: CLINIC | Age: 80
End: 2025-01-01

## 2025-01-21 DIAGNOSIS — E11.65 TYPE 2 DIABETES MELLITUS WITH HYPERGLYCEMIA, WITHOUT LONG-TERM CURRENT USE OF INSULIN (HCC): ICD-10-CM

## 2025-01-21 RX ORDER — GLIPIZIDE 5 MG/1
TABLET, FILM COATED, EXTENDED RELEASE ORAL
Qty: 180 TABLET | Refills: 1 | Status: SHIPPED | OUTPATIENT
Start: 2025-01-21

## 2025-02-17 DIAGNOSIS — E11.65 TYPE 2 DIABETES MELLITUS WITH HYPERGLYCEMIA, WITHOUT LONG-TERM CURRENT USE OF INSULIN (HCC): ICD-10-CM

## 2025-02-17 NOTE — TELEPHONE ENCOUNTER
Reason for call: Not a duplicate. Patient is at his home in [place] and would like the refill sent there    [x] Refill   [] Prior Auth  [] Other:     Office:   [] PCP/Provider -   [x] Specialty/Provider - ENDO    Medication: Continuous Blood Gluc Sensor (FreeStyle Denny 2 Sensor) MISC     Dose/Frequency: Use daily as directed for CGM - Change every 14 days     Quantity: 6    Pharmacy: [facility]/pharmacy #[id number] - [place], [place] - [address]       Does the patient have enough for 3 days?   [x] Yes   [] No - Send as HP to POD

## 2025-02-22 DIAGNOSIS — E11.65 UNCONTROLLED TYPE 2 DIABETES MELLITUS WITH HYPERGLYCEMIA (HCC): ICD-10-CM

## 2025-02-24 RX ORDER — SEMAGLUTIDE 1.34 MG/ML
INJECTION, SOLUTION SUBCUTANEOUS
Qty: 3 ML | Refills: 1 | Status: SHIPPED | OUTPATIENT
Start: 2025-02-24

## 2025-03-12 DIAGNOSIS — E11.65 TYPE 2 DIABETES MELLITUS WITH HYPERGLYCEMIA, WITHOUT LONG-TERM CURRENT USE OF INSULIN (HCC): ICD-10-CM

## 2025-03-12 RX ORDER — LISINOPRIL 2.5 MG/1
2.5 TABLET ORAL DAILY
Qty: 90 TABLET | Refills: 1 | Status: SHIPPED | OUTPATIENT
Start: 2025-03-12

## 2025-03-12 NOTE — TELEPHONE ENCOUNTER
NOT A DUPLICATE Pharmacy does not have the refills    Reason for call:   [x] Refill   [] Prior Auth  [] Other:     Office:   [] PCP/Provider -   [x] Specialty/Provider - Endo    Medication: Continuous Glucose Sensor (FreeStyle Denny 2 Sensor) MISC Use daily as directed for CGM - Change every 14 days       Pharmacy: [facility]/pharmacy #[id number] - [place] - [id number] [facility]   Does the patient have enough for 3 days?   [x] Yes   [] No - Send as HP to POD    Mail Away Pharmacy   Does the patient have enough for 10 days?   [] Yes   [] No - Send as HP to POD

## 2025-03-13 ENCOUNTER — RA CDI HCC (OUTPATIENT)
Dept: OTHER | Facility: HOSPITAL | Age: 80
End: 2025-03-13

## 2025-03-13 NOTE — PROGRESS NOTES
"HCC coding opportunities       Chart reviewed, no opportunity found: CHART REVIEWED, NO OPPORTUNITY FOUND     Patients Insurance     Medicare Insurance: [employer]vantage          E11.22: Type 2 diabetes mellitus with diabetic chronic kidney disease [E11.22]      Refer to 9/14/2022 note - please review and assess if applicable      \"Presents in the office for follow up chronic conditions.  Patient has non-insulin diabetes mellitus type 2 with chronic kidney disease stage 2.  His current regimen includes Trulicity injected weekly. \"  "

## 2025-03-26 DIAGNOSIS — E78.5 DYSLIPIDEMIA: ICD-10-CM

## 2025-03-26 PROBLEM — S42.309A HUMERUS FRACTURE: Status: RESOLVED | Noted: 2021-08-26 | Resolved: 2025-03-26

## 2025-03-26 RX ORDER — ATORVASTATIN CALCIUM 40 MG/1
40 TABLET, FILM COATED ORAL DAILY
Qty: 90 TABLET | Refills: 1 | Status: SHIPPED | OUTPATIENT
Start: 2025-03-26

## 2025-03-27 ENCOUNTER — OFFICE VISIT (OUTPATIENT)
Dept: FAMILY MEDICINE CLINIC | Facility: CLINIC | Age: 80
End: 2025-03-27
Payer: COMMERCIAL

## 2025-03-27 VITALS
WEIGHT: 225.4 LBS | BODY MASS INDEX: 34.16 KG/M2 | SYSTOLIC BLOOD PRESSURE: 118 MMHG | TEMPERATURE: 97.3 F | DIASTOLIC BLOOD PRESSURE: 82 MMHG | OXYGEN SATURATION: 94 % | HEIGHT: 68 IN | HEART RATE: 90 BPM

## 2025-03-27 DIAGNOSIS — C61 PROSTATE CANCER (HCC): ICD-10-CM

## 2025-03-27 DIAGNOSIS — K75.81 NASH (NONALCOHOLIC STEATOHEPATITIS): ICD-10-CM

## 2025-03-27 DIAGNOSIS — I25.10 ARTERIOSCLEROTIC CORONARY ARTERY DISEASE: ICD-10-CM

## 2025-03-27 DIAGNOSIS — E78.2 MIXED HYPERLIPIDEMIA: ICD-10-CM

## 2025-03-27 DIAGNOSIS — E66.812 CLASS 2 SEVERE OBESITY DUE TO EXCESS CALORIES WITH SERIOUS COMORBIDITY AND BODY MASS INDEX (BMI) OF 36.0 TO 36.9 IN ADULT (HCC): ICD-10-CM

## 2025-03-27 DIAGNOSIS — E66.01 CLASS 2 SEVERE OBESITY DUE TO EXCESS CALORIES WITH SERIOUS COMORBIDITY AND BODY MASS INDEX (BMI) OF 36.0 TO 36.9 IN ADULT (HCC): ICD-10-CM

## 2025-03-27 DIAGNOSIS — M81.0 OSTEOPOROSIS WITHOUT CURRENT PATHOLOGICAL FRACTURE, UNSPECIFIED OSTEOPOROSIS TYPE: ICD-10-CM

## 2025-03-27 DIAGNOSIS — E11.65 TYPE 2 DIABETES MELLITUS WITH HYPERGLYCEMIA, WITHOUT LONG-TERM CURRENT USE OF INSULIN (HCC): Primary | ICD-10-CM

## 2025-03-27 DIAGNOSIS — I10 BENIGN ESSENTIAL HTN: ICD-10-CM

## 2025-03-27 LAB — SL AMB POCT HEMOGLOBIN AIC: 7.6 (ref ?–6.5)

## 2025-03-27 PROCEDURE — 83036 HEMOGLOBIN GLYCOSYLATED A1C: CPT | Performed by: PHYSICIAN ASSISTANT

## 2025-03-27 PROCEDURE — G2211 COMPLEX E/M VISIT ADD ON: HCPCS | Performed by: PHYSICIAN ASSISTANT

## 2025-03-27 PROCEDURE — 99214 OFFICE O/P EST MOD 30 MIN: CPT | Performed by: PHYSICIAN ASSISTANT

## 2025-03-27 NOTE — ASSESSMENT & PLAN NOTE
Diabetes a little above goal.  Follow-up with endocrinology.  In the meantime continue current regimen  Lab Results   Component Value Date    HGBA1C 7.6 (A) 03/27/2025       Orders:    POCT hemoglobin A1c

## 2025-03-27 NOTE — ASSESSMENT & PLAN NOTE
Patient took Fosamax for over 5 years.   On calcium vitamin D supplements.   Prolia injection in past.  Deferred to endocrinology

## 2025-03-27 NOTE — PROGRESS NOTES
Diabetic Foot Exam    Patient's shoes and socks removed.    Right Foot/Ankle   Right Foot Inspection  Skin Exam: skin intact and dry skin. No callus, no ulcer and no callus.     Toe Exam: right toe deformity. No swelling and erythema    Sensory   Vibration: intact  Monofilament testing: intact    Vascular  The right DP pulse is 1+.     Left Foot/Ankle  Left Foot Inspection  Skin Exam: skin intact and dry skin. No ulcer and no callus.     Toe Exam: left toe deformity. No swelling and no erythema.     Sensory   Vibration: intact  Monofilament testing: intact    Vascular  The left DP pulse is 2+.     Assign Risk Category  Deformity present  No loss of protective sensation  Weak pulses  Risk: 1  Name: Derick Blanton      : 1945      MRN: [id number]  Encounter Provider: Racquel Marr PA-C  Encounter Date: 3/27/2025   Encounter department: [facility]  :  Assessment & Plan  Type 2 diabetes mellitus with hyperglycemia, without long-term current use of insulin (HCC)  Diabetes a little above goal.  Follow-up with endocrinology.  In the meantime continue current regimen  Lab Results   Component Value Date    HGBA1C 7.6 (A) 2025       Orders:    POCT hemoglobin A1c    Benign essential HTN  Blood pressure at goal continue lisinopril 2.5 mg       Arteriosclerotic coronary artery disease  Patient is stable with no angina.  No signs of congestive heart failure       Mixed hyperlipidemia  Continue statin therapy and low-fat diet.  Check labs       Class 2 severe obesity due to excess calories with serious comorbidity and body mass index (BMI) of 36.0 to 36.9 in adult (HCC)    Continue diet and exercise to promote weight loss       YATES (nonalcoholic steatohepatitis)         Osteoporosis without current pathological fracture, unspecified osteoporosis type  Patient took Fosamax for over 5 years.   On calcium vitamin D supplements.   Prolia injection in past.  Deferred to endocrinology       Prostate  cancer (HCC)  Currently in remission              History of Present Illness   Patient presents in the office for follow-up chronic conditions.  Patient has non-insulin diabetes mellitus type 2.  His current regimen includes glipizide XL 5 twice daily, metformin  2 in the morning 1 at supper.  He is also on Ozempic 1 mg injected weekly.  Hemoglobin A1c in the office today is 7.6.  Pressure remains well-controlled with lisinopril 2.5 mg.  Has a history of CABG.  He also has a history of aortic valve replacement.  He is on baby aspirin.  He has no angina.  No signs of congestive heart failure.  Hyperlipidemia he is on atorvastatin 40 mg.  Is due for routine labs.  Patient is overweight however he is lost 13 pounds.  Patient was in [place] for most of the winter.  He was walking extensively.  Has a history of prostate cancer this is in remission.  He also has osteoporosis.  He is only taking calcium and vitamin D supplements.  Hemoglobin A1c in the office is 7.6.  Will defer to endocrinologist to adjust medication.  May need to increase Ozempic and/or metformin.  Patient is due for other labs await these results.      Review of Systems   Constitutional:  Negative for activity change, appetite change, chills, fatigue and fever.   HENT:  Negative for ear pain and sore throat.    Eyes:  Negative for visual disturbance.   Respiratory:  Negative for cough and shortness of breath.    Cardiovascular:  Negative for chest pain, palpitations and leg swelling.   Gastrointestinal:  Negative for abdominal pain, blood in stool, constipation, diarrhea and nausea.   Genitourinary:  Negative for difficulty urinating.   Musculoskeletal:  Negative for arthralgias, back pain and myalgias.   Skin:  Negative for rash.   Neurological:  Negative for dizziness, syncope and headaches.   Psychiatric/Behavioral:  Negative for self-injury, sleep disturbance and suicidal ideas. The patient is not nervous/anxious.        Objective   BP  "118/82 (BP Location: Right arm, Patient Position: Sitting, Cuff Size: Large)   Pulse 90   Temp (!) 97.3 °F (36.3 °C) (Temporal)   Ht 5' 8\" (1.727 m)   Wt 102 kg (225 lb 6.4 oz)   SpO2 94%   BMI 34.27 kg/m²      Physical Exam  Vitals and nursing note reviewed.   Constitutional:       General: He is not in acute distress.     Appearance: He is well-developed. He is obese. He is not ill-appearing.   HENT:      Head: Normocephalic and atraumatic.      Right Ear: Tympanic membrane, ear canal and external ear normal.      Left Ear: Tympanic membrane, ear canal and external ear normal.   Eyes:      Conjunctiva/sclera: Conjunctivae normal.      Pupils: Pupils are equal, round, and reactive to light.   Neck:      Thyroid: No thyromegaly.      Vascular: No carotid bruit.   Cardiovascular:      Rate and Rhythm: Normal rate and regular rhythm.      Pulses: Pulses are weak.           Dorsalis pedis pulses are 1+ on the right side and 2+ on the left side.      Heart sounds: Murmur heard.      Comments: avr  Pulmonary:      Effort: Pulmonary effort is normal.      Breath sounds: Normal breath sounds. No wheezing.   Abdominal:      General: Bowel sounds are normal.      Palpations: Abdomen is soft. There is no mass.      Tenderness: There is no abdominal tenderness.      Comments: Obese abd   Musculoskeletal:      Right lower leg: No edema.      Left lower leg: No edema.   Feet:      Right foot:      Skin integrity: Dry skin present. No ulcer or callus.      Left foot:      Skin integrity: Dry skin present. No ulcer or callus.   Lymphadenopathy:      Cervical: No cervical adenopathy.   Skin:     General: Skin is warm and dry.   Neurological:      General: No focal deficit present.      Mental Status: He is alert and oriented to person, place, and time.   Psychiatric:         Mood and Affect: Mood normal.         Behavior: Behavior normal.         Thought Content: Thought content normal.         Judgment: Judgment normal. "

## 2025-03-28 ENCOUNTER — APPOINTMENT (OUTPATIENT)
Dept: LAB | Facility: CLINIC | Age: 80
End: 2025-03-28
Payer: COMMERCIAL

## 2025-03-28 ENCOUNTER — RESULTS FOLLOW-UP (OUTPATIENT)
Dept: OTHER | Facility: HOSPITAL | Age: 80
End: 2025-03-28

## 2025-03-28 DIAGNOSIS — E11.65 TYPE 2 DIABETES MELLITUS WITH HYPERGLYCEMIA, WITHOUT LONG-TERM CURRENT USE OF INSULIN (HCC): ICD-10-CM

## 2025-03-28 DIAGNOSIS — C61 PROSTATE CANCER (HCC): ICD-10-CM

## 2025-03-28 LAB
ALBUMIN SERPL BCG-MCNC: 3.9 G/DL (ref 3.5–5)
ALP SERPL-CCNC: 136 U/L (ref 34–104)
ALT SERPL W P-5'-P-CCNC: 61 U/L (ref 7–52)
ANION GAP SERPL CALCULATED.3IONS-SCNC: 9 MMOL/L (ref 4–13)
AST SERPL W P-5'-P-CCNC: 70 U/L (ref 13–39)
BASOPHILS # BLD AUTO: 0.05 THOUSANDS/ÂΜL (ref 0–0.1)
BASOPHILS NFR BLD AUTO: 1 % (ref 0–1)
BILIRUB SERPL-MCNC: 0.61 MG/DL (ref 0.2–1)
BUN SERPL-MCNC: 23 MG/DL (ref 5–25)
CALCIUM SERPL-MCNC: 9.7 MG/DL (ref 8.4–10.2)
CHLORIDE SERPL-SCNC: 102 MMOL/L (ref 96–108)
CHOLEST SERPL-MCNC: 128 MG/DL (ref ?–200)
CO2 SERPL-SCNC: 28 MMOL/L (ref 21–32)
CREAT SERPL-MCNC: 1.18 MG/DL (ref 0.6–1.3)
CREAT UR-MCNC: 117.1 MG/DL
EOSINOPHIL # BLD AUTO: 0.28 THOUSAND/ÂΜL (ref 0–0.61)
EOSINOPHIL NFR BLD AUTO: 4 % (ref 0–6)
ERYTHROCYTE [DISTWIDTH] IN BLOOD BY AUTOMATED COUNT: 14.5 % (ref 11.6–15.1)
GFR SERPL CREATININE-BSD FRML MDRD: 58 ML/MIN/1.73SQ M
GLUCOSE P FAST SERPL-MCNC: 176 MG/DL (ref 65–99)
HCT VFR BLD AUTO: 39.8 % (ref 36.5–49.3)
HDLC SERPL-MCNC: 40 MG/DL
HGB BLD-MCNC: 12.6 G/DL (ref 12–17)
IMM GRANULOCYTES # BLD AUTO: 0.02 THOUSAND/UL (ref 0–0.2)
IMM GRANULOCYTES NFR BLD AUTO: 0 % (ref 0–2)
LDLC SERPL CALC-MCNC: 64 MG/DL (ref 0–100)
LYMPHOCYTES # BLD AUTO: 2.8 THOUSANDS/ÂΜL (ref 0.6–4.47)
LYMPHOCYTES NFR BLD AUTO: 35 % (ref 14–44)
MCH RBC QN AUTO: 30.3 PG (ref 26.8–34.3)
MCHC RBC AUTO-ENTMCNC: 31.7 G/DL (ref 31.4–37.4)
MCV RBC AUTO: 96 FL (ref 82–98)
MICROALBUMIN UR-MCNC: 30.8 MG/L
MICROALBUMIN/CREAT 24H UR: 26 MG/G CREATININE (ref 0–30)
MONOCYTES # BLD AUTO: 0.67 THOUSAND/ÂΜL (ref 0.17–1.22)
MONOCYTES NFR BLD AUTO: 8 % (ref 4–12)
NEUTROPHILS # BLD AUTO: 4.19 THOUSANDS/ÂΜL (ref 1.85–7.62)
NEUTS SEG NFR BLD AUTO: 52 % (ref 43–75)
NONHDLC SERPL-MCNC: 88 MG/DL
NRBC BLD AUTO-RTO: 0 /100 WBCS
PLATELET # BLD AUTO: 254 THOUSANDS/UL (ref 149–390)
PMV BLD AUTO: 10.9 FL (ref 8.9–12.7)
POTASSIUM SERPL-SCNC: 4.3 MMOL/L (ref 3.5–5.3)
PROT SERPL-MCNC: 7.8 G/DL (ref 6.4–8.4)
PSA SERPL-MCNC: <0.008 NG/ML (ref 0–4)
RBC # BLD AUTO: 4.16 MILLION/UL (ref 3.88–5.62)
SODIUM SERPL-SCNC: 139 MMOL/L (ref 135–147)
TRIGL SERPL-MCNC: 121 MG/DL (ref ?–150)
WBC # BLD AUTO: 8.01 THOUSAND/UL (ref 4.31–10.16)

## 2025-03-28 PROCEDURE — 82570 ASSAY OF URINE CREATININE: CPT

## 2025-03-28 PROCEDURE — 82043 UR ALBUMIN QUANTITATIVE: CPT

## 2025-03-31 ENCOUNTER — RESULTS FOLLOW-UP (OUTPATIENT)
Dept: ENDOCRINOLOGY | Facility: CLINIC | Age: 80
End: 2025-03-31

## 2025-04-01 ENCOUNTER — RESULTS FOLLOW-UP (OUTPATIENT)
Dept: FAMILY MEDICINE CLINIC | Facility: CLINIC | Age: 80
End: 2025-04-01

## 2025-04-01 NOTE — TELEPHONE ENCOUNTER
----- Message from Racquel Marr PA-C sent at 4/1/2025  7:18 AM EDT -----  Please inform  patient  that  his  labs  show  elevated liver enzymes.  This is  due  to fatty  liver.  Stricter diet and  weight loss

## 2025-04-10 NOTE — TELEPHONE ENCOUNTER
Patient called requesting refill for Atorvastatin 40 mg. Patient made aware medication was refilled on 3/26/25 for 90 with 1 refills to [facility] pharmacy. Patient instructed to contact the pharmacy to obtain refills of medication. Patient verbalized understanding.

## 2025-04-14 ENCOUNTER — OFFICE VISIT (OUTPATIENT)
Dept: UROLOGY | Facility: AMBULATORY SURGERY CENTER | Age: 80
End: 2025-04-14
Payer: COMMERCIAL

## 2025-04-14 VITALS
WEIGHT: 218 LBS | OXYGEN SATURATION: 92 % | DIASTOLIC BLOOD PRESSURE: 58 MMHG | SYSTOLIC BLOOD PRESSURE: 116 MMHG | HEART RATE: 88 BPM | BODY MASS INDEX: 33.04 KG/M2 | HEIGHT: 68 IN

## 2025-04-14 DIAGNOSIS — N52.1 ERECTILE DYSFUNCTION DUE TO DISEASES CLASSIFIED ELSEWHERE: ICD-10-CM

## 2025-04-14 DIAGNOSIS — E11.22 TYPE 2 DIABETES MELLITUS WITH DIABETIC CHRONIC KIDNEY DISEASE, UNSPECIFIED CKD STAGE, UNSPECIFIED WHETHER LONG TERM INSULIN USE (HCC): ICD-10-CM

## 2025-04-14 DIAGNOSIS — N48.83 ACQUIRED BURIED PENIS: ICD-10-CM

## 2025-04-14 DIAGNOSIS — C61 PROSTATE CANCER (HCC): Primary | ICD-10-CM

## 2025-04-14 PROCEDURE — 99213 OFFICE O/P EST LOW 20 MIN: CPT

## 2025-04-14 NOTE — ASSESSMENT & PLAN NOTE
History of prostate cancer status post brachytherapy completed 2013  Patient's most recent PSA was performed 3/28/2025 and found to be less than 0.008.  Refer to PSA trend below.  Discussed that the patient's PSA remains undetectable following his brachytherapy in 2013.  We discussed that patient can plan to repeat PSA in 1 year to continue to monitor it closely.  Patient will undergo repeat PSA testing in 1 year from his last with follow-up in the office that time to review.

## 2025-04-14 NOTE — PROGRESS NOTES
4/14/2025      Assessment and Plan    79 y.o. male managed by Dr. Chavez    Prostate cancer ([facility])  History of prostate cancer status post brachytherapy completed 2013  Patient's most recent PSA was performed 3/28/2025 and found to be less than 0.008.  Refer to PSA trend below.  Discussed that the patient's PSA remains undetectable following his brachytherapy in 2013.  We discussed that patient can plan to repeat PSA in 1 year to continue to monitor it closely.  Patient will undergo repeat PSA testing in 1 year from his last with follow-up in the office that time to review.    Acquired buried penis  Discussed weight loss and dietary recommendations as therapy for treatment of his buried penis    Erectile dysfunction due to diseases classified elsewhere  Previously controlled on Viagra 50 mg as needed 1 hour prior to sexual activity.  Patient reports that he is not currently sexually active, nor utilizing the medication.        History of Present Illness  Derick Blanton is a 79 y.o. male here for evaluation of history of prostate cancer status post brachytherapy completed 2013, erectile dysfunction, and acquired buried penis.  Patient was last seen in the office on 4/9/24.  Patient currently utilizes Viagra 50 mg as needed 1 hour prior to sexual activity for treatment of his erectile dysfunction.  Patient's most recent PSA was performed 3/28/2025 and found to be less than 0.008.  Patient was previously recommended weight loss and dietary changes for treatment of his buried penis as the surgery for this is quite complex.    Today, the patient reports that he is overall doing well.  Patient reports nocturia x 2 and urinates about 2-3 times during the day.  Patient notes that he has not been pursuing any sexual intercourse or intervention for his acquired buried penis as he is no longer sexually active.  Patient is content with not pursuing any further intervention at this time.  Patient offers no other lower urinary  "tract symptoms at today's office visit.        Review of Systems   Constitutional:  Negative for chills and fever.   HENT:  Negative for ear pain and sore throat.    Eyes:  Negative for pain and visual disturbance.   Respiratory:  Negative for cough and shortness of breath.    Cardiovascular:  Negative for chest pain and palpitations.   Gastrointestinal:  Negative for abdominal pain and vomiting.   Genitourinary:  Negative for decreased urine volume, difficulty urinating, dysuria, flank pain, frequency, hematuria and urgency.   Musculoskeletal:  Negative for arthralgias and back pain.   Skin:  Negative for color change and rash.   Neurological:  Negative for seizures and syncope.   All other systems reviewed and are negative.               Vitals  Vitals:    04/14/25 1129   BP: 116/58   BP Location: Left arm   Patient Position: Sitting   Cuff Size: Standard   Pulse: 88   SpO2: 92%   Weight: 98.9 kg (218 lb)   Height: 5' 8\" (1.727 m)       Physical Exam  Vitals reviewed.   Constitutional:       General: He is not in acute distress.     Appearance: Normal appearance. He is not ill-appearing.   HENT:      Head: Normocephalic and atraumatic.      Nose: Nose normal.   Eyes:      General: No scleral icterus.  Pulmonary:      Effort: No respiratory distress.   Abdominal:      General: Abdomen is flat. There is no distension.      Palpations: Abdomen is soft.      Tenderness: There is no abdominal tenderness.   Musculoskeletal:         General: Normal range of motion.      Cervical back: Normal range of motion.   Skin:     General: Skin is warm.      Coloration: Skin is not jaundiced.   Neurological:      Mental Status: He is alert and oriented to person, place, and time.      Gait: Gait normal.   Psychiatric:         Mood and Affect: Mood normal.         Behavior: Behavior normal.           Past History  Past Medical History:   Diagnosis Date    Arteriosclerotic coronary artery disease 11/30/2017    Benign prostate " hyperplasia     L.A....12   R....17      COPD (chronic obstructive pulmonary disease) (HCC)     questionable    Deep venous embolism and thrombosis (HCC)     Diabetes mellitus (HCC)     Hyperlipidemia     Hypertension     Impacted cerumen     unspecified laterity / L.A.....12       Osteoarthritis 2012    Prostate cancer (HCC)     Pulmonary embolism (HCC)      Social History     Socioeconomic History    Marital status: /Civil Union     Spouse name: None    Number of children: None    Years of education: None    Highest education level: None   Occupational History    None   Tobacco Use    Smoking status: Former     Current packs/day: 0.00     Types: Cigarettes     Start date:      Quit date: 3/23/2000     Years since quittin.0     Passive exposure: Past    Smokeless tobacco: Never   Vaping Use    Vaping status: Never Used   Substance and Sexual Activity    Alcohol use: Yes     Comment: social drinker rare    Drug use: No    Sexual activity: Yes   Other Topics Concern    None   Social History Narrative    Daily coffee consumption     Uses safety equipment seatbelts     Social Drivers of Health     Financial Resource Strain: Low Risk  (2023)    Overall Financial Resource Strain (CARDIA)     Difficulty of Paying Living Expenses: Not hard at all   Food Insecurity: No Food Insecurity (2024)    Nursing - Inadequate Food Risk Classification     Worried About Running Out of Food in the Last Year: Never true     Ran Out of Food in the Last Year: Never true     Ran Out of Food in the Last Year: Not on file   Transportation Needs: No Transportation Needs (2024)    PRAPARE - Transportation     Lack of Transportation (Medical): No     Lack of Transportation (Non-Medical): No   Physical Activity: Not on file   Stress: Not on file   Social Connections: Not on file   Intimate Partner Violence: Not on file   Housing Stability: Low Risk  (2024)    Housing Stability Vital Sign      Unable to Pay for Housing in the Last Year: No     Number of Times Moved in the Last Year: 0     Homeless in the Last Year: No     Social History     Tobacco Use   Smoking Status Former    Current packs/day: 0.00    Types: Cigarettes    Start date:     Quit date: 3/23/2000    Years since quittin.0    Passive exposure: Past   Smokeless Tobacco Never     Family History   Problem Relation Age of Onset    COPD Mother     Heart attack Mother     Heart attack Father        The following portions of the patient's history were reviewed and updated as appropriate: allergies, current medications, past medical history, past social history, past surgical history and problem list.    Results  No results found for this or any previous visit (from the past hour).]  Lab Results   Component Value Date    PSA <0.008 2025    PSA <0.1 2023    PSA <0.1 2022    PSA <0.1 2021     Lab Results   Component Value Date    GLUCOSE 209 (H) 2015    CALCIUM 9.7 2025     2015    K 4.3 2025    CO2 28 2025     2025    BUN 23 2025    CREATININE 1.18 2025     Lab Results   Component Value Date    WBC 8.01 2025    HGB 12.6 2025    HCT 39.8 2025    MCV 96 2025     2025

## 2025-04-14 NOTE — ASSESSMENT & PLAN NOTE
Previously controlled on Viagra 50 mg as needed 1 hour prior to sexual activity.  Patient reports that he is not currently sexually active, nor utilizing the medication.

## 2025-04-16 DIAGNOSIS — E11.65 UNCONTROLLED TYPE 2 DIABETES MELLITUS WITH HYPERGLYCEMIA (HCC): ICD-10-CM

## 2025-04-16 RX ORDER — SEMAGLUTIDE 1.34 MG/ML
INJECTION, SOLUTION SUBCUTANEOUS
Qty: 3 ML | Refills: 5 | Status: SHIPPED | OUTPATIENT
Start: 2025-04-16

## 2025-04-30 ENCOUNTER — OFFICE VISIT (OUTPATIENT)
Dept: ENDOCRINOLOGY | Facility: CLINIC | Age: 80
End: 2025-04-30
Payer: COMMERCIAL

## 2025-04-30 VITALS
HEIGHT: 68 IN | OXYGEN SATURATION: 95 % | HEART RATE: 86 BPM | BODY MASS INDEX: 33.49 KG/M2 | SYSTOLIC BLOOD PRESSURE: 128 MMHG | DIASTOLIC BLOOD PRESSURE: 72 MMHG | WEIGHT: 221 LBS

## 2025-04-30 DIAGNOSIS — Z95.1 S/P CABG (CORONARY ARTERY BYPASS GRAFT): ICD-10-CM

## 2025-04-30 DIAGNOSIS — I10 BENIGN ESSENTIAL HTN: ICD-10-CM

## 2025-04-30 DIAGNOSIS — E55.9 VITAMIN D DEFICIENCY: ICD-10-CM

## 2025-04-30 DIAGNOSIS — E66.9 OBESITY (BMI 35.0-39.9 WITHOUT COMORBIDITY): ICD-10-CM

## 2025-04-30 DIAGNOSIS — E11.22 TYPE 2 DIABETES MELLITUS WITH DIABETIC CHRONIC KIDNEY DISEASE, UNSPECIFIED CKD STAGE, UNSPECIFIED WHETHER LONG TERM INSULIN USE (HCC): ICD-10-CM

## 2025-04-30 DIAGNOSIS — M81.0 OSTEOPOROSIS WITHOUT CURRENT PATHOLOGICAL FRACTURE, UNSPECIFIED OSTEOPOROSIS TYPE: ICD-10-CM

## 2025-04-30 DIAGNOSIS — K75.81 NASH (NONALCOHOLIC STEATOHEPATITIS): ICD-10-CM

## 2025-04-30 DIAGNOSIS — E78.2 MIXED HYPERLIPIDEMIA: ICD-10-CM

## 2025-04-30 DIAGNOSIS — E11.65 TYPE 2 DIABETES MELLITUS WITH HYPERGLYCEMIA, WITHOUT LONG-TERM CURRENT USE OF INSULIN (HCC): Primary | ICD-10-CM

## 2025-04-30 PROCEDURE — 95251 CONT GLUC MNTR ANALYSIS I&R: CPT | Performed by: INTERNAL MEDICINE

## 2025-04-30 PROCEDURE — 99215 OFFICE O/P EST HI 40 MIN: CPT | Performed by: INTERNAL MEDICINE

## 2025-04-30 RX ORDER — BLOOD-GLUCOSE SENSOR
1 EACH MISCELLANEOUS
Qty: 2 EACH | Refills: 5 | Status: SHIPPED | OUTPATIENT
Start: 2025-04-30

## 2025-04-30 NOTE — ASSESSMENT & PLAN NOTE
BP Readings from Last 3 Encounters:   04/30/25 128/72   04/14/25 116/58   03/27/25 118/82   Blood pressure well-controlled, continue current management as per PCP

## 2025-04-30 NOTE — ASSESSMENT & PLAN NOTE
Continue current management, continue metformin Ozempic and glipizide  Lab Results   Component Value Date    HGBA1C 7.6 (A) 03/27/2025     Lab Results   Component Value Date    CREATININE 1.18 03/28/2025   Creatinine is stable

## 2025-04-30 NOTE — PROGRESS NOTES
Name: Derick Blanton      : 1945      MRN: [id number]  Encounter Provider: Paul Fraser MD  Encounter Date: 2025   Encounter department: [facility] [place]      :  Assessment & Plan  Type 2 diabetes mellitus with hyperglycemia, without long-term current use of insulin (HCC)    Lab Results   Component Value Date    HGBA1C 7.6 (A) 2025   A1c 7.6%, close to the goal.  Goal for A1c is 7 to 7.5%.  Continue Ozempic 1 mg once a week, glipizide XL 5 mg twice a day, metformin  mg 2 tablets with breakfast and 1 tablet with dinner.  Educated about hypoglycemia symptoms and treatment.  Discussed the importance of use of freestyle denny sensor for glucose monitoring and prevention of hypoglycemia and hyperglycemia    Orders:    Continuous Glucose Sensor (FreeStyle Denny 2 Plus Sensor) MISC; Use 1 each every 15 (fifteen) days    Basic metabolic panel; Future    Hemoglobin A1C; Future    Type 2 diabetes mellitus with diabetic chronic kidney disease, unspecified CKD stage, unspecified whether long term insulin use (HCC)  Continue current management, continue metformin Ozempic and glipizide  Lab Results   Component Value Date    HGBA1C 7.6 (A) 2025     Lab Results   Component Value Date    CREATININE 1.18 2025   Creatinine is stable           S/P CABG (coronary artery bypass graft)  Managed by cardiology.  Stable       Mixed hyperlipidemia  Currently managed on Lipitor 40 mg daily, continue current management       Osteoporosis without current pathological fracture, unspecified osteoporosis type  Patient is agreeable to start Prolia injection.  Discussed the side effects and benefits.  He was treated with bisphosphonate therapy for 5 years and was on drug holiday.  Recent DEXA scan from 2024 showed worsening of bone density.  Lumbar spine T-score -1.0, left total hip T-score -1.0, left femoral neck T-score -2.2, right total hip T-score  -1.1 suggestive of osteoporosis, statistically significant decrease in mineral density 3.7% in both hips.  10-year risk of hip fracture is 6.6% and 10-year risk of major osteoporotic fracture is 15.8%  Discussed with staff to start the paperwork and schedule appointment for Prolia injection in 2 weeks       Benign essential HTN  BP Readings from Last 3 Encounters:   04/30/25 128/72   04/14/25 116/58   03/27/25 118/82   Blood pressure well-controlled, continue current management as per PCP       Vitamin D deficiency  Discussed to take vitamin D3 2000 IU daily       Obesity (BMI 35.0-39.9 without comorbidity)  Continue glucagon like peptide therapy  Reinforced lifestyle modifications       YATES (nonalcoholic steatohepatitis)  Continue glucagon like peptide therapy  Reinforced lifestyle modifications         Assessment & Plan        History of Present Illness   History of Present Illness    Derick Blanton is a 79 y.o. male with type 2 diabetes seen in follow up. Reports complicatio CKD, coronary artery disease status post CABG  nonalcoholic steatohepatitis, CKD. Denies recent severe hypoglycemic or severe hyperglycemic episodes. Denies any issues with his current regimen. Last A1C was   Lab Results   Component Value Date    HGBA1C 7.6 (A) 03/27/2025     . Denies recent illness, hospitalization or steroid use.       CGM Interpretation:  Date Range: April 17, 2025 to April 30, 2025  Device used: Freestyle rosa maria 2  Option - Home use yes Professional Use- Physician Provided Equipment  Option - Indication: Type 2 Diabetes  Analysis of data:   Average Glucose: 175 mg per DL  GMI: 7.5%  Coefficient of Variation: 32.7%  Glucose Variability: 32.7%  Time in Target Range: 54%   Time Above Range: 33% high, 11% very high  Time Below Range: 2% low, 0% very low  Interpretation of data: Patient has pattern of elevated blood sugars specially after breakfast and after lunch.  He stated that he was going for melanoma surgery and was  asked to hold Ozempic for 2 weeks which could have contributed to elevated blood sugars after meals  More than 72 hours of data was reviewed. Report to be scanned to chart.     Current regimen:   Glipizide XL 5 mg twice a day, metformin 500 mg XR, 2 tablets with breakfast and 1 tablet with dinner, Ozempic 1 mg once a week    Last Eye Exam: Not on file  Last Foot Exam: 03/27/2025  Health Maintenance   Topic Date Due    Diabetic Eye Exam  04/30/2026 (Originally 2/25/2022)    Diabetic Foot Exam  03/27/2026     Pertinent Medical History   History of osteoporosis, was treated with bisphosphonate therapy for 5 years was on drug holiday.  He denies recent history of fractures or falls  He has history of microalbuminuria which is improved        Review of Systems   Constitutional:  Positive for activity change. Negative for diaphoresis, fatigue, fever and unexpected weight change.   HENT: Negative.     Eyes:  Negative for visual disturbance.   Respiratory:  Negative for cough, chest tightness and shortness of breath.    Cardiovascular:  Negative for chest pain, palpitations and leg swelling.   Gastrointestinal:  Negative for abdominal pain, blood in stool, constipation, diarrhea, nausea and vomiting.   Endocrine: Negative for cold intolerance, heat intolerance, polydipsia, polyphagia and polyuria.   Genitourinary:  Negative for dysuria, enuresis, frequency and urgency.   Musculoskeletal:  Negative for arthralgias and myalgias.   Skin:  Positive for wound. Negative for pallor and rash.   Allergic/Immunologic: Negative.    Neurological:  Negative for dizziness, tremors, weakness and numbness.   Hematological: Negative.    Psychiatric/Behavioral: Negative.      as per HPI    Current Outpatient Medications on File Prior to Visit   Medication Sig Dispense Refill    aspirin 81 MG tablet Take 81 mg by mouth daily      atorvastatin (LIPITOR) 40 mg tablet TAKE 1 TABLET BY MOUTH EVERY DAY 90 tablet 1    Blood Pressure Monitoring ([employer]  Blood Pressure Monitor) MISC Use 3 (three) times a week 1 each 0    Cholecalciferol (VITAMIN D3) 2000 units capsule Take 2,000 Units by mouth daily  11    clotrimazole-betamethasone (LOTRISONE) 1-0.05 % cream APPLY TO AFFECTED AREA TWICE A DAY BY TOPICAL ROUTE      Coenzyme Q10 (COQ-10) 100 MG CAPS Take 100 mg by mouth daily      Continuous Blood Gluc  (FreeStyle Denny 2 [place]) ADELAIDA USE AS DIRECTED 4 TIMES A DAY 1 each 0    Continuous Glucose Sensor (FreeStyle Denny 2 Sensor) MISC Use daily as directed for CGM - Change every 14 days 6 each 1    fluorouracil (EFUDEX) 5 % cream APPLY TWICE A DAY TO SCALP FOR 1 MONTH 40 g 0    glipiZIDE (GLUCOTROL XL) 5 mg 24 hr tablet TAKE 1 TABLET BY MOUTH 2 TIMES A DAY WITH MEALS. 180 tablet 1    lisinopril (ZESTRIL) 2.5 mg tablet TAKE 1 TABLET BY MOUTH EVERY DAY 90 tablet 1    metFORMIN (GLUCOPHAGE-XR) 500 mg 24 hr tablet Take 2 tablets with breakfast and 1 tablet with dinner 270 tablet 1    Multiple Vitamin-Folic Acid TABS Take by mouth in the morning      Omega-3 Fatty Acids (FISH OIL) 1,000 mg Take 1,000 mg by mouth daily.      semaglutide, 1 mg/dose, (Ozempic, 1 MG/DOSE,) 4 mg/3 mL injection pen INJECT 1 MG SUBCUTANEOUSLY ONCE A WEEK 3 mL 5    sildenafil (VIAGRA) 100 mg tablet Take 0.5 tablets (50 mg total) by mouth daily as needed for erectile dysfunction 5 tablet 5    tretinoin (RETIN-A) 0.025 % cream       Blood Glucose Monitoring Suppl (ONE TOUCH ULTRA 2) w/Device KIT by Does not apply route daily DX E11.65  Pt to   Check  fbs  daily (Patient not taking: Reported on 3/27/2025) 1 each 0    senna-docusate sodium (SENOKOT S) 8.6-50 mg per tablet Take 1 tablet by mouth 2 (two) times a day (Patient not taking: Reported on 11/2/2023)  0     No current facility-administered medications on file prior to visit.         Medical History Reviewed by provider this encounter:     .    Objective   /72 (BP Location: Left arm, Patient Position: Sitting, Cuff Size: Large)    "Pulse 86   Ht 5' 8\" (1.727 m)   Wt 100 kg (221 lb)   SpO2 95%   BMI 33.60 kg/m²      Body mass index is 33.6 kg/m².  Wt Readings from Last 3 Encounters:   04/30/25 100 kg (221 lb)   04/14/25 98.9 kg (218 lb)   03/27/25 102 kg (225 lb 6.4 oz)     Physical Exam    Physical Exam  Constitutional:       Appearance: Normal appearance.   Cardiovascular:      Pulses: Normal pulses.      Heart sounds: Normal heart sounds.   Pulmonary:      Effort: Pulmonary effort is normal.      Breath sounds: Normal breath sounds.   Musculoskeletal:         General: Normal range of motion.      Cervical back: Normal range of motion and neck supple.   Skin:     General: Skin is warm.   Neurological:      General: No focal deficit present.      Mental Status: He is alert and oriented to person, place, and time.       Results    Labs:     Component      Latest Ref Rng 3/28/2025   Sodium      135 - 147 mmol/L 139    Potassium      3.5 - 5.3 mmol/L 4.3    Chloride      96 - 108 mmol/L 102    Carbon Dioxide      21 - 32 mmol/L 28    ANION GAP      4 - 13 mmol/L 9    BUN      5 - 25 mg/dL 23    Creatinine      0.60 - 1.30 mg/dL 1.18    GLUCOSE, FASTING      65 - 99 mg/dL 176 (H)    Calcium      8.4 - 10.2 mg/dL 9.7    AST      13 - 39 U/L 70 (H)    ALT      7 - 52 U/L 61 (H)    ALK PHOS      34 - 104 U/L 136 (H)    Total Protein      6.4 - 8.4 g/dL 7.8    Albumin      3.5 - 5.0 g/dL 3.9    Total Bilirubin      0.20 - 1.00 mg/dL 0.61    GFR, Calculated      ml/min/1.73sq m 58    Cholesterol      See Comment mg/dL 128    Triglycerides      See Comment mg/dL 121    HDL      >=40 mg/dL 40    LDL Calculated      0 - 100 mg/dL 64    Non-HDL Cholesterol      mg/dl 88    EXT Creatinine Urine      Reference range not established. mg/dL 117.1    Albumin,U,Random      <20.0 mg/L 30.8 (H)    Albumin Creat Ratio      0 - 30 mg/g creatinine 26         Lab Results   Component Value Date    HGBA1C 7.6 (A) 03/27/2025    HGBA1C 7.9 (H) 09/24/2024    HGBA1C 8.5 " (H) 04/04/2024     Lab Results   Component Value Date    CREATININE 1.18 03/28/2025    CREATININE 1.09 09/24/2024    CREATININE 1.05 04/04/2024    BUN 23 03/28/2025     09/14/2015    K 4.3 03/28/2025     03/28/2025    CO2 28 03/28/2025     eGFR   Date Value Ref Range Status   03/28/2025 58 ml/min/1.73sq m Final     Lab Results   Component Value Date    CHOL 208 09/08/2015    HDL 40 03/28/2025    TRIG 121 03/28/2025     Lab Results   Component Value Date    ALT 61 (H) 03/28/2025    AST 70 (H) 03/28/2025    ALKPHOS 136 (H) 03/28/2025    BILITOT 0.34 11/23/2015     Lab Results   Component Value Date    [id number] 1.667 11/21/2018       There are no Patient Instructions on file for this visit.    Discussed with the patient and all questioned fully answered. He will call me if any problems arise.    Administrative Statements     I have spent a total time of 40 minutes in caring for this patient on the day of the visit/encounter including Diagnostic results, Prognosis, Risks and benefits of tx options, Instructions for management, Patient and family education, Importance of tx compliance, Risk factor reductions, Impressions, Counseling / Coordination of care, Documenting in the medical record, Reviewing/placing orders in the medical record (including tests, medications, and/or procedures), and Obtaining or reviewing history  .

## 2025-04-30 NOTE — ASSESSMENT & PLAN NOTE
Patient is agreeable to start Prolia injection.  Discussed the side effects and benefits.  He was treated with bisphosphonate therapy for 5 years and was on drug holiday.  Recent DEXA scan from November 2024 showed worsening of bone density.  Lumbar spine T-score -1.0, left total hip T-score -1.0, left femoral neck T-score -2.2, right total hip T-score -1.1 suggestive of osteoporosis, statistically significant decrease in mineral density 3.7% in both hips.  10-year risk of hip fracture is 6.6% and 10-year risk of major osteoporotic fracture is 15.8%  Discussed with staff to start the paperwork and schedule appointment for Prolia injection in 2 weeks

## 2025-04-30 NOTE — ASSESSMENT & PLAN NOTE
Lab Results   Component Value Date    HGBA1C 7.6 (A) 03/27/2025   A1c 7.6%, close to the goal.  Goal for A1c is 7 to 7.5%.  Continue Ozempic 1 mg once a week, glipizide XL 5 mg twice a day, metformin  mg 2 tablets with breakfast and 1 tablet with dinner.  Educated about hypoglycemia symptoms and treatment.  Discussed the importance of use of freestyle denny sensor for glucose monitoring and prevention of hypoglycemia and hyperglycemia    Orders:    Continuous Glucose Sensor (FreeStyle Denny 2 Plus Sensor) MISC; Use 1 each every 15 (fifteen) days    Basic metabolic panel; Future    Hemoglobin A1C; Future

## 2025-05-07 ENCOUNTER — TELEPHONE (OUTPATIENT)
Dept: ENDOCRINOLOGY | Facility: CLINIC | Age: 80
End: 2025-05-07

## 2025-05-07 ENCOUNTER — OFFICE VISIT (OUTPATIENT)
Dept: CARDIOLOGY CLINIC | Facility: MEDICAL CENTER | Age: 80
End: 2025-05-07
Payer: COMMERCIAL

## 2025-05-07 VITALS
HEIGHT: 68 IN | SYSTOLIC BLOOD PRESSURE: 120 MMHG | HEART RATE: 95 BPM | WEIGHT: 222 LBS | OXYGEN SATURATION: 96 % | BODY MASS INDEX: 33.65 KG/M2 | DIASTOLIC BLOOD PRESSURE: 60 MMHG

## 2025-05-07 DIAGNOSIS — E66.9 OBESITY (BMI 30-39.9): ICD-10-CM

## 2025-05-07 DIAGNOSIS — Z95.1 S/P CABG (CORONARY ARTERY BYPASS GRAFT): ICD-10-CM

## 2025-05-07 DIAGNOSIS — Z95.2 HISTORY OF AORTIC VALVE REPLACEMENT: ICD-10-CM

## 2025-05-07 DIAGNOSIS — G47.19 EXCESSIVE DAYTIME SLEEPINESS: ICD-10-CM

## 2025-05-07 DIAGNOSIS — E78.5 HYPERLIPIDEMIA, UNSPECIFIED HYPERLIPIDEMIA TYPE: ICD-10-CM

## 2025-05-07 DIAGNOSIS — E11.65 TYPE 2 DIABETES MELLITUS WITH HYPERGLYCEMIA, WITHOUT LONG-TERM CURRENT USE OF INSULIN (HCC): ICD-10-CM

## 2025-05-07 DIAGNOSIS — I10 HYPERTENSION, UNSPECIFIED TYPE: ICD-10-CM

## 2025-05-07 DIAGNOSIS — I25.10 ARTERIOSCLEROTIC CORONARY ARTERY DISEASE: Primary | ICD-10-CM

## 2025-05-07 PROCEDURE — 93000 ELECTROCARDIOGRAM COMPLETE: CPT | Performed by: INTERNAL MEDICINE

## 2025-05-07 PROCEDURE — 99214 OFFICE O/P EST MOD 30 MIN: CPT | Performed by: INTERNAL MEDICINE

## 2025-05-07 RX ORDER — ATORVASTATIN CALCIUM 80 MG/1
80 TABLET, FILM COATED ORAL DAILY
Qty: 30 TABLET | Refills: 3 | Status: SHIPPED | OUTPATIENT
Start: 2025-05-07

## 2025-05-07 NOTE — PROGRESS NOTES
Cardiology Follow Up Visit       Derick Blanton   [id number]  1945      [facility]  [address]  [place] [address]    Primary Care Physician:   Racquel Marr PA-C    Reason for Follow Up Visit / Principal Problem:  Mr. Derick Blanton is a 79 y.o. male and former smoker with a PMH significant for but not limited to CAD (s/p CABG), AS (s/p AVR), PE (post surgical, remote), HTN, HLD, DMII, YATES, Prostate Cancer (s/p brachytherapy/hormone deprivation therapy), Osteoporosis and Obesity.  He presents to clinic for routine Follow Up.    Assessment & Plan   Frequent PACs , etiology unclear at this time  Patient is asymptomatic of palpitations or near-syncope at this time, EKG today with slight increase in QRS from baseline  Will proceed with TTE, Cont supportive measures, valsalva maneuver, hydration maintenance, as well as stimulant avoidance  Monitor routinely for symptomatic changes, record vitals if symptoms recur, ed/clinic precautions reviewed    CAD, s/p 1V-CABG ([facility]A-[facility]AD, 2017, Dr. Kellogg)  Tolerating medical management, Notes of resting right cp (different from his original sob that was associated with his CAD diagnosis and CABG), No prior cardiac rehab at that time, LDL increased from baseline  Will proceed with cardiac rehab, Cont GDMT for CAD on asa/statin on increased statin  Monitor routinely for symptomatic changes, Record BP/HR and log if symptoms return, ED/Clinic precautions reviewed    AS, s/p 23mm [place] Bioc[employer] AVR , normal function by TTE on 09/2017  Asymptomatic at this time, No reports of progressive lay, chest pain, near syncope, or fatigue, Patient states that they are at their baseline, TTE on 09/06/23 with nml function  Repeat TTE at this time  Monitor closely for symptomatic changes    HLD, recent FLP:  /  / HDL 40 / LDL 64 on 03/28/25  Well controlled, Tolerating statin without significant adverse  reactions  Check FLP in 3 months, cont current medication regimen, cont counseling on diet and lifestyle modification  Monitor FLP routinely as ordered by PCP, will follow peripherally    HTN, long-standing   No home BP log for review, but clinic SBP in the 115-125 mmHg range  Cont current medication regimen, cont counseling on low-sodium diet, BP log given, Review the importance of maintaining a home BP log  Monitor BP at home routinely and review log on next visit    DM Type II, most recent A1c 7.6 on 03/27/25  Tolerating medical mgmt without progressive symptoms  Cont current medication regimen  Monitor Blood Sugar/A1c routinely as ordered by Endocrinology, will follow peripherally    Overweight, Body mass index is 33.75 kg/m².  Weight has been down-trending overall: down 20 lbs over the last year  Cont to review the importance of diet and lifestyle modification  Will monitor routinely at this time    Discussion / Summary:  Precautions and reasons to call our office or proceed to ER were discussed in detail. Patient expressed understanding and questions were answered. Plan on follow up in-clinic in 4 months.    Office Visit Diagnosis:  1. Arteriosclerotic coronary artery disease  POCT ECG    Ambulatory  Referral to Cardiac Rehabilitation      2. S/P CABG (coronary artery bypass graft)  Ambulatory  Referral to Cardiac Rehabilitation      3. History of aortic valve replacement  Echo complete w/ contrast if indicated      4. Hypertension, unspecified type        5. Hyperlipidemia, unspecified hyperlipidemia type  atorvastatin (LIPITOR) 80 mg tablet    Lipid Panel with Direct LDL reflex      6. Type 2 diabetes mellitus with hyperglycemia, without long-term current use of insulin (HCC)        7. Obesity (BMI 30-39.9)        8. Excessive daytime sleepiness  Ambulatory Referral to Sleep Medicine          Interval History:  Mr. Blanton was originally seen in clinic on 05/09/23 for for Preoperative Risk Stratification.   He'd been at his baseline state of health: independent of adl's, retired from Management at [employer] , and exercising regularly with 2-mile walks every other day , when he  decided in April to reschedule a colonoscopy he'd had to postpone two years ago.   He admitted to being lost to CV follow up after his cardiologist retired.  He'd previously been followed closely for progressive AS, and in 2017 underwent successful 1V-CABG/AVR on 08/04/17.  His last visit with Dr. Trice FARAH appears to have been shortly thereafter.  On review of his medications, he was previously on carvedilol and lisinopril in 2018, but the patient was uncertain if he was taking those medications at that time. As he was asymptomatic, we decided to cont GDMT for CAD on asa/statin and continued risk factor reduction with an ACEi for htn mgmt. During our 11/02/23 follow up visit, he stated that he was doing well overall. He noted difficulty adjusting to the sudden loss of his daughter in Aug 2023, but otherwise had done well clinically. We made no changes to his medical management.     Since our last visit, he's been well overall though he notes a resting right cp that randomly comes and goes. His wife notes that he has become more sedentary and sleeps more than he has in the past.  He admits that originally after his CABG he'd been exercising daily, but over the last few months had stopped his routine.  He also notes undergoing several skin cancer procedures of late with good healing to his back, scalp and leg. He currently denies any anginal cp, diaphoresis, n/v, sob, lay, palpitations, near syncope, syncope, orthopnea, pnd, or ble edema. He notes inconsistent control of his diet and exercise habits. He reports a diet that is somewhat balanced but has room for improvement and exercise that is inconsistent but with plans for plans for improvement. He is otherwise compliant with medications but does not maintain a detailed BP log.  Of note,  the patient's cardiac risk factor(s) include: advanced age, male gender, hypertension, dyslipidemia, diabetes mellitus, and obesity.     Subjective   Review of Systems   Constitutional: Negative for chills and fever.   HENT:  Negative for congestion and sore throat.    Eyes:  Negative for blurred vision and pain.   Cardiovascular:  Positive for orthopnea (sleeps in a recliner with the TV on, but can sleep laying down). Negative for chest pain, dyspnea on exertion, leg swelling, near-syncope, palpitations, paroxysmal nocturnal dyspnea and syncope.   Respiratory:  Positive for sleep disturbances due to breathing and snoring (that's wife sleeps downstairs). Negative for cough, shortness of breath and wheezing.    Hematologic/Lymphatic: Negative for bleeding problem. Bruises/bleeds easily.   Skin:  Positive for skin cancer (s/p basal cancer removal on back and melanoma on leg). Negative for itching and rash.        s/p left skin biopsy lesion (bandaged) with f/u appointment on 5/16/23   Musculoskeletal:  Negative for back pain.   Gastrointestinal:  Negative for abdominal pain, constipation, diarrhea, nausea and vomiting.   Genitourinary:  Negative for dysuria and hematuria.   Neurological:  Negative for light-headedness, numbness and paresthesias.   Psychiatric/Behavioral:  Negative for depression. The patient is not nervous/anxious.      The following portions of the patient's history were reviewed and updated as appropriate: past medical history, past social history, past family history, past surgical history, current medications, allergies, past surgical history and problem list.  Past Medical History:   Diagnosis Date   • Arteriosclerotic coronary artery disease 11/30/2017   • Benign prostate hyperplasia     L.A....7/17/12   R....8/30/17     • COPD (chronic obstructive pulmonary disease) (HCC)     questionable   • Deep venous embolism and thrombosis (HCC)    • Diabetes mellitus (HCC)    • Hyperlipidemia    •  Hypertension    • Impacted cerumen     unspecified laterity / L.A.....12      • Osteoarthritis 2012   • Prostate cancer (HCC)    • Pulmonary embolism (HCC)      Social History     Socioeconomic History   • Marital status: /Civil Union     Spouse name: Not on file   • Number of children: Not on file   • Years of education: Not on file   • Highest education level: Not on file   Occupational History   • Not on file   Tobacco Use   • Smoking status: Former     Current packs/day: 0.00     Types: Cigarettes     Start date:      Quit date: 3/23/2000     Years since quittin.1     Passive exposure: Past   • Smokeless tobacco: Never   Vaping Use   • Vaping status: Never Used   Substance and Sexual Activity   • Alcohol use: Yes     Comment: social drinker rare   • Drug use: No   • Sexual activity: Yes   Other Topics Concern   • Not on file   Social History Narrative    Daily coffee consumption     Uses safety equipment seatbelts     Social Drivers of Health     Financial Resource Strain: Low Risk  (2023)    Overall Financial Resource Strain (CARDIA)    • Difficulty of Paying Living Expenses: Not hard at all   Food Insecurity: No Food Insecurity (2024)    Nursing - Inadequate Food Risk Classification    • Worried About Running Out of Food in the Last Year: Never true    • Ran Out of Food in the Last Year: Never true    • Ran Out of Food in the Last Year: Not on file   Transportation Needs: No Transportation Needs (2024)    PRAPARE - Transportation    • Lack of Transportation (Medical): No    • Lack of Transportation (Non-Medical): No   Physical Activity: Not on file   Stress: Not on file   Social Connections: Not on file   Intimate Partner Violence: Not on file   Housing Stability: Low Risk  (2024)    Housing Stability Vital Sign    • Unable to Pay for Housing in the Last Year: No    • Number of Times Moved in the Last Year: 0    • Homeless in the Last Year: No     Family History    Problem Relation Age of Onset   • COPD Mother    • Heart attack Mother    • Heart attack Father      Past Surgical History:   Procedure Laterality Date   • CARDIAC VALVE REPLACEMENT     • COLONOSCOPY     • CORONARY ARTERY BYPASS GRAFT     • FRACTURE SURGERY      left knee surgery   • JOINT REPLACEMENT Left     tkr   • KNEE ARTHROSCOPY      Therapeutic   • KNEE SURGERY      replacement   • LUNG SURGERY     • LUNG SURGERY      wedge resection    • TONSILLECTOMY     • UMBILICAL HERNIA REPAIR         Current Outpatient Medications:   •  atorvastatin (LIPITOR) 80 mg tablet, Take 1 tablet (80 mg total) by mouth daily, Disp: 30 tablet, Rfl: 3  •  aspirin 81 MG tablet, Take 81 mg by mouth daily, Disp: , Rfl:   •  Blood Glucose Monitoring Suppl (ONE TOUCH ULTRA 2) w/Device KIT, by Does not apply route daily DX E11.65  Pt to   Check  fbs  daily (Patient not taking: Reported on 3/27/2025), Disp: 1 each, Rfl: 0  •  Blood Pressure Monitoring ([employer] Blood Pressure Monitor) MISC, Use 3 (three) times a week, Disp: 1 each, Rfl: 0  •  Cholecalciferol (VITAMIN D3) 2000 units capsule, Take 2,000 Units by mouth daily, Disp: , Rfl: 11  •  clotrimazole-betamethasone (LOTRISONE) 1-0.05 % cream, APPLY TO AFFECTED AREA TWICE A DAY BY TOPICAL ROUTE, Disp: , Rfl:   •  Coenzyme Q10 (COQ-10) 100 MG CAPS, Take 100 mg by mouth daily, Disp: , Rfl:   •  Continuous Blood Gluc  (FreeStyle Denny 2 [place]) [facility], USE AS DIRECTED 4 TIMES A DAY, Disp: 1 each, Rfl: 0  •  Continuous Glucose Sensor (FreeStyle Denny 2 Plus Sensor) MISC, Use 1 each every 15 (fifteen) days, Disp: 2 each, Rfl: 5  •  Continuous Glucose Sensor (FreeStyle Denny 2 Sensor) MISC, Use daily as directed for CGM - Change every 14 days, Disp: 6 each, Rfl: 1  •  fluorouracil (EFUDEX) 5 % cream, APPLY TWICE A DAY TO SCALP FOR 1 MONTH, Disp: 40 g, Rfl: 0  •  glipiZIDE (GLUCOTROL XL) 5 mg 24 hr tablet, TAKE 1 TABLET BY MOUTH 2 TIMES A DAY WITH MEALS., Disp: 180 tablet, Rfl: 1  •   lisinopril (ZESTRIL) 2.5 mg tablet, TAKE 1 TABLET BY MOUTH EVERY DAY, Disp: 90 tablet, Rfl: 1  •  metFORMIN (GLUCOPHAGE-XR) 500 mg 24 hr tablet, Take 2 tablets with breakfast and 1 tablet with dinner, Disp: 270 tablet, Rfl: 1  •  Multiple Vitamin-Folic Acid TABS, Take by mouth in the morning, Disp: , Rfl:   •  Omega-3 Fatty Acids (FISH OIL) 1,000 mg, Take 1,000 mg by mouth daily., Disp: , Rfl:   •  semaglutide, 1 mg/dose, (Ozempic, 1 MG/DOSE,) 4 mg/3 mL injection pen, INJECT 1 MG SUBCUTANEOUSLY ONCE A WEEK, Disp: 3 mL, Rfl: 5  •  senna-docusate sodium (SENOKOT S) 8.6-50 mg per tablet, Take 1 tablet by mouth 2 (two) times a day (Patient not taking: Reported on 11/2/2023), Disp: , Rfl: 0  •  sildenafil (VIAGRA) 100 mg tablet, Take 0.5 tablets (50 mg total) by mouth daily as needed for erectile dysfunction, Disp: 5 tablet, Rfl: 5  •  tretinoin (RETIN-A) 0.025 % cream, , Disp: , Rfl:   No Known Allergies  Patient Active Problem List   Diagnosis   • History of aortic valve replacement   • Arteriosclerotic coronary artery disease   • Benign essential HTN   • Type 2 diabetes mellitus with hyperglycemia, without long-term current use of insulin ([facility])   • Hyperlipidemia   • YATES (nonalcoholic steatohepatitis)   • Class 2 severe obesity with serious comorbidity and body mass index (BMI) of 36.0 to 36.9 in adult ([facility])   • Osteoarthritis   • Osteoporosis   • Prostate cancer ([facility])   • Erectile dysfunction due to diseases classified elsewhere   • Vitamin D deficiency   • Cubital tunnel syndrome on right   • Post-nasal drip   • Erectile dysfunction due to arterial insufficiency   • Benign essential microscopic hematuria   • S/P CABG (coronary artery bypass graft)   • Acquired buried penis   • Type 2 diabetes mellitus with diabetic chronic kidney disease, unspecified CKD stage, unspecified whether long term insulin use ([facility])       Objective     Vitals:    05/07/25 1426   BP: 120/60   BP Location: Left arm   Patient Position:  "Sitting   Cuff Size: Adult   Pulse: 95   SpO2: 96%   Weight: 101 kg (222 lb)   Height: 5' 8\" (1.727 m)     Body mass index is 33.75 kg/m².    Physical Exam  Constitutional:       General: He is not in acute distress.     Appearance: Normal appearance. He is obese. He is not toxic-appearing.   HENT:      Head: Normocephalic and atraumatic.      Right Ear: External ear normal.      Left Ear: External ear normal.      Nose: Nose normal.   Eyes:      General: No scleral icterus.        Right eye: No discharge.         Left eye: No discharge.   Neck:      Vascular: No carotid bruit.   Cardiovascular:      Rate and Rhythm: Normal rate. Rhythm irregular.      Pulses: Normal pulses.      Heart sounds: No murmur heard.  Pulmonary:      Effort: Pulmonary effort is normal. No respiratory distress.      Breath sounds: Normal breath sounds. No wheezing or rales.   Musculoskeletal:      Cervical back: Neck supple.      Right lower leg: No edema.      Left lower leg: No edema.   Skin:     General: Skin is warm and dry.      Capillary Refill: Capillary refill takes less than 2 seconds.   Neurological:      General: No focal deficit present.      Mental Status: He is alert and oriented to person, place, and time.   Psychiatric:         Mood and Affect: Mood normal.       Labs:  Lab Results   Component Value Date     09/14/2015     09/08/2015    K 4.3 03/28/2025    K 4.1 09/24/2024    K 4.4 04/04/2024    K 3.5 09/14/2015    K 4.3 09/08/2015     03/28/2025     09/24/2024     04/04/2024     09/14/2015     09/08/2015    CO2 28 03/28/2025    CO2 26 09/24/2024    CO2 26 04/04/2024    CO2 28.1 09/14/2015    CO2 28.8 09/08/2015    BUN 23 03/28/2025    BUN 22 09/24/2024    BUN 17 04/04/2024    BUN 18 09/14/2015    BUN 21 09/08/2015    CREATININE 1.18 03/28/2025    CREATININE 1.09 09/24/2024    CREATININE 1.05 04/04/2024    CREATININE 1.16 09/14/2015    CREATININE 1.15 09/08/2015    EGFR 58 03/28/2025 "    EGFR 64 09/24/2024    EGFR 68 04/04/2024    GLUC 192 (H) 08/26/2021    GLUC 222 (H) 08/26/2021    GLUC 294 (H) 09/19/2018    AST 70 (H) 03/28/2025    AST 53 (H) 04/04/2024    AST 46 (H) 03/22/2023     (H) 11/23/2015     (H) 09/14/2015     (H) 09/08/2015    ALT 61 (H) 03/28/2025    ALT 46 04/04/2024    ALT 51 03/22/2023     (H) 11/23/2015     (H) 09/14/2015     (H) 09/08/2015    TBILI 0.61 03/28/2025    TBILI 0.70 04/04/2024    TBILI 0.73 03/22/2023    ALB 3.9 03/28/2025    ALB 4.0 04/04/2024    ALB 4.1 03/22/2023    ALB 3.4 (L) 11/23/2015    ALB 3.4 (L) 09/14/2015    ALB 3.4 (L) 09/08/2015    CALCIUM 9.7 03/28/2025    CALCIUM 9.6 09/24/2024    CALCIUM 9.6 04/04/2024    CALCIUM 9.2 09/14/2015    CALCIUM 9.4 09/08/2015      Lab Results   Component Value Date    WBC 8.01 03/28/2025    WBC 9.89 04/04/2024    WBC 10.87 (H) 08/26/2021    WBC 7.33 09/14/2015    HGB 12.6 03/28/2025    HGB 12.2 04/04/2024    HGB 11.7 (L) 08/26/2021    HGB 12.2 09/14/2015    HCT 39.8 03/28/2025    HCT 39.3 04/04/2024    HCT 35.4 (L) 08/26/2021    HCT 36.5 09/14/2015     03/28/2025     04/04/2024     08/26/2021     09/14/2015      Lab Results   Component Value Date    CHOLESTEROL 128 03/28/2025    CHOLESTEROL 116 04/04/2024    CHOLESTEROL 127 03/22/2023    TRIG 121 03/28/2025    TRIG 140 04/04/2024    TRIG 132 03/22/2023    TRIG 163 09/08/2015    HDL 40 03/28/2025    HDL 37 (L) 04/04/2024    HDL 41 03/22/2023    HDL 39 09/08/2015    LDLCALC 64 03/28/2025    LDLCALC 51 04/04/2024    LDLCALC 60 03/22/2023    LDLCALC 136 (H) 09/08/2015     Lab Results   Component Value Date    HGBA1C 7.6 (A) 03/27/2025    HGBA1C 7.9 (H) 09/24/2024    HGBA1C 8.5 (H) 04/04/2024    HGBA1C 8.8 (A) 12/28/2023    HGBA1C 8.1 (A) 09/13/2023    HGBA1C 7.5 (H) 03/22/2023    HGBA1C 8.3 (H) 09/08/2015    HGBA1C 7.6 (H) 05/01/2014    HGBA1C 7.8 (H) 01/17/2014    GLUF 176 (H) 03/28/2025    GLUF 174 (H)  "09/24/2024    GLUF 147 (H) 04/04/2024    GLUF 158 (H) 05/01/2014    POCGLU 187 (H) 08/26/2021    POCGLU 206 (H) 08/26/2021     No results found for: \"TSH\", \"FT4\"  No results found for: \"HSTNI0\", \"HSTNI2\", \"HSTNI4\", \"TROPONINI\"  No results found for: \"BNP\", \"NTBNP\"     Imaging:  I have personally reviewed pertinent reports.  No results found.    Cardiac Studies:  EKG:   Date: 05/07/25, normal sinus rhythm, rbbb, sep infarct age undetermined  Date: 05/09/23, normal sinus rhythm, frequent PACS (when compared to prior EKG)  Date: 08/26/21, normal sinus rhythm, LVH with Repolarization Abnormality, possible septal infarct age undetermined  Date: 08/03/17, normal sinus rhythm, repolarization abnormality suggistive of ischemia  Date: 08/07/17, normal sinus rhythm, probable antsep infarct old, repol abnormalities suggest ischemia  Tele:   No results found for this or any previous visit.     Holter:   No results found for this or any previous visit.    Recent Device Check:  No results found for this or any previous visit.    Previous EPS/Interventions:  No results found for this or any previous visit.    Previous Cath/PCI:  Results for testing completed on the encounter of  08/02/17  Study: Left Heart Catheterization  Interpreted Summary  99% LAD stenosis    Previous STRESS TEST:  Results for testing completed on the encounter of 10/13/15  Study: Echo exercise stress test  Interpreted Summary  The findings are consistent with significant aortic valve stenosis with  secondary moderate to severe concentric LVH and impaired diastolic LV  relaxation.  Unfortunately, inadequate doppler velocity measurements were taken for aortic  valve area calculation. Patient will be contacted to come in for those  measurements, which will follow separately.  SUMMARY  LEFT VENTRICLE:  Systolic function was hyperdynamic. Ejection fraction was estimated in the  range of 65 % to 70 %.  There were no regional wall motion abnormalities.  There was " moderate to severe concentric hypertrophy.  Doppler parameters were consistent with abnormal left ventricular relaxation.  MITRAL VALVE:  There was trace regurgitation.  AORTIC VALVE:  The valve was trileaflet. Leaflets exhibited moderately to markedly increased  thickness, moderate to marked calcification, markedly reduced cuspal  separation, and reduced mobility. There was moderate to severe stenosis by  visual assessment, but insufficient doppler measurements for aortic valve area  calculation.  TRICUSPID VALVE:  There was trace regurgitation.    ECHO:  Results for orders placed during the hospital encounter of 09/06/23  Echo complete w/ contrast if indicated  Interpretation Summary  •  Left Ventricle: Left ventricular cavity size is normal. Wall thickness is normal. The left ventricular ejection fraction is 60%. Systolic function is normal. Wall motion is normal. Diastolic function is mildly abnormal, consistent with grade I (abnormal) relaxation.  •  Left Atrium: The atrium is dilated.  •  Right Atrium: The atrium is dilated.  •  Aortic Valve: There is a bioprosthetic valve. There is no evidence of paravalvular regurgitation. There is trace transvalvular regurgitation. The aortic valve has no significant stenosis. The gradient recorded across the prosthetic aortic valve is within the expected range. The aortic valve peak velocity is 2.34 m/s. The aortic valve peak gradient is 22 mmHg. The aortic valve mean gradient is 11 mmHg. The dimensionless velocity index is 0.38.  •  Mitral Valve: There is mild regurgitation.  •  Aorta: The aortic root is normal in size. The ascending aorta is mildly dilated. The ascending aorta is 3.7 cm.    Results for testing completed on the encounter of 09/12/17  Study: Transthoracic Echo  Interpreted Summary      Results for testing completed on the encounter of  10/19/15  Study: Transthoracic Echo  Interpreted Summary  SUMMARY  LEFT VENTRICLE:  Systolic function was normal. Ejection  fraction was estimated in the range of  55 % to 65 %.  There were no regional wall motion abnormalities.  MITRAL VALVE:  There was mild annular calcification.  There was trace regurgitation.  AORTIC VALVE:  There was moderate to severe stenosis.  TRICUSPID VALVE:  There was mild regurgitation.     LISA:  No results found for this or any previous visit.    CMR:  No results found for this or any previous visit.    Counseling / Coordination of Care:  During our visit, I spent 20 minutes with the patient, and greater than 50% of this time was spent on counseling and coordination of care, including addressing diagnostic results, prognosis, risks and benefits of treatment options, instructions for management, patient/family education, importance of treatment compliance, along with risk factor reductions.    Dictation Disclaimer:  This note was completed in part utilizing [employer] direct voice recognition software. Grammatical errors, random word insertion, spelling mistakes, and incomplete sentences may be an occasional consequence of the system secondary to software limitations, ambient noise and hardware issues. At the time of dictation, efforts were made to edit, clarify and /or correct errors.  Please read the chart carefully and recognize, using context, where substitutions have occurred.  If you have any questions or concerns about the context, text or information contained within the body of this dictation, please contact myself, the provider, for further clarification.     Cassi Erickson, DO 05/07/25

## 2025-05-14 ENCOUNTER — CLINICAL SUPPORT (OUTPATIENT)
Dept: ENDOCRINOLOGY | Facility: CLINIC | Age: 80
End: 2025-05-14
Payer: COMMERCIAL

## 2025-05-14 VITALS
OXYGEN SATURATION: 95 % | HEART RATE: 99 BPM | DIASTOLIC BLOOD PRESSURE: 64 MMHG | BODY MASS INDEX: 33.75 KG/M2 | SYSTOLIC BLOOD PRESSURE: 114 MMHG | HEIGHT: 68 IN

## 2025-05-14 DIAGNOSIS — M81.0 OSTEOPOROSIS WITHOUT CURRENT PATHOLOGICAL FRACTURE, UNSPECIFIED OSTEOPOROSIS TYPE: Primary | ICD-10-CM

## 2025-05-14 PROCEDURE — 96372 THER/PROPH/DIAG INJ SC/IM: CPT

## 2025-05-14 NOTE — PROGRESS NOTES
"Assessment/Plan:    Derick Blanton came into the [facility] Endocrinology Office today 05/14/25 to receive Prolia injection.      Verbal consent obtained.  Consent given by: patient    patient states patient has been medically healthy with no underlining concerns/complications.      Derick Blanton presents with no symptoms today.       All insturctions were reviewed with the patient.    If the patient should have any questions/concerns, advised patient to contacted [facility] Endocrinology Office.       Subjective:     History provided by: patient    Patient ID: Derick Blanton is a 79 y.o. male      Objective:    Vitals:    05/14/25 1119   BP: 114/64   Pulse: 99   SpO2: 95%   Height: 5' 8\" (1.727 m)       Patient tolerated the injection well without any complications.  Injection site/s Left arm.  Medication was provided by Office.    Patient signed consent form yes   Patient signed ABN form yes (If no patient is not a medicare patient).   Patient waited 15 minutes after injection yes (This only applies to patient's receiving first time injection).       Last Visit: 5/7/2025  Next visit:Visit date not found     "

## 2025-05-15 ENCOUNTER — HOSPITAL ENCOUNTER (OUTPATIENT)
Dept: NON INVASIVE DIAGNOSTICS | Facility: CLINIC | Age: 80
Discharge: HOME | End: 2025-05-15
Attending: INTERNAL MEDICINE
Payer: COMMERCIAL

## 2025-05-15 VITALS
DIASTOLIC BLOOD PRESSURE: 64 MMHG | HEIGHT: 68 IN | SYSTOLIC BLOOD PRESSURE: 114 MMHG | BODY MASS INDEX: 33.65 KG/M2 | HEART RATE: 99 BPM | WEIGHT: 222 LBS

## 2025-05-15 DIAGNOSIS — Z95.2 HISTORY OF AORTIC VALVE REPLACEMENT: ICD-10-CM

## 2025-05-15 LAB
AORTIC ROOT: 3 CM
AORTIC VALVE MEAN VELOCITY: 16.2 M/S
ASCENDING AORTA: 3.9 CM
AV AREA BY CONTINUOUS VTI: 1.7 CM2
AV AREA PEAK VELOCITY: 1.5 CM2
AV LVOT MEAN GRADIENT: 2 MMHG
AV LVOT PEAK GRADIENT: 3 MMHG
AV MEAN PRESS GRAD SYS DOP V1V2: 12 MMHG
AV ORIFICE AREA US: 1.71 CM2
AV PEAK GRADIENT: 20 MMHG
AV VELOCITY RATIO: 0.41
AV VMAX SYS DOP: 2.26 M/S
BSA FOR ECHO PROCEDURE: 2.14 M2
DOP CALC AO VTI: 41.26 CM
DOP CALC LVOT AREA: 4.15 CM2
DOP CALC LVOT CARDIAC INDEX: 2.71 L/MIN/M2
DOP CALC LVOT CARDIAC OUTPUT: 5.8 L/MIN
DOP CALC LVOT DIAMETER: 2.3 CM
DOP CALC LVOT PEAK VEL VTI: 16.95 CM
DOP CALC LVOT PEAK VEL: 0.82 M/S
DOP CALC LVOT STROKE INDEX: 32.2 ML/M2
DOP CALC LVOT STROKE VOLUME: 70.39
E WAVE DECELERATION TIME: 210 MS
E/A RATIO: 0.84
FRACTIONAL SHORTENING: 33 (ref 28–44)
INTERVENTRICULAR SEPTUM IN DIASTOLE (PARASTERNAL SHORT AXIS VIEW): 1.3 CM
INTERVENTRICULAR SEPTUM: 1.3 CM (ref 0.6–1.1)
LAAS-AP2: 26 CM2
LAAS-AP4: 30.6 CM2
LEFT ATRIUM SIZE: 4.8 CM
LEFT ATRIUM VOLUME (MOD BIPLANE): 92 ML
LEFT ATRIUM VOLUME INDEX (MOD BIPLANE): 43 ML/M2
LEFT INTERNAL DIMENSION IN SYSTOLE: 3.1 CM (ref 2.1–4)
LEFT VENTRICLE DIASTOLIC VOLUME (MOD BIPLANE): 82 ML
LEFT VENTRICLE DIASTOLIC VOLUME INDEX (MOD BIPLANE): 38.3 ML/M2
LEFT VENTRICLE SYSTOLIC VOLUME (MOD BIPLANE): 37 ML
LEFT VENTRICLE SYSTOLIC VOLUME INDEX (MOD BIPLANE): 17.3 ML/M2
LEFT VENTRICULAR INTERNAL DIMENSION IN DIASTOLE: 4.6 CM (ref 3.5–6)
LEFT VENTRICULAR POSTERIOR WALL IN END DIASTOLE: 1.4 CM
LEFT VENTRICULAR STROKE VOLUME: 59 ML
LV EF BIPLANE MOD: 55 %
LV EF US.2D.A4C+ESTIMATED: 60 %
LVSV (TEICH): 59 ML
MV E'TISSUE VEL-SEP: 7 CM/S
MV PEAK A VEL: 0.57 M/S
MV PEAK E VEL: 48 CM/S
MV STENOSIS PRESSURE HALF TIME: 61 MS
MV VALVE AREA P 1/2 METHOD: 3.61
RIGHT ATRIUM AREA SYSTOLE A4C: 21.6 CM2
RIGHT VENTRICLE ID DIMENSION: 3.7 CM
SL CV LEFT ATRIUM LENGTH A2C: 7 CM
SL CV LV EF: 55
SL CV PED ECHO LEFT VENTRICLE DIASTOLIC VOLUME (MOD BIPLANE) 2D: 97 ML
SL CV PED ECHO LEFT VENTRICLE SYSTOLIC VOLUME (MOD BIPLANE) 2D: 38 ML
TRICUSPID ANNULAR PLANE SYSTOLIC EXCURSION: 1.7 CM

## 2025-05-15 PROCEDURE — 93306 TTE W/DOPPLER COMPLETE: CPT

## 2025-05-15 PROCEDURE — 93306 TTE W/DOPPLER COMPLETE: CPT | Performed by: INTERNAL MEDICINE

## 2025-05-22 ENCOUNTER — RESULTS FOLLOW-UP (OUTPATIENT)
Dept: CARDIOLOGY CLINIC | Facility: MEDICAL CENTER | Age: 80
End: 2025-05-22

## 2025-05-23 NOTE — RESULT ENCOUNTER NOTE
Sal HornSima Blanton,    Thank you for completing your imaging study. I have reviewed your Echocardiogram, and your findings have been stable. These results are reassuring. At this time, I do not have any further testing for you to complete. We can review your findings in-person at our next visit. Should you have questions or concerns, don't hesitate to reach out via [employer] or via Phone at [phone].     Very best,

## 2025-06-01 DIAGNOSIS — E78.5 HYPERLIPIDEMIA, UNSPECIFIED HYPERLIPIDEMIA TYPE: ICD-10-CM

## 2025-06-01 RX ORDER — ATORVASTATIN CALCIUM 80 MG/1
80 TABLET, FILM COATED ORAL DAILY
Qty: 90 TABLET | Refills: 0 | Status: SHIPPED | OUTPATIENT
Start: 2025-06-01

## 2025-06-05 ENCOUNTER — TELEPHONE (OUTPATIENT)
Dept: CARDIAC REHAB | Facility: CLINIC | Age: 80
End: 2025-06-05

## 2025-06-28 DIAGNOSIS — E11.65 TYPE 2 DIABETES MELLITUS WITH HYPERGLYCEMIA, WITHOUT LONG-TERM CURRENT USE OF INSULIN (HCC): ICD-10-CM

## 2025-06-30 RX ORDER — METFORMIN HYDROCHLORIDE 500 MG/1
TABLET, EXTENDED RELEASE ORAL
Qty: 270 TABLET | Refills: 1 | Status: SHIPPED | OUTPATIENT
Start: 2025-06-30

## 2025-07-07 ENCOUNTER — TELEPHONE (OUTPATIENT)
Age: 80
End: 2025-07-07

## 2025-07-07 NOTE — TELEPHONE ENCOUNTER
Patient's spouse called asking if Primary Care Provider is willing to return her call. She states her and Ortiz unfortunately found out that he has liver cancer while they were on vacation in [facility]. She is asking for a referral to an oncologist as soon as possible, so they can schedule something when they return home. He is currently in the hospital [facility] Emergency Department.

## 2025-07-08 ENCOUNTER — TELEPHONE (OUTPATIENT)
Age: 80
End: 2025-07-08

## 2025-07-08 NOTE — TELEPHONE ENCOUNTER
Patient is calling requesting a referral.  Derick is requesting from PCP an oncology referral. Patient states he was hospitalized in [place] and has been discharged yesterday.    Please advise and contact patient  Thank you.

## 2025-07-08 NOTE — TELEPHONE ENCOUNTER
PCP rescponded in previous encounter, she wants to see him when he comes back to [place]. LMOM for wife to call back

## 2025-07-09 ENCOUNTER — TRANSITIONAL CARE MANAGEMENT (OUTPATIENT)
Dept: FAMILY MEDICINE CLINIC | Facility: CLINIC | Age: 80
End: 2025-07-09

## 2025-07-09 ENCOUNTER — TELEPHONE (OUTPATIENT)
Age: 80
End: 2025-07-09

## 2025-07-09 NOTE — TELEPHONE ENCOUNTER
Mary from [facility] Case management called asking if pt had a TCM appt to follow up his discharge from [facility] on 7/7. No TCM appt found. If pt is scheduled please fax discharge summary to [phone]. Also per Mary pt is also missing a screening for HTN and diabetes and requested this is discussed at his next OV.

## 2025-07-16 DIAGNOSIS — E11.65 TYPE 2 DIABETES MELLITUS WITH HYPERGLYCEMIA, WITHOUT LONG-TERM CURRENT USE OF INSULIN (HCC): ICD-10-CM

## 2025-07-16 RX ORDER — GLIPIZIDE 5 MG/1
5 TABLET, FILM COATED, EXTENDED RELEASE ORAL 2 TIMES DAILY WITH MEALS
Qty: 180 TABLET | Refills: 1 | Status: SHIPPED | OUTPATIENT
Start: 2025-07-16